# Patient Record
Sex: FEMALE | Race: WHITE | NOT HISPANIC OR LATINO | Employment: UNEMPLOYED | URBAN - METROPOLITAN AREA
[De-identification: names, ages, dates, MRNs, and addresses within clinical notes are randomized per-mention and may not be internally consistent; named-entity substitution may affect disease eponyms.]

---

## 2022-05-12 ENCOUNTER — OFFICE VISIT (OUTPATIENT)
Dept: URGENT CARE | Facility: CLINIC | Age: 47
End: 2022-05-12
Payer: MEDICAID

## 2022-05-12 VITALS
RESPIRATION RATE: 16 BRPM | OXYGEN SATURATION: 98 % | TEMPERATURE: 98 F | HEART RATE: 88 BPM | WEIGHT: 259 LBS | DIASTOLIC BLOOD PRESSURE: 96 MMHG | SYSTOLIC BLOOD PRESSURE: 157 MMHG

## 2022-05-12 DIAGNOSIS — R22.31 LOCALIZED SWELLING ON RIGHT HAND: ICD-10-CM

## 2022-05-12 DIAGNOSIS — T63.461A WASP STING, ACCIDENTAL OR UNINTENTIONAL, INITIAL ENCOUNTER: Primary | ICD-10-CM

## 2022-05-12 PROCEDURE — 4010F ACE/ARB THERAPY RXD/TAKEN: CPT | Mod: CPTII,S$GLB,, | Performed by: STUDENT IN AN ORGANIZED HEALTH CARE EDUCATION/TRAINING PROGRAM

## 2022-05-12 PROCEDURE — 3077F PR MOST RECENT SYSTOLIC BLOOD PRESSURE >= 140 MM HG: ICD-10-PCS | Mod: CPTII,S$GLB,, | Performed by: STUDENT IN AN ORGANIZED HEALTH CARE EDUCATION/TRAINING PROGRAM

## 2022-05-12 PROCEDURE — 3077F SYST BP >= 140 MM HG: CPT | Mod: CPTII,S$GLB,, | Performed by: STUDENT IN AN ORGANIZED HEALTH CARE EDUCATION/TRAINING PROGRAM

## 2022-05-12 PROCEDURE — 3080F DIAST BP >= 90 MM HG: CPT | Mod: CPTII,S$GLB,, | Performed by: STUDENT IN AN ORGANIZED HEALTH CARE EDUCATION/TRAINING PROGRAM

## 2022-05-12 PROCEDURE — 99203 PR OFFICE/OUTPT VISIT, NEW, LEVL III, 30-44 MIN: ICD-10-PCS | Mod: S$GLB,,, | Performed by: STUDENT IN AN ORGANIZED HEALTH CARE EDUCATION/TRAINING PROGRAM

## 2022-05-12 PROCEDURE — 99203 OFFICE O/P NEW LOW 30 MIN: CPT | Mod: S$GLB,,, | Performed by: STUDENT IN AN ORGANIZED HEALTH CARE EDUCATION/TRAINING PROGRAM

## 2022-05-12 PROCEDURE — 1159F PR MEDICATION LIST DOCUMENTED IN MEDICAL RECORD: ICD-10-PCS | Mod: CPTII,S$GLB,, | Performed by: STUDENT IN AN ORGANIZED HEALTH CARE EDUCATION/TRAINING PROGRAM

## 2022-05-12 PROCEDURE — 3080F PR MOST RECENT DIASTOLIC BLOOD PRESSURE >= 90 MM HG: ICD-10-PCS | Mod: CPTII,S$GLB,, | Performed by: STUDENT IN AN ORGANIZED HEALTH CARE EDUCATION/TRAINING PROGRAM

## 2022-05-12 PROCEDURE — 1160F RVW MEDS BY RX/DR IN RCRD: CPT | Mod: CPTII,S$GLB,, | Performed by: STUDENT IN AN ORGANIZED HEALTH CARE EDUCATION/TRAINING PROGRAM

## 2022-05-12 PROCEDURE — 1159F MED LIST DOCD IN RCRD: CPT | Mod: CPTII,S$GLB,, | Performed by: STUDENT IN AN ORGANIZED HEALTH CARE EDUCATION/TRAINING PROGRAM

## 2022-05-12 PROCEDURE — 4010F PR ACE/ARB THEARPY RXD/TAKEN: ICD-10-PCS | Mod: CPTII,S$GLB,, | Performed by: STUDENT IN AN ORGANIZED HEALTH CARE EDUCATION/TRAINING PROGRAM

## 2022-05-12 PROCEDURE — 1160F PR REVIEW ALL MEDS BY PRESCRIBER/CLIN PHARMACIST DOCUMENTED: ICD-10-PCS | Mod: CPTII,S$GLB,, | Performed by: STUDENT IN AN ORGANIZED HEALTH CARE EDUCATION/TRAINING PROGRAM

## 2022-05-12 RX ORDER — FAMOTIDINE 40 MG/1
40 TABLET, FILM COATED ORAL DAILY
Qty: 5 TABLET | Refills: 0 | Status: SHIPPED | OUTPATIENT
Start: 2022-05-12 | End: 2023-08-21

## 2022-05-12 RX ORDER — DEXAMETHASONE SODIUM PHOSPHATE 4 MG/ML
8 INJECTION, SOLUTION INTRA-ARTICULAR; INTRALESIONAL; INTRAMUSCULAR; INTRAVENOUS; SOFT TISSUE
Status: COMPLETED | OUTPATIENT
Start: 2022-05-12 | End: 2022-05-12

## 2022-05-12 RX ADMIN — DEXAMETHASONE SODIUM PHOSPHATE 8 MG: 4 INJECTION, SOLUTION INTRA-ARTICULAR; INTRALESIONAL; INTRAMUSCULAR; INTRAVENOUS; SOFT TISSUE at 09:05

## 2022-05-12 NOTE — PROGRESS NOTES
Subjective:       Patient ID: Brande Reyer is a 46 y.o. female.    Vitals:  weight is 117.5 kg (259 lb). Her temperature is 97.7 °F (36.5 °C). Her blood pressure is 157/96 (abnormal) and her pulse is 88. Her respiration is 16 and oxygen saturation is 98%.     Chief Complaint: Insect Bite    Patient is a 46-year-old female with past medical history of hypertension, hyperlipidemia, type 2 diabetes, and chronic kidney disease who presents to clinic for evaluation of insect sting.  Patient reports insect sting occurred yesterday.  Patient states she was stung by a wasp on the back of her right hand.  Patient states sting occurred while cleaning her pool.  Patient states has been taking Zyrtec and Claritin with some relief to symptoms.  Patient states symptoms are not completely resolved with this.  Patient states she primarily experiences slight pain to hand however more so swelling to the right hand.  Patient states she does not believe it is infected but needs something to help with the swelling.  Patient states does not have any abnormal sensation to include numbness or tingling or any skin discoloration including redness or bruising.  Patient states does have some decreased  strength but states this is due to the swelling.  Patient states has been stung by wasp in the past and has had similar reactions.  Patient denies any feeling of oral swelling, difficulty speaking or swallowing, excessive drooling, sore throat, fever or chills, chest pain or palpitations, shortness of breath or cough, abnormal breath sounds, headaches or dizziness.    Insect Bite  This is a new problem. The current episode started yesterday. Associated symptoms include arthralgias (Right hand) and joint swelling (Right hand). Pertinent negatives include no abdominal pain, chest pain, chills, congestion, coughing, diaphoresis, fatigue, fever, headaches, nausea, neck pain, numbness, rash, sore throat or vomiting. Nothing aggravates the  symptoms. She has tried nothing for the symptoms.       Constitution: Negative. Negative for chills, sweating, fatigue and fever.   HENT: Negative.  Negative for ear pain, drooling, facial swelling, congestion, sore throat, trouble swallowing and voice change.    Neck: neck negative. Negative for neck pain, neck stiffness, painful lymph nodes and neck swelling.   Cardiovascular: Negative.  Negative for chest pain and palpitations.   Eyes: Negative.    Respiratory: Negative.  Negative for chest tightness, cough, shortness of breath, stridor and wheezing.    Gastrointestinal: Negative.  Negative for abdominal pain, nausea, vomiting and diarrhea.   Endocrine: negative.   Genitourinary: Negative.  Negative for dysuria, frequency and urgency.   Musculoskeletal: Positive for joint pain (Right hand) and joint swelling (Right hand).   Skin: Negative.  Negative for color change, pale, rash, erythema, bruising and hives.   Allergic/Immunologic: Negative.  Negative for hives.   Neurological: Negative.  Negative for dizziness, light-headedness, passing out, headaches, disorientation, altered mental status, numbness and tingling.   Hematologic/Lymphatic: Negative.  Negative for swollen lymph nodes.   Psychiatric/Behavioral: Negative.  Negative for altered mental status, disorientation and confusion.       Objective:      Physical Exam   Constitutional: She is oriented to person, place, and time. She appears well-developed. She is cooperative.  Non-toxic appearance. She does not appear ill. No distress.   HENT:   Head: Normocephalic and atraumatic.   Ears:   Right Ear: Hearing, tympanic membrane, external ear and ear canal normal.   Left Ear: Hearing, tympanic membrane, external ear and ear canal normal.   Nose: Nose normal. No mucosal edema, rhinorrhea, nasal deformity or congestion. No epistaxis. Right sinus exhibits no maxillary sinus tenderness and no frontal sinus tenderness. Left sinus exhibits no maxillary sinus tenderness  and no frontal sinus tenderness.   Mouth/Throat: Uvula is midline, oropharynx is clear and moist and mucous membranes are normal. Mucous membranes are moist. No trismus in the jaw. Normal dentition. No uvula swelling. No oropharyngeal exudate or posterior oropharyngeal erythema. Oropharynx is clear.   Eyes: Conjunctivae and lids are normal. Pupils are equal, round, and reactive to light. Right eye exhibits no discharge. Left eye exhibits no discharge. No scleral icterus.   Neck: Trachea normal and phonation normal. Neck supple. No neck rigidity present.   Cardiovascular: Normal rate, regular rhythm, normal heart sounds and normal pulses.   Pulmonary/Chest: Effort normal and breath sounds normal. No stridor. No respiratory distress. She has no wheezes. She has no rhonchi. She has no rales.   Abdominal: Normal appearance and bowel sounds are normal. She exhibits no distension. Soft. There is no abdominal tenderness.   Musculoskeletal: Normal range of motion.         General: No deformity. Normal range of motion.      Cervical back: She exhibits no tenderness.      Right hand: She exhibits tenderness (Mild) and swelling (Moderate). She exhibits normal capillary refill. Normal sensation noted. Decreased strength (Mild) noted.   Lymphadenopathy:     She has no cervical adenopathy.   Neurological: She is alert and oriented to person, place, and time. She exhibits normal muscle tone. Coordination normal.   Skin: Skin is warm, dry, intact, not diaphoretic, not pale and no rash. Capillary refill takes less than 2 seconds. No erythema   Psychiatric: Her speech is normal and behavior is normal. Judgment and thought content normal.   Nursing note and vitals reviewed.        Assessment:       1. Wasp sting, accidental or unintentional, initial encounter    2. Localized swelling on right hand          Plan:         Wasp sting, accidental or unintentional, initial encounter    Localized swelling on right hand    Other orders  -      dexamethasone injection 8 mg  -     famotidine (PEPCID) 40 MG tablet; Take 1 tablet (40 mg total) by mouth once daily. for 5 days  Dispense: 5 tablet; Refill: 0                 Patient with recent labs x3 days ago on May 9, 2022 were A1c returned at 5.5.  Discussed risk versus benefits of steroid with patient.  Patient requesting continued steroid injection for wasp sting.  Patient reports will monitor her blood sugars for the next 3-5 days more frequent than prior.  Decadron 8 mg IM in clinic.  Patient tolerated well.  No complications noted.  Take medications as prescribed.  Recommend continued use of Zyrtec and Claritin as directed by PCP.  Recommend elevation of right hand above level of heart.  Recommend use of ice as directed.  Tylenol per package instructions for any pain or fever.  Follow-up with PCP in 1-2 days.  Return to clinic as needed.  To ED for any new or acutely worsening symptoms.  Patient in agreement with plan of care.    DISCLAIMER: Please note that my documentation in this Electronic Healthcare Record was produced using speech recognition software and therefore may contain errors related to that software system.These could include grammar, punctuation and spelling errors or the inclusion/exclusion of phrases that were not intended. Garbled syntax, mangled pronouns, and other bizarre constructions may be attributed to that software system.

## 2022-06-17 ENCOUNTER — OFFICE VISIT (OUTPATIENT)
Dept: URGENT CARE | Facility: CLINIC | Age: 47
End: 2022-06-17
Payer: MEDICAID

## 2022-06-17 VITALS
DIASTOLIC BLOOD PRESSURE: 89 MMHG | SYSTOLIC BLOOD PRESSURE: 157 MMHG | BODY MASS INDEX: 49.34 KG/M2 | HEART RATE: 103 BPM | RESPIRATION RATE: 18 BRPM | WEIGHT: 251.31 LBS | HEIGHT: 60 IN | OXYGEN SATURATION: 96 % | TEMPERATURE: 100 F

## 2022-06-17 DIAGNOSIS — U07.1 COVID-19: ICD-10-CM

## 2022-06-17 DIAGNOSIS — U07.1 COVID-19 VIRUS DETECTED: ICD-10-CM

## 2022-06-17 DIAGNOSIS — R05.9 COUGH: Primary | ICD-10-CM

## 2022-06-17 LAB
CTP QC/QA: YES
SARS-COV-2 AG RESP QL IA.RAPID: POSITIVE

## 2022-06-17 PROCEDURE — 1159F MED LIST DOCD IN RCRD: CPT | Mod: CPTII,S$GLB,, | Performed by: STUDENT IN AN ORGANIZED HEALTH CARE EDUCATION/TRAINING PROGRAM

## 2022-06-17 PROCEDURE — 99214 PR OFFICE/OUTPT VISIT, EST, LEVL IV, 30-39 MIN: ICD-10-PCS | Mod: S$GLB,,, | Performed by: STUDENT IN AN ORGANIZED HEALTH CARE EDUCATION/TRAINING PROGRAM

## 2022-06-17 PROCEDURE — 3079F DIAST BP 80-89 MM HG: CPT | Mod: CPTII,S$GLB,, | Performed by: STUDENT IN AN ORGANIZED HEALTH CARE EDUCATION/TRAINING PROGRAM

## 2022-06-17 PROCEDURE — 3077F PR MOST RECENT SYSTOLIC BLOOD PRESSURE >= 140 MM HG: ICD-10-PCS | Mod: CPTII,S$GLB,, | Performed by: STUDENT IN AN ORGANIZED HEALTH CARE EDUCATION/TRAINING PROGRAM

## 2022-06-17 PROCEDURE — 87811 SARS-COV-2 COVID19 W/OPTIC: CPT | Mod: QW,S$GLB,, | Performed by: STUDENT IN AN ORGANIZED HEALTH CARE EDUCATION/TRAINING PROGRAM

## 2022-06-17 PROCEDURE — 87811 SARS CORONAVIRUS 2 ANTIGEN POCT, MANUAL READ: ICD-10-PCS | Mod: QW,S$GLB,, | Performed by: STUDENT IN AN ORGANIZED HEALTH CARE EDUCATION/TRAINING PROGRAM

## 2022-06-17 PROCEDURE — 4010F ACE/ARB THERAPY RXD/TAKEN: CPT | Mod: CPTII,S$GLB,, | Performed by: STUDENT IN AN ORGANIZED HEALTH CARE EDUCATION/TRAINING PROGRAM

## 2022-06-17 PROCEDURE — 1159F PR MEDICATION LIST DOCUMENTED IN MEDICAL RECORD: ICD-10-PCS | Mod: CPTII,S$GLB,, | Performed by: STUDENT IN AN ORGANIZED HEALTH CARE EDUCATION/TRAINING PROGRAM

## 2022-06-17 PROCEDURE — 3077F SYST BP >= 140 MM HG: CPT | Mod: CPTII,S$GLB,, | Performed by: STUDENT IN AN ORGANIZED HEALTH CARE EDUCATION/TRAINING PROGRAM

## 2022-06-17 PROCEDURE — 1160F PR REVIEW ALL MEDS BY PRESCRIBER/CLIN PHARMACIST DOCUMENTED: ICD-10-PCS | Mod: CPTII,S$GLB,, | Performed by: STUDENT IN AN ORGANIZED HEALTH CARE EDUCATION/TRAINING PROGRAM

## 2022-06-17 PROCEDURE — 3008F BODY MASS INDEX DOCD: CPT | Mod: CPTII,S$GLB,, | Performed by: STUDENT IN AN ORGANIZED HEALTH CARE EDUCATION/TRAINING PROGRAM

## 2022-06-17 PROCEDURE — 3008F PR BODY MASS INDEX (BMI) DOCUMENTED: ICD-10-PCS | Mod: CPTII,S$GLB,, | Performed by: STUDENT IN AN ORGANIZED HEALTH CARE EDUCATION/TRAINING PROGRAM

## 2022-06-17 PROCEDURE — 99214 OFFICE O/P EST MOD 30 MIN: CPT | Mod: S$GLB,,, | Performed by: STUDENT IN AN ORGANIZED HEALTH CARE EDUCATION/TRAINING PROGRAM

## 2022-06-17 PROCEDURE — 3079F PR MOST RECENT DIASTOLIC BLOOD PRESSURE 80-89 MM HG: ICD-10-PCS | Mod: CPTII,S$GLB,, | Performed by: STUDENT IN AN ORGANIZED HEALTH CARE EDUCATION/TRAINING PROGRAM

## 2022-06-17 PROCEDURE — 4010F PR ACE/ARB THEARPY RXD/TAKEN: ICD-10-PCS | Mod: CPTII,S$GLB,, | Performed by: STUDENT IN AN ORGANIZED HEALTH CARE EDUCATION/TRAINING PROGRAM

## 2022-06-17 PROCEDURE — 1160F RVW MEDS BY RX/DR IN RCRD: CPT | Mod: CPTII,S$GLB,, | Performed by: STUDENT IN AN ORGANIZED HEALTH CARE EDUCATION/TRAINING PROGRAM

## 2022-06-17 RX ORDER — AMOXICILLIN 875 MG/1
875 TABLET, FILM COATED ORAL EVERY 12 HOURS
Qty: 14 TABLET | Refills: 0 | Status: SHIPPED | OUTPATIENT
Start: 2022-06-17 | End: 2022-06-24

## 2022-06-17 RX ORDER — BENZONATATE 200 MG/1
200 CAPSULE ORAL 3 TIMES DAILY PRN
Qty: 30 CAPSULE | Refills: 0 | Status: SHIPPED | OUTPATIENT
Start: 2022-06-17 | End: 2022-06-27

## 2022-06-17 RX ORDER — SEVELAMER CARBONATE 800 MG/1
800 TABLET, FILM COATED ORAL 3 TIMES DAILY
COMMUNITY
Start: 2022-06-06

## 2022-06-17 RX ORDER — VIT B COMP NO.3/FOLIC/C/BIOTIN 1 MG-60 MG
1 TABLET ORAL EVERY MORNING
COMMUNITY
Start: 2022-06-08

## 2022-06-17 RX ORDER — CETIRIZINE HYDROCHLORIDE 10 MG/1
TABLET ORAL
COMMUNITY
Start: 2022-01-18

## 2022-06-17 RX ORDER — MISOPROSTOL 200 UG/1
200 TABLET ORAL 2 TIMES DAILY
COMMUNITY
Start: 2022-01-29 | End: 2022-06-17

## 2022-06-17 RX ORDER — LANCETS 33 GAUGE
EACH MISCELLANEOUS
COMMUNITY
Start: 2022-06-14 | End: 2023-06-20 | Stop reason: ALTCHOICE

## 2022-06-17 RX ORDER — LOSARTAN POTASSIUM 50 MG/1
50 TABLET ORAL
COMMUNITY
Start: 2022-06-09 | End: 2023-08-21

## 2022-06-17 RX ORDER — BUSPIRONE HYDROCHLORIDE 5 MG/1
5 TABLET ORAL 2 TIMES DAILY
COMMUNITY
Start: 2022-03-17

## 2022-06-17 RX ORDER — LANCETS 30 GAUGE
EACH MISCELLANEOUS 2 TIMES DAILY PRN
COMMUNITY
Start: 2022-06-01 | End: 2023-06-20 | Stop reason: ALTCHOICE

## 2022-06-17 RX ORDER — CITALOPRAM 20 MG/1
TABLET, FILM COATED ORAL
COMMUNITY
Start: 2022-03-17 | End: 2023-06-20 | Stop reason: ALTCHOICE

## 2022-06-17 RX ORDER — PRAVASTATIN SODIUM 20 MG/1
TABLET ORAL
COMMUNITY
Start: 2022-03-17

## 2022-06-17 RX ORDER — ALBUTEROL SULFATE 90 UG/1
1-2 AEROSOL, METERED RESPIRATORY (INHALATION) EVERY 6 HOURS PRN
Qty: 18 G | Refills: 0 | Status: SHIPPED | OUTPATIENT
Start: 2022-06-17 | End: 2023-08-21

## 2022-06-17 RX ORDER — MONTELUKAST SODIUM 10 MG/1
10 TABLET ORAL NIGHTLY
COMMUNITY
Start: 2022-04-19

## 2022-08-10 ENCOUNTER — PATIENT MESSAGE (OUTPATIENT)
Dept: TRANSPLANT | Facility: CLINIC | Age: 47
End: 2022-08-10
Payer: MEDICAID

## 2022-11-29 ENCOUNTER — PATIENT MESSAGE (OUTPATIENT)
Dept: TRANSPLANT | Facility: CLINIC | Age: 47
End: 2022-11-29
Payer: MEDICARE

## 2023-03-28 ENCOUNTER — PATIENT MESSAGE (OUTPATIENT)
Dept: RESEARCH | Facility: HOSPITAL | Age: 48
End: 2023-03-28
Payer: MEDICARE

## 2023-04-11 ENCOUNTER — PATIENT MESSAGE (OUTPATIENT)
Dept: RESEARCH | Facility: HOSPITAL | Age: 48
End: 2023-04-11
Payer: MEDICARE

## 2023-04-20 ENCOUNTER — PATIENT MESSAGE (OUTPATIENT)
Dept: TRANSPLANT | Facility: CLINIC | Age: 48
End: 2023-04-20
Payer: MEDICARE

## 2023-04-24 ENCOUNTER — TELEPHONE (OUTPATIENT)
Dept: TRANSPLANT | Facility: CLINIC | Age: 48
End: 2023-04-24
Payer: MEDICARE

## 2023-04-26 ENCOUNTER — TELEPHONE (OUTPATIENT)
Dept: TRANSPLANT | Facility: CLINIC | Age: 48
End: 2023-04-26
Payer: MEDICARE

## 2023-04-27 ENCOUNTER — TELEPHONE (OUTPATIENT)
Dept: TRANSPLANT | Facility: CLINIC | Age: 48
End: 2023-04-27
Payer: MEDICARE

## 2023-05-02 ENCOUNTER — PATIENT MESSAGE (OUTPATIENT)
Dept: TRANSPLANT | Facility: CLINIC | Age: 48
End: 2023-05-02
Payer: MEDICARE

## 2023-05-04 DIAGNOSIS — Z76.82 ORGAN TRANSPLANT CANDIDATE: Primary | ICD-10-CM

## 2023-06-20 ENCOUNTER — HOSPITAL ENCOUNTER (OUTPATIENT)
Dept: RADIOLOGY | Facility: HOSPITAL | Age: 48
Discharge: HOME | End: 2023-06-20
Attending: NURSE PRACTITIONER
Payer: MEDICARE

## 2023-06-20 ENCOUNTER — TELEPHONE (OUTPATIENT)
Dept: TRANSPLANT | Facility: CLINIC | Age: 48
End: 2023-06-20
Payer: MEDICARE

## 2023-06-20 ENCOUNTER — OFFICE VISIT (OUTPATIENT)
Dept: TRANSPLANT | Facility: CLINIC | Age: 48
End: 2023-06-20
Payer: MEDICARE

## 2023-06-20 ENCOUNTER — DOCUMENTATION ONLY (OUTPATIENT)
Dept: TRANSPLANT | Facility: CLINIC | Age: 48
End: 2023-06-20
Payer: MEDICARE

## 2023-06-20 VITALS
HEART RATE: 89 BPM | WEIGHT: 223.56 LBS | DIASTOLIC BLOOD PRESSURE: 70 MMHG | RESPIRATION RATE: 16 BRPM | SYSTOLIC BLOOD PRESSURE: 142 MMHG | HEIGHT: 64 IN | OXYGEN SATURATION: 100 % | TEMPERATURE: 98 F | BODY MASS INDEX: 38.17 KG/M2

## 2023-06-20 DIAGNOSIS — E66.01 OBESITY, MORBID: ICD-10-CM

## 2023-06-20 DIAGNOSIS — E11.21 TYPE 2 DIABETES MELLITUS WITH DIABETIC NEPHROPATHY, UNSPECIFIED WHETHER LONG TERM INSULIN USE: ICD-10-CM

## 2023-06-20 DIAGNOSIS — Z76.82 ORGAN TRANSPLANT CANDIDATE: ICD-10-CM

## 2023-06-20 DIAGNOSIS — D63.1 ANEMIA IN CHRONIC KIDNEY DISEASE, UNSPECIFIED CKD STAGE: ICD-10-CM

## 2023-06-20 DIAGNOSIS — N18.9 ANEMIA IN CHRONIC KIDNEY DISEASE, UNSPECIFIED CKD STAGE: ICD-10-CM

## 2023-06-20 DIAGNOSIS — T78.2XXA ANAPHYLACTIC SHOCK, UNSPECIFIED, INITIAL ENCOUNTER: ICD-10-CM

## 2023-06-20 DIAGNOSIS — F32.1 MAJOR DEPRESSIVE DISORDER, SINGLE EPISODE, MODERATE: ICD-10-CM

## 2023-06-20 DIAGNOSIS — I10 ESSENTIAL (PRIMARY) HYPERTENSION: ICD-10-CM

## 2023-06-20 DIAGNOSIS — Z87.59 HISTORY OF STILLBIRTH: ICD-10-CM

## 2023-06-20 DIAGNOSIS — N18.6 END STAGE RENAL DISEASE: Primary | ICD-10-CM

## 2023-06-20 DIAGNOSIS — F41.9 ANXIETY DISORDER, UNSPECIFIED TYPE: ICD-10-CM

## 2023-06-20 DIAGNOSIS — E78.5 HYPERLIPIDEMIA, UNSPECIFIED HYPERLIPIDEMIA TYPE: ICD-10-CM

## 2023-06-20 DIAGNOSIS — R80.9 PROTEINURIA, UNSPECIFIED TYPE: ICD-10-CM

## 2023-06-20 PROBLEM — Z99.2 HEMODIALYSIS ACCESS, AV GRAFT: Status: ACTIVE | Noted: 2022-11-14

## 2023-06-20 PROBLEM — F17.200 NICOTINE DEPENDENCE, UNSPECIFIED, UNCOMPLICATED: Status: ACTIVE | Noted: 2023-02-17

## 2023-06-20 PROCEDURE — 71046 XR CHEST PA AND LATERAL: ICD-10-PCS | Mod: 26,TXP,, | Performed by: RADIOLOGY

## 2023-06-20 PROCEDURE — 93978 VASCULAR STUDY: CPT | Mod: 26,TXP,, | Performed by: RADIOLOGY

## 2023-06-20 PROCEDURE — 99244 OFF/OP CNSLTJ NEW/EST MOD 40: CPT | Mod: S$PBB,TXP,, | Performed by: SURGERY

## 2023-06-20 PROCEDURE — 99999 PR PBB SHADOW E&M-EST. PATIENT-LVL V: ICD-10-PCS | Mod: PBBFAC,TXP,, | Performed by: STUDENT IN AN ORGANIZED HEALTH CARE EDUCATION/TRAINING PROGRAM

## 2023-06-20 PROCEDURE — 99244 PR OFFICE CONSULTATION,LEVEL IV: ICD-10-PCS | Mod: S$PBB,TXP,, | Performed by: SURGERY

## 2023-06-20 PROCEDURE — 72170 X-RAY EXAM OF PELVIS: CPT | Mod: 26,TXP,, | Performed by: RADIOLOGY

## 2023-06-20 PROCEDURE — 99215 OFFICE O/P EST HI 40 MIN: CPT | Mod: S$PBB,TXP,, | Performed by: STUDENT IN AN ORGANIZED HEALTH CARE EDUCATION/TRAINING PROGRAM

## 2023-06-20 PROCEDURE — 72170 XR PELVIS ROUTINE AP: ICD-10-PCS | Mod: 26,TXP,, | Performed by: RADIOLOGY

## 2023-06-20 PROCEDURE — 71046 X-RAY EXAM CHEST 2 VIEWS: CPT | Mod: TC,TXP

## 2023-06-20 PROCEDURE — 99999 PR PBB SHADOW E&M-EST. PATIENT-LVL V: CPT | Mod: PBBFAC,TXP,, | Performed by: STUDENT IN AN ORGANIZED HEALTH CARE EDUCATION/TRAINING PROGRAM

## 2023-06-20 PROCEDURE — 99215 OFFICE O/P EST HI 40 MIN: CPT | Mod: PBBFAC,25,TXP | Performed by: STUDENT IN AN ORGANIZED HEALTH CARE EDUCATION/TRAINING PROGRAM

## 2023-06-20 PROCEDURE — 99204 PR OFFICE/OUTPT VISIT, NEW, LEVL IV, 45-59 MIN: ICD-10-PCS | Mod: S$PBB,TXP,, | Performed by: REGISTERED NURSE

## 2023-06-20 PROCEDURE — 93978 US DOPP ILIACS BILATERAL: ICD-10-PCS | Mod: 26,TXP,, | Performed by: RADIOLOGY

## 2023-06-20 PROCEDURE — 99204 OFFICE O/P NEW MOD 45 MIN: CPT | Mod: S$PBB,TXP,, | Performed by: REGISTERED NURSE

## 2023-06-20 PROCEDURE — 71046 X-RAY EXAM CHEST 2 VIEWS: CPT | Mod: 26,TXP,, | Performed by: RADIOLOGY

## 2023-06-20 PROCEDURE — 72170 X-RAY EXAM OF PELVIS: CPT | Mod: TC,TXP

## 2023-06-20 PROCEDURE — 99215 PR OFFICE/OUTPT VISIT, EST, LEVL V, 40-54 MIN: ICD-10-PCS | Mod: S$PBB,TXP,, | Performed by: STUDENT IN AN ORGANIZED HEALTH CARE EDUCATION/TRAINING PROGRAM

## 2023-06-20 PROCEDURE — 93978 VASCULAR STUDY: CPT | Mod: TC,TXP

## 2023-06-20 RX ORDER — ERGOCALCIFEROL 1.25 MG/1
50000 CAPSULE ORAL
COMMUNITY
Start: 2023-02-28

## 2023-06-20 RX ORDER — CLONIDINE HYDROCHLORIDE 0.1 MG/1
0.2 TABLET ORAL NIGHTLY
COMMUNITY
Start: 2023-05-26

## 2023-06-20 RX ORDER — L. ACIDOPHILUS/L.BULGARICUS 100MM CELL
1 GRANULES IN PACKET (EA) ORAL DAILY
COMMUNITY

## 2023-06-20 RX ORDER — CALCITRIOL 0.25 UG/1
0.25 CAPSULE ORAL
COMMUNITY
Start: 2023-02-28

## 2023-06-20 RX ORDER — BUPROPION HYDROCHLORIDE 100 MG/1
100 TABLET ORAL 2 TIMES DAILY
COMMUNITY
Start: 2023-05-26 | End: 2023-08-21

## 2023-06-20 RX ORDER — FLUOXETINE HYDROCHLORIDE 20 MG/1
40 CAPSULE ORAL DAILY
COMMUNITY
Start: 2023-05-26

## 2023-06-20 RX ORDER — ACETAMINOPHEN AND PHENYLEPHRINE HCL 325; 5 MG/1; MG/1
1 TABLET ORAL DAILY
COMMUNITY

## 2023-06-20 RX ORDER — ALPRAZOLAM 0.25 MG/1
TABLET ORAL
COMMUNITY
Start: 2023-05-26

## 2023-06-20 RX ORDER — DOCUSATE SODIUM 100 MG/1
CAPSULE ORAL
COMMUNITY
Start: 2023-02-28

## 2023-06-20 NOTE — PROGRESS NOTES
INITIAL PATIENT EDUCATION NOTE    Ms. Brande Reyer was seen in pre-kidney transplant clinic for evaluation for kidney, kidney/pancreas or pancreas only transplant.  The patient attended a an individual video education session that discussed/reviewed the following aspects of transplantation: evaluation including diagnostic and laboratory testing,( Chemistries, Hematology, Serologies including HIV and Hepatitis and HLA) required for transplantation and selection committee process, UNOS waitlist management/multiple listings, types of organs offered (KDPI < 85%, KDPI > 85%, PHS risk, DCD, HCV+, HIV+ for HIV+ recipients and enbloc/dual), financial aspects, surgical procedures, dietary instruction pre- and post-transplant, health maintenance pre- and post-transplant, post-transplant hospitalization and outpatient follow-up, potential to participate in a research protocol, and medication management and side effects.  A question and answer session was provided after the presentation.    The patient was seen by all members of the multi-disciplinary team to include: Nephrologist/LISA, Surgeon, , Transplant Coordinator, , Pharmacist and Dietician (if applicable).    The patient reviewed and signed all consents for evaluation which were witnessed and sent to scanning into the [facility] chart.    The patient was given an education book and plan for further evaluation based on her individual assessment.      Reviewed program requirement for complete COVID vaccination with documentation prior to listing.  COVID education information reviewed with patient. Patient encouraged to be up to date on all vaccinations.       The patient was informed that the transplant team would manage immediate post op pain. If the patient requires long term pain management, they will need to have that pain management addressed by their PCP or previous provider who wrote for long term pain medicines.    The patient was  encouraged to call with any questions or concerns.

## 2023-06-20 NOTE — TELEPHONE ENCOUNTER
Reviewed pt transplant labs.  Notified dialysis unit dietitian of the following abnormal labs via fax and requested their most recent nutrition note on this pt.  Once this note is received it will be scanned into pt's chart.     Glucose 128

## 2023-06-20 NOTE — PROGRESS NOTES
Transplant Surgery  Kidney Transplant Recipient Evaluation    Referring Physician: Samuel Knutson  Current Nephrologist: Tam Mathis    Subjective:     Reason for Visit: evaluate transplant candidacy    History of Present Illness: Brande Reyer is a 48 y.o. year old female undergoing transplant evaluation.    Dialysis History: [facility] is on hemodialysis.      Transplant History: N/A    Etiology of Renal Disease: Diabetes Mellitus - Type II (based on medical records from referral).    External provider notes reviewed: No    Review of Systems   Constitutional:  Positive for fatigue.   HENT:  Negative for drooling, postnasal drip and sore throat.    Eyes:  Negative for discharge and itching.   Respiratory:  Negative for choking and stridor.    Gastrointestinal:  Negative for rectal pain.   Endocrine: Negative for polydipsia.   Genitourinary:  Negative for enuresis and genital sores.   Musculoskeletal:  Negative for back pain, neck pain and neck stiffness.   Allergic/Immunologic: Negative for immunocompromised state.   Neurological:  Negative for facial asymmetry and numbness.   Hematological:  Negative for adenopathy.   Psychiatric/Behavioral:  Negative for behavioral problems, self-injury and suicidal ideas.    Objective:     Physical Exam:  Constitutional:   Vitals reviewed: yes   Well-nourished and well-groomed: yes  Eyes:   Sclerae icteric: no   Extraocular movements intact: yes  GI:    Bowel sounds normal: yes   Tenderness: no    If yes, quadrant/location: not applicable   Palpable masses: no    If yes, quadrant/location: not applicable   Hepatosplenomegaly: no   Ascites: no   Hernia: no    If yes, type/location: not applicable   Surgical scars: yes    If yes, type/location: Pfannenstiel  laparoscopic port sites  Resp:   Effort normal: yes   Breath sounds normal: yes    CV:   Regular rate and rhythm: yes   Heart sounds normal: yes   Femoral pulses normal: yes   Extremities edematous: no  Skin:   Rashes or  lesions present: no    If yes, describe:not applicable   Jaundice:: no    Musculoskeletal:   Gait normal: yes   Strength normal: yes  Psych:   Oriented to person, place, and time: yes   Affect and mood normal: yes    Additional comments: not applicable    Diagnostics:  The following labs have been reviewed: CBC  CMP    Counseling: We provided Brande Reyer with a group education session today.  We discussed kidney transplantation at length with her, including risks, potential complications, and alternatives in the management of her renal failure.  The discussion included complications related to anesthesia, bleeding, infection, primary nonfunction, and ATN.  I discussed the typical postoperative course, length of hospitalization, the need for long-term immunosuppression, and the need for long-term routine follow-up.  I discussed living-donor and -donor transplantation and the relative advantages and disadvantages of each.  I also discussed average waiting times for both living donation and  donation.  I discussed national and center-specific survival rates.  I also mentioned the potential benefit of multicenter listing to candidates listed with centers within more than one organ procurement organization.  All questions were answered.    Patient advised that it is recommended that all transplant candidates, and their close contacts and household members receive Covid vaccination.    Final determination of transplant candidacy will be made once evaluation is complete and reviewed by the Kidney & Kidney/Pancreas Selection Committee.    Coronavirus disease (COVID-19) caused by severe acute respiratory virus coronavirus 2 (SARS-C0V 2) is associated with increased mortality in solid organ transplant recipients (SOT) compared to non-transplant patients. Vaccine responses to vaccination are depressed against SARS-CoV2 compared to normal individuals but improve with third vaccination doses. Vaccination prior  to SOT provides both the best opportunity for transplant candidates to develop protective immunity and to reduce the risk of serious COVID19 infections post transplantation. Organ transplant candidates at [facility] will be required to receive SARS-CoV-2 vaccination prior to being listed with a an active status, whenever possible. Exceptions will be made for disability related reasons or for sincerely held Rastafari beliefs.          Transplant Surgery - Candidacy   Assessment/Plan:   Brande Reyer is pre-dialysis with CKD stage 4 (GFR 15-29 mL/min). I have concerns with faint pulses on physical exam and long history of smoking - will need bilateral iliac dopplers.. Based on available information, Brande Reyer is a suitable kidney transplant candidate.     Additional testing to be completed according to the Written Order Guidelines for Adult Pre-kidney and Pancreas Transplant Evaluation (KI-02).  Interpretation of tests and discussion of patient management involves all members of the multidisciplinary transplant team.    Karthik Stover MD

## 2023-06-20 NOTE — PROGRESS NOTES
PHARM.D. PRE-TRANSPLANT NOTE:    This patient's medication therapy was evaluated as part of her pre-transplant evaluation.      The following general pharmacologic concerns were noted: none    The following concerns for post-operative pain management were noted: none    The following pharmacologic concerns related to HCV therapy were noted: none      This patient's medication profile was reviewed for considerations for DAA Hepatitis C therapy:    [x]  No current inducers of CYP 3A4 or PGP  [x]  No amiodarone on this patient's EMR profile in the last 24 months  [x]  No past or current atrial fibrillation on this patient's EMR profile       Current Outpatient Medications   Medication Sig Dispense Refill    albuterol (VENTOLIN HFA) 90 mcg/actuation inhaler Inhale 1-2 puffs into the lungs every 6 (six) hours as needed for Wheezing or Shortness of Breath. Rescue 18 g 0    ALPRAZolam (XANAX) 0.25 MG tablet TAKE ONE TABLET BY MOUTH TWICE DAILY ON DIALYSIS DAYS. ALL OTHER DAYS TAKE ONE TABLET BY MOUTH AT BEDTIME      biotin 10,000 mcg Cap Take 1 capsule by mouth once daily.      buPROPion (WELLBUTRIN) 100 MG tablet Take 100 mg by mouth 2 (two) times a day.      busPIRone (BUSPAR) 5 MG Tab Take 5 mg by mouth 2 (two) times daily.      calcitRIOL (ROCALTROL) 0.25 MCG Cap Take 0.25 mcg by mouth 3 (three) times a week.      cetirizine (ZYRTEC) 10 MG tablet cetirizine 10 mg tablet   TAKE ONE TABLET BY MOUTH DAILY      cloNIDine (CATAPRES) 0.1 MG tablet Take 0.1 mg by mouth every evening.      COLACE 100 mg capsule SMARTSI Capsule(s) By Mouth Every Other Day PRN      ergocalciferol (ERGOCALCIFEROL) 50,000 unit Cap Take 50,000 Units by mouth every 14 (fourteen) days.      FLUoxetine 20 MG capsule Take 20 mg by mouth once daily.      lactobacillus acidophilus & bulgar (LACTINEX) 100 million cell packet Take 1 tablet by mouth once daily.      losartan (COZAAR) 50 MG tablet Take 50 mg by mouth.      montelukast (SINGULAIR) 10 mg  tablet Take 10 mg by mouth nightly.      pravastatin (PRAVACHOL) 20 MG tablet pravastatin 20 mg tablet   TAKE ONE TABLET BY MOUTH DAILY      VIRGIL-GIO RX 1- mg-mg-mcg Tab Take 1 tablet by mouth every morning.      sevelamer carbonate (RENVELA) 800 mg Tab Take 800 mg by mouth 3 (three) times daily.      famotidine (PEPCID) 40 MG tablet Take 1 tablet (40 mg total) by mouth once daily. for 5 days (Patient not taking: Reported on 6/20/2023) 5 tablet 0     No current facility-administered medications for this visit.           I am available for consultation and can be contacted, as needed by the other members of the Transplant team.

## 2023-06-20 NOTE — PROGRESS NOTES
"   Transplant Nephrology  Kidney Transplant Recipient Evaluation    Referring Physician: Samuel Knutson  Current Nephrologist: Tam Mathis    CC:   Initial evaluation of kidney transplant candidacy.    HPI:  Ms. Reyer is a 48 y.o. year old White female who presented to [facility] for evaluation as a potential kidney transplant recipient.  She has ESRD secondary to unknown etiology and worst a1c in life was 7, had proteinuria, never had kidney biopsy . Patient is currently on hemodialysis started on 2022. Patient is dialyzing on home hemo every other day.  Patient reports that she is tolerating dialysis well.. She has a LUE AV fistula for dialysis access.    She does basic activities on daily basis without any symptoms of chest pain, SOB. Ambulates without an assistive device.    Light-touch clinical frailty:   No - Occasionally or most of the time last week "Everything was an effort"  No - Unintentional weight loss in the prior year  No - do 0-1 of the following: walking for exercise, moderately strenuous chores, any other physical activities  39 - time to cross-arm stand 15 times  Bilateral excellent -  strength  Home O2: No  Hypotension: No  Active wound: No  HIV: No  HEP C: No  Active Cancer: No  Previous neurogenic bladder/Self-cath/recurrent UTI: No  Current urinary output: Yes, describe: 1.5L  Anticoagulation/ antiplatelet therapy and reason: No  History of DM: Yes, describe: worst a1c 7 diagnosed in   Meets eligibility for SKP: She does not require insulin. She does not have history of DKA, hypoglycemia.   Previous Transplant: No   Potential Donor: Yes   Past Medical History:   Diagnosis Date    Allergy     Diabetes mellitus, type 2     Disorder of kidney and ureter     GERD (gastroesophageal reflux disease)     Hyperlipidemia     Hypertension      Past Surgical History:   Procedure Laterality Date     SECTION      HYSTERECTOMY      INSERTION OF DIALYSIS CATHETER      TUBAL LIGATION       No " family history on file.  Social History     Socioeconomic History    Marital status:    Tobacco Use    Smoking status: Every Day     Packs/day: 1.00     Types: Cigarettes    Smokeless tobacco: Never   Substance and Sexual Activity    Alcohol use: Not Currently    Drug use: Never    Sexual activity: Not Currently   Social History Narrative    Caregiver      Current Outpatient Medications   Medication Sig Dispense Refill    albuterol (VENTOLIN HFA) 90 mcg/actuation inhaler Inhale 1-2 puffs into the lungs every 6 (six) hours as needed for Wheezing or Shortness of Breath. Rescue 18 g 0    ALPRAZolam (XANAX) 0.25 MG tablet TAKE ONE TABLET BY MOUTH TWICE DAILY ON DIALYSIS DAYS. ALL OTHER DAYS TAKE ONE TABLET BY MOUTH AT BEDTIME      biotin 10,000 mcg Cap Take 1 capsule by mouth once daily.      buPROPion (WELLBUTRIN) 100 MG tablet Take 100 mg by mouth 2 (two) times a day.      busPIRone (BUSPAR) 5 MG Tab Take 5 mg by mouth 2 (two) times daily.      calcitRIOL (ROCALTROL) 0.25 MCG Cap Take 0.25 mcg by mouth 3 (three) times a week.      cetirizine (ZYRTEC) 10 MG tablet cetirizine 10 mg tablet   TAKE ONE TABLET BY MOUTH DAILY      cloNIDine (CATAPRES) 0.1 MG tablet Take 0.1 mg by mouth every evening.      COLACE 100 mg capsule SMARTSI Capsule(s) By Mouth Every Other Day PRN      ergocalciferol (ERGOCALCIFEROL) 50,000 unit Cap Take 50,000 Units by mouth every 14 (fourteen) days.      FLUoxetine 20 MG capsule Take 20 mg by mouth once daily.      lactobacillus acidophilus & bulgar (LACTINEX) 100 million cell packet Take 1 tablet by mouth once daily.      losartan (COZAAR) 50 MG tablet Take 50 mg by mouth.      montelukast (SINGULAIR) 10 mg tablet Take 10 mg by mouth nightly.      pravastatin (PRAVACHOL) 20 MG tablet pravastatin 20 mg tablet   TAKE ONE TABLET BY MOUTH DAILY      VIRGIL-GIO RX 1- mg-mg-mcg Tab Take 1 tablet by mouth every morning.      sevelamer carbonate (RENVELA) 800 mg Tab Take 800 mg by  "mouth 3 (three) times daily.      famotidine (PEPCID) 40 MG tablet Take 1 tablet (40 mg total) by mouth once daily. for 5 days (Patient not taking: Reported on 6/20/2023) 5 tablet 0     No current facility-administered medications for this visit.     Review of Systems   Constitutional: Negative.    HENT: Negative.     Eyes: Negative.    Respiratory: Negative.     Cardiovascular: Negative.    Gastrointestinal: Negative.    Genitourinary: Negative.    Musculoskeletal: Negative.    Skin: Negative.    Neurological: Negative.    Endo/Heme/Allergies: Negative.    Psychiatric/Behavioral: Negative.     Blood pressure (!) 142/70, pulse 89, temperature 97.7 °F (36.5 °C), temperature source Temporal, resp. rate 16, height 5' 3.9" (1.623 m), weight 101.4 kg (223 lb 8.7 oz), SpO2 100 %.body mass index is 38.49 kg/m².  Physical Exam  Vitals and nursing note reviewed.   Constitutional:       General: She is not in acute distress.     Appearance: She is obese.   Eyes:      General: No scleral icterus.  Cardiovascular:      Rate and Rhythm: Normal rate.   Pulmonary:      Effort: Pulmonary effort is normal. No respiratory distress.   Abdominal:      Palpations: Abdomen is soft.   Musculoskeletal:         General: Deformity (LUE AVF) present. No swelling.      Right lower leg: No edema.      Left lower leg: No edema.   Skin:     Coloration: Skin is not jaundiced.   Neurological:      Mental Status: She is alert.      Lab Results   Component Value Date    WBC 7.77 06/20/2023    HGB 9.5 (L) 06/20/2023    HCT 29.5 (L) 06/20/2023     06/20/2023    K 4.6 06/20/2023     06/20/2023    CO2 25 06/20/2023    BUN 47 (H) 06/20/2023    CREATININE 6.7 (H) 06/20/2023    CALCIUM 9.4 06/20/2023    PHOS 4.1 06/20/2023    ALBUMIN 3.8 06/20/2023    AST 11 06/20/2023    ALT 11 06/20/2023    .3 (H) 06/20/2023     No results found for: PREALBUMIN, BILIRUBINUA, GGT, AMYLASE, LIPASE, PROTEINUA, NITRITE, RBCUA, WBCUA  No results found for: " HLAABCTYPE  Labs were reviewed with the patient.    ASSESSMENT  1. End stage renal disease    2. Essential (primary) hypertension    3. Major depressive disorder, single episode, moderate    4. Anxiety disorder, unspecified type    5. Hyperlipidemia, unspecified hyperlipidemia type    6. Anemia in chronic kidney disease, unspecified CKD stage    7. Obesity, morbid    8. Type 2 diabetes mellitus with diabetic nephropathy, unspecified whether long term insulin use    9. History of stillbirth    10. Anaphylactic shock, unspecified, initial encounter      PLAN    Mrs. Reyer is a 48 year old female with ESKD from unknown cause with good compliance and low frailty. She started home hemodialysis in 5/2022 and quit smoking after 30 yrs at that time. She needs standard workup including cardiac stress test and will also need SPEP, IF for her proteinuria. She is an excellent candidate for kidney transplant though we have recommended continued weight loss and necessary age relate screening.    Transplant Candidacy:   Based on available information, Ms. Reyer is a suitable kidney transplant candidate.   Meets center eligibility for accepting HCV+ donor offer - Yes.  Patient educated on HCV+ donors. [facility] is willing to accept HCV+ donor offer - Yes   Patient is a candidate for KDPI > 85 kidney donor offer - No.  Final determination of transplant candidacy will be made once workup is complete and reviewed by the selection committee.    Patient advised that it is recommended that all transplant candidates, and their close contacts and household members receive Covid vaccination.    Andrea Adhikari Jr., MD       [facility] Patient Status  Functional Status: 40% - Disabled: requires special care and assistance  Physical Capacity: No Limitations

## 2023-06-20 NOTE — PROGRESS NOTES
PRE-TRANSPLANT INFECTIOUS DISEASE CONSULT    Reason for Visit:  Pre-transplant evaluation  Referring Provider: Dr. Tam Mathis     History of Present Illness:    48 y.o. female with a history of ESRD, on HD every other day, T2DM, presents for pre-kidney transplant evaluation.    Infectious History:  Recent hospital admissions: No  Recent infections: No  Recent or current antibiotic use: No  History of recurrent infections *(sinus / pneumonia / UTI / SBP)*: No  History of diabetic foot wound or kellie  ne/joint infection: No  Recent dental infections, issues or procedures: No  History of chicken pox: Yes  History of shingles: No  History of STI: No  History of COVID infection: Yes    History of Immunosuppression:  Prior chemotherapy / immunosuppression: No  Prior transplant: No  History of splenectomy: No    Tuberculosis:  Prior screening for latent TB: Yes  Prior diagnosis of latent TB: No  Risk factors for TB *known exposure, incarceration, homelessness*: No    Geographical exposures:  Currently lives in [place], [place] with , and two children (8 and 22 year old)  Lived in the following states: [place], [place], [place]   Lived or travelled to the [facility] [place]: No  International travel: No  Travel-associated illness: No    Social/Environmental:  Occupation:  unemployed  Pets: Yes, 2 dogs (indoor, UTD on vaccines), 2 cats (1 indoor, 1 in/out). Pt reports she does not handle the litter boxes and/or feces.   Livestock: Yes, chickens, pt does not maintain coup, states her  does.   Fishing / hunting: No  Hobbies: NA  Water: [place] water  Consumption of raw/undercooked meat or seafood?  No  Tobacco: No  Alcohol: No  Recreational drug use:  No  Sexual partners: Yes, monogamous with the        Past Histories:   Past Medical History:   Diagnosis Date    Allergy     Anemia     Anxiety     Depression     Diabetes mellitus, type 2     Disorder of kidney and ureter     GERD (gastroesophageal reflux  disease)     Hyperlipidemia     Hypertension     Obesity      Past Surgical History:   Procedure Laterality Date     SECTION      HYSTERECTOMY      INSERTION OF DIALYSIS CATHETER      TUBAL LIGATION       Family History   Problem Relation Age of Onset    Heart disease Father     No Known Problems Daughter      Social History     Tobacco Use    Smoking status: Former     Packs/day: 1.00     Types: Cigarettes     Quit date:      Years since quittin.4    Smokeless tobacco: Never   Substance Use Topics    Alcohol use: Not Currently    Drug use: Never     Review of patient's allergies indicates:   Allergen Reactions    Amlodipine Itching and Swelling         Immunization History:  Received all childhood vaccines: Yes  All household members receive annual flu vaccine: No  All household members are up to date on COVID vaccine: No      Current antibiotics:  Antibiotics (From admission, onward)      None              Review of Systems  Review of Systems   Constitutional: Negative for chills, fever and weight loss.   Respiratory:  Negative for cough and hemoptysis.    Skin:  Negative for poor wound healing, rash and suspicious lesions.        Objective  Physical Exam  Vitals reviewed.   Constitutional:       General: She is not in acute distress.     Appearance: Normal appearance. She is not ill-appearing.   HENT:      Head: Normocephalic.      Nose: Nose normal.      Mouth/Throat:      Mouth: Mucous membranes are moist.      Pharynx: Oropharynx is clear.   Eyes:      General: No scleral icterus.        Right eye: No discharge.         Left eye: No discharge.      Conjunctiva/sclera: Conjunctivae normal.   Cardiovascular:      Rate and Rhythm: Normal rate and regular rhythm.   Pulmonary:      Effort: Pulmonary effort is normal. No respiratory distress.      Breath sounds: No stridor.   Abdominal:      General: Abdomen is flat. There is no distension.      Palpations: Abdomen is soft.   Musculoskeletal:          General: Normal range of motion.      Cervical back: Normal range of motion.      Right lower leg: No edema.      Left lower leg: No edema.   Skin:     General: Skin is warm.      Findings: No erythema or rash.   Neurological:      General: No focal deficit present.      Mental Status: She is alert and oriented to person, place, and time.      Motor: No weakness.      Gait: Gait normal.   Psychiatric:         Mood and Affect: Mood normal.         Behavior: Behavior normal.         Thought Content: Thought content normal.         Judgment: Judgment normal.         Labs:    CBC:   Lab Results   Component Value Date    WBC 7.77 06/20/2023    HGB 9.5 (L) 06/20/2023    HCT 29.5 (L) 06/20/2023     (H) 06/20/2023     (L) 06/20/2023    GRAN 5.6 06/20/2023    GRAN 71.8 06/20/2023    LYMPH 1.7 06/20/2023    LYMPH 21.5 06/20/2023    MONO 0.3 06/20/2023    MONO 4.0 06/20/2023    EOSINOPHIL 1.9 06/20/2023       Syphilis screening: No results found for: RPR, PRPQ, FTAABS     TB screening: No results found for: TBGOLDPLUS, TSPOTSCREN    HIV screening:   Lab Results   Component Value Date    [id number] Non-reactive 06/20/2023       Strongyloides IgG: No results found for: STRONGANTIGG    Hepatitis Serologies:   Lab Results   Component Value Date    HEPAIGG Non-reactive 06/20/2023    HEPBCAB Non-reactive 06/20/2023    HEPBSAB <3.00 06/20/2023    HEPBSAB Non-reactive 06/20/2023    HEPCAB Non-reactive 06/20/2023        Varicella IgG: No results found for: VARICELLAINT        There is no immunization history on file for this patient.       Assessment and Plan    1. Risks of Infection: Available serologies were reviewed. No unusual risks of infection or significant barriers to transplantation were identified from the infectious disease standpoint given the information available at this time.    - Acute infectious issues: None   - Pending serologies: Quantiferon gold / T-spot, RPR, Strongyloides IgG, and VZV IgG   - Please  call if any pending serologic testing is positive.    2. Immunizations:  Based on the patient's immunization history and serologies, the following immunizations are recommended:  - Hepatitis A    Patient does not have immunity to hepatitis A    Vaccination ordered today: Yes   - Hepatitis B    Patient does not have immunity to hepatitis B    Vaccination ordered today: Yes  Pt reports that she has completed two hepatitis B vaccination series. Discussed with staff MD, recommending to revaccinate and recheck Hep B titers 2 months after completion of series.    - COVID    Current CDC vaccination recommendations were discussed with the patient   - Annual high dose influenza     Vaccination ordered today: Yes   - Prevnar 20    Vaccination ordered today: Yes   - Tdap    Vaccination ordered today: Yes   - Shingrix    Vaccination ordered today: Yes    Recommended Pre-Transplant Immunization Schedule   Vaccine  0m 1m 2m 6m   Pneumococcal conjugate vaccine (Prevnar 20) X      Tetanus-diphtheria-pertussis (Tdap)* X      Hepatitis A Vaccine (Havrix)** X   X   Hepatitis B Vaccine (Heplisav)** X X     Influenza (annual) X      Zoster Recombinant Vaccine (Shingrix) X  X           *Administer booster every 10 years.       **Administer if no immunity demonstrated on serologies               Patient will receive vaccines at local pharmacy. A written prescription was provided for all vaccine doses.     3. Counseling:   I discussed with the patient the risk for increased susceptibility to infections following transplantation including increased risk for infection right after transplant and if rejection should occur.  The patient has been counseled on the importance of vaccinations to decrease risk of infection and severe illness. Specific guidance has been provided to the patient regarding the patient's occupation, hobbies and activities to avoid future infectious complications.     4. Transplant Candidacy: Based on available  information, there are no identified significant barriers to transplantation from an infectious disease standpoint.  Final determination of transplant candidacy will be made once evaluation is complete and reviewed by the Selection Committee.      Follow up with infectious disease as needed.       The total time for evaluation and management services performed on 06/20/2023 was greater than 45 minutes.

## 2023-06-20 NOTE — PROGRESS NOTES
Transplant Recipient Adult Psychosocial Assessment    Quiana Reyer  [address] [place] [address]  Telephone Information:   Mobile [phone]   Home  [phone] (home)  Work  There is no work phone number on file.  E-mail  [email or web address]    Sex: female  YOB: 1975  Age: 48 y.o.    Encounter Date: 2023  [place] Citizen: yes  Primary Language: English   Needed: no    Emergency Contact:  Name: Shandy Reyer  Relationship:  sister in law  Address: [place]  Phone Numbers:  [phone] (mobile)    Family/Social Support:   Number of dependents/: one 9 y/o adopted dtr  Marital history:  since   Other family dynamics: both parents are , two half siblings and pt reports two miscarriages-one was late term, 1 infant death (13 months), 1 adopted dtr and one adult dtr.  Reports close family with good support.   is over-the-road  so is gone for long stretches of time.    Household Composition:  Name: Brande and James Reyer  Age: Both 48  Relationship: patient and  (Tylor [phone])  Does person drive?  Both drive    Name: Macayla Reyer [phone]  Age: 22  Relationship: daughter  Does person drive? yes    Name: Jemma Reyer  Age: 8  Relationship: daughter  Does person drive? no    Do you and your caregivers have access to reliable transportation? yes  PRIMARY CAREGIVER: Shandy Reyer will be primary caregiver, phone number [phone].      provided in-depth information to patient and caregiver regarding pre- and post-transplant caregiver role.   strongly encourages patient and caregiver to have concrete plan regarding post-transplant care giving, including back-up caregiver(s) to ensure care giving needs are met as needed.    Patient and Caregiver states understanding all aspects of caregiver role/commitment and is able/willing/committed to being caregiver to the fullest extent necessary.    Patient  and Caregiver verbalizes understanding of the education provided today and caregiver responsibilities.         remains available. Patient and Caregiver agree to contact  in a timely manner if concerns arise.      Able to take time off work without financial concerns:  caregiver does not work outside of the home .     Additional Significant Others who will Assist with Transplant:  Name: Colton Reyer [phone]  Age: 28  City: [place] State: [place]  Relationship:  brother in law  Does person drive? yes    Name: Doc Street [phone]  Age: 23  City: [place] State: [place]  Relationship:  dtr's fiancee  Does person drive? yes    Living Will: no  Healthcare Power of : no  Advance Directives on file: <<no information> per medical record.  Verbally reviewed LW/HCPA information.   provided patient with copy of LW/HCPA documents and provided education on completion of forms.    Living Donors: Yes.  Name: Pt states she has several interested persons. Education and resource information given to patient.      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    Spouse/Significant Other Employment: over-the-road     Disabled: no  Pt states she does not qualify since she has not paid the required number of quarters.    Monthly Income:  Other: $Pt states 's income varies by load  Able to afford all costs now and if transplanted, including medications: yes  Patient and Caregiver verbalizes understanding of personal responsibilities related to transplant costs and the importance of having a financial plan to ensure that patients transplant costs are fully covered.       provided fundraising  information/education. Patient and Caregiververbalizes understanding.   remains available.    Insurance:   Payer/Plan Subscr  Sex Relation Sub. Ins. ID Effective Group Num   1. [place] - [place]* REYER,BRANDE 1975 Female Self [id number] 22                                    [address]   2. [facility]* REYER,BRANDE 1975 Female Self [id number]                                    [address] [address]     Primary Insurance (for UNOS reporting): Public Insurance - Medicare FFS (Fee For Service)  Secondary Insurance (for UNOS reporting): [place] Insurance - [place]  [place]  Patient and Caregiver verbalizes clear understanding that patient may experience difficulty obtaining and/or be denied insurance coverage post-surgery. This includes and is not limited to disability insurance, life insurance, health insurance, burial insurance, long term care insurance, and other insurances.      Patient and Caregiver also reports understanding that future health concerns related to or unrelated to transplantation may not be covered by patient's insurance.  Resources and information provided and reviewed.     Patient and Caregiver provides verbal permission to release any necessary information to outside resources for patient care and discharge planning.  Resources and information provided are reviewed.      Dialysis Adherence: Patient reports she is on home hemo since 2023.  She has been on dialysis for one year.  Compliance check sent to unit.    Infusion Service: patient utilizing? no  Home Health: patient utilizing? no  DME:  home hemo equipment and supplies  Pulmonary/Cardiac Rehab: denies   ADLS:  Pt states ability to independently accomplish all adls.     Adherence:   Pt states current and expected compliance with all healthcare recommendations.  Adherence education and counseling provided.     Per History Section:  Past Medical History:   Diagnosis Date    Allergy     Diabetes mellitus, type 2      Disorder of kidney and ureter     GERD (gastroesophageal reflux disease)     Hyperlipidemia     Hypertension      Social History     Tobacco Use    Smoking status: Every Day     Packs/day: 1.00     Types: Cigarettes    Smokeless tobacco: Never   Substance Use Topics    Alcohol use: Not Currently     Social History     Substance and Sexual Activity   Drug Use Never     Social History     Substance and Sexual Activity   Sexual Activity Not Currently       Per Today's Psychosocial:  Tobacco:  Pt reports she smoked 1ppd for 30 years, but quit when instructed in August 2022. .  Alcohol: none, patient denies any use.  Illicit Drugs/Non-prescribed Medications: none, patient denies any use.  Has used marijuana socially in the past.    Patient and Caregiver states clear understanding of the potential impact of substance use as it relates to transplant candidacy and is aware of possible random substance screening.  Substance abstinence/cessation counseling, education and resources provided and reviewed.     Arrests/DWI/Treatment/Rehab: patient denies    Psychiatric History:    Mental Health: anxiety  Psychiatrist/Counselor: PCP prescribes medications Nicole Shankar MD and therapist Lisa Churchill.  Medications:  Wellbutrin, prozac and Xanax  Suicide/Homicide Issues: Pt denies current or past suicidal/homicidal ideation.    Safety at home: Pt denies any home safety concerns.     Knowledge: Patient and Caregiver states having clear understanding and realistic expectations regarding the potential risks and potential benefits of organ transplantation and organ donation and agrees to discuss with health care team members and support system members, as well as to utilize available resources and express questions and/or concerns in order to further facilitate the pt informed decision-making.  Resources and information provided and reviewed.    Patient and Caregiver is aware of [facility]'s affiliation and/or partnership with agencies in  home health care, LTAC, SNF, DME, and other hospitals and clinics.    Understanding: Patient and Caregiver reports having a clear understanding of the many lifetime commitments involved with being a transplant recipient, including costs, compliance, medications, lab work, procedures, appointments, concrete and financial planning, preparedness, timely and appropriate communication of concerns, abstinence (ETOH, tobacco, illicit non-prescribed drugs), adherence to all health care team recommendations, support system and caregiver involvement, appropriate and timely resource utilization and follow-through, mental health counseling as needed/recommended, and patient and caregiver responsibilities.  Social Service Handbook, resources and detailed educational information provided and reviewed.  Educational information provided.    Patient and Caregiver also reports current and expected compliance with health care regime and states having a clear understanding of the importance of compliance.      Patient and Caregiver reports a clear understanding that risks and benefits may be involved with organ transplantation and with organ donation.       Patient and Caregiver also reports clear understanding that psychosocial risk factors may affect patient, and include but are not limited to feelings of depression, generalized anxiety, anxiety regarding dependence on others, post traumatic stress disorder, feelings of guilt and other emotional and/or mental concerns, and/or exacerbation of existing mental health concerns.  Detailed resources provided and discussed.      Patient and Caregiver agrees to access appropriate resources in a timely manner as needed and/or as recommended, and to communicate concerns appropriately.  Patient and Caregiver also reports a clear understanding of treatment options available.     Patient and Caregiver received education in a group setting.   reviewed education, provided additional  information, and answered questions.    Feelings or Concerns: pt and caregiver deny current concerns.    Coping: Identify Patient & Caregiver Strategies to [place]:   1. In the past, coping with major surgery and/or related stress - pt states she has experienced surgeries and coped successfully with the related stressors.   2. Currently & Pre-transplant - reports excellent family support, enjoys swimming, exercise, yard wrk, crafts and chickens   3. At the time of surgery - family support   4. During post-Transplant & Recovery Period - family support    Goals: wants to get off dialysis and live longer quality life for her children.  Patient referred to Vocational Rehabilitation.    Interview Behavior: Patient and Caregiver presents as alert and oriented x 4, pleasant, good eye contact, well groomed, recall good, concentration/judgement good, average intelligence, calm, communicative, cooperative, and asking and answering questions appropriately. She was accompanied by her caregiver, Mia who presented as very supportive of pt's pursuit of transplant.         Transplant Social Work - Candidacy  Assessment/Plan:     Psychosocial Suitability: Based on psychosocial risk factors, patient presents as low risk, due to high motivation for transplant, has caregiver, transportation, financial plans and family living nearby in [place] for lodging.   .    Recommendations/Additional Comments: Recommend fundraising to offset costs associated with transplant.  Pt and caregiver informed that they are responsible for transplant related lodging arrangements and expense.   Pt and caregiver informed that lodging assistance may be unavailable beginning 2023. - information reviewed in depth.      Sharla Biggs LCSW

## 2023-06-20 NOTE — LETTER
June 22, 2023        Tam Mathis  [address]  [facility] [address]  Phone: [phone]  Fax: [phone]             [facility]- Transplant 1st Fl  [address]  [facility] [address]  Phone: [phone]   Patient: Brande Reyer   MR Number: [id number]   YOB: 1975   Date of Visit: 6/20/2023       Dear Dr. Tam Mathis    Thank you for referring Brande Reyer to me for evaluation. Attached you will find relevant portions of my assessment and plan of care.    If you have questions, please do not hesitate to call me. I look forward to following Brande Reyer along with you.    Sincerely,    Andrea Adhikari Jr., MD    Enclosure    If you would like to receive this communication electronically, please contact [email or web address] or [phone] to request NavigatorMD Link access.    Navigator[facility] Link is a tool which provides read-only access to select patient information with whom you have a relationship. Its easy to use and provides real time access to review your patients record including encounter summaries, notes, results, and demographic information.    If you feel you have received this communication in error or would no longer like to receive these types of communications, please e-mail [email or web address]

## 2023-06-23 ENCOUNTER — SOCIAL WORK (OUTPATIENT)
Dept: TRANSPLANT | Facility: CLINIC | Age: 48
End: 2023-06-23
Payer: MEDICARE

## 2023-06-27 ENCOUNTER — TELEPHONE (OUTPATIENT)
Dept: TRANSPLANT | Facility: CLINIC | Age: 48
End: 2023-06-27
Payer: MEDICARE

## 2023-06-27 NOTE — TELEPHONE ENCOUNTER
Returned pt's call and informed her that her testing is still being review by her nurse. She will get a call in about 2 weeks regarding update. Pt verbalized understanding.

## 2023-06-27 NOTE — TELEPHONE ENCOUNTER
"----- Message from Federico Mackay sent at 6/27/2023  1:14 PM CDT -----  Consult/Advisory:          Name Of Caller: Self      Contact Preference?: [phone]      What is the nature of the call?: Calling to speak w/ Shelbie about current status          Additional Notes:  "Thank you for all that you do for our patients"      "

## 2023-06-29 ENCOUNTER — DOCUMENTATION ONLY (OUTPATIENT)
Dept: TRANSPLANT | Facility: CLINIC | Age: 48
End: 2023-06-29
Payer: MEDICARE

## 2023-07-13 ENCOUNTER — TELEPHONE (OUTPATIENT)
Dept: TRANSPLANT | Facility: CLINIC | Age: 48
End: 2023-07-13
Payer: MEDICARE

## 2023-07-13 DIAGNOSIS — Z94.0 KIDNEY REPLACED BY TRANSPLANT: Primary | ICD-10-CM

## 2023-07-13 DIAGNOSIS — R80.8 OTHER PROTEINURIA: ICD-10-CM

## 2023-07-13 DIAGNOSIS — Z12.11 COLON CANCER SCREENING: ICD-10-CM

## 2023-07-13 DIAGNOSIS — Z76.82 AWAITING ORGAN TRANSPLANT STATUS: ICD-10-CM

## 2023-07-13 DIAGNOSIS — Z12.31 ENCOUNTER FOR SCREENING MAMMOGRAM FOR MALIGNANT NEOPLASM OF BREAST: ICD-10-CM

## 2023-07-18 ENCOUNTER — TELEPHONE (OUTPATIENT)
Dept: TRANSPLANT | Facility: CLINIC | Age: 48
End: 2023-07-18
Payer: MEDICARE

## 2023-07-18 DIAGNOSIS — Z76.82 ORGAN TRANSPLANT CANDIDATE: ICD-10-CM

## 2023-07-18 DIAGNOSIS — Z76.82 AWAITING ORGAN TRANSPLANT STATUS: Primary | ICD-10-CM

## 2023-07-18 DIAGNOSIS — Z12.11 COLON CANCER SCREENING: ICD-10-CM

## 2023-07-18 NOTE — TELEPHONE ENCOUNTER
----- Message from Raul Monique sent at 7/18/2023 12:38 PM CDT -----  Regarding: call back  Pt call to speak with someone in regards to scheduling some appt for transplant requesting call back    Call

## 2023-08-03 DIAGNOSIS — Z76.82 AWAITING ORGAN TRANSPLANT STATUS: Primary | ICD-10-CM

## 2023-08-21 ENCOUNTER — HOSPITAL ENCOUNTER (OUTPATIENT)
Dept: PREADMISSION TESTING | Facility: HOSPITAL | Age: 48
Discharge: HOME | End: 2023-08-21
Attending: INTERNAL MEDICINE
Payer: MEDICARE

## 2023-08-21 VITALS
OXYGEN SATURATION: 98 % | BODY MASS INDEX: 38.17 KG/M2 | HEART RATE: 88 BPM | HEIGHT: 64 IN | TEMPERATURE: 98 F | WEIGHT: 223.56 LBS | DIASTOLIC BLOOD PRESSURE: 72 MMHG | SYSTOLIC BLOOD PRESSURE: 108 MMHG

## 2023-08-21 DIAGNOSIS — Z01.818 PREOP TESTING: Primary | ICD-10-CM

## 2023-08-21 PROCEDURE — 93005 ELECTROCARDIOGRAM TRACING: CPT | Performed by: SPECIALIST

## 2023-08-21 PROCEDURE — 93010 ELECTROCARDIOGRAM REPORT: CPT | Mod: ,,, | Performed by: SPECIALIST

## 2023-08-21 PROCEDURE — 93010 EKG 12-LEAD: ICD-10-PCS | Mod: ,,, | Performed by: SPECIALIST

## 2023-08-21 RX ORDER — DULAGLUTIDE 3 MG/.5ML
3 INJECTION, SOLUTION SUBCUTANEOUS WEEKLY
COMMUNITY
Start: 2023-08-07

## 2023-08-21 NOTE — DISCHARGE INSTRUCTIONS
INSTRUCTIONS  To confirm your doctor has scheduled your surgery for: 08/29    Morning of surgery please check in with registration near [address] Entrance then proceed to Registration then to endoscopy department    ENDOSCOPY NURSES will call the afternoon prior to procedure with your final arrival time.    TAKE ONLY THESE MEDICATIONS WITH A SMALL SIP OF WATER THE MORNING OF SURGERY: see list    DO NOT TAKE ANY INSULIN OR ORAL DIABETIC MEDICATIONS THE MORNING OF SURGERY UNLESS DIRECTED BY YOUR PHYSICIAN OR PRE ADMIT NURSE.    DO NOT TAKE THESE MEDICATIONS 5-7 DAYS PRIOR to your procedure per your surgeon's request: ASPIRIN, ALEVE, BC powder, STEVIE SELTZER, IBUPROFEN, FISH OIL, VITAMIN E, OR HERBALS   (May take Tylenol)    If you are prescribed any types of blood thinners (Aspirin, Coumadin, Plavix, Pradaxa, Xarelto, Aggrenox, Effient, Eliquis, Savasya, Brilinta or any other), please ask your surgeon how many days before scheduled procedure should you stop taking them. You may also need to verify with prescribing physician if it is OK to stop your blood thinners.      INSTRUCTIONS IMPORTANT!!  Do not eat or drink anything between midnight and the time of your procedure- this includes gum, mints, and candy. You may brush your teeth but do not swallow.  ONLY if you are diabetic, check your sugar in the morning before your procedure.  Do not smoke, vape or drink alcoholic beverages 24 hours prior to your procedure.  If you wear contact lenses, dentures, hearing aids or glasses, bring a container to put them in during surgery and give to a family member for safe keeping.    Please leave all jewelry, piercing's and valuables at home.   Wear comfortable loose clothing and rubber soled shoes.  If your condition changes such as fever, cough, etc, please notify your surgeon.   ONLY for patients requiring bowel prep, written instructions will be given by your doctor's office.  Make arrangements in advance for  transportation home by a responsible adult.  You must make arrangements for transportation, TAXI'S, UBER'S OR LYFTS ARE NOT ALLOWED.        If you have any questions about these instructions, call Endoscopy Nurse at [phone]

## 2023-08-29 ENCOUNTER — ANESTHESIA (OUTPATIENT)
Dept: SURGERY | Facility: HOSPITAL | Age: 48
End: 2023-08-29
Payer: MEDICARE

## 2023-08-29 ENCOUNTER — HOSPITAL ENCOUNTER (OUTPATIENT)
Facility: HOSPITAL | Age: 48
Discharge: HOME | End: 2023-08-29
Attending: INTERNAL MEDICINE | Admitting: INTERNAL MEDICINE
Payer: MEDICARE

## 2023-08-29 ENCOUNTER — ANESTHESIA EVENT (OUTPATIENT)
Dept: SURGERY | Facility: HOSPITAL | Age: 48
End: 2023-08-29
Payer: MEDICARE

## 2023-08-29 VITALS
OXYGEN SATURATION: 100 % | DIASTOLIC BLOOD PRESSURE: 77 MMHG | SYSTOLIC BLOOD PRESSURE: 166 MMHG | RESPIRATION RATE: 18 BRPM | TEMPERATURE: 98 F | HEART RATE: 75 BPM

## 2023-08-29 DIAGNOSIS — Z12.11 SCREEN FOR COLON CANCER: ICD-10-CM

## 2023-08-29 PROCEDURE — 45385 COLONOSCOPY W/LESION REMOVAL: CPT | Performed by: INTERNAL MEDICINE

## 2023-08-29 PROCEDURE — 27201028 HC NEEDLE, SCLERO: Performed by: INTERNAL MEDICINE

## 2023-08-29 PROCEDURE — 27201089 HC SNARE, DISP (ANY): Performed by: INTERNAL MEDICINE

## 2023-08-29 PROCEDURE — 27202049 HC PROBE, APC ERBE: Performed by: INTERNAL MEDICINE

## 2023-08-29 PROCEDURE — D9220A PRA ANESTHESIA: ICD-10-PCS | Mod: PT,ANES,, | Performed by: ANESTHESIOLOGY

## 2023-08-29 PROCEDURE — 63600175 PHARM REV CODE 636 W HCPCS: Performed by: NURSE ANESTHETIST, CERTIFIED REGISTERED

## 2023-08-29 PROCEDURE — 25000003 PHARM REV CODE 250: Performed by: NURSE ANESTHETIST, CERTIFIED REGISTERED

## 2023-08-29 PROCEDURE — D9220A PRA ANESTHESIA: ICD-10-PCS | Mod: PT,CRNA,, | Performed by: NURSE ANESTHETIST, CERTIFIED REGISTERED

## 2023-08-29 PROCEDURE — D9220A PRA ANESTHESIA: Mod: PT,ANES,, | Performed by: ANESTHESIOLOGY

## 2023-08-29 PROCEDURE — 27201114 HC TRAP (ANY): Performed by: INTERNAL MEDICINE

## 2023-08-29 PROCEDURE — D9220A PRA ANESTHESIA: Mod: PT,CRNA,, | Performed by: NURSE ANESTHETIST, CERTIFIED REGISTERED

## 2023-08-29 PROCEDURE — 27200997: Performed by: INTERNAL MEDICINE

## 2023-08-29 PROCEDURE — 37000008 HC ANESTHESIA 1ST 15 MINUTES: Performed by: INTERNAL MEDICINE

## 2023-08-29 PROCEDURE — 88305 TISSUE EXAM BY PATHOLOGIST: CPT | Mod: TC | Performed by: PATHOLOGY

## 2023-08-29 PROCEDURE — 45390 COLONOSCOPY W/RESECTION: CPT | Performed by: INTERNAL MEDICINE

## 2023-08-29 PROCEDURE — 27202438 HC MUCOSAL LIFTING AGENT: Performed by: INTERNAL MEDICINE

## 2023-08-29 PROCEDURE — 27201013 HC FORCEPS, HOT BIOPSY, DISP: Performed by: INTERNAL MEDICINE

## 2023-08-29 PROCEDURE — 37000009 HC ANESTHESIA EA ADD 15 MINS: Performed by: INTERNAL MEDICINE

## 2023-08-29 RX ORDER — PROPOFOL 10 MG/ML
VIAL (ML) INTRAVENOUS
Status: DISCONTINUED | OUTPATIENT
Start: 2023-08-29 | End: 2023-08-29

## 2023-08-29 RX ORDER — LIDOCAINE HYDROCHLORIDE 20 MG/ML
INJECTION INTRAVENOUS
Status: DISCONTINUED | OUTPATIENT
Start: 2023-08-29 | End: 2023-08-29

## 2023-08-29 RX ADMIN — PROPOFOL 100 MG: 10 INJECTION, EMULSION INTRAVENOUS at 11:08

## 2023-08-29 RX ADMIN — PROPOFOL 50 MG: 10 INJECTION, EMULSION INTRAVENOUS at 11:08

## 2023-08-29 RX ADMIN — SODIUM CHLORIDE: 9 INJECTION, SOLUTION INTRAVENOUS at 10:08

## 2023-08-29 RX ADMIN — LIDOCAINE HYDROCHLORIDE 40 MG: 20 INJECTION, SOLUTION INTRAVENOUS at 11:08

## 2023-08-29 NOTE — H&P
GASTROENTEROLOGY PRE-PROCEDURE H&P NOTE  Patient Name: Brande Reyer  Patient MRN: [id number]  Patient : 1975    Service date: 2023    PCP: No, Primary Doctor    No chief complaint on file.      HPI: Patient is a 48 y.o. female with PMHx as below here for evaluation of screening colon.     Past Medical History:  Past Medical History:   Diagnosis Date    Allergy     Anemia     Anxiety     Depression     Diabetes mellitus, type 2     Disorder of kidney and ureter     Hyperlipidemia     Hypertension     Obesity         Past Surgical History:  Past Surgical History:   Procedure Laterality Date    AV FISTULA PLACEMENT  2022     SECTION      HYSTERECTOMY      INSERTION OF DIALYSIS CATHETER      removal of dialysis catheter      TUBAL LIGATION          Home Medications:  Medications Prior to Admission   Medication Sig Dispense Refill Last Dose    ALPRAZolam (XANAX) 0.25 MG tablet TAKE ONE TABLET BY MOUTH TWICE DAILY ON DIALYSIS DAYS. ALL OTHER DAYS TAKE ONE TABLET BY MOUTH AT BEDTIME   2023    biotin 10,000 mcg Cap Take 1 capsule by mouth once daily.   2023    busPIRone (BUSPAR) 5 MG Tab Take 5 mg by mouth 2 (two) times daily.   2023    calcitRIOL (ROCALTROL) 0.25 MCG Cap Take 0.25 mcg by mouth 3 (three) times a week.   2023    cetirizine (ZYRTEC) 10 MG tablet cetirizine 10 mg tablet   TAKE ONE TABLET BY MOUTH DAILY   2023    cloNIDine (CATAPRES) 0.1 MG tablet Take 0.2 mg by mouth every evening.   2023    COLACE 100 mg capsule SMARTSI Capsule(s) By Mouth Every Other Day PRN   2023    ergocalciferol (ERGOCALCIFEROL) 50,000 unit Cap Take 50,000 Units by mouth every 14 (fourteen) days.   2023    FLUoxetine 20 MG capsule Take 20 mg by mouth once daily.   2023    lactobacillus acidophilus & bulgar (LACTINEX) 100 million cell packet Take 1 tablet by mouth once daily.   2023    montelukast (SINGULAIR) 10 mg tablet Take 10 mg by mouth nightly.   2023     "pravastatin (PRAVACHOL) 20 MG tablet pravastatin 20 mg tablet   TAKE ONE TABLET BY MOUTH DAILY   2023    VIRGIL-GIO RX 1- mg-mg-mcg Tab Take 1 tablet by mouth every morning.   2023    sevelamer carbonate (RENVELA) 800 mg Tab Take 800 mg by mouth 3 (three) times daily.   2023    TRULICITY 3 mg/0.5 mL pen injector Inject 3 mg into the skin once a week.   2023               Review of patient's allergies indicates:   Allergen Reactions    Amlodipine Itching and Swelling       Social History:   Social History     Occupational History    Occupation: unemployed   Tobacco Use    Smoking status: Former     Current packs/day: 0.00     Types: Cigarettes     Quit date:      Years since quittin.6    Smokeless tobacco: Never   Substance and Sexual Activity    Alcohol use: Not Currently    Drug use: Never    Sexual activity: Yes     Partners: Male     Birth control/protection: See Surgical Hx       Family History:   Family History   Problem Relation Age of Onset    Heart disease Father     No Known Problems Daughter        Review of Systems:  A 10 point review of systems was performed and was normal, except as mentioned in the HPI, including constitutional, HEENT, heme, lymph, cardiovascular, respiratory, gastrointestinal, genitourinary, neurologic, endocrine, psychiatric and musculoskeletal.      OBJECTIVE:    Physical Exam:  24 Hour Vital Sign Ranges:    Most recent vitals: Breastfeeding No    GEN: well-developed, well-nourished, awake and alert, non-toxic appearing adult  HEENT: PERRL, sclera anicteric, oral mucosa pink and moist without lesion  NECK: trachea midline; Good ROM  CV: regular rate and rhythm, no murmurs or gallops  RESP: clear to auscultation bilaterally, no wheezes, rhonci or rales  ABD: soft, non-tender, non-distended, normal bowel sounds  EXT: no swelling or edema, 2+ pulses distally  SKIN: no rashes or jaundice  PSYCH: normal affect    Labs:   No results for input(s): "WBC", " ""MCV", "PLT" in the last 72 hours.    Invalid input(s): "HGBAU"  No results for input(s): "NA", "K", "CL", "CO2", "BUN", "GLU" in the last 72 hours.    Invalid input(s): "CREA"  No results for input(s): "ALB" in the last 72 hours.    Invalid input(s): "ALKP", "SGOT", "SGPT", "TBIL", "DBIL", "TPRO"  No results for input(s): "PT", "INR", "PTT" in the last 72 hours.      IMPRESSION / RECOMMENDATIONS:  Colon  with interventions as warranted.   RIsks, benefits, alternatives discussed in detail regarding upcoming procedures and sedation. Some of the more common endoscopic complications include but not limited to immediate or delayed perforation, bleeding, infections, pain, inadvertent injury to surrounding tissue / organs and possible need for surgical evaluation. Patient expressed understanding, all questions answered and will proceed with procedure as planned.     Hermilo Orozco III  8/29/2023  11:13 AM      "

## 2023-08-29 NOTE — PROVATION PATIENT INSTRUCTIONS
Discharge Summary/Instructions after an Endoscopic Procedure  Patient Name: Brande Reyer  Patient MRN: [id number]  Patient YOB: 1975 Tuesday, August 29, 2023  Hermilo Orozco III, MD  RESTRICTIONS:  During your procedure today, you received medications for sedation.  These   medications may affect your judgment, balance and coordination.  Therefore,   for 24 hours, you have the following restrictions:   - DO NOT drive a car, operate machinery, make legal/financial decisions,   sign important papers or drink alcohol.    ACTIVITY:  Today: no heavy lifting, straining or running due to procedural   sedation/anesthesia.  The following day: return to full activity including work.  DIET:  Eat and drink normally unless instructed otherwise.     TREATMENT FOR COMMON SIDE EFFECTS:  - Mild abdominal pain, nausea, belching, bloating or excessive gas:  rest,   eat lightly and use a heating pad.  - Sore Throat: treat with throat lozenges and/or gargle with warm salt   water.  - Because air was used during the procedure, expelling large amounts of air   from your rectum or belching is normal.  - If a bowel prep was taken, you may not have a bowel movement for 1-3 days.    This is normal.  SYMPTOMS TO WATCH FOR AND REPORT TO YOUR PHYSICIAN:  1. Abdominal pain or bloating, other than gas cramps.  2. Chest pain.  3. Back pain.  4. Signs of infection such as: chills or fever occurring within 24 hours   after the procedure.  5. Rectal bleeding, which would show as bright red, maroon, or black stools.   (A tablespoon of blood from the rectum is not serious, especially if   hemorrhoids are present.)  6. Vomiting.  7. Weakness or dizziness.  GO DIRECTLY TO THE NEAREST EMERGENCY ROOM IF YOU HAVE ANY OF THE FOLLOWING:      Difficulty breathing              Chills and/or fever over 101 F   Persistent vomiting and/or vomiting blood   Severe abdominal pain   Severe chest pain   Black, tarry stools   Bleeding- more than one  tablespoon   Any other symptom or condition that you feel may need urgent attention  Your doctor recommends these additional instructions:  If any biopsies were taken, your doctors clinic will contact you in 1 to 2   weeks with any results.  - Patient has a contact number available for emergencies.  The signs and   symptoms of potential delayed complications were discussed with the   patient.  Return to normal activities tomorrow.  Written discharge   instructions were provided to the patient.   - Resume previous diet.   - Continue present medications.   - Repeat colonoscopy in 3 months for surveillance.   - No ibuprofen, naproxen, or other non-steroidal anti-inflammatory drugs for   2 weeks after polyp removal.   - Discharge patient to home (with escort).  For questions, problems or results please call your physician - Hermilo Orozco III, MD at Work:  [phone].  [facility], EMERGENCY ROOM PHONE NUMBER: [phone]  IF A COMPLICATION OR EMERGENCY SITUATION ARISES AND YOU ARE UNABLE TO REACH   YOUR PHYSICIAN - GO DIRECTLY TO THE EMERGENCY ROOM.  Hermilo Orozco III, MD  8/29/2023 12:07:16 PM  This report has been verified and signed electronically.  Dear patient,  As a result of recent federal legislation (The Federal Cures Act), you may   receive lab or pathology results from your procedure in your [facility]   account before your physician is able to contact you. Your physician or   their representative will relay the results to you with their   recommendations at their soonest availability.  Thank you,  PROVATION

## 2023-08-29 NOTE — TRANSFER OF CARE
Anesthesia Transfer of Care Note    Patient: Brande Reyer    Procedure(s) Performed: Procedure(s) (LRB):  COLONOSCOPY (N/A)    Patient location: GI    Anesthesia Type: general    Transport from OR: Transported from OR on room air with adequate spontaneous ventilation    Post pain: adequate analgesia    Post assessment: no apparent anesthetic complications    Post vital signs: stable    Level of consciousness: awake and alert    Nausea/Vomiting: no nausea/vomiting    Complications: none    Transfer of care protocol was followed      Last vitals:   Visit Vitals  Breastfeeding No

## 2023-08-29 NOTE — ANESTHESIA PREPROCEDURE EVALUATION
2023  Brande Reyer is a 48 y.o., female.      Pre-op Assessment    I have reviewed the Patient Summary Reports.     I have reviewed the Nursing Notes. I have reviewed the NPO Status.   I have reviewed the Medications.     Review of Systems  Anesthesia Hx:  No problems with previous Anesthesia  Neg history of prior surgery. Denies Family Hx of Anesthesia complications.   Denies Personal Hx of Anesthesia complications.   Social:  Former Smoker, No Alcohol Use    Hematology/Oncology:     Oncology Normal    -- Anemia:   EENT/Dental:EENT/Dental Normal   Cardiovascular:   Hypertension hyperlipidemia    Pulmonary:  Pulmonary Normal    Renal/:   Chronic Renal Disease, ESRD, Dialysis    Hepatic/GI:  Hepatic/GI Normal    Musculoskeletal:  Musculoskeletal Normal    Neurological:  Neurology Normal    Endocrine:   Diabetes, type 2    Dermatological:  Skin Normal    Psych:   anxiety depression          Patient Active Problem List   Diagnosis    Anaphylactic shock, unspecified, initial encounter    Anemia in chronic kidney disease    Anxiety disorder, unspecified    End stage renal disease    Hemodialysis access, AV graft    History of stillbirth    Hyperlipidemia    Essential (primary) hypertension    Major depressive disorder, single episode, moderate    Nicotine dependence, unspecified, uncomplicated    Obesity, morbid    Type 2 diabetes mellitus with diabetic nephropathy       Past Surgical History:   Procedure Laterality Date    AV FISTULA PLACEMENT  2022     SECTION      HYSTERECTOMY      INSERTION OF DIALYSIS CATHETER      removal of dialysis catheter      TUBAL LIGATION          Tobacco Use:  The patient  reports that she quit smoking about 19 months ago. Her smoking use included cigarettes. She has never used smokeless tobacco.     Results for orders placed or performed during  the hospital encounter of 08/21/23   EKG 12-lead    Collection Time: 08/21/23 12:44 PM    Narrative    Test Reason : Z01.818,    Vent. Rate : 089 BPM     Atrial Rate : 089 BPM     P-R Int : 154 ms          QRS Dur : 082 ms      QT Int : 388 ms       P-R-T Axes : 027 038 044 degrees     QTc Int : 472 ms    Normal sinus rhythm  Normal ECG  No previous ECGs available  Confirmed by Oscar MATHUR, Andi FRANKEL ([id number]) on 8/25/2023 6:39:48 PM    Referred By:             Confirmed By:Andi Moore MD             Lab Results   Component Value Date    WBC 8.51 08/21/2023    HGB 10.7 (L) 08/21/2023    HCT 32.2 (L) 08/21/2023     (H) 08/21/2023     (L) 08/21/2023     BMP  Lab Results   Component Value Date     08/21/2023    K 4.8 08/21/2023     08/21/2023    CO2 27 08/21/2023    BUN 54 (H) 08/21/2023    CREATININE 7.6 (H) 08/21/2023    CALCIUM 9.1 08/21/2023    ANIONGAP 9 08/21/2023     (H) 08/21/2023     (H) 06/20/2023       No results found for this or any previous visit.          Physical Exam  General: Well nourished and Alert    Airway:  Mallampati: II   Mouth Opening: Normal  TM Distance: Normal  Tongue: Normal  Neck ROM: Normal ROM    Dental:  Intact    Chest/Lungs:  Clear to auscultation, Normal Respiratory Rate    Heart:  Rate: Normal  Rhythm: Regular Rhythm  Sounds: Normal        Anesthesia Plan  Type of Anesthesia, risks & benefits discussed:    Anesthesia Type: Gen Natural Airway  Intra-op Monitoring Plan: Standard ASA Monitors  Induction:  IV  Informed Consent: Informed consent signed with the Patient and all parties understand the risks and agree with anesthesia plan.  All questions answered. Patient consented to blood products? Yes  ASA Score: 3  Anesthesia Plan Notes: Last dialysis Monday    Ready For Surgery From Anesthesia Perspective.     .

## 2023-08-29 NOTE — ANESTHESIA POSTPROCEDURE EVALUATION
Anesthesia Post Evaluation    Patient: Brande Reyer    Procedure(s) Performed: Procedure(s) (LRB):  COLONOSCOPY (N/A)    Final Anesthesia Type: general      Patient location during evaluation: GI PACU  Patient participation: Yes- Able to Participate  Level of consciousness: awake and alert and oriented  Post-procedure vital signs: reviewed and stable  Pain management: adequate  Airway patency: patent    PONV status at discharge: No PONV  Anesthetic complications: no      Cardiovascular status: blood pressure returned to baseline, hemodynamically stable and stable  Respiratory status: unassisted, spontaneous ventilation and room air  Hydration status: euvolemic  Follow-up not needed.          Vitals Value Taken Time   /74 08/29/23 1230   Temp 36.6 °C (97.8 °F) 08/29/23 1209   Pulse 75 08/29/23 1242   Resp 18 08/29/23 1215   SpO2 100 % 08/29/23 1242   Vitals shown include unvalidated device data.      No case tracking events are documented in the log.      Pain/Jesse Score: No data recorded

## 2023-09-05 ENCOUNTER — TELEPHONE (OUTPATIENT)
Dept: TRANSPLANT | Facility: CLINIC | Age: 48
End: 2023-09-05

## 2023-09-05 NOTE — TELEPHONE ENCOUNTER
----- Message from Sweetie Shaw, RN sent at 9/1/2023  2:52 PM CDT -----  Regarding: FW: Patient advice  Contact: Mia [phone]    ----- Message -----  From: Stacy Velazco  Sent: 9/1/2023  12:04 PM CDT  To: Kidney Waitlisted Coordinator  Subject: Patient advice                                           Name of Caller: Mia    Contact Preference:   [phone]     Nature of Call: Patient care partner called to confirm everything is needed for up coming appt 09/18/23

## 2023-09-14 ENCOUNTER — TELEPHONE (OUTPATIENT)
Dept: CARDIOLOGY | Facility: HOSPITAL | Age: 48
End: 2023-09-14
Payer: MEDICARE

## 2023-09-18 ENCOUNTER — HOSPITAL ENCOUNTER (OUTPATIENT)
Dept: CARDIOLOGY | Facility: HOSPITAL | Age: 48
Discharge: HOME | End: 2023-09-18
Attending: NURSE PRACTITIONER
Payer: MEDICARE

## 2023-09-18 ENCOUNTER — OFFICE VISIT (OUTPATIENT)
Dept: CARDIOLOGY | Facility: CLINIC | Age: 48
End: 2023-09-18
Attending: NURSE PRACTITIONER
Payer: MEDICARE

## 2023-09-18 VITALS
DIASTOLIC BLOOD PRESSURE: 76 MMHG | BODY MASS INDEX: 38.07 KG/M2 | SYSTOLIC BLOOD PRESSURE: 130 MMHG | HEIGHT: 64 IN | HEART RATE: 75 BPM | WEIGHT: 223 LBS

## 2023-09-18 VITALS
HEIGHT: 64 IN | BODY MASS INDEX: 38.07 KG/M2 | DIASTOLIC BLOOD PRESSURE: 91 MMHG | HEART RATE: 91 BPM | SYSTOLIC BLOOD PRESSURE: 150 MMHG | WEIGHT: 223 LBS | RESPIRATION RATE: 16 BRPM

## 2023-09-18 VITALS
BODY MASS INDEX: 37.9 KG/M2 | WEIGHT: 222 LBS | HEIGHT: 64 IN | SYSTOLIC BLOOD PRESSURE: 132 MMHG | DIASTOLIC BLOOD PRESSURE: 70 MMHG | HEART RATE: 104 BPM

## 2023-09-18 DIAGNOSIS — Z76.82 AWAITING ORGAN TRANSPLANT STATUS: ICD-10-CM

## 2023-09-18 DIAGNOSIS — N18.6 END STAGE RENAL DISEASE: ICD-10-CM

## 2023-09-18 DIAGNOSIS — I10 ESSENTIAL (PRIMARY) HYPERTENSION: ICD-10-CM

## 2023-09-18 DIAGNOSIS — Z99.2 HEMODIALYSIS ACCESS, AV GRAFT: Primary | ICD-10-CM

## 2023-09-18 DIAGNOSIS — E78.5 HYPERLIPIDEMIA, UNSPECIFIED HYPERLIPIDEMIA TYPE: ICD-10-CM

## 2023-09-18 DIAGNOSIS — R94.39 ABNORMAL CARDIOVASCULAR STRESS TEST: ICD-10-CM

## 2023-09-18 DIAGNOSIS — E66.01 OBESITY, MORBID: ICD-10-CM

## 2023-09-18 LAB
ASCENDING AORTA: 2.89 CM
AV INDEX (PROSTH): 0.88
AV MEAN GRADIENT: 5 MMHG
AV PEAK GRADIENT: 9 MMHG
AV VALVE AREA BY VELOCITY RATIO: 3.88 CM²
AV VALVE AREA: 3.66 CM²
AV VELOCITY RATIO: 0.94
BSA FOR ECHO PROCEDURE: 2.14 M2
CV ECHO LV RWT: 0.45 CM
CV STRESS BASE HR: 81 BPM
DIASTOLIC BLOOD PRESSURE: 97 MMHG
DOP CALC AO PEAK VEL: 1.54 M/S
DOP CALC AO VTI: 34.68 CM
DOP CALC LVOT AREA: 4.2 CM2
DOP CALC LVOT DIAMETER: 2.3 CM
DOP CALC LVOT PEAK VEL: 1.44 M/S
DOP CALC LVOT STROKE VOLUME: 126.9 CM3
DOP CALCLVOT PEAK VEL VTI: 30.56 CM
E WAVE DECELERATION TIME: 181.11 MSEC
E/A RATIO: 0.9
E/E' RATIO: 18.17 M/S
ECHO LV POSTERIOR WALL: 1.07 CM (ref 0.6–1.1)
EJECTION FRACTION- HIGH: 59 %
END DIASTOLIC INDEX-HIGH: 155 ML/M2
END DIASTOLIC INDEX-LOW: 91 ML/M2
END SYSTOLIC INDEX-HIGH: 78 ML/M2
END SYSTOLIC INDEX-LOW: 40 ML/M2
FRACTIONAL SHORTENING: 36 % (ref 28–44)
INTERVENTRICULAR SEPTUM: 0.93 CM (ref 0.6–1.1)
LA MAJOR: 5.16 CM
LA MINOR: 5.33 CM
LA WIDTH: 3.54 CM
LEFT ATRIUM SIZE: 4.25 CM
LEFT ATRIUM VOLUME INDEX MOD: 19 ML/M2
LEFT ATRIUM VOLUME INDEX: 32.7 ML/M2
LEFT ATRIUM VOLUME MOD: 38.93 CM3
LEFT ATRIUM VOLUME: 67.06 CM3
LEFT INTERNAL DIMENSION IN SYSTOLE: 3.02 CM (ref 2.1–4)
LEFT VENTRICLE DIASTOLIC VOLUME INDEX: 50.83 ML/M2
LEFT VENTRICLE DIASTOLIC VOLUME: 104.2 ML
LEFT VENTRICLE MASS INDEX: 81 G/M2
LEFT VENTRICLE SYSTOLIC VOLUME INDEX: 17.3 ML/M2
LEFT VENTRICLE SYSTOLIC VOLUME: 35.51 ML
LEFT VENTRICULAR INTERNAL DIMENSION IN DIASTOLE: 4.74 CM (ref 3.5–6)
LEFT VENTRICULAR MASS: 166.74 G
LV LATERAL E/E' RATIO: 18.17 M/S
LV SEPTAL E/E' RATIO: 18.17 M/S
MV A" WAVE DURATION": 10.28 MSEC
MV PEAK A VEL: 1.21 M/S
MV PEAK E VEL: 1.09 M/S
MV STENOSIS PRESSURE HALF TIME: 52.52 MS
MV VALVE AREA P 1/2 METHOD: 4.19 CM2
NUC REST DIASTOLIC VOLUME INDEX: 85
NUC REST EJECTION FRACTION: 66
NUC REST SYSTOLIC VOLUME INDEX: 29
NUC STRESS DIASTOLIC VOLUME INDEX: 82
NUC STRESS EJECTION FRACTION: 65 %
NUC STRESS SYSTOLIC VOLUME INDEX: 29
OHS CV CPX 1 MINUTE RECOVERY HEART RATE: 107 BPM
OHS CV CPX 85 PERCENT MAX PREDICTED HEART RATE MALE: 138
OHS CV CPX MAX PREDICTED HEART RATE: 163
OHS CV CPX PATIENT IS FEMALE: 1
OHS CV CPX PATIENT IS MALE: 0
OHS CV CPX PEAK DIASTOLIC BLOOD PRESSURE: 96 MMHG
OHS CV CPX PEAK HEAR RATE: 96 BPM
OHS CV CPX PEAK RATE PRESSURE PRODUCT: NORMAL
OHS CV CPX PEAK SYSTOLIC BLOOD PRESSURE: 147 MMHG
OHS CV CPX PERCENT MAX PREDICTED HEART RATE ACHIEVED: 59
OHS CV CPX RATE PRESSURE PRODUCT PRESENTING: NORMAL
PISA TR MAX VEL: 2.73 M/S
PULM VEIN S/D RATIO: 1.32
PV PEAK D VEL: 0.28 M/S
PV PEAK S VEL: 0.37 M/S
RA MAJOR: 4.88 CM
RA PRESSURE ESTIMATED: 3 MMHG
RA WIDTH: 2.53 CM
RETIRED EF AND QEF - SEE NOTES: 47 %
RIGHT VENTRICULAR END-DIASTOLIC DIMENSION: 3.43 CM
RV TB RVSP: 6 MMHG
SINUS: 3.3 CM
STJ: 2.32 CM
SYSTOLIC BLOOD PRESSURE: 142 MMHG
TDI LATERAL: 0.06 M/S
TDI SEPTAL: 0.06 M/S
TDI: 0.06 M/S
TR MAX PG: 30 MMHG
TRICUSPID ANNULAR PLANE SYSTOLIC EXCURSION: 2.17 CM
TV REST PULMONARY ARTERY PRESSURE: 33 MMHG
Z-SCORE OF LEFT VENTRICULAR DIMENSION IN END DIASTOLE: -2.6
Z-SCORE OF LEFT VENTRICULAR DIMENSION IN END SYSTOLE: -1.75

## 2023-09-18 PROCEDURE — 3075F PR MOST RECENT SYSTOLIC BLOOD PRESS GE 130-139MM HG: ICD-10-PCS | Mod: CPTII,S$GLB,TXP, | Performed by: INTERNAL MEDICINE

## 2023-09-18 PROCEDURE — 3075F SYST BP GE 130 - 139MM HG: CPT | Mod: CPTII,S$GLB,TXP, | Performed by: INTERNAL MEDICINE

## 2023-09-18 PROCEDURE — 3008F BODY MASS INDEX DOCD: CPT | Mod: CPTII,S$GLB,TXP, | Performed by: INTERNAL MEDICINE

## 2023-09-18 PROCEDURE — 93016 CV STRESS TEST SUPVJ ONLY: CPT | Mod: TXP,,, | Performed by: INTERNAL MEDICINE

## 2023-09-18 PROCEDURE — 3066F NEPHROPATHY DOC TX: CPT | Mod: CPTII,S$GLB,TXP, | Performed by: INTERNAL MEDICINE

## 2023-09-18 PROCEDURE — 99205 OFFICE O/P NEW HI 60 MIN: CPT | Mod: S$GLB,TXP,, | Performed by: INTERNAL MEDICINE

## 2023-09-18 PROCEDURE — 1160F RVW MEDS BY RX/DR IN RCRD: CPT | Mod: CPTII,S$GLB,TXP, | Performed by: INTERNAL MEDICINE

## 2023-09-18 PROCEDURE — 99205 PR OFFICE/OUTPT VISIT, NEW, LEVL V, 60-74 MIN: ICD-10-PCS | Mod: S$GLB,TXP,, | Performed by: INTERNAL MEDICINE

## 2023-09-18 PROCEDURE — 3078F PR MOST RECENT DIASTOLIC BLOOD PRESSURE < 80 MM HG: ICD-10-PCS | Mod: CPTII,S$GLB,TXP, | Performed by: INTERNAL MEDICINE

## 2023-09-18 PROCEDURE — 93306 ECHO (CUPID ONLY): ICD-10-PCS | Mod: 26,TXP,, | Performed by: INTERNAL MEDICINE

## 2023-09-18 PROCEDURE — 3066F PR DOCUMENTATION OF TREATMENT FOR NEPHROPATHY: ICD-10-PCS | Mod: CPTII,S$GLB,TXP, | Performed by: INTERNAL MEDICINE

## 2023-09-18 PROCEDURE — 93018 CV STRESS TEST I&R ONLY: CPT | Mod: TXP,,, | Performed by: INTERNAL MEDICINE

## 2023-09-18 PROCEDURE — 78452 NUCLEAR STRESS - CARDIOLOGY INTERPRETED (CUPID ONLY): ICD-10-PCS | Mod: 26,TXP,, | Performed by: INTERNAL MEDICINE

## 2023-09-18 PROCEDURE — 3008F PR BODY MASS INDEX (BMI) DOCUMENTED: ICD-10-PCS | Mod: CPTII,S$GLB,TXP, | Performed by: INTERNAL MEDICINE

## 2023-09-18 PROCEDURE — 93018 NUCLEAR STRESS - CARDIOLOGY INTERPRETED (CUPID ONLY): ICD-10-PCS | Mod: TXP,,, | Performed by: INTERNAL MEDICINE

## 2023-09-18 PROCEDURE — 4010F ACE/ARB THERAPY RXD/TAKEN: CPT | Mod: CPTII,S$GLB,TXP, | Performed by: INTERNAL MEDICINE

## 2023-09-18 PROCEDURE — 63600175 PHARM REV CODE 636 W HCPCS: Mod: TXP | Performed by: NURSE PRACTITIONER

## 2023-09-18 PROCEDURE — 1159F PR MEDICATION LIST DOCUMENTED IN MEDICAL RECORD: ICD-10-PCS | Mod: CPTII,S$GLB,TXP, | Performed by: INTERNAL MEDICINE

## 2023-09-18 PROCEDURE — 3078F DIAST BP <80 MM HG: CPT | Mod: CPTII,S$GLB,TXP, | Performed by: INTERNAL MEDICINE

## 2023-09-18 PROCEDURE — 1160F PR REVIEW ALL MEDS BY PRESCRIBER/CLIN PHARMACIST DOCUMENTED: ICD-10-PCS | Mod: CPTII,S$GLB,TXP, | Performed by: INTERNAL MEDICINE

## 2023-09-18 PROCEDURE — 1159F MED LIST DOCD IN RCRD: CPT | Mod: CPTII,S$GLB,TXP, | Performed by: INTERNAL MEDICINE

## 2023-09-18 PROCEDURE — A9502 TC99M TETROFOSMIN: HCPCS | Mod: TXP

## 2023-09-18 PROCEDURE — 99999 PR PBB SHADOW E&M-EST. PATIENT-LVL V: CPT | Mod: PBBFAC,TXP,, | Performed by: INTERNAL MEDICINE

## 2023-09-18 PROCEDURE — 93306 TTE W/DOPPLER COMPLETE: CPT | Mod: 26,TXP,, | Performed by: INTERNAL MEDICINE

## 2023-09-18 PROCEDURE — 93016 NUCLEAR STRESS - CARDIOLOGY INTERPRETED (CUPID ONLY): ICD-10-PCS | Mod: TXP,,, | Performed by: INTERNAL MEDICINE

## 2023-09-18 PROCEDURE — 4010F PR ACE/ARB THEARPY RXD/TAKEN: ICD-10-PCS | Mod: CPTII,S$GLB,TXP, | Performed by: INTERNAL MEDICINE

## 2023-09-18 PROCEDURE — 78452 HT MUSCLE IMAGE SPECT MULT: CPT | Mod: 26,TXP,, | Performed by: INTERNAL MEDICINE

## 2023-09-18 PROCEDURE — 99999 PR PBB SHADOW E&M-EST. PATIENT-LVL V: ICD-10-PCS | Mod: PBBFAC,TXP,, | Performed by: INTERNAL MEDICINE

## 2023-09-18 PROCEDURE — 93017 CV STRESS TEST TRACING ONLY: CPT | Mod: TXP

## 2023-09-18 PROCEDURE — 93306 TTE W/DOPPLER COMPLETE: CPT | Mod: TXP

## 2023-09-18 RX ORDER — REGADENOSON 0.08 MG/ML
0.4 INJECTION, SOLUTION INTRAVENOUS ONCE
Status: COMPLETED | OUTPATIENT
Start: 2023-09-18 | End: 2023-09-18

## 2023-09-18 RX ORDER — ZOSTER VACCINE RECOMBINANT, ADJUVANTED 50 MCG/0.5
0.5 KIT INTRAMUSCULAR ONCE
Qty: 1 EACH | Refills: 0 | Status: SHIPPED | OUTPATIENT
Start: 2023-09-18 | End: 2023-10-06

## 2023-09-18 RX ORDER — AMINOPHYLLINE 25 MG/ML
75 INJECTION, SOLUTION INTRAVENOUS ONCE
Status: COMPLETED | OUTPATIENT
Start: 2023-09-18 | End: 2023-09-18

## 2023-09-18 RX ADMIN — AMINOPHYLLINE 75 MG: 25 INJECTION, SOLUTION INTRAVENOUS at 09:09

## 2023-09-18 RX ADMIN — REGADENOSON 0.4 MG: 0.08 INJECTION, SOLUTION INTRAVENOUS at 09:09

## 2023-09-18 NOTE — PROGRESS NOTES
PCP - No, Primary Doctor    Subjective:   Brande Reyer is a 48 y.o. y.o. female with PMH significant for HTN, HLD, Obesity with BMI 38, ESRD on HD via AVF in [facility] who presents for cardiac evaluation prior to kidney transplant.   She never had cardiac issues in the past. No current cardiac symptoms. No CP, SOB, lightheadedness, palpitations. Never had a syncope or a fall. She reports to be physically active and takes care of her kids and chickens and dogs w/o any problems. Her  ASCVD risk is 1.3% as calculated today.     SH: lives in [place], [place] with her  and 2 kids, worked in a cafeteria ten years ago, smoked 1 ppd x 30 years and quit in 2022, no h/o illicit drug use, no current alcohol use  FH: father  of heart disease and had CAD, CABG  PSH: 3 c sections, hysterectomy, AVF     History:     Social History     Tobacco Use    Smoking status: Former     Current packs/day: 0.00     Types: Cigarettes     Quit date:      Years since quittin.7    Smokeless tobacco: Never   Substance Use Topics    Alcohol use: Not Currently     Family History   Problem Relation Age of Onset    Heart disease Father     No Known Problems Daughter        Meds:     Review of patient's allergies indicates:   Allergen Reactions    Amlodipine Itching and Swelling       Current Outpatient Medications:     ALPRAZolam (XANAX) 0.25 MG tablet, TAKE ONE TABLET BY MOUTH TWICE DAILY ON DIALYSIS DAYS. ALL OTHER DAYS TAKE ONE TABLET BY MOUTH AT BEDTIME, Disp: , Rfl:     biotin 10,000 mcg Cap, Take 1 capsule by mouth once daily., Disp: , Rfl:     busPIRone (BUSPAR) 5 MG Tab, Take 5 mg by mouth 2 (two) times daily., Disp: , Rfl:     calcitRIOL (ROCALTROL) 0.25 MCG Cap, Take 0.25 mcg by mouth 3 (three) times a week. MWF, Disp: , Rfl:     cetirizine (ZYRTEC) 10 MG tablet, cetirizine 10 mg tablet  TAKE ONE TABLET BY MOUTH DAILY, Disp: , Rfl:     cloNIDine (CATAPRES) 0.1 MG tablet, Take 0.2 mg by mouth every evening., Disp:  ", Rfl:     COLACE 100 mg capsule, SMARTSI Capsule(s) By Mouth Every Other Day PRN, Disp: , Rfl:     ergocalciferol (ERGOCALCIFEROL) 50,000 unit Cap, Take 50,000 Units by mouth every 14 (fourteen) days., Disp: , Rfl:     FLUoxetine 20 MG capsule, Take 20 mg by mouth once daily., Disp: , Rfl:     lactobacillus acidophilus & bulgar (LACTINEX) 100 million cell packet, Take 1 tablet by mouth once daily., Disp: , Rfl:     montelukast (SINGULAIR) 10 mg tablet, Take 10 mg by mouth nightly., Disp: , Rfl:     pravastatin (PRAVACHOL) 20 MG tablet, pravastatin 20 mg tablet  TAKE ONE TABLET BY MOUTH DAILY, Disp: , Rfl:     VIRGIL-GIO RX 1- mg-mg-mcg Tab, Take 1 tablet by mouth every morning., Disp: , Rfl:     sevelamer carbonate (RENVELA) 800 mg Tab, Take 800 mg by mouth 3 (three) times daily., Disp: , Rfl:     TRULICITY 3 mg/0.5 mL pen injector, Inject 3 mg into the skin once a week., Disp: , Rfl:     varicella-zoster gE-AS01B, PF, (SHINGRIX, PF,) 50 mcg/0.5 mL injection, Inject 0.5 mLs into the muscle once. for 1 dose, Disp: 1 each, Rfl: 0  No current facility-administered medications for this visit.    Review of Systems   Constitutional:  Positive for malaise/fatigue. Negative for fever and weight loss.   HENT:  Negative for hearing loss and nosebleeds.    Eyes:  Negative for photophobia and pain.   Respiratory:  Negative for cough and shortness of breath.    Cardiovascular:  Negative for chest pain, palpitations, orthopnea, claudication and leg swelling.   Gastrointestinal:  Negative for abdominal pain and heartburn.   Genitourinary:  Negative for dysuria.   Musculoskeletal:  Negative for myalgias.   Skin:  Negative for rash.   Neurological:  Negative for dizziness and headaches.   Endo/Heme/Allergies:  Negative for polydipsia.   Psychiatric/Behavioral:  Negative for depression. The patient is not nervous/anxious.        Objective:   /70   Pulse 104   Ht 5' 4" (1.626 m)   Wt 100.7 kg (222 lb 0.1 oz)   BMI " "38.11 kg/m²   Physical Exam  Constitutional:       Appearance: Normal appearance. She is obese.   HENT:      Right Ear: Tympanic membrane normal.      Nose: Nose normal.      Mouth/Throat:      Mouth: Mucous membranes are moist.   Eyes:      Pupils: Pupils are equal, round, and reactive to light.   Cardiovascular:      Rate and Rhythm: Tachycardia present.   Pulmonary:      Effort: Pulmonary effort is normal.      Breath sounds: Normal breath sounds.   Abdominal:      General: Abdomen is flat. There is distension.   Skin:     General: Skin is warm.   Neurological:      General: No focal deficit present.      Mental Status: She is alert and oriented to person, place, and time.   Psychiatric:         Mood and Affect: Mood normal.         Behavior: Behavior normal.         Labs:     Lab Results   Component Value Date     08/21/2023    K 4.8 08/21/2023     08/21/2023    CO2 27 08/21/2023    BUN 54 (H) 08/21/2023    CREATININE 7.6 (H) 08/21/2023    ANIONGAP 9 08/21/2023     Lab Results   Component Value Date    HGBA1C 6.1 08/04/2010     No results found for: "BNP", "BNPTRIAGEBLO"    Lab Results   Component Value Date    WBC 8.51 08/21/2023    HGB 10.7 (L) 08/21/2023    HCT 32.2 (L) 08/21/2023     (L) 08/21/2023    GRAN 6.0 08/21/2023    GRAN 69.9 08/21/2023     Lab Results   Component Value Date    CHOL 151 06/20/2023    HDL 48 06/20/2023    LDLCALC 81.0 06/20/2023    TRIG 110 06/20/2023       Lab Results   Component Value Date     08/21/2023    K 4.8 08/21/2023     08/21/2023    CO2 27 08/21/2023    BUN 54 (H) 08/21/2023    CREATININE 7.6 (H) 08/21/2023    ANIONGAP 9 08/21/2023     Lab Results   Component Value Date    HGBA1C 6.1 08/04/2010     No results found for: "BNP", "BNPTRIAGEBLO"    Vital Signs:   /70   Pulse 104   Ht 5' 4" (1.626 m)   Wt 100.7 kg (222 lb 0.1 oz)   BMI 38.11 kg/m²   Lab Results   Component Value Date    WBC 8.51 08/21/2023    HGB 10.7 (L) 08/21/2023    " HCT 32.2 (L) 08/21/2023     (L) 08/21/2023    GRAN 6.0 08/21/2023    GRAN 69.9 08/21/2023     Lab Results   Component Value Date    CHOL 151 06/20/2023    HDL 48 06/20/2023    LDLCALC 81.0 06/20/2023    TRIG 110 06/20/2023          TTE 9/18/23    Left Ventricle: The left ventricle is normal in size. Increased wall thickness. There is concentric remodeling. Normal wall motion. There is normal systolic function with a visually estimated ejection fraction of 55 - 60%. There is indeterminate diastolic function.    Right Ventricle: Normal right ventricular cavity size. Right ventricle wall motion  is normal. Systolic function is normal.    Pulmonary Artery: The estimated pulmonary artery systolic pressure is 33 mmHg.    IVC/SVC: Normal venous pressure at 3 mmHg.    SPECT 9/18/23    Abnormal myocardial perfusion scan.    There is a moderate intensity, small to moderate sized, reversible perfusion abnormality that is consistent with ischemia in the mid to distal anteroseptal wall(s). Specificity of these findings is reduced secondary to the patient's body habitus.    There are no other significant perfusion abnormalities.    The gated perfusion images showed an ejection fraction of 66% at rest. The gated perfusion images showed an ejection fraction of 65% post stress. Normal ejection fraction is greater than 47%.    There is normal wall motion at rest and post stress.    LV cavity size is normal at rest and normal at stress.    The ECG portion of the study is negative for ischemia.    The patient reported no chest pain during the stress test.    There were no arrhythmias during stress.    There are no prior studies for comparison.    Assessment & Plan:     1. Hemodialysis access, AV graft    2. Awaiting organ transplant status    3. Hyperlipidemia, unspecified hyperlipidemia type    4. Essential (primary) hypertension    5. End stage renal disease    6. Obesity, morbid    7. Abnormal cardiovascular stress test       #Pre-kidney tranplant evaluation  #Abnormal stress test (abnormal SPECT)  -EKG and echo unremarkable, she doesn't have any cardiac symptoms either  -SPECT is positive today, however, her BMI is 38 and sensitivity of SPECT decreased once BMI is above 35  -There is a moderate intensity, small to moderate sized, reversible perfusion abnormality that is consistent with ischemia in the mid to distal anteroseptal wall on SPECT  -her ASCVD risk score is 1.3%, her SPECT is most likely not accurate, d/w Dr Hartman who rev'd the studies in detail  -will order CT coronary calcium score today, will follow but that test is to see if she qualifies for ASA 81 qd for primary MI prevention     Revised Cardiac Risk Index   High risk surgery: Yes  History of ischemic heart disease: No  History of congestive heart failure: No  History of stroke: No  Insulin dependent diabetes: No  Cr > 2: Yes  2 points, class III risk, 10.1% risk of cardiac event      #HTN  -132/70, follows with nephrology for dialysis and BP management    #Hyperlpidemia  -on pravastatin, continue    #Obesity  -on trulicity now, BMI 38  -actively working on it, showed me her prior picture, remarkably has lost weight to be able to get renal tranpslant    #ESRD on HD  -via AVF in [place] every other day at home with care partner     #Tachycardia  -, was told to drink coffee after the lexiscan test and also has anxiety    #Health maintenance  -age 48 but needs shingrix vaccine due to ESRD and to be listed on trnasplant wait list  -d/w Dr Hartman, order inserted and sent it to our pharmacy    Signed:  Carmine Vázquez M.D.  Cardiovascular Fellow PGY-V  [facility]

## 2023-09-25 ENCOUNTER — HOSPITAL ENCOUNTER (OUTPATIENT)
Dept: RADIOLOGY | Facility: HOSPITAL | Age: 48
Discharge: HOME | End: 2023-09-25
Attending: INTERNAL MEDICINE
Payer: MEDICARE

## 2023-09-25 DIAGNOSIS — Z76.82 AWAITING ORGAN TRANSPLANT STATUS: ICD-10-CM

## 2023-09-25 DIAGNOSIS — R94.39 ABNORMAL CARDIOVASCULAR STRESS TEST: ICD-10-CM

## 2023-09-28 ENCOUNTER — TELEPHONE (OUTPATIENT)
Dept: TRANSPLANT | Facility: CLINIC | Age: 48
End: 2023-09-28

## 2023-09-28 NOTE — TELEPHONE ENCOUNTER
----- Message from Hermes Melgar sent at 9/28/2023 12:02 PM CDT -----  Regarding: Consult/Advisory  Contact: Reyer,Shandy , patient's sister  Consult/Advisory    Name Of Caller: HariniMia tilley , patient's sister      Contact Preference:[phone] (Mobile    Nature of call: Cardiac testing came back abnormal so cardiac Ct scoring down and heart rate can't get low enough to do the test. Can cardiac doctors send a note to let team know that they are working on the test to be on the list? Requesting a call back.

## 2023-11-02 ENCOUNTER — TELEPHONE (OUTPATIENT)
Dept: TRANSPLANT | Facility: HOSPITAL | Age: 48
End: 2023-11-02
Payer: MEDICARE

## 2023-11-02 NOTE — TELEPHONE ENCOUNTER
Transplant Nephrology Review of Pre-transplant Work up    48 yr old WF with ESRD 2 to unknown etiology on home HD since 5/2022.    - hx of DM but appears to have been well controlled   - heavy smoking hx but quit on 5/2022.     BMI of 38.49    Has potential donors.    Things pending:   - Regadenoson stress test on 9/18/23 abnormal due to possible reversible perfusion abnormality. Decreased specificity of the finding given patient's BMI. Seen by Dr. Hartman with CT coronary calcium score recommended  Plan:    - can present at selection once CT coronary calcium score result is back   - checklist updated    Pepper Jenkins DO  Transplant Nephrology

## 2023-11-03 ENCOUNTER — TELEPHONE (OUTPATIENT)
Dept: TRANSPLANT | Facility: CLINIC | Age: 48
End: 2023-11-03
Payer: MEDICARE

## 2023-11-03 NOTE — TELEPHONE ENCOUNTER
----- Message from Lisa Davies sent at 11/3/2023  8:07 AM CDT -----  Regarding: Questions      Name Of Caller:    Quiana      Contact Preference:    [phone]      Nature of call:   Pt was unable to complete the cardiac scoring test due to her heart rate being too high. Even with medication it was still too high. She wants to know what steps to take next.

## 2023-11-05 NOTE — PROGRESS NOTES
Late entry:   11/03/23: spoke to pt, informed her that her case will be presented to the transplant selection committee next Friday.  ----- Message from Lisa Davies sent at 11/3/2023  8:07 AM CDT -----  Regarding: Questions        Name Of Caller:    Quiana        Contact Preference:    [phone]        Nature of call:   Pt was unable to complete the cardiac scoring test due to her heart rate being too high. Even with medication it was still too high. She wants to know what steps to take next.                  Note

## 2023-11-08 ENCOUNTER — TELEPHONE (OUTPATIENT)
Dept: TRANSPLANT | Facility: CLINIC | Age: 48
End: 2023-11-08
Payer: MEDICARE

## 2023-11-08 NOTE — TELEPHONE ENCOUNTER
MA notes per Adherence form     FOR THE PAST THREE MONTHS:    0-AMA's  0-No-shows    No concerns with care giving, transportation, or mental health    Scanned in pt's media    Shaina Pat  Abdominal Transplant MA

## 2023-11-10 ENCOUNTER — COMMITTEE REVIEW (OUTPATIENT)
Dept: TRANSPLANT | Facility: CLINIC | Age: 48
End: 2023-11-10
Payer: MEDICARE

## 2023-11-10 NOTE — LETTER
November 10, 2023    Tam Mathis MD  [address]  [place] [address]  Phone: [phone]  Fax: [phone]     Dear Dr. Mathis:    Patient: Brande Reyer   MR Number: [id number]   YOB: 1975     Your patient, Brande Reyer, was recently discussed at the [facility] Selection Committee meeting on 11/10/2023. I am happy to inform you that Quiana has been approved for transplantation.  She has met selection criteria for a kidney transplant related to ESRD secondary to primary diagnosis of Diabetes Mellitus - Type II. Your patient will be placed on the cadaveric wait list pending final financial approval from insurance company.     We appreciate your confidence in allowing us to participate in your patients care.  If you have any questions or concerns, please do not hesitate to contact me.    Sincerely,      Amina Venegas MD  Medical Director, Kidney & Kidney/Pancreas Transplantation    CC:  Brande Reyer (patient)          [facility]          Samuel Knutson MD

## 2023-11-10 NOTE — COMMITTEE REVIEW
Native Organ Dx: Diabetes Mellitus - Type II      SELECTION COMMITTEE NOTE    Brande Reyer was presented at selection committee on 11/10/23.  Patient met selection criteria for kidney transplant related to ESRD due to  Diabetes Mellitus - Type II.  No absolute contraindications to transplant at this time.  Patient will be placed on the cadaveric wait list pending  cardiololgy discussion regarding abnormal spect  and final financial approval from insurance company.  Patient will return to clinic for routine appointment in 1 year(s). Patient does not meet criteria for High KDPI kidney offer. Patient does meet HCV+ donor offer. Patient does not meet criteria for dual/enbloc, due to weight. Planned immunosuppression thymoglobulin.      Note written by Shelbie Gotti RN    ===============================================    I was present at the meeting and attest to the general consensus of the committee.   Andrea Adhikari Jr.

## 2023-11-13 ENCOUNTER — TELEPHONE (OUTPATIENT)
Dept: TRANSPLANT | Facility: CLINIC | Age: 48
End: 2023-11-13
Payer: MEDICARE

## 2023-11-13 NOTE — TELEPHONE ENCOUNTER
----- Message from Hermes Melgar sent at 11/13/2023 10:56 AM CST -----  Regarding: Consult/Advisory  Contact: Brande Reyer  Consult/Advisory    Name Of Caller: Brande Reyer       Contact Preference: [phone] (home)      Nature of call: Patient calling to speak to coordinator regarding committee review decisions. Requesting a call back.

## 2023-11-16 ENCOUNTER — TELEPHONE (OUTPATIENT)
Dept: TRANSPLANT | Facility: CLINIC | Age: 48
End: 2023-11-16
Payer: MEDICARE

## 2023-11-16 NOTE — TELEPHONE ENCOUNTER
----- Message from Sweetie Shaw RN sent at 11/16/2023  4:57 PM CST -----  Regarding: FW: Committe review  Contact: Quiana    ----- Message -----  From: Geno Schroeder MA  Sent: 11/16/2023  11:37 AM CST  To: [facility] Pre-Kidney Transplant Clinical  Subject: FW: Committe review                                ----- Message -----  From: Melania Barnes  Sent: 11/16/2023  11:27 AM CST  To: [facility] Pre-Kidney Transplant Non-Clinical  Subject: Committe review                                  Regarding:  Pt Status        Name Of Caller: Quiana        Contact Preference: [phone] (home)          Nature of call:  is calling to check on  Pt Status/ Committee Review . Please advise. Requesting a call back.

## 2023-11-16 NOTE — PROGRESS NOTES
"11/17/23-  notified pt of MD review and team decision: ok to list.  Will activate when financialyl cleared.    Marii Galeano MD Frady, Bridget L., RN  After talking to Dr Hartman, we can list pt . However pt has pending CT coronary calcium score test. We need to follow on this .            Previous Messages     ----- Message -----  From: Paul Hartman MD  Sent: 11/16/2023  10:10 AM CST  To: Marii Galeano MD  Does not appear to have been ordered.  We will make sure it gets done.  In view of all the data, you can probably list the patient.  ----- Message -----  From: Marii Galeano MD  Sent: 11/14/2023  10:10 AM CST  To: Paul Hartman MD  Thank you so much for clarification!  So was CT coronary calcium score test  ordered by cardiology? We plan to list the pt for kidney tx but awaiting final word from cardiology after CT coronary calcium score test.  Thank you  Marii  ----- Message -----  From: Paul Hartman MD  Sent: 11/13/2023   5:07 PM CST  To: MD Marii James,  She was not "cleared" by Cardiology.  We only establish risk of a future event, which is very low in her case.  I believe the SPECT is probably a false positive, which is why we asked for a CT calcium score.  If the CT calcium score shows evidence of calcium in her coronary arteries, then we will do a PET stress to evaluate coronary blood flow.    However, she should have better lipid control so I suggest increasing her pravastatin to 40 qd and repeat a fasting lipid profile in one month.  The goal is to get her LDL cholesterol to <70 mg/dL.    Thanks,  Paul  ----- Message -----  From: Marii Galeano MD  Sent: 11/13/2023  11:32 AM CST  To: Marii Galeano MD; Paul Hartman MD  Hello Dr Hartman,  Hope you are doing well.  This pt had abnormal Spect. However he was cleared by cardiology team. CT coronary calcium score was recommenced/ordered by cardiology but it was never done.  Transplant team wanted to run " this by you. I appreciate your input. Pt has history of DM  Thank you  Marii       11/16/23 Spoke to pt, reviewed committee decision, approved pending cardiology discussion regarding abnormal SPECT and financial clearance.  Will update patient after cardiology discussion.   She is currently doing home dialysis and will be drawing her own HLA blood samples with her monthly dialysis labs and [facility] will send to HLA lab.  She confirms that she already has the [employer] HLA tubes and will drawn labs this Monday, 11/21/23, will bring to Dialysis clinic and they will send in to HLA lab

## 2023-11-16 NOTE — TELEPHONE ENCOUNTER
----- Message from Geno Schroeder MA sent at 11/16/2023 11:37 AM CST -----  Regarding: FW: Committe review  Contact: Quiana    ----- Message -----  From: Melania Barnes  Sent: 11/16/2023  11:27 AM CST  To: [facility] Pre-Kidney Transplant Non-Clinical  Subject: Committe review                                  Regarding:  Pt Status        Name Of Caller: Quiana        Contact Preference: [phone] (home)          Nature of call:  is calling to check on  Pt Status/ Committee Review . Please advise. Requesting a call back.

## 2023-12-19 ENCOUNTER — TELEPHONE (OUTPATIENT)
Dept: TRANSPLANT | Facility: CLINIC | Age: 48
End: 2023-12-19
Payer: MEDICARE

## 2023-12-19 NOTE — TELEPHONE ENCOUNTER
----- Message from Marcie Moyer RN sent at 12/18/2023  3:40 PM CST -----  Regarding: FW: discss status  Contact:  [phone]    ----- Message -----  From: Nazanin Desai  Sent: 12/18/2023  10:57 AM CST  To: [facility] Pre-Kidney Transplant Clinical  Subject: discss status                                    The patient called requesting to speak to Nurse Sauceda to discuss txp status. Please reach out to patient to advise    No further information provided      Patient can be contacted @# [phone]

## 2023-12-19 NOTE — TELEPHONE ENCOUNTER
----- Message from Melania Barnes sent at 12/19/2023  8:10 AM CST -----  Regarding: Pt Status  Contact: Quiana  Regarding:  Pt Status        Name Of Caller: Quiana        Contact Preference: [phone]          Nature of call:  pt  is calling to check  Status, pt is really anxious to know what her next step is, Please advise. Requesting  a call  back.

## 2023-12-19 NOTE — PROGRESS NOTES
Notified pt that we are waiting for final insurance approval to activate on the kidney transplant waitlist.

## 2024-01-05 ENCOUNTER — TELEPHONE (OUTPATIENT)
Dept: TRANSPLANT | Facility: CLINIC | Age: 49
End: 2024-01-05
Payer: MEDICARE

## 2024-01-05 NOTE — PROGRESS NOTES
"    KIDNEY WAIT LISTING NOTE    Date of Financial clearance to list: 23    SSN/[facility]:     Organ: Kidney    Last Name: Reyer  First Name: Quiana    : 1975       Gender: female        MRN#: [id number]                                   State of Permanent Residence: [address] [place] [address] [place] [address]    Ethnicity: Not  or /a   Race:      White    CLINICAL INFORMATION   Candidate Medical Urgency Status: Active (1)  Number of Previous Kidney Transplants: 0  Number of Previous Solid Organ Transplants: 0  Did you enter number of previous kidney or other solid organ transplants? Yes  Is this Candidate a Prior Living Donor: No  (If yes, please generate letter to [employer] with patient's date of donation, recipient SSN, signed by Surgical Director after patient is listed in order to receive priority points).      ABO  ABO Blood Group:   A POS     ABO Confirmation: (THESE DATES MUST BE PRIOR TO THE LIST DATE AND SUPPORTED BY SEPARATE LAB REPORTS)    Internal Results    Lab Results   Component Value Date    GROUPTRH A POS 2023    GROUPTRH A POS 2023     No results found for: "ABO"    External Results    ABO Date 1:  11  ABO Date 2  Are either of these ABO results based on External Labs? Yes  (If Yes, STOP and go to source document in Media Tab for verification).    VITALS  Height:  5' 3.7"  Weight:  100.7 kg  (Use height from Transplant clinic visits only).  Did you enter height/weight? Yes    HLA    Class I:  Lab Results   Component Value Date    UUIT0IL 11 2023    ETDR0SU 26 2023    FCUP9QQ 64 2023    TJZG5QW 58 2023    YBEXX5HO 6 2023    VDLAF9GQ 4 2023    SPSVK1PF 10 2023    UPRLW4MM 8 2023       Class II:  Lab Results   Component Value Date    SEXUCG43HF 17 2023    YXYBMI48TW 7 2023    QMZMUC093HS 52 2023    ESBFCK0525 53 2023    XHOUD6ZU 2 2023    VCFEZ8EQ XX 2023       Tested for HLA " "Antibodies: Yes, antibodies detected     If result is "Positive" antibodies are detected     If result is "Negative or questionable" no antibodies detected    Lab Results   Component Value Date    CIPRAS Positive 06/20/2023    CIIPRAS Positive 06/20/2023       DIALYSIS INFORMATION  Is patient Pre-Dialysis: No            GFR Information  Report GFR being used as the criteria for placement on the kidney list. If not, leave blank  GFR < or = 20 ml/min?  Yes/no  If Yes, Specify value  ___   ml/min     Initial date GFR xfwiq4l 20 or less:   Is GFR obtained from an Outside lab Result? Yes/no  (If YES verify with source document scanned into media)    If patient on Dialysis:    Is candidate currently on dialysis for ESRD? Yes  If Yes,  Date Chronic Dialysis Started:   5/27/2022  (verify with source document in Media Tab)   Dialysis Unit Name: [facility]  [address] [place] [address]  [place] [address]                        Physician Name:  Dr. Amina Will  NPI#: [id number]    DIABETES INFORMATION  Primary Native Kidney Diagnosis: Diabetes Mellitus - Type II  C-Peptide Value - No results found for: "CPEPTIDE"  Current Diabetes Status: Type II    FOR NON-KIDNEY DEPARTMENT USE ONLY:  Additional Organs Registered? none    Maximum Acceptable Number of HLA Mismatches  ABDR:     6      (0-6)               AB:               (0-4)  ADR:   _____  (0-4)              BDR: _____ (0-4)  A:        _____  (0-2)              B:      _____ (0-2)          DR: ______ (0-2)    Will Recipient Accept?   Accept HBcAB Positive Organ:            Yes  Accept HBV MARKO Positive Organ:        No  Accept HCV Antibody Positive Organ: Yes   Accept HCV MARKO Positive Organ: Yes    Dual Kidney and En Bloc Opt In : No  Dual  Local:   No  Dual Import:   No  En Bloc Local:   No  En Bloc Import: No     Accept KDPI > 85: Single: No     Local: No     Import: No  Accept KDPI > 85: Dual: No     Local: No     Import: No    ### NURSE TO VERIFY CONSENT " AND MAKE ANY NECESSARY CHANGES NEEDED IN UNET AT THE TIME OF VERIFICATION ###    Unacceptible Antigens  If yes, list     Lab Results   Component Value Date    AS7NIOF  06/20/2023     Cw18,Cw15,Cw5,Cw2,B7,B81,Cw17,Cw6,B60,B73,B61,B27,B13,B48,B42,A66,B55,B67,B56,B37,B82,B47,B45,Cw4,B35,B53    CIABCLM B51, WEAK---B76, B75 06/20/2023    CIIAB DQ5,DQ6 06/20/2023       ### DO NOT LIST IF ANTIGEN VALUE WEAK ###    Hep B Vaccine series completed: yes    Blood Type x2 was verified by myself and (type in Name of second coordinator).  Blood type determination and reporting was completed according to the programs protocols and OPTN requirements.

## 2024-01-10 ENCOUNTER — TELEPHONE (OUTPATIENT)
Dept: TRANSPLANT | Facility: CLINIC | Age: 49
End: 2024-01-10
Payer: MEDICARE

## 2024-01-10 DIAGNOSIS — Z94.0 KIDNEY REPLACED BY TRANSPLANT: Primary | ICD-10-CM

## 2024-01-10 NOTE — LETTER
This communication is flagged as high priority.    January 10, 2024  Brande Reyer  [address] [place] [address]        Dear Brande Reyer:  MRN: [id number]    Congratulations! On 1/10/2024, you were placed on  the waiting list for a  donor transplant.    Your candidacy for kidney transplant is based on the following criteria: Diabetes Mellitus type II.    Your transplant coordinator while on the waiting list is Komal Linton RN. They can be reached at [phone] or [phone] with any questions.      What to do now?    Ask your living donors to call and begin testing  Give your donors our phone number, [phone]  Make sure your donors have your name and date of birth when they call  You will get transplanted much faster if you have a living donor    Have your blood sent to our Transplant Lab every month  If you are on dialysis - our Transplant Lab will work with your dialysis unit to send your blood every month  If you are not on dialysis   If you live near an [facility] lab, we will schedule you to have blood drawn every month  If you do not live near an [facility] lab, you will be sent blood kits in the mail. You will need to take a kit to your local lab or doctor to have your blood drawn every month and mail to the Transplant Lab.     Call us with ANY CHANGES  Phone numbers - we must be able to reach you anytime of the day or night when a kidney is available  Address  Insurance coverage  Dialysis unit or kidney doctor  [place]: if you have surgery, stay in the hospital, have to get blood, or have an infection    Review your Kidney/Kidney-Pancreas Transplant Guide   This will give you detailed information about what happens when  you are on the waiting list   you are called when a kidney is available    The [facility] has a transplant surgeon and physician available 365 days a year, 24 hours a day to coordinate organ acceptance, procurement, surgical placement and to  address urgent patient care issues.  You will be notified in writing of any changes to our Transplant Centers staffing plan that would impact your ability to receive a transplant.    Attached is a letter from the [employer] ([employer]). It describes the services and information offered to patients by [employer] and the Organ Procurement and Transplant Network. We look forward to working with you while on the waiting list.     We would like to inform you of an important OPTN (Organ Procurement and Transplant Network) Policy change that may affect the waiting time for some candidates on our waiting list.     Waiting time is important in identifying who receives offers for kidneys. A long wait time may increase your chance of getting an offer. Wait time is based on a test called eGFR that tells how well your kidneys are working. Wait time could also be based on how long you have been on dialysis. Government and health officials have changed the way this test is used. Before this year, hospitals used an eGFR that would include your race. For Black or  Americans, this eGFR could have shown that their kidneys were working better than they were.    Because of this change, we are looking at everyones record and assessing waiting time for people who are eligible. We will be reviewing everyones medical records and will contact you if you are eligible.     Who can I talk to if I have a question?  You can contact us if you have questions or send a message through [facility].     Please give us time to answer your questions. We are working on this for many patients.    How can I learn more about eGFR and this policy change?  Go to OPTN website > Patients > Kidney > FAQ: Understanding race-neutral eGFR calculations  Full URL: https://optn.transplant.hrsa.gov/patients/by-organ/kidney/understanding-the-proposal-to-require-race-neutral-egfr-calculations/  Call the Organ Procurement and Transplantation Network  (OPTN) toll-free patient services line at 1-959.485.1286    Congratulations,    Your Transplant Partner  [facility]   [facility] [place] [address]  [phone]                                                                The Organ Procurement and Transplantation Network   Toll-free patient services line: [phone]  Your resource for organ transplant information      Staffed 8:30 am - 5:00 pm ET Monday - Friday   Leave a message 24/7 to receive a call back    The Organ Procurement and Transplantation Network (OPTN) is the national transplant system. It makes the policies that decide how donated organs are matched to patients waiting for a transplant. The OPTN:    Makes sure donated organs get matched to people on the transplant waiting list  Tells people about the donation and transplant processes  Makes sure that the public knows about the need for more organ and tissue donations    The OPTN has a free patient services line that you can call to:  Get more information about:   o Organ donation and organ transplants   o Donation and transplant policies  Get an information kit with:   o A list of transplant hospitals   o Waiting list information  Talk about any questions you may have about your transplant hospital or organ procurement organization. The staff will do their best to help you or point you to others who may help.  Find out how you can volunteer with the OPTN and help shape transplant policy    The patient services line number is: 2-924-351-1687    Patient services line staff CANNOT answer questions about your own medical care, including:  Waiting list status  Test results  Medical records  You will need to call your transplant hospital for this information.    The following websites have more information about transplantation and donation:  OPTN: https://optn.transplant.[facility].gov/  For potential living donors and transplant recipients:   o Living with  transplant: https://www.transplantliving.org/   o Living donation process: https://optn.transplant.hrsa.gov/living-donation/     o Financial assistance: https://www.livingdonorassistance.org/  Transplantation data: https://www.srtr.org/  Organ donation: https://www.organdonor.gov/    Volunteer with the OPTN: https://optn.transplant.hrsa.gov/get-involved

## 2024-01-10 NOTE — PROGRESS NOTES
"  KIDNEY WAIT LISTING NOTE    Date of Financial clearance to list: 23    SSN/[facility]:     Organ: Kidney    Last Name: Reyer  First Name: Quiana    : 1975       Gender: female        MRN#: [id number]                                   State of Permanent Residence: [address] [place] [address]  [place] [address]    Ethnicity: Not  or /a   Race:      White    CLINICAL INFORMATION   Candidate Medical Urgency Status: Active (1)  Number of Previous Kidney Transplants: 0   Number of Previous Solid Organ Transplants: 0  Did you enter number of previous kidney or other solid organ transplants? Yes  Is this Candidate a Prior Living Donor: No  (If yes, please generate letter to [employer] with patient's date of donation, recipient SSN, signed by Surgical Director after patient is listed in order to receive priority points).      ABO  ABO Blood Group:   A POS     ABO Confirmation: (THESE DATES MUST BE PRIOR TO THE LIST DATE AND SUPPORTED BY SEPARATE LAB REPORTS)    Internal Results    Lab Results   Component Value Date    GROUPTRH A POS 2023    GROUPTRH A POS 2023     No results found for: "ABO"    External Results    ABO Date 1:    ABO Date 2  Are either of these ABO results based on External Labs? No  (If Yes, STOP and go to source document in Media Tab for verification).    VITALS  Height:  5' 3.7"  Weight:  100.7 kg  (Use height from Transplant clinic visits only).  Did you enter height/weight? Yes    HLA    Class I:  Lab Results   Component Value Date    VHWQ0WB 11 2023    GYMI4ZP 26 2023    GJTN1BC 64 2023    YESG5YB 58 2023    LJATF9NJ 6 2023    ISHSF7BZ 4 2023    SMBML5FY 10 2023    YTZUM2QA 8 2023       Class II:  Lab Results   Component Value Date    YQNXAR76PZ 17 2023    AOLCBA91LD 7 2023    VGSWAE566AQ 52 2023    QMGNXI7365 53 2023    LGQPS1XE 2 2023    SEPCW5WW XX 2023       Tested for HLA Antibodies: " "Yes, antibodies detected     If result is "Positive" antibodies are detected     If result is "Negative or questionable" no antibodies detected    Lab Results   Component Value Date    CIPRAS Positive 06/20/2023    CIIPRAS Positive 06/20/2023       DIALYSIS INFORMATION  Is patient Pre-Dialysis: No     GFR Information  Report GFR being used as the criteria for placement on the kidney list. If not, leave blank  GFR < or = 20 ml/min? n/a  If Yes, Specify value  ___   ml/min     Initial date GFR became 20 or less:   Is GFR obtained from an Outside lab Result? n/a  (If YES verify with source document scanned into media)    If patient on Dialysis:    Is candidate currently on dialysis for ESRD? Yes  If Yes,  Date Chronic Dialysis Started:   5/27/2022  (verify with source document in Media Tab)   Dialysis Unit Name: [facility]  [address] [place] [address]  [place] [address]                        Physician Name:  Dr. Amina Will  NPI#: [id number]    DIABETES INFORMATION  Primary Native Kidney Diagnosis: Diabetes Mellitus - Type II  C-Peptide Value - No results found for: "CPEPTIDE"  Current Diabetes Status: Type II    FOR NON-KIDNEY DEPARTMENT USE ONLY:  Additional Organs Registered? none    Maximum Acceptable Number of HLA Mismatches  ABDR:     6      (0-6)               AB:               (0-4)  ADR:   _____  (0-4)              BDR: _____ (0-4)  A:        _____  (0-2)              B:      _____ (0-2)          DR: ______ (0-2)    Will Recipient Accept?   Accept HBcAB Positive Organ:            Yes  Accept HBV MARKO Positive Organ:        No  Accept HCV Antibody Positive Organ: Yes   Accept HCV MARKO Positive Organ: Yes    Dual Kidney and En Bloc Opt In : No  Dual  Local:   No  Dual Import:   No  En Bloc Local:   No  En Bloc Import: No     Accept KDPI > 85: Single: No     Local: No     Import: No  Accept KDPI > 85: Dual: No     Local: No     Import: No    ### NURSE TO VERIFY CONSENT AND MAKE ANY NECESSARY " CHANGES NEEDED IN UNET AT THE TIME OF VERIFICATION ###    Unacceptible Antigens  If yes, list     Lab Results   Component Value Date    XV6VHQM  06/20/2023     Cw18,Cw15,Cw5,Cw2,B7,B81,Cw17,Cw6,B60,B73,B61,B27,B13,B48,B42,A66,B55,B67,B56,B37,B82,B47,B45,Cw4,B35,B53    CIABCLM B51, WEAK---B76, B75 06/20/2023    CIIAB DQ5,DQ6 06/20/2023       ### DO NOT LIST IF ANTIGEN VALUE WEAK ###    Hep B Vaccine series completed: yes    Blood Type x2 was verified by myself and Mavis Allen RN.  Blood type determination and reporting was completed according to the programs protocols and OPTN requirements.

## 2024-01-12 ENCOUNTER — TELEPHONE (OUTPATIENT)
Dept: TRANSPLANT | Facility: CLINIC | Age: 49
End: 2024-01-12
Payer: MEDICARE

## 2024-01-12 NOTE — PROGRESS NOTES
Spoke to pt, she confirms that her transplant tube was drawn with her monthly labs yesterday, she dropped off to her dialysis clinic today.

## 2024-01-16 ENCOUNTER — TELEPHONE (OUTPATIENT)
Dept: TRANSPLANT | Facility: CLINIC | Age: 49
End: 2024-01-16
Payer: MEDICARE

## 2024-01-17 DIAGNOSIS — Z76.82 PRE-KIDNEY TRANSPLANT, LISTED: Primary | ICD-10-CM

## 2024-01-17 DIAGNOSIS — Z76.82 ORGAN TRANSPLANT CANDIDATE: Primary | ICD-10-CM

## 2024-01-26 ENCOUNTER — HOSPITAL ENCOUNTER (OUTPATIENT)
Dept: PREADMISSION TESTING | Facility: HOSPITAL | Age: 49
Discharge: HOME | End: 2024-01-26
Attending: INTERNAL MEDICINE
Payer: MEDICARE

## 2024-01-26 VITALS
DIASTOLIC BLOOD PRESSURE: 81 MMHG | OXYGEN SATURATION: 100 % | HEIGHT: 64 IN | SYSTOLIC BLOOD PRESSURE: 144 MMHG | RESPIRATION RATE: 16 BRPM | WEIGHT: 222 LBS | BODY MASS INDEX: 37.9 KG/M2 | TEMPERATURE: 98 F

## 2024-01-26 DIAGNOSIS — Z01.818 PRE-OP TESTING: Primary | ICD-10-CM

## 2024-01-26 RX ORDER — LANTHANUM CARBONATE 750 MG/1
1500 TABLET, CHEWABLE ORAL
COMMUNITY

## 2024-01-30 ENCOUNTER — ANESTHESIA (OUTPATIENT)
Dept: SURGERY | Facility: HOSPITAL | Age: 49
End: 2024-01-30
Payer: MEDICARE

## 2024-01-30 ENCOUNTER — HOSPITAL ENCOUNTER (OUTPATIENT)
Facility: HOSPITAL | Age: 49
Discharge: HOME | End: 2024-01-30
Attending: INTERNAL MEDICINE | Admitting: INTERNAL MEDICINE
Payer: MEDICARE

## 2024-01-30 ENCOUNTER — ANESTHESIA EVENT (OUTPATIENT)
Dept: SURGERY | Facility: HOSPITAL | Age: 49
End: 2024-01-30
Payer: MEDICARE

## 2024-01-30 VITALS
SYSTOLIC BLOOD PRESSURE: 137 MMHG | OXYGEN SATURATION: 99 % | HEART RATE: 77 BPM | RESPIRATION RATE: 14 BRPM | DIASTOLIC BLOOD PRESSURE: 61 MMHG

## 2024-01-30 VITALS
OXYGEN SATURATION: 99 % | RESPIRATION RATE: 14 BRPM | HEART RATE: 74 BPM | SYSTOLIC BLOOD PRESSURE: 144 MMHG | TEMPERATURE: 98 F | DIASTOLIC BLOOD PRESSURE: 66 MMHG

## 2024-01-30 DIAGNOSIS — Z86.010 PERSONAL HISTORY OF COLONIC POLYPS: ICD-10-CM

## 2024-01-30 LAB — GLUCOSE SERPL-MCNC: 112 MG/DL (ref 70–110)

## 2024-01-30 PROCEDURE — 45385 COLONOSCOPY W/LESION REMOVAL: CPT | Performed by: INTERNAL MEDICINE

## 2024-01-30 PROCEDURE — 82962 GLUCOSE BLOOD TEST: CPT | Performed by: INTERNAL MEDICINE

## 2024-01-30 PROCEDURE — 37000009 HC ANESTHESIA EA ADD 15 MINS: Performed by: INTERNAL MEDICINE

## 2024-01-30 PROCEDURE — 27201089 HC SNARE, DISP (ANY): Performed by: INTERNAL MEDICINE

## 2024-01-30 PROCEDURE — 37000008 HC ANESTHESIA 1ST 15 MINUTES: Performed by: INTERNAL MEDICINE

## 2024-01-30 PROCEDURE — 88305 TISSUE EXAM BY PATHOLOGIST: CPT | Mod: TC,59 | Performed by: PATHOLOGY

## 2024-01-30 PROCEDURE — D9220A PRA ANESTHESIA: Mod: PT,CRNA,, | Performed by: NURSE ANESTHETIST, CERTIFIED REGISTERED

## 2024-01-30 PROCEDURE — 63600175 PHARM REV CODE 636 W HCPCS: Performed by: NURSE ANESTHETIST, CERTIFIED REGISTERED

## 2024-01-30 PROCEDURE — 25000003 PHARM REV CODE 250: Performed by: NURSE ANESTHETIST, CERTIFIED REGISTERED

## 2024-01-30 PROCEDURE — 27201114 HC TRAP (ANY): Performed by: INTERNAL MEDICINE

## 2024-01-30 PROCEDURE — D9220A PRA ANESTHESIA: Mod: PT,ANES,, | Performed by: ANESTHESIOLOGY

## 2024-01-30 RX ORDER — TIRZEPATIDE 2.5 MG/.5ML
2.5 INJECTION, SOLUTION SUBCUTANEOUS
COMMUNITY

## 2024-01-30 RX ORDER — PROPOFOL 10 MG/ML
VIAL (ML) INTRAVENOUS
Status: DISCONTINUED | OUTPATIENT
Start: 2024-01-30 | End: 2024-01-30

## 2024-01-30 RX ADMIN — PROPOFOL 30 MG: 10 INJECTION, EMULSION INTRAVENOUS at 09:01

## 2024-01-30 RX ADMIN — PROPOFOL 20 MG: 10 INJECTION, EMULSION INTRAVENOUS at 09:01

## 2024-01-30 RX ADMIN — PROPOFOL 100 MG: 10 INJECTION, EMULSION INTRAVENOUS at 09:01

## 2024-01-30 RX ADMIN — SODIUM CHLORIDE: 0.9 INJECTION, SOLUTION INTRAVENOUS at 09:01

## 2024-01-30 NOTE — PROVATION PATIENT INSTRUCTIONS
Discharge Summary/Instructions after an Endoscopic Procedure  Patient Name: Brande Reyer  Patient MRN: [id number]  Patient YOB: 1975 Tuesday, January 30, 2024  Hermilo Orozco III, MD  RESTRICTIONS:  During your procedure today, you received medications for sedation.  These   medications may affect your judgment, balance and coordination.  Therefore,   for 24 hours, you have the following restrictions:   - DO NOT drive a car, operate machinery, make legal/financial decisions,   sign important papers or drink alcohol.    ACTIVITY:  Today: no heavy lifting, straining or running due to procedural   sedation/anesthesia.  The following day: return to full activity including work.  DIET:  Eat and drink normally unless instructed otherwise.     TREATMENT FOR COMMON SIDE EFFECTS:  - Mild abdominal pain, nausea, belching, bloating or excessive gas:  rest,   eat lightly and use a heating pad.  - Sore Throat: treat with throat lozenges and/or gargle with warm salt   water.  - Because air was used during the procedure, expelling large amounts of air   from your rectum or belching is normal.  - If a bowel prep was taken, you may not have a bowel movement for 1-3 days.    This is normal.  SYMPTOMS TO WATCH FOR AND REPORT TO YOUR PHYSICIAN:  1. Abdominal pain or bloating, other than gas cramps.  2. Chest pain.  3. Back pain.  4. Signs of infection such as: chills or fever occurring within 24 hours   after the procedure.  5. Rectal bleeding, which would show as bright red, maroon, or black stools.   (A tablespoon of blood from the rectum is not serious, especially if   hemorrhoids are present.)  6. Vomiting.  7. Weakness or dizziness.  GO DIRECTLY TO THE NEAREST EMERGENCY ROOM IF YOU HAVE ANY OF THE FOLLOWING:      Difficulty breathing              Chills and/or fever over 101 F   Persistent vomiting and/or vomiting blood   Severe abdominal pain   Severe chest pain   Black, tarry stools   Bleeding- more than one  tablespoon   Any other symptom or condition that you feel may need urgent attention  Your doctor recommends these additional instructions:  If any biopsies were taken, your doctors clinic will contact you in 1 to 2   weeks with any results.  - Discharge patient to home (ambulatory).   - Patient has a contact number available for emergencies.  The signs and   symptoms of potential delayed complications were discussed with the   patient.  Return to normal activities tomorrow.  Written discharge   instructions were provided to the patient.   - Resume previous diet.   - Continue present medications.   - Repeat colonoscopy in 3 years for surveillance.   - No ibuprofen, naproxen, or other non-steroidal anti-inflammatory drugs for   2 weeks after polyp removal.  For questions, problems or results please call your physician - Hermilo Orozco III, MD at Work:  [phone].  [facility], EMERGENCY ROOM PHONE NUMBER: [phone]  IF A COMPLICATION OR EMERGENCY SITUATION ARISES AND YOU ARE UNABLE TO REACH   YOUR PHYSICIAN - GO DIRECTLY TO THE EMERGENCY ROOM.  Hermilo Orozco III, MD  1/30/2024 9:45:45 AM  This report has been verified and signed electronically.  Dear patient,  As a result of recent federal legislation (The Federal Cures Act), you may   receive lab or pathology results from your procedure in your [facility]   account before your physician is able to contact you. Your physician or   their representative will relay the results to you with their   recommendations at their soonest availability.  Thank you,  PROVATION

## 2024-01-30 NOTE — ANESTHESIA PREPROCEDURE EVALUATION
2024  Brande Reyer is a 48 y.o., female.      Pre-op Assessment    I have reviewed the Patient Summary Reports.     I have reviewed the Nursing Notes. I have reviewed the NPO Status.   I have reviewed the Medications.     Review of Systems  Anesthesia Hx:  No problems with previous Anesthesia   Neg history of prior surgery.          Denies Family Hx of Anesthesia complications.    Denies Personal Hx of Anesthesia complications.                    Social:  Former Smoker, No Alcohol Use       Hematology/Oncology:    Oncology Normal    -- Anemia:                                  EENT/Dental:  EENT/Dental Normal           Cardiovascular:     Hypertension           hyperlipidemia                             Pulmonary:  Pulmonary Normal                       Renal/:  Chronic Renal Disease, ESRD, Dialysis                Hepatic/GI:  Hepatic/GI Normal       Colonic polyps in the past          Musculoskeletal:  Musculoskeletal Normal                Neurological:  Neurology Normal                                      Endocrine:  Diabetes, type 2           Dermatological:  Skin Normal    Psych:   anxiety depression                Patient Active Problem List   Diagnosis    Anaphylactic shock, unspecified, initial encounter    Anemia in chronic kidney disease    Anxiety disorder, unspecified    End stage renal disease    Hemodialysis access, AV graft    History of stillbirth    Hyperlipidemia    Essential (primary) hypertension    Major depressive disorder, single episode, moderate    Nicotine dependence, unspecified, uncomplicated    Obesity, morbid    Type 2 diabetes mellitus with diabetic nephropathy       Past Surgical History:   Procedure Laterality Date    AV FISTULA PLACEMENT  2022     SECTION      COLONOSCOPY N/A 2023    Procedure: COLONOSCOPY;  Surgeon: Hermilo Orozco III, MD;   Location: [facility];  Service: Endoscopy;  Laterality: N/A;    HYSTERECTOMY      INSERTION OF DIALYSIS CATHETER      removal of dialysis catheter      TUBAL LIGATION          Tobacco Use:  The patient  reports that she quit smoking about 2 years ago. Her smoking use included cigarettes. She has never used smokeless tobacco.     Results for orders placed or performed during the hospital encounter of 08/21/23   EKG 12-lead    Collection Time: 08/21/23 12:44 PM    Narrative    Test Reason : Z01.818,    Vent. Rate : 089 BPM     Atrial Rate : 089 BPM     P-R Int : 154 ms          QRS Dur : 082 ms      QT Int : 388 ms       P-R-T Axes : 027 038 044 degrees     QTc Int : 472 ms    Normal sinus rhythm  Normal ECG  No previous ECGs available  Confirmed by Oscar MATHUR, Andi FRANKEL ([id number]) on 8/25/2023 6:39:48 PM    Referred By:             Confirmed By:Andi Moore MD             Lab Results   Component Value Date    WBC 6.82 01/26/2024    HGB 10.1 (L) 01/26/2024    HCT 31.4 (L) 01/26/2024     (H) 01/26/2024     (L) 01/26/2024     BMP  Lab Results   Component Value Date     01/26/2024    K 4.5 01/26/2024     01/26/2024    CO2 28 01/26/2024    BUN 60 (H) 01/26/2024    CREATININE 7.4 (H) 01/26/2024    CALCIUM 9.3 01/26/2024    ANIONGAP 9 01/26/2024     (H) 01/26/2024     (H) 08/21/2023     (H) 06/20/2023       No results found for this or any previous visit.          Physical Exam  General: Well nourished and Alert    Airway:  Mallampati: II   Mouth Opening: Normal  TM Distance: Normal  Tongue: Normal  Neck ROM: Normal ROM    Dental:  Intact    Chest/Lungs:  Clear to auscultation, Normal Respiratory Rate    Heart:  Rate: Normal  Rhythm: Regular Rhythm  Sounds: Normal        Anesthesia Plan  Type of Anesthesia, risks & benefits discussed:    Anesthesia Type: Gen Natural Airway  Intra-op Monitoring Plan: Standard ASA Monitors  Induction:  IV  Informed Consent: Informed consent signed  with the Patient and all parties understand the risks and agree with anesthesia plan.  All questions answered. Patient consented to blood products? Yes  ASA Score: 3    Ready For Surgery From Anesthesia Perspective.     .

## 2024-01-30 NOTE — H&P
GASTROENTEROLOGY PRE-PROCEDURE H&P NOTE  Patient Name: Brande Reyer  Patient MRN: [id number]  Patient : 1975    Service date: 2024    PCP: Janie Shankar FNP-C    No chief complaint on file.      HPI: Patient is a 48 y.o. female with PMHx as below here for evaluation of f/u colon polyps and EMR.     Past Medical History:  Past Medical History:   Diagnosis Date    Allergy     Anemia     Anxiety     Depression     Diabetes mellitus, type 2     Dialysis patient     Disorder of kidney and ureter     Hyperlipidemia     Hypertension     Obesity         Past Surgical History:  Past Surgical History:   Procedure Laterality Date    AV FISTULA PLACEMENT  2022     SECTION      COLONOSCOPY N/A 2023    Procedure: COLONOSCOPY;  Surgeon: Hermilo Orozco III, MD;  Location: [facility];  Service: Endoscopy;  Laterality: N/A;    HYSTERECTOMY      INSERTION OF DIALYSIS CATHETER      TUBAL LIGATION          Home Medications:  Medications Prior to Admission   Medication Sig Dispense Refill Last Dose    ALPRAZolam (XANAX) 0.25 MG tablet TAKE ONE TABLET BY MOUTH TWICE DAILY ON DIALYSIS DAYS. ALL OTHER DAYS TAKE ONE TABLET BY MOUTH AT BEDTIME   2024    biotin 10,000 mcg Cap Take 1 capsule by mouth once daily.   2024    busPIRone (BUSPAR) 5 MG Tab Take 5 mg by mouth 2 (two) times daily.   2024    calcitRIOL (ROCALTROL) 0.25 MCG Cap Take 0.25 mcg by mouth 3 (three) times a week. MWF   2024    cloNIDine (CATAPRES) 0.1 MG tablet Take 0.2 mg by mouth every evening.   2024    COLACE 100 mg capsule SMARTSI Capsule(s) By Mouth Every Other Day PRN   2024    ergocalciferol (ERGOCALCIFEROL) 50,000 unit Cap Take 50,000 Units by mouth every 30 days.   2024    FLUoxetine 20 MG capsule Take 40 mg by mouth once daily.   2024    lactobacillus acidophilus & bulgar (LACTINEX) 100 million cell packet Take 1 tablet by mouth once daily.   2024    lanthanum (FOSRENOL) 750 mg  chewable tablet Take 1,500 mg by mouth 3 (three) times daily with meals.   2024    montelukast (SINGULAIR) 10 mg tablet Take 10 mg by mouth nightly.   2024    pravastatin (PRAVACHOL) 20 MG tablet pravastatin 20 mg tablet   TAKE ONE TABLET BY MOUTH DAILY   2024    VIRGIL-GIO RX 1- mg-mg-mcg Tab Take 1 tablet by mouth every morning.   2024    cetirizine (ZYRTEC) 10 MG tablet cetirizine 10 mg tablet   TAKE ONE TABLET BY MOUTH DAILY       sevelamer carbonate (RENVELA) 800 mg Tab Take 800 mg by mouth 3 (three) times daily.       sodium zirconium cyclosilicate (LOKELMA) 10 gram packet Take 10 g by mouth. Mix entire contents of packet(s) into drinking glass containing 3 tablespoons of water; stir well and drink immediately. Add water and repeat until no powder remains to receive entire dose.   2024    tirzepatide (MOUNJARO) 2.5 mg/0.5 mL PnIj Inject 2.5 mg into the skin every 7 days.   2024    TRULICITY 3 mg/0.5 mL pen injector Inject 3 mg into the skin once a week.       varicella-zoster gE-AS01B, PF, (SHINGRIX, PF,) 50 mcg/0.5 mL injection Inject into the muscle. 1 each 1                Review of patient's allergies indicates:   Allergen Reactions    Amlodipine Itching and Swelling       Social History:   Social History     Occupational History    Occupation: unemployed   Tobacco Use    Smoking status: Former     Current packs/day: 0.00     Types: Cigarettes     Quit date:      Years since quittin.0    Smokeless tobacco: Never   Substance and Sexual Activity    Alcohol use: Not Currently    Drug use: Never    Sexual activity: Yes     Partners: Male     Birth control/protection: See Surgical Hx       Family History:   Family History   Problem Relation Age of Onset    Heart disease Father     No Known Problems Daughter        Review of Systems:  A 10 point review of systems was performed and was normal, except as mentioned in the HPI, including constitutional, HEENT, heme, lymph,  "cardiovascular, respiratory, gastrointestinal, genitourinary, neurologic, endocrine, psychiatric and musculoskeletal.      OBJECTIVE:    Physical Exam:  24 Hour Vital Sign Ranges: Temp:  [98.2 °F (36.8 °C)] 98.2 °F (36.8 °C)  Pulse:  [73] 73  Resp:  [16] 16  SpO2:  [100 %] 100 %  BP: (120)/(61) 120/61  Most recent vitals: /61 (BP Location: Left arm, Patient Position: Lying)   Pulse 73   Temp 98.2 °F (36.8 °C) (Oral)   Resp 16   SpO2 100% Comment: ROOM AIR  Breastfeeding No    GEN: well-developed, well-nourished, awake and alert, non-toxic appearing adult  HEENT: PERRL, sclera anicteric, oral mucosa pink and moist without lesion  NECK: trachea midline; Good ROM  CV: regular rate and rhythm, no murmurs or gallops  RESP: clear to auscultation bilaterally, no wheezes, rhonci or rales  ABD: soft, non-tender, non-distended, normal bowel sounds  EXT: no swelling or edema, 2+ pulses distally  SKIN: no rashes or jaundice  PSYCH: normal affect    Labs:   No results for input(s): "WBC", "MCV", "PLT" in the last 72 hours.    Invalid input(s): "HGBAU"  No results for input(s): "NA", "K", "CL", "CO2", "BUN", "GLU" in the last 72 hours.    Invalid input(s): "CREA"  No results for input(s): "ALB" in the last 72 hours.    Invalid input(s): "ALKP", "SGOT", "SGPT", "TBIL", "DBIL", "TPRO"  No results for input(s): "PT", "INR", "PTT" in the last 72 hours.      IMPRESSION / RECOMMENDATIONS:  Colon  with interventions as warranted.   RIsks, benefits, alternatives discussed in detail regarding upcoming procedures and sedation. Some of the more common endoscopic complications include but not limited to immediate or delayed perforation, bleeding, infections, pain, inadvertent injury to surrounding tissue / organs and possible need for surgical evaluation. Patient expressed understanding, all questions answered and will proceed with procedure as planned.     Hermilo Orozco III  1/30/2024  9:22 AM      "

## 2024-01-30 NOTE — TRANSFER OF CARE
Anesthesia Transfer of Care Note    Patient: Brande Reyer    Procedure(s) Performed: Procedure(s) (LRB):  COLONOSCOPY (N/A)    Patient location: GI    Anesthesia Type: general    Transport from OR: Transported from OR on room air with adequate spontaneous ventilation    Post pain: adequate analgesia    Post assessment: no apparent anesthetic complications and tolerated procedure well    Post vital signs: stable    Level of consciousness: awake, alert and oriented    Nausea/Vomiting: no nausea/vomiting    Complications: none    Transfer of care protocol was followed      Last vitals: Visit Vitals  /61 (BP Location: Left arm, Patient Position: Lying)   Pulse 73   Temp 36.8 °C (98.2 °F) (Oral)   Resp 16   SpO2 100%   Breastfeeding No

## 2024-01-30 NOTE — ANESTHESIA POSTPROCEDURE EVALUATION
Anesthesia Post Evaluation    Patient: Brande Reyer    Procedure(s) Performed: Procedure(s) (LRB):  COLONOSCOPY (N/A)    Final Anesthesia Type: general      Patient location during evaluation: GI PACU  Patient participation: Yes- Able to Participate  Level of consciousness: awake and alert  Post-procedure vital signs: reviewed and stable  Pain management: adequate  Airway patency: patent    PONV status at discharge: No PONV  Anesthetic complications: no      Cardiovascular status: blood pressure returned to baseline and stable  Respiratory status: unassisted and room air  Hydration status: euvolemic  Follow-up not needed.              Vitals Value Taken Time   /66 01/30/24 1005   Temp 36.6 °C (97.8 °F) 01/30/24 0950   Pulse 74 01/30/24 1005   Resp 17 01/30/24 1005   SpO2 99 % 01/30/24 1005         Event Time   Out of Recovery 10:12:56         Pain/Jesse Score: Jesse Score: 10 (1/30/2024  9:53 AM)

## 2024-01-31 ENCOUNTER — TELEPHONE (OUTPATIENT)
Dept: TRANSPLANT | Facility: CLINIC | Age: 49
End: 2024-01-31
Payer: MEDICARE

## 2024-01-31 DIAGNOSIS — Z76.82 ORGAN TRANSPLANT CANDIDATE: Primary | ICD-10-CM

## 2024-01-31 NOTE — TELEPHONE ENCOUNTER
Spoke with patient about getting blood work drawn so we can test donors for cross matches.  All questions answered.  Orders placed, appointment set.

## 2024-02-01 ENCOUNTER — PATIENT MESSAGE (OUTPATIENT)
Dept: TRANSPLANT | Facility: CLINIC | Age: 49
End: 2024-02-01
Payer: MEDICARE

## 2024-02-01 ENCOUNTER — LAB VISIT (OUTPATIENT)
Dept: LAB | Facility: HOSPITAL | Age: 49
End: 2024-02-01
Attending: NURSE PRACTITIONER
Payer: MEDICARE

## 2024-02-01 DIAGNOSIS — Z76.82 ORGAN TRANSPLANT CANDIDATE: Primary | ICD-10-CM

## 2024-02-01 PROCEDURE — 36415 COLL VENOUS BLD VENIPUNCTURE: CPT | Performed by: NURSE PRACTITIONER

## 2024-02-02 DIAGNOSIS — Z76.82 ORGAN TRANSPLANT CANDIDATE: Primary | ICD-10-CM

## 2024-02-15 DIAGNOSIS — Z76.82 AWAITING ORGAN TRANSPLANT STATUS: Primary | ICD-10-CM

## 2024-02-20 ENCOUNTER — TELEPHONE (OUTPATIENT)
Dept: TRANSPLANT | Facility: CLINIC | Age: 49
End: 2024-02-20
Payer: MEDICARE

## 2024-02-20 NOTE — TELEPHONE ENCOUNTER
Spoke with patient, questions answered.    ----- Message from Komal Linton RN sent at 2/20/2024  8:22 AM CST -----  Regarding: FW: Questions    ----- Message -----  From: Lisa Davies  Sent: 2/20/2024   8:20 AM CST  To: Kidney Waitlisted Coordinator  Subject: Questions                                              Name Of Caller:     Quiana      Contact Preference:    [phone]       Nature of call:    Pt has some questions regarding the donor crossmatch tests.

## 2024-04-10 ENCOUNTER — TELEPHONE (OUTPATIENT)
Dept: TRANSPLANT | Facility: CLINIC | Age: 49
End: 2024-04-10
Payer: MEDICARE

## 2024-04-10 DIAGNOSIS — Z76.82 ORGAN TRANSPLANT CANDIDATE: Primary | ICD-10-CM

## 2024-04-10 DIAGNOSIS — Z12.31 ENCOUNTER FOR SCREENING MAMMOGRAM FOR MALIGNANT NEOPLASM OF BREAST: ICD-10-CM

## 2024-04-23 ENCOUNTER — TELEPHONE (OUTPATIENT)
Dept: TRANSPLANT | Facility: CLINIC | Age: 49
End: 2024-04-23
Payer: MEDICARE

## 2024-04-25 ENCOUNTER — TELEPHONE (OUTPATIENT)
Dept: TRANSPLANT | Facility: CLINIC | Age: 49
End: 2024-04-25
Payer: MEDICARE

## 2024-04-25 NOTE — TELEPHONE ENCOUNTER
"----- Message from Mavis Allen RN sent at 4/25/2024 12:31 PM CDT -----  Regarding: FW: call back    ----- Message -----  From: Raul Monique  Sent: 4/25/2024  12:10 PM CDT  To: Kidney Waitlisted Coordinator  Subject: call back                                        Consult/Advisory:        Name Of Caller: Self     Contact Preference?:[phone]     What is the nature of the call?: Calling to speak w/Komal in regards to some question she has          Additional Notes:  "Thank you for all that you do for our patients"  "

## 2024-04-25 NOTE — TELEPHONE ENCOUNTER
"Returned call, patient had questions about her potential donor "registering with the national donor registry" and wanted to know how that works for her to get her donor's kidney. I informed patient that I do not know how that works but her donor needs to complete [facility]'s questionnaire to start the donor evaluation for any potential donor to donate to her. She wanted to speak to donor coordinators to see if they can answer her question about national registry. I also educated patient on paired donation program.   "

## 2024-04-26 ENCOUNTER — TELEPHONE (OUTPATIENT)
Dept: TRANSPLANT | Facility: CLINIC | Age: 49
End: 2024-04-26
Payer: MEDICARE

## 2024-04-26 NOTE — TELEPHONE ENCOUNTER
"Spoke with patient regarding questions about donation.  Gave her my name and number to give to potential donors if they have any questions.  All questions answered.    ----- Message from Kathleen Mendoza RN sent at 4/25/2024  2:57 PM CDT -----    ----- Message -----  From: Federico Mackay  Sent: 4/25/2024   1:17 PM CDT  To: [facility] Living Donor Coordinator    Consult/Advisory:      Name Of Caller: Self    Contact Preference?: [phone] (home)     What is the nature of the call?: Calling to speak vaughn/ Stefani about donor inquiries    Additional Notes: Stating she already spoke vaughn/ Komal Linton and was recommended to reach out    "Thank you for all that you do for our patients"  "

## 2024-06-20 ENCOUNTER — HOSPITAL ENCOUNTER (OUTPATIENT)
Dept: RADIOLOGY | Facility: HOSPITAL | Age: 49
Discharge: HOME | End: 2024-06-20
Attending: NURSE PRACTITIONER
Payer: MEDICARE

## 2024-06-20 ENCOUNTER — OFFICE VISIT (OUTPATIENT)
Dept: TRANSPLANT | Facility: CLINIC | Age: 49
End: 2024-06-20
Payer: MEDICARE

## 2024-06-20 ENCOUNTER — TELEPHONE (OUTPATIENT)
Dept: TRANSPLANT | Facility: CLINIC | Age: 49
End: 2024-06-20
Payer: MEDICARE

## 2024-06-20 VITALS
OXYGEN SATURATION: 100 % | HEIGHT: 64 IN | BODY MASS INDEX: 39.03 KG/M2 | SYSTOLIC BLOOD PRESSURE: 137 MMHG | RESPIRATION RATE: 16 BRPM | HEART RATE: 85 BPM | WEIGHT: 228.63 LBS | TEMPERATURE: 97 F | DIASTOLIC BLOOD PRESSURE: 68 MMHG

## 2024-06-20 DIAGNOSIS — N18.6 ESRD ON HEMODIALYSIS: ICD-10-CM

## 2024-06-20 DIAGNOSIS — Z76.82 PATIENT ON WAITING LIST FOR KIDNEY TRANSPLANT: Primary | ICD-10-CM

## 2024-06-20 DIAGNOSIS — Z99.2 ESRD ON HEMODIALYSIS: ICD-10-CM

## 2024-06-20 DIAGNOSIS — Z76.82 ORGAN TRANSPLANT CANDIDATE: ICD-10-CM

## 2024-06-20 DIAGNOSIS — N25.81 SECONDARY HYPERPARATHYROIDISM: ICD-10-CM

## 2024-06-20 DIAGNOSIS — I10 ESSENTIAL (PRIMARY) HYPERTENSION: ICD-10-CM

## 2024-06-20 DIAGNOSIS — E11.21 TYPE 2 DIABETES MELLITUS WITH DIABETIC NEPHROPATHY, UNSPECIFIED WHETHER LONG TERM INSULIN USE: ICD-10-CM

## 2024-06-20 PROCEDURE — 76770 US EXAM ABDO BACK WALL COMP: CPT | Mod: 26,TXP,, | Performed by: RADIOLOGY

## 2024-06-20 PROCEDURE — 3078F DIAST BP <80 MM HG: CPT | Mod: CPTII,S$GLB,TXP, | Performed by: NURSE PRACTITIONER

## 2024-06-20 PROCEDURE — 71046 X-RAY EXAM CHEST 2 VIEWS: CPT | Mod: 26,TXP,, | Performed by: RADIOLOGY

## 2024-06-20 PROCEDURE — 99215 OFFICE O/P EST HI 40 MIN: CPT | Mod: S$GLB,TXP,, | Performed by: NURSE PRACTITIONER

## 2024-06-20 PROCEDURE — 1160F RVW MEDS BY RX/DR IN RCRD: CPT | Mod: CPTII,S$GLB,TXP, | Performed by: NURSE PRACTITIONER

## 2024-06-20 PROCEDURE — 1159F MED LIST DOCD IN RCRD: CPT | Mod: CPTII,S$GLB,TXP, | Performed by: NURSE PRACTITIONER

## 2024-06-20 PROCEDURE — 3008F BODY MASS INDEX DOCD: CPT | Mod: CPTII,S$GLB,TXP, | Performed by: NURSE PRACTITIONER

## 2024-06-20 PROCEDURE — 71046 X-RAY EXAM CHEST 2 VIEWS: CPT | Mod: TC,TXP

## 2024-06-20 PROCEDURE — 76770 US EXAM ABDO BACK WALL COMP: CPT | Mod: TC,TXP

## 2024-06-20 PROCEDURE — 3044F HG A1C LEVEL LT 7.0%: CPT | Mod: CPTII,S$GLB,TXP, | Performed by: NURSE PRACTITIONER

## 2024-06-20 PROCEDURE — 99999 PR PBB SHADOW E&M-EST. PATIENT-LVL V: CPT | Mod: PBBFAC,TXP,, | Performed by: NURSE PRACTITIONER

## 2024-06-20 PROCEDURE — 3075F SYST BP GE 130 - 139MM HG: CPT | Mod: CPTII,S$GLB,TXP, | Performed by: NURSE PRACTITIONER

## 2024-06-20 RX ORDER — FLUOXETINE HYDROCHLORIDE 40 MG/1
1 CAPSULE ORAL DAILY
COMMUNITY
Start: 2024-01-14

## 2024-06-20 RX ORDER — GABAPENTIN 100 MG/1
100 CAPSULE ORAL 3 TIMES DAILY
COMMUNITY

## 2024-06-20 RX ORDER — CLONIDINE HYDROCHLORIDE 0.2 MG/1
0.2 TABLET ORAL 2 TIMES DAILY
COMMUNITY

## 2024-06-20 RX ORDER — ALPRAZOLAM 0.5 MG/1
0.5 TABLET ORAL 3 TIMES DAILY
COMMUNITY

## 2024-06-20 NOTE — PROGRESS NOTES
Kidney Transplant Recipient Reevalulation    Referring Physician: Samuel Knutson  Current Nephrologist: Tam Mathis  Waitlist Status: active  Dialysis Start Date: 5/27/2022    Subjective:     CC:  Annual reassessment of kidney transplant candidacy.    HPI:  Ms. Reyer is a 49 y.o. year old White female with ESRD secondary to diabetic nephropathy.  She has been on the wait list for a kidney transplant at [facility] since 5/27/2022. Patient is currently on hemodialysis started on 5/27/2022. Patient is dialyzing on home HD 4 days/week.  Patient reports that she is tolerating dialysis well.. She has a LUE AV fistula. Patient denies any recent hospitalizations or ED visits.    Here today for first wait list visit. Weight up 5 lbs since last transplant visit, BMI today 39.24. She is on mounjaro and has been increasing dosage. Remains active without functional impairments. Looks good, not frail. Requesting rx for RSV vaccine.    CXR 6/20/2024: No acute process seen.   PXR 6/20/2023: favorable for transplant   Renal US 6/20/2024: Chronic medical renal disease. No hydronephrosis or solid renal mass. Bilateral simple and minimally complex renal cysts.  Iliacs 6/20/2023: favorable for transplant   Echo 9/18/2023: EF 55-60%, PA 33  Stress 9/18/2023: Abnormal myocardial perfusion scan. There is a moderate intensity, small to moderate sized, reversible perfusion abnormality that is consistent with ischemia in the mid to distal anteroseptal wall(s). PER CARDIOLOGY:her ASCVD risk score is 1.3%, her SPECT is most likely not accurate, d/w Dr Hartman who rev'd the studies in detail   Colonoscopy 1/30/2024: tubular adenoma, repeat in 3 years  PAP 12/7/2021: Negative for intraepithelial lesion or malignancy   MMG 4/17/2024: negative    Current Outpatient Medications   Medication Sig Dispense Refill    ALPRAZolam (XANAX) 0.5 MG tablet Take 0.5 mg by mouth 3 (three) times daily.      biotin 10,000 mcg Cap Take 1 capsule by mouth once  daily.      busPIRone (BUSPAR) 5 MG Tab Take 5 mg by mouth 2 (two) times daily.      calcitRIOL (ROCALTROL) 0.25 MCG Cap Take 0.25 mcg by mouth 3 (three) times a week. MWF      cloNIDine (CATAPRES) 0.2 MG tablet Take 0.2 mg by mouth 2 (two) times daily.      COLACE 100 mg capsule SMARTSI Capsule(s) By Mouth Every Other Day PRN      FLUoxetine 40 MG capsule Take 1 tablet by mouth once daily.      gabapentin (NEURONTIN) 100 MG capsule Take 100 mg by mouth 3 (three) times daily.      lactobacillus acidophilus & bulgar (LACTINEX) 100 million cell packet Take 1 tablet by mouth once daily.      Lactobacillus acidophilus (ACIDOPHILUS ORAL) Take by mouth.      lanthanum (FOSRENOL) 750 mg chewable tablet Take 1,500 mg by mouth 3 (three) times daily with meals.      montelukast (SINGULAIR) 10 mg tablet Take 10 mg by mouth nightly.      pravastatin (PRAVACHOL) 20 MG tablet pravastatin 20 mg tablet   TAKE ONE TABLET BY MOUTH DAILY      VIRGIL-GIO RX 1- mg-mg-mcg Tab Take 1 tablet by mouth every morning.      sodium zirconium cyclosilicate (LOKELMA) 10 gram packet Take 10 g by mouth. Mix entire contents of packet(s) into drinking glass containing 3 tablespoons of water; stir well and drink immediately. Add water and repeat until no powder remains to receive entire dose.      tirzepatide (MOUNJARO) 2.5 mg/0.5 mL PnIj Inject 2.5 mg into the skin every 7 days.      varicella-zoster gE-AS01B, PF, (SHINGRIX, PF,) 50 mcg/0.5 mL injection Inject into the muscle. 1 each 1    ALPRAZolam (XANAX) 0.25 MG tablet TAKE ONE TABLET BY MOUTH TWICE DAILY ON DIALYSIS DAYS. ALL OTHER DAYS TAKE ONE TABLET BY MOUTH AT BEDTIME      cetirizine (ZYRTEC) 10 MG tablet cetirizine 10 mg tablet   TAKE ONE TABLET BY MOUTH DAILY      cloNIDine (CATAPRES) 0.1 MG tablet Take 0.2 mg by mouth every evening.      ergocalciferol (ERGOCALCIFEROL) 50,000 unit Cap Take 50,000 Units by mouth every 30 days.      FLUoxetine 20 MG capsule Take 40 mg by mouth once  "daily.      sevelamer carbonate (RENVELA) 800 mg Tab Take 800 mg by mouth 3 (three) times daily.      TRULICITY 3 mg/0.5 mL pen injector Inject 3 mg into the skin once a week.       No current facility-administered medications for this visit.       Past Medical History:   Diagnosis Date    Allergy     Anemia     Anxiety     Depression     Diabetes mellitus, type 2     Dialysis patient     Disorder of kidney and ureter     Hyperlipidemia     Hypertension     Obesity        Review of Systems   Constitutional:  Positive for fatigue. Negative for activity change and fever.   Eyes:  Negative for visual disturbance.   Respiratory:  Negative for shortness of breath.    Cardiovascular:  Negative for chest pain and leg swelling.   Gastrointestinal:  Negative for constipation, diarrhea and nausea.   Genitourinary:  Positive for decreased urine volume. Negative for difficulty urinating, frequency and hematuria.        Still urinating    Musculoskeletal:  Negative for arthralgias and myalgias.   Skin:  Negative for wound.   Neurological:  Negative for weakness and numbness.   Psychiatric/Behavioral:  Negative for sleep disturbance. The patient is not nervous/anxious.        Objective:   body mass index is 39.24 kg/m².  /68 (BP Location: Right arm, Patient Position: Sitting, BP Method: Large (Automatic))   Pulse 85   Temp 97.2 °F (36.2 °C) (Temporal)   Resp 16   Ht 5' 4" (1.626 m)   Wt 103.7 kg (228 lb 9.9 oz)   SpO2 100%   BMI 39.24 kg/m²     Physical Exam  Vitals and nursing note reviewed.   Constitutional:       Appearance: Normal appearance.   Cardiovascular:      Rate and Rhythm: Normal rate and regular rhythm.      Heart sounds: Normal heart sounds.   Pulmonary:      Effort: Pulmonary effort is normal.      Breath sounds: Normal breath sounds.   Abdominal:      General: There is no distension.   Musculoskeletal:         General: Normal range of motion.      Comments: LUE AV fistula + thrill    Skin:     " General: Skin is warm and dry.   Neurological:      Mental Status: She is alert.         Labs:  Lab Results   Component Value Date    WBC 6.82 01/26/2024    HGB 10.1 (L) 01/26/2024    HCT 31.4 (L) 01/26/2024     01/26/2024    K 4.5 01/26/2024     01/26/2024    CO2 28 01/26/2024    BUN 57 (H) 04/24/2024    CREATININE 7.4 (H) 01/26/2024    EGFRNORACEVR 6.3 (A) 01/26/2024    CALCIUM 9.3 01/26/2024    PHOS 4.1 06/20/2023    ALBUMIN 3.8 06/20/2023    AST 11 06/20/2023    ALT 11 06/20/2023     (H) 06/12/2024     Lab Results   Component Value Date    CPRA 94 02/01/2024    [id number]  02/01/2024     Cw18,Cw15,Cw5,B7,B81,Cw2,B60,Cw17,Cw6,B27,B61,B73,B13,B42,B48,B67,B55,A66,B56,B82,B37,B47,B45,B35,Cw4,B53,B75,B76,B51    CIABCLM WEAK B78, B77, B71, B49, B54, B41, B44, B62, B50 02/01/2024    CIIAB DQ5,DQ6,DPB1*14:01 02/01/2024    ABCMT  02/01/2024     DR51, DP11, DQ8, DQ9, -- WEAK DQ7, DP6, DP13, DR4, DP10, DP3, DP5, DQ4, --       Labs were reviewed with the patient.    Pre-transplant Workup:   Reviewed with the patient.    Assessment:     1. Patient on waiting list for kidney transplant    2. ESRD on hemodialysis    3. Type 2 diabetes mellitus with diabetic nephropathy, unspecified whether long term insulin use    4. Essential (primary) hypertension    5. Secondary hyperparathyroidism    6. BMI 39.0-39.9,adult      Plan:   Encourage lifestyle modifications: diet, exercise, weight loss   Needs to maintain acceptable BMI for transplant per guidelines       Transplant Candidacy:   Ms. Reyer is a suitable kidney transplant candidate.  Meets center eligibility for accepting HCV+ donor offer - Yes.  Patient educated on HCV+ donors. Quiana is willing  to accept HCV+ donor offer -  Yes   Patient is a candidate for KDPI > 85 kidney donor offer - No (weight)  She remains in overall stable health, and will remain active on the transplant list.    Patient advised that it is recommended that all transplant candidates, and  their close contacts and household members receive Covid vaccination.    Birgit Romano NP       Follow-up:   In addition to the tests noted in the plan, Ms. Reyer will continue to have reevaluation as per the standing pre-kidney transplant protocol:  Monthly blood for PRA  Annual return to clinic, except HIV positive, > 65 years of age, or pancreas transplant candidates who will be scheduled to see transplant every 6 months while in pre-transplant phase  Annual re-testing: CXR, EKG, yearly mammograms for women over 40 and PSA for males over 40, cardiology follow-up as recommended by initial cardiology pre-transplant evaluation  Renal ultrasound every 2 years  Baseline colonoscopy after age 50 and repeated as recommended    UNOS Patient Status  Functional Status: 60% - Requires occasional assistance but is able to care for needs  Physical Capacity: No Limitations

## 2024-06-20 NOTE — LETTER
June 24, 2024        Tam Mathis  [address]  [facility] [address]  Phone: [phone]  Fax: [phone]             [facility] 1st Fl  [address]  [facility] [address]  Phone: [phone]   Patient: Brande Reyer   MR Number: [id number]   YOB: 1975   Date of Visit: 6/20/2024       Dear Dr. Tam Mathis    Thank you for referring Brande Reyer to me for evaluation. Attached you will find relevant portions of my assessment and plan of care.    If you have questions, please do not hesitate to call me. I look forward to following Brande Reyer along with you.    Sincerely,    Birgit Romano, LAZARO    Enclosure    If you would like to receive this communication electronically, please contact [email or web address] or [phone] to request [employer] Link access.    [employer] Link is a tool which provides read-only access to select patient information with whom you have a relationship. Its easy to use and provides real time access to review your patients record including encounter summaries, notes, results, and demographic information.    If you feel you have received this communication in error or would no longer like to receive these types of communications, please e-mail [email or web address]

## 2024-06-21 NOTE — PROGRESS NOTES
WAITLIST  PATIENT EDUCATION NOTE    Ms. Brande Reyer was seen in pre-kidney transplant clinic for evaluation for kidney, kidney/pancreas or pancreas only transplant.  The patient attended a group education session that discussed/reviewed the following aspects of transplantation: evaluation and selection committee process, UNOS waitlist management/multiple listings, types of organs offered (KDPI < 85%, KDPI > 85%, PHS risk, DCD, HCV+, HIV+ for HIV+ recipients and enbloc/dual), financial aspects, surgical procedures, dietary instruction pre- and post-transplant, health maintenance pre- and post-transplant, post-transplant hospitalization and outpatient follow-up, potential to participate in a research protocol, and medication management and side effects.  A question and answer session was provided after the presentation.    The patient was seen by all members of the multi-disciplinary team to include: Nephrologist/PA, Surgeon, , Transplant Coordinator, , Pharmacist and Dietician (if applicable).    The patient reviewed and signed all consents for evaluation which were witnessed and sent to scanning into the EPIC chart.    The patient was given an education book and plan for further evaluation based on her individual assessment.      Reviewed program requirement for complete COVID vaccination with documentation prior to listing. COVID education information reviewed with patient. Patient encouraged to be up to date on all vaccinations.     The patient was encouraged to call with any questions or concerns.

## 2024-06-25 ENCOUNTER — TELEPHONE (OUTPATIENT)
Dept: TRANSPLANT | Facility: CLINIC | Age: 49
End: 2024-06-25
Payer: MEDICARE

## 2024-06-25 DIAGNOSIS — Z76.82 PRE-KIDNEY TRANSPLANT, LISTED: Primary | ICD-10-CM

## 2024-06-25 NOTE — TELEPHONE ENCOUNTER
MA notes per Adherence form     HHPT    FOR THE PAST THREE MONTHS:    0-AMA's  0-No-shows    No concerns with care giving, transportation, or mental health    Scanned in pt's media    Shaina Pat  Abdominal Transplant MA

## 2024-06-27 ENCOUNTER — TELEPHONE (OUTPATIENT)
Dept: TRANSPLANT | Facility: CLINIC | Age: 49
End: 2024-06-27
Payer: MEDICARE

## 2024-06-27 DIAGNOSIS — Z76.82 ORGAN TRANSPLANT CANDIDATE: Primary | ICD-10-CM

## 2024-06-27 NOTE — TELEPHONE ENCOUNTER
----- Message from Mary Field MD sent at 6/26/2024  3:18 PM CDT -----  Regarding: FW: kidney waitlist  Surgical Review:    She has a CT from 2019 that is not in our system to review.  Can we get a CT pelvis at some point when convenient, or on admission if she comes up for an offer?  ----- Message -----  From: Meg Chiang DNP  Sent: 6/26/2024   1:54 PM CDT  To: Mary Field MD  Subject: kidney waitlist                                  CT abd/pelvis 3/5/19 needs surgical review.

## 2024-07-11 ENCOUNTER — HOSPITAL ENCOUNTER (OUTPATIENT)
Dept: RADIOLOGY | Facility: HOSPITAL | Age: 49
Discharge: HOME | End: 2024-07-11
Attending: NURSE PRACTITIONER
Payer: MEDICARE

## 2024-07-11 DIAGNOSIS — Z76.82 ORGAN TRANSPLANT CANDIDATE: ICD-10-CM

## 2024-07-11 PROCEDURE — 72192 CT PELVIS W/O DYE: CPT | Mod: TC,PO

## 2024-09-27 ENCOUNTER — OFFICE VISIT (OUTPATIENT)
Dept: URGENT CARE | Facility: CLINIC | Age: 49
End: 2024-09-27
Payer: MEDICARE

## 2024-09-27 VITALS
RESPIRATION RATE: 16 BRPM | TEMPERATURE: 97 F | DIASTOLIC BLOOD PRESSURE: 82 MMHG | SYSTOLIC BLOOD PRESSURE: 149 MMHG | BODY MASS INDEX: 38.93 KG/M2 | WEIGHT: 228 LBS | HEART RATE: 81 BPM | HEIGHT: 64 IN | OXYGEN SATURATION: 100 %

## 2024-09-27 DIAGNOSIS — T14.8XXA MUSCLE STRAIN: Primary | ICD-10-CM

## 2024-09-27 DIAGNOSIS — S83.91XA SPRAIN OF RIGHT KNEE, UNSPECIFIED LIGAMENT, INITIAL ENCOUNTER: ICD-10-CM

## 2024-09-27 RX ORDER — LISINOPRIL 20 MG/1
1 TABLET ORAL NIGHTLY
COMMUNITY

## 2024-09-27 RX ORDER — CYCLOBENZAPRINE HCL 10 MG
10 TABLET ORAL 3 TIMES DAILY PRN
Qty: 21 TABLET | Refills: 0 | Status: SHIPPED | OUTPATIENT
Start: 2024-09-27 | End: 2024-10-07

## 2024-09-27 NOTE — LETTER
September 27, 2024      [facility]  [address]  [address]  [place] [address]  Phone: [phone]  Fax: [phone]       Patient: Brande Brande Reyer   YOB: 1975  Date of Visit: 09/27/2024    To Whom It May Concern:    Brande Reyer  was at [facility] on 09/27/2024. The patient may return to work/school on 9/28/24 with  restrictions of frequent resting until cleared by primary . If you have any questions or concerns, or if I can be of further assistance, please do not hesitate to contact me.    Sincerely,    Yevtte Palacio MA

## 2024-09-27 NOTE — PATIENT INSTRUCTIONS
Thankyou for the opportunity to care for you today.  Please take all medications as directed, continue any previous prescribed medications unless we specifically discussed holding them.  If your symptoms do not resolve or worsen please return to the clinic for re-evaluation, if your situation becomes emergent please present to to the nearest emergency department.  Follow-up with your PCP for continued evaluation and management.  R.I.C.E. therapy.  Rest, ice, compress, elevate.  For the next 24-48 hours please continue rest, intermittently use ice packs and continue to wear your Ace wrap, maintain elevation to the limb.

## 2024-09-27 NOTE — PROGRESS NOTES
"Subjective:      Patient ID: Brande Reyer is a 49 y.o. female.    Vitals:  height is 5' 4" (1.626 m) and weight is 103.4 kg (228 lb). Her temperature is 97.3 °F (36.3 °C). Her blood pressure is 149/82 (abnormal) and her pulse is 81. Her respiration is 16 and oxygen saturation is 100%.     Chief Complaint: Knee Injury    Bibiana to room with complaint of right knee pain right calf pain times a couple of days, states was ambulating walking down stairs where she twisted her right knee    Knee Injury  This is a new problem. The current episode started in the past 7 days (x 1 week). The problem has been unchanged. The symptoms are aggravated by walking. She has tried ice for the symptoms.       Constitution: Negative.   HENT: Negative.     Neck: neck negative.   Cardiovascular: Negative.    Eyes: Negative.    Respiratory: Negative.     Gastrointestinal: Negative.    Genitourinary: Negative.    Musculoskeletal: Negative.  Positive for pain.   Skin: Negative.    Allergic/Immunologic: Negative.    Neurological: Negative.    Psychiatric/Behavioral: Negative.        Objective:     Physical Exam   Constitutional: She is oriented to person, place, and time. She appears well-developed. She is cooperative. No distress.   HENT:   Head: Normocephalic and atraumatic.   Nose: Nose normal.   Mouth/Throat: Oropharynx is clear and moist and mucous membranes are normal.   Eyes: Conjunctivae and lids are normal.   Neck: Trachea normal and phonation normal. Neck supple.   Cardiovascular: Normal rate, regular rhythm, normal heart sounds and normal pulses.   Pulmonary/Chest: Effort normal and breath sounds normal.   Abdominal: Normal appearance and bowel sounds are normal. She exhibits no mass. Soft.   Musculoskeletal:         General: Tenderness present. No deformity.      Comments: Right knee pain and right calf pain.  Tender to palpation.   Neurological: She is alert and oriented to person, place, and time. She has normal strength and normal " reflexes. No sensory deficit.   Skin: Skin is warm, dry, intact and not diaphoretic.   Psychiatric: Her speech is normal and behavior is normal. Judgment and thought content normal.   Nursing note and vitals reviewed.      Assessment:     1. Muscle strain    2. Sprain of right knee, unspecified ligament, initial encounter        Plan:       Muscle strain    Sprain of right knee, unspecified ligament, initial encounter  -     Physician communication order    Other orders  -     cyclobenzaprine (FLEXERIL) 10 MG tablet; Take 1 tablet (10 mg total) by mouth 3 (three) times daily as needed for Muscle spasms.  Dispense: 21 tablet; Refill: 0           Patient is on dialysis, we will avoid NSAIDs as well as Robaxin, Flexeril for muscle relaxer and pain relief.  R.I.C.E. therapy.  Rest, ice, compress, elevate.  For the next 24-48 hours please continue rest, intermittently use ice packs and continue to wear your Ace wrap, maintain elevation to the limb.      - To ED for any new or acutely worsening symptoms including but not limited to chest pain, palpitations, shortness of breath, or fever greater than 103° F.  Patient in agreement with plan of care.    - The diagnosis, treatment plan, instructions for follow-up and reevaluation as well as ED precautions were discussed and understanding was verbalized. All questions or concerns have been addressed.  -Follow up with your primary care provider for continued evaluation and management.

## 2024-10-23 ENCOUNTER — TELEPHONE (OUTPATIENT)
Dept: TRANSPLANT | Facility: CLINIC | Age: 49
End: 2024-10-23

## 2024-10-23 NOTE — TELEPHONE ENCOUNTER
Spoke to patient, discussed wait time on the list and elevated PRA level. She asked if a cousin in custodial could be evaluated as a donor. I informed her that he cannot be evaluated while incarcerated.

## 2024-10-23 NOTE — TELEPHONE ENCOUNTER
"----- Message from Federico sent at 10/23/2024  9:25 AM CDT -----  Consult/Advisory    Name Of Caller: Self    Contact Preference?: [phone]     Coordinator: Royer    What is the nature of the call?: Inquiring about current status      Additional Notes: Stating it's been a while since she's heard from anyone    "Thank you for all that you do for our patients"  "

## 2024-11-18 DIAGNOSIS — M25.561 KNEE PAIN, RIGHT: Primary | ICD-10-CM

## 2024-11-21 ENCOUNTER — CLINICAL SUPPORT (OUTPATIENT)
Dept: REHABILITATION | Facility: HOSPITAL | Age: 49
End: 2024-11-21
Payer: MEDICARE

## 2024-11-21 DIAGNOSIS — M25.561 KNEE PAIN, RIGHT: ICD-10-CM

## 2024-11-21 DIAGNOSIS — M25.561 ACUTE PAIN OF RIGHT KNEE: ICD-10-CM

## 2024-11-21 DIAGNOSIS — Z74.09 IMPAIRED FUNCTIONAL MOBILITY AND ACTIVITY TOLERANCE: Primary | ICD-10-CM

## 2024-11-21 PROCEDURE — 97110 THERAPEUTIC EXERCISES: CPT | Mod: PN

## 2024-11-21 PROCEDURE — 97162 PT EVAL MOD COMPLEX 30 MIN: CPT | Mod: PN

## 2024-11-21 NOTE — PLAN OF CARE
"[facility]   Physical Therapy Initial Evaluation      Name: Brande Reyer  [facility] Number: [id number]    Therapy Diagnosis:   Encounter Diagnoses   Name Primary?    Impaired functional mobility and activity tolerance Yes    Knee pain, right         Physician: Janie Shankar NP    Physician Orders: PT Eval and Treat Knees  Medical Diagnosis from Referral: Knee pain, right  Evaluation Date: 11/21/2024  Authorization Period Expiration: 12/31/2024  Plan of Care Expiration: 1/3/2025  Progress Note Due: 12/21/2024  Date of Surgery: N/A  Visit # / Visits authorized: 1/ 1   FOTO: 1/ 3   Next survey due week of 12/2/2024    Precautions: Diabetes and dialysis patient (organ transplant candidate)      Time In: 1011  Time Out: 1102  Total Billable Time: 47 minutes    Subjective     Date of onset: about a month ago    History of current condition - Quiana reports: she was in her usual state of health until about an month ago when she developed insidious onset R knee pain while descending steps.  She states that "it felt like it was coming apart". The next day she couldn't walk on it. Went to urgent care. Was prescribed muscle relaxers which have helped.  She has self treated with kinesiology taping and wrapping which also have helped. She is now being referred to PT.  Patient is a kidney transplant candidate.    Falls: No    Imaging: X-Ray Knee 3 View Right 11/13/24: FINDINGS per report:    "No fracture, dislocation or soft tissue injury is identified. The joint spaces are well-maintained."    Prior Therapy: No  Social History: lives with their family (, 10 y/o. Sister-in-law lives next door) in a mobile home with 4 steps to enter. B rails present.  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 " "Increased pain with walking "a long time". Twisting it    Pain:  Current 3/10, worst 8/10, best 0/10   Location: right medial knee and posterolateral knee    Description: Intermittent burning and throbbing  Aggravating Factors: prolonged standing, step negotiation, prolonged walking, twisting  Easing Factors: Taping, activity modification.     Patients goals: To make my knee not hurt.      Medical History:   Past Medical History:   Diagnosis Date    Allergy     Anemia     Anxiety     Depression     Diabetes mellitus, type 2     Dialysis patient     Disorder of kidney and ureter     Hyperlipidemia     Hypertension     Obesity        Surgical History:   Brande Reyer  has a past surgical history that includes Hysterectomy;  section; Tubal ligation; Insertion of dialysis catheter; AV fistula placement (2022); Colonoscopy (N/A, 2023); and Colonoscopy (N/A, 2024).    Medications:   Quiana has a current medication list which includes the following prescription(s): alprazolam, alprazolam, biotin, buspirone, calcitriol, cetirizine, clonidine, clonidine, colace, ergocalciferol, fluoxetine, fluoxetine, gabapentin, lactobacillus acidophilus & bulgar, lactobacillus acidophilus, lanthanum, lisinopril, montelukast, pravastatin, wanda-niko rx, sevelamer carbonate, sodium zirconium cyclosilicate, mounjaro, trulicity, and shingrix (pf).    Allergies:   Review of patient's allergies indicates:   Allergen Reactions    Amlodipine Itching and Swelling        Objective      Posture: Standing: nearly equal weight bearing.  Sitting: unremarkable   Palpation: tenderness present medial joint line, medial tibial plateau.   Sensation: Reports no new paresthesias this date.   DTRs: N/A  Range of Motion/Strength:   Knee  Right   Left  Pain/Dysfunction with Movement    AROM PROM MMT AROM PROM MMT    Flexion  105*  117*  4+/5  110*  120*  4+/5    Extension  0*  0*   4-/5   0*  0*  4+/5    Hip ROM Grossly WNL   Strength: hip " flexion R 4-/5; L 4-/5-4/5; extension 4-/5 B; abduction R 4/5, L 4/5-4+/5; adduction 5/5;   Ankle ROM: Grossly WNL   Strength: R 4+/5, L 4/5   Flexibility: Hamstring length R 150*, L 153*; Piriformis R moderate tightness, L minimal to moderate tightness;   Gait: Without AD  Analysis: increased stance width, decreased step length/foot clearance B  Bed Mobility:Independent  Transfers: Modified independent with increased UE support and increased time required  Special Tests: Anterior/posterior drawer (-); Vandana (-); varus/valgus stress (-)  Other: Patellar mobility Decreased R lateral glide.    Girth:  Knee     Right  Left  @ joint line  38.0 cm 35.8 cm  @ 5 cm proximal 45.2 cm 40.9 cm  @ 5 cm distal   37.5 cm 35.3 cm    Intake Outcome Measure for FOTO Knee Survey    Therapist reviewed FOTO scores for Brande Reyer on 11/21/2024.   FOTO report - see Media section or FOTO account episode details.    Intake Score: 48%    Primary Measure     Score Range     Intake Score     Score Interpretation      KOOS, JR.            0 - 100                57.1                   Lower Score = Greater Disability         Treatment     Total Treatment time (time-based codes) separate from Evaluation: 8 minutes     Quiana received the treatments listed below:      therapeutic exercises to develop flexibility for 8 minutes including:  Seated hamstring stretch 3x30s ea   Piriformis stretch 3x30s ea  Standing calf stretch using wedge 3x30s    Patient Education and Home Exercises     Education provided:   - Educated patient in role of PT and proposed POC. Initiated instruction in exercise program    Written Home Exercises Provided: yes. Exercises were reviewed and Quiana was able to demonstrate them prior to the end of the session.  Quiana demonstrated good  understanding of the education provided. See EMR under Patient Instructions for exercises provided during therapy sessions.    Assessment     Quiana is a 49 y.o. female referred to  outpatient Physical Therapy with a medical diagnosis of Knee pain, right. Patient presents with therapeutic diagnoses of impaired functional mobility/activity tolerance.  Additional problems include: increased tenderness, LE weakness, impaired LE flexibility, and swelling.  These problems contribute to the following limitations:  difficulty with step negotiation, with prolonged standing and walking, impaired sleep.  Quiana will benefit from skilled PT services to address these problems in order to reduce knee pain and swelling, to resolve tenderness, to minimize functional deficit and to maximize activity tolerance.     Patient prognosis is fair - good.   Patient will benefit from skilled outpatient Physical Therapy to address the deficits stated above and in the chart below, provide patient /family education, and to maximize patientt's level of independence.     Plan of care discussed with patient: Yes  Patient's spiritual, cultural and educational needs considered and patient is agreeable to the plan of care and goals as stated below:     Anticipated Barriers for therapy: Patient is a dialysis patient and in on the transplant list.    Medical Necessity is demonstrated by the following  History  Co-morbidities and personal factors that may impact the plan of care [] LOW: no personal factors / co-morbidities  [] MODERATE: 1-2 personal factors / co-morbidities  [x] HIGH: 3+ personal factors / co-morbidities    Moderate / High Support Documentation:   Co-morbidities affecting plan of care: diabetes, dialysis, HTN, BMI    Personal Factors:   no deficits     Examination  Body Structures and Functions, activity limitations and participation restrictions that may impact the plan of care [] LOW: addressing 1-2 elements  [x] MODERATE: 3+ elements  [] HIGH: 4+ elements (please support below)    Moderate / High Support Documentation: impaired gait, limited walking/standing tolerance, intermittent sleep impairment     Clinical  "Presentation [] LOW: stable  [x] MODERATE: Evolving  [] HIGH: Unstable     Decision Making/ Complexity Score: moderate       Goals:  Short Term Goals: 3 weeks   1) Decrease maximal pain to < 6/10  2) Decrease swelling by 1 cm  3) Normalize step length  4) Patient will navigate 4-6 steps up to 6" height utilizing reciprocating gait pattern with pain no greater than 4/10    Long Term Goals: 6 weeks   1) Facilitate improved LE flexibility  2) Patient will stand/walk > 30 minutes without increased pain  3) Patient will consistently navigate 8" steps utilizing reciprocating gait pattern without increased pain  4) Resolve sleep disturbance caused by knee pain  5) Improve functional score to > 65% as indicated by FOTO knee scale  6) Patient will be independent in HEP    Plan     Plan of care Certification: 11/21/2024 to 1/3/2025.    Outpatient Physical Therapy 2 times weekly for 6 weeks to include the following interventions: Electrical Stimulation IFC, Gait Training, Manual Therapy, Moist Heat/ Ice, Neuromuscular Re-ed, Patient Education, Therapeutic Activities, Therapeutic Exercise, Ultrasound, and Therapeutic Taping . Quiana may be treated by PTA as part of their rehab team.     Samira Katz, PT        Physician's Signature: _________________________________________ Date: ________________   "

## 2024-11-22 PROBLEM — M25.561 KNEE PAIN, RIGHT: Status: ACTIVE | Noted: 2024-11-22

## 2024-11-22 PROBLEM — Z74.09 IMPAIRED FUNCTIONAL MOBILITY AND ACTIVITY TOLERANCE: Status: ACTIVE | Noted: 2024-11-22

## 2024-11-27 ENCOUNTER — CLINICAL SUPPORT (OUTPATIENT)
Dept: REHABILITATION | Facility: HOSPITAL | Age: 49
End: 2024-11-27
Payer: MEDICARE

## 2024-11-27 DIAGNOSIS — M25.561 ACUTE PAIN OF RIGHT KNEE: ICD-10-CM

## 2024-11-27 DIAGNOSIS — Z74.09 IMPAIRED FUNCTIONAL MOBILITY AND ACTIVITY TOLERANCE: Primary | ICD-10-CM

## 2024-11-27 PROCEDURE — 97110 THERAPEUTIC EXERCISES: CPT | Mod: PN,CQ

## 2024-11-27 NOTE — PROGRESS NOTES
"[facility]   Physical Therapy Treatment Note     Name: Brande Reyer  Clinic Number: [id number]    Therapy Diagnosis:   Encounter Diagnoses   Name Primary?    Impaired functional mobility and activity tolerance Yes    Acute pain of right knee      Physician: Janie Shankar NP    Visit Date: 11/27/2024    Physician Orders: PT Eval and Treat Knees  Medical Diagnosis from Referral: Knee pain, right  Evaluation Date: 11/21/2024  Authorization Period Expiration: 12/31/2024  Plan of Care Expiration: 1/3/2025  Progress Note Due: 12/21/2024  Date of Surgery: N/A  Visit # / Visits authorized: 1/ 20 (2 total)  FOTO: 1/ 3              Next survey due week of 12/2/2024     Precautions: Diabetes and dialysis patient (organ transplant candidate)      PTA Visit #: 1/5     Time In: 1108  Time Out: 1204  Total Billable Time: 56 minutes    SUBJECTIVE     Pt reports: "It is kind of sore, but not real bad right now." "Once it starts hurting I start limping on it."  She was compliant with home exercise program.  Response to previous treatment: "Sore, but I was okay. I went to work afterwards."  Functional change: N/A at this time    Pain: 1/10  Location: right knee      OBJECTIVE     Objective Measures updated at progress report unless specified.     Treatment   Quiana received the treatments listed below: (activities not performed on this date in grey print, additions in bold print, and to be added/changed in blue print)    therapeutic exercises to develop strength, endurance, flexibility, and knee stability for 53 minutes including:  Recumbent bike lvl1 x6'  Standing gastroc using wedge 3x30s   Marching x15 ea  R Hip abduction x15  Extension x15  Flexion SLR x15  R closed chain TKE on 4" step x15  Shuttle leg press x15 ea  B using 50#  R using 37#  Seated hamstring stretches 3x30s ea  Piriformis stretches 3x30s ea  R LAQ using 2# x15  Alternate hip abduction against green theraband x15 ea  R marching using 2# " against green theraband x15  Supine R hip flexor stretches x1.5'  R hip flexion SLR using 2# x15  R SAQ using 2# x15  Bridging x15    To be added:   VMO strengthening (transitioning standing hip AROM to increased gravity resisted, clamshell, fire-hydrants, lateral lunges, monster walks), Step navigation exercises, Squats as tolerated, Treadmill (forward & retro, laterals too if not performing lateral lunges)    manual therapy techniques: Kinesiology taping were applied to the: right knee for 3 minutes, including:  Kinesiology tape applied using 2 I strips: 1st anchored distal, medial thigh with no tension. Dispersing approximately 20% tension distal over medial joint, laterally below joint line, and down down lateral calf (S shape); 2nd I strip anchored near 1st (distal medial thigh) with no tension. Dispersing approximately 25% tension distal over medial joint and down medial calf. (Can add 3rd I strip longitudinal with slight tension as needed for additional stability)    Patient Education and Home Exercises     Home Exercises Provided and Patient Education Provided     Education provided:   - Education was provided on rehabilitation process, and the patient was instructed in initial treatment program. Reviewed utilizing kinesiology tape, specific technique, as well as can do trial of others, and care/removal of tape.    Written Home Exercises Provided: Patient instructed to cont prior HEP, and will be provided updated as further progressed. Exercises were reviewed and Quiana was able to demonstrate them prior to the end of the session. Quiana demonstrated good  understanding of the education provided. See EMR under Patient Instructions for exercises provided during therapy sessions.    ASSESSMENT     The patient reported to physical therapy stating present aching/soreness of knee, but denying increased pain. Patient reports increased pain usually while working, and she also states it is constant when begins,  "affecting gait quality. Brande Reyer was instructed in initial treatment program targeting increased flexibility for improved functional mobility and reduced stresses on knee joint, as well as improving strength for improved functional performance and knee stability. The patient required slight verbal cues for valgus correction and to not hyperextend knee/control terminal knee extension during exercises. To aide in reducing valgus form, joint space correction, and weight distrubution, in turn reducing excess stresses on knee, trial of offloading kinesiology tape was provided. Excess mass of eyes of knee/distal to knee joint demonstrate increased compared to contralateral side. Patient did not report or demonstrate any adverse reactions during treatment.     Quiana Is slowly progressing towards her goals.   Pt prognosis is  fair - good.     Pt will continue to benefit from skilled outpatient physical therapy to address the deficits listed in the problem list box on initial evaluation, provide pt/family education and to maximize pt's level of independence in the home and community environment.     Pt's spiritual, cultural and educational needs considered and pt agreeable to plan of care and goals.     Anticipated barriers to physical therapy: Patient is a dialysis patient and in on the transplant list.     Goals:  Short Term Goals: 3 weeks   1) Decrease maximal pain to < 6/10 Initiated/Ongoing  2) Decrease swelling by 1 cm Initiated/Ongoing  3) Normalize step length Initiated  4) Patient will navigate 4-6 steps up to 6" height utilizing reciprocating gait pattern with pain no greater than 4/10 Partially initiated     Long Term Goals: 6 weeks   1) Facilitate improved LE flexibility Initiated  2) Patient will stand/walk > 30 minutes without increased pain Partially initiated  3) Patient will consistently navigate 8" steps utilizing reciprocating gait pattern without increased pain Ongoing  4) Resolve sleep disturbance " caused by knee pain Initiated/Ongoing  5) Improve functional score to > 65% as indicated by FOTO knee scale Initiated/Ongoing  6) Patient will be independent in HEP Initiated    PLAN   POC: 2 times weekly for 6 weeks to include the following interventions: Electrical Stimulation IFC, Gait Training, Manual Therapy, Moist Heat/ Ice, Neuromuscular Re-ed, Patient Education, Therapeutic Activities, Therapeutic Exercise, Ultrasound, and Therapeutic Taping. Quiana may be treated by PTA as part of their rehab team.     The patient is to be progressed within the established plan of care as tolerated in order to accomplish goals as stated above. Plan to incorporate additional VMO training, Mini squats, and step navigation exercises/activities subsequently to aide in knee stability and functional performance subsequently. Assess and change kinesiology taping technique as needed. Update home exercise program as further established.    Birgit Cervantes, PTA

## 2024-12-22 ENCOUNTER — TELEPHONE (OUTPATIENT)
Dept: TRANSPLANT | Facility: CLINIC | Age: 49
End: 2024-12-22
Payer: MEDICARE

## 2024-12-22 NOTE — TELEPHONE ENCOUNTER
ON-CALL NOTE    UNOS# [id number]    Reviewed patient status with Dr. Jenkins . Upon review of medical record, the following was identified as having potential safety issues for the transplant at this time and will require further assessment and resolution:  abnormal stress test. Will need CT coronary calcium score per cardiologist's recommendation.

## 2024-12-27 ENCOUNTER — TELEPHONE (OUTPATIENT)
Dept: TRANSPLANT | Facility: CLINIC | Age: 49
End: 2024-12-27
Payer: MEDICARE

## 2024-12-27 NOTE — TELEPHONE ENCOUNTER
"Returned patient's call this afternoon:  Advised that we could send additional tubes and kits to patient's home address as per her request.  Further advised patient that she is inactive on the transplant waiting list as of 12/22/24 due to an abnormal stress test.  Patient instantly became very upset.  "No one bothered to call me".  Advised that her assigned coordinator has been off due to the holiday and that she would have received a call on Monday morning.  " [facility] does not care about me.  [facility] is playing with my life".  Advised patient that after physician review of her chart that the abnormal stress test needed further investigation in order for patient to safely proceed to transplant.  Patient remained upset.  With further review of the chart, while patient was on the phone, I could see she saw cardiology on the same day as the stress test.  A calcium scoring test was ordered, but patient stated she did not complete the test due to her heart rate being elevated.  Patient remained upset.  Advised I would see if I could get a physician to call her, but not sure if I could get that done today.  Patient stated she is being seen in the near future at [facility].  Advised patient letter was sent to her home address and to her dialysis unit as well as per UNOS requirements. Advised pt she has not lost any time.  Advised patient she can be reactivated as soon as this is further investigated.  Patient remained upset at the conclusion of the phone conversation.    "

## 2024-12-27 NOTE — TELEPHONE ENCOUNTER
"----- Message from Federico sent at 12/27/2024  9:31 AM CST -----  Consult/Advisory    Name Of Caller: Self    Contact Preference?: [phone]     Coordinator: Royer    What is the nature of the call?: Inquiring about current status; Also requesting more boxes to send samples in    Additional Notes:  "Thank you for all that you do for our patients"  "

## 2024-12-30 ENCOUNTER — TELEPHONE (OUTPATIENT)
Dept: TRANSPLANT | Facility: CLINIC | Age: 49
End: 2024-12-30
Payer: MEDICARE

## 2024-12-30 DIAGNOSIS — I25.110 ATHEROSCLEROSIS OF NATIVE CORONARY ARTERY OF NATIVE HEART WITH UNSTABLE ANGINA PECTORIS: Primary | ICD-10-CM

## 2024-12-30 DIAGNOSIS — Z76.82 ORGAN TRANSPLANT CANDIDATE: ICD-10-CM

## 2024-12-30 DIAGNOSIS — N18.6 END STAGE RENAL DISEASE: ICD-10-CM

## 2024-12-30 DIAGNOSIS — R94.39 ABNORMAL STRESS TEST: ICD-10-CM

## 2024-12-30 NOTE — TELEPHONE ENCOUNTER
"Lengthy conversation with patient explaining why she was placed temporarily inactive due to abnormal stress test and incomplete calcium score. Dr. Jenkins reviewed her chart on 12/22 and did not feel comfortable moving forward with transplant until testing is complete and Cardiology clearance received. Patient states her Cardiologist could not complete the calcium scoring due to her "bp not getting low enough". We discussed that we could try to order a PET stress test and Cardiology visit for clearance. Patient agreed with plan.   "

## 2024-12-30 NOTE — TELEPHONE ENCOUNTER
"----- Message from Federico sent at 12/27/2024  9:31 AM CST -----  Consult/Advisory    Name Of Caller: Self    Contact Preference?: [phone]     Coordinator: Royer    What is the nature of the call?: Inquiring about current status; Also requesting more boxes to send samples in    Additional Notes:  "Thank you for all that you do for our patients"  "

## 2024-12-30 NOTE — TELEPHONE ENCOUNTER
Spoke to pt confirming scheduled stress test and cardiology consult visit on 01/09/2025. Appt reminders were mailed.

## 2025-01-09 ENCOUNTER — OFFICE VISIT (OUTPATIENT)
Dept: CARDIOLOGY | Facility: CLINIC | Age: 50
End: 2025-01-09
Payer: MEDICARE

## 2025-01-09 ENCOUNTER — HOSPITAL ENCOUNTER (OUTPATIENT)
Dept: CARDIOLOGY | Facility: HOSPITAL | Age: 50
Discharge: HOME | End: 2025-01-09
Attending: NURSE PRACTITIONER
Payer: MEDICARE

## 2025-01-09 VITALS
HEART RATE: 87 BPM | WEIGHT: 229.25 LBS | HEIGHT: 64 IN | SYSTOLIC BLOOD PRESSURE: 164 MMHG | BODY MASS INDEX: 39.14 KG/M2 | DIASTOLIC BLOOD PRESSURE: 81 MMHG

## 2025-01-09 VITALS
HEART RATE: 92 BPM | WEIGHT: 228 LBS | RESPIRATION RATE: 16 BRPM | DIASTOLIC BLOOD PRESSURE: 55 MMHG | HEIGHT: 64 IN | SYSTOLIC BLOOD PRESSURE: 136 MMHG | BODY MASS INDEX: 38.93 KG/M2

## 2025-01-09 DIAGNOSIS — Z76.82 ORGAN TRANSPLANT CANDIDATE: ICD-10-CM

## 2025-01-09 DIAGNOSIS — I10 ESSENTIAL (PRIMARY) HYPERTENSION: ICD-10-CM

## 2025-01-09 DIAGNOSIS — R94.39 ABNORMAL STRESS TEST: ICD-10-CM

## 2025-01-09 DIAGNOSIS — F32.1 MAJOR DEPRESSIVE DISORDER, SINGLE EPISODE, MODERATE: ICD-10-CM

## 2025-01-09 DIAGNOSIS — N18.6 ESRD ON HEMODIALYSIS: Primary | ICD-10-CM

## 2025-01-09 DIAGNOSIS — I25.110 ATHEROSCLEROSIS OF NATIVE CORONARY ARTERY OF NATIVE HEART WITH UNSTABLE ANGINA PECTORIS: ICD-10-CM

## 2025-01-09 DIAGNOSIS — E66.01 OBESITY, MORBID: ICD-10-CM

## 2025-01-09 DIAGNOSIS — Z99.2 ESRD ON HEMODIALYSIS: Primary | ICD-10-CM

## 2025-01-09 DIAGNOSIS — E11.21 TYPE 2 DIABETES MELLITUS WITH DIABETIC NEPHROPATHY, WITHOUT LONG-TERM CURRENT USE OF INSULIN: ICD-10-CM

## 2025-01-09 LAB
CFR FLOW - ANTERIOR: 1.54
CFR FLOW - INFERIOR: 1.64
CFR FLOW - LATERAL: 1.41
CFR FLOW - MAX: 1.83
CFR FLOW - MIN: 1.25
CFR FLOW - SEPTAL: 1.52
CFR FLOW - WHOLE HEART: 1.53
CV STRESS BASE HR: 88 BPM
DIASTOLIC BLOOD PRESSURE: 88 MMHG
EJECTION FRACTION- HIGH: 59 %
END DIASTOLIC INDEX-HIGH: 155 ML/M2
END DIASTOLIC INDEX-LOW: 91 ML/M2
END SYSTOLIC INDEX-HIGH: 78 ML/M2
END SYSTOLIC INDEX-LOW: 40 ML/M2
NUC REST DIASTOLIC VOLUME INDEX: 138
NUC REST EJECTION FRACTION: 62
NUC REST SYSTOLIC VOLUME INDEX: 52
NUC STRESS DIASTOLIC VOLUME INDEX: 147
NUC STRESS EJECTION FRACTION: 63 %
NUC STRESS SYSTOLIC VOLUME INDEX: 55
OHS CV CPX 1 MINUTE RECOVERY HEART RATE: 100 BPM
OHS CV CPX 85 PERCENT MAX PREDICTED HEART RATE MALE: 145
OHS CV CPX MAX PREDICTED HEART RATE: 170
OHS CV CPX PATIENT IS FEMALE: 1
OHS CV CPX PATIENT IS MALE: 0
OHS CV CPX PEAK DIASTOLIC BLOOD PRESSURE: 62 MMHG
OHS CV CPX PEAK HEAR RATE: 89 BPM
OHS CV CPX PEAK RATE PRESSURE PRODUCT: NORMAL
OHS CV CPX PEAK SYSTOLIC BLOOD PRESSURE: 137 MMHG
OHS CV CPX PERCENT MAX PREDICTED HEART RATE ACHIEVED: 55
OHS CV CPX RATE PRESSURE PRODUCT PRESENTING: NORMAL
OHS CV INITIAL DOSE: 32 MCG/KG/MIN
OHS CV MODERATELY REDUCED FLOW CAPACITY: 28 %
OHS CV MYOCARDIAL STEAL: 0 %
OHS CV NO ISCHEMIA MILDLY REDUCED FLOW CAPACTY: 69 %
OHS CV NO ISCHEMIA MINIMALLY REDUCED FLOW CAPACITY: 2 %
OHS CV NON-TRANSMURAL MYOCARDIAL INFARCTION SINGLE CONTINUOUS REGION: 0 %
OHS CV NORMAL FLOW CAPACITY COMPARABLE TO HEALTHY YOUNG VOLUNTEERS: 0 %
OHS CV PEAK DOSE: 32 MCG/KG/MIN
OHS CV PET ID: 7969
OHS CV PRE-DOMINANTLY MYOCARDIAL SCAR: 0 %
OHS CV SEVERELY REDUCED FLOW CAPACITY LARGEST SINGLE CONTINUOUS REGION: 0 %
OHS CV SEVERELY REDUCED FLOW CAPACITY: 0 %
OHS CV TOTAL EXAM DLP: 530.45 MGY-CM
REST FLOW - ANTERIOR: 0.86 CC/MIN/G
REST FLOW - INFERIOR: 0.93 CC/MIN/G
REST FLOW - LATERAL: 0.88 CC/MIN/G
REST FLOW - MAX: 1.25 CC/MIN/G
REST FLOW - MIN: 0.5 CC/MIN/G
REST FLOW - SEPTAL: 0.81 CC/MIN/G
REST FLOW - WHOLE HEART: 0.87 CC/MIN/G
RETIRED EF AND QEF - SEE NOTES: 47 %
STRESS FLOW - ANTERIOR: 1.29 CC/MIN/G
STRESS FLOW - INFERIOR: 1.48 CC/MIN/G
STRESS FLOW - LATERAL: 1.21 CC/MIN/G
STRESS FLOW - MAX: 2.01 CC/MIN/G
STRESS FLOW - MIN: 0.77 CC/MIN/G
STRESS FLOW - SEPTAL: 1.21 CC/MIN/G
STRESS FLOW - WHOLE HEART: 1.3 CC/MIN/G
SYSTOLIC BLOOD PRESSURE: 157 MMHG

## 2025-01-09 PROCEDURE — 93017 CV STRESS TEST TRACING ONLY: CPT | Mod: TXP

## 2025-01-09 PROCEDURE — 63600175 PHARM REV CODE 636 W HCPCS: Mod: TXP | Performed by: NURSE PRACTITIONER

## 2025-01-09 PROCEDURE — 99999 PR PBB SHADOW E&M-EST. PATIENT-LVL IV: CPT | Mod: PBBFAC,TXP,, | Performed by: INTERNAL MEDICINE

## 2025-01-09 PROCEDURE — A9555 RB82 RUBIDIUM: HCPCS | Mod: TXP | Performed by: NURSE PRACTITIONER

## 2025-01-09 RX ORDER — REGADENOSON 0.08 MG/ML
0.4 INJECTION, SOLUTION INTRAVENOUS
Status: COMPLETED | OUTPATIENT
Start: 2025-01-09 | End: 2025-01-09

## 2025-01-09 RX ORDER — AMINOPHYLLINE 25 MG/ML
75 INJECTION, SOLUTION INTRAVENOUS
Status: COMPLETED | OUTPATIENT
Start: 2025-01-09 | End: 2025-01-09

## 2025-01-09 RX ORDER — PRAVASTATIN SODIUM 40 MG/1
40 TABLET ORAL DAILY
Qty: 90 TABLET | Refills: 3 | Status: SHIPPED | OUTPATIENT
Start: 2025-01-09 | End: 2026-01-09

## 2025-01-09 RX ADMIN — RUBIDIUM CHLORIDE RB-82 32 MILLICURIE: 150 INJECTION, SOLUTION INTRAVENOUS at 07:01

## 2025-01-09 RX ADMIN — REGADENOSON 0.4 MG: 0.08 INJECTION, SOLUTION INTRAVENOUS at 08:01

## 2025-01-09 RX ADMIN — AMINOPHYLLINE 75 MG: 25 INJECTION, SOLUTION INTRAVENOUS at 08:01

## 2025-01-09 RX ADMIN — RUBIDIUM CHLORIDE RB-82 32 MILLICURIE: 150 INJECTION, SOLUTION INTRAVENOUS at 08:01

## 2025-01-09 NOTE — PROGRESS NOTES
Subjective:   Patient ID:  Brande Reyer is a 49 y.o. female who presents for follow-up of Kidney Transplant Pre-evaluation and Hypertension      Brande Reyer is a 48 y.o. y.o. female with PMH significant for HTN, HLD, Obesity with BMI 38, ESRD on HD via AVF in [facility] [place] who presents for cardiac evaluation prior to kidney transplant.   She never had cardiac issues in the past. No current cardiac symptoms. No CP, SOB, lightheadedness, palpitations. Never had a syncope or a fall. She reports to be physically active and takes care of her kids and chickens and dogs w/o any problems. Her     ASCVD risk is 1.3% as calculated today.      SH: lives in [place], [place] with her  and 2 kids, worked in a cafeteria ten years ago, smoked 1 ppd x 30 years and quit in 2022, no h/o illicit drug use, no current alcohol use  FH: father  of heart disease and had CAD, CABG  PSH: 3 c sections, hysterectomy, AVF       Echo     Left Ventricle: The left ventricle is normal in size. Increased wall thickness. There is concentric remodeling. Normal wall motion. There is normal systolic function with a visually estimated ejection fraction of 55 - 60%. There is indeterminate diastolic function.    Right Ventricle: Normal right ventricular cavity size. Right ventricle wall motion  is normal. Systolic function is normal.    Pulmonary Artery: The estimated pulmonary artery systolic pressure is 33 mmHg.    IVC/SVC: Normal venous pressure at 3 mmHg.     HPI:   Smoked 1 ppd x 30 years and quit in 2022,  Father had CABG at age 46  She does HD and does it at home  No chest pain, Orthopnea, PND of heart failure symptoms.   Denies palpitations or fluttering in the chest  She lost 80 pounds to get on the transplant list  Can easily go up a flight of stairs  She walks      Nuclear stress     Abnormal myocardial perfusion scan.    There is a moderate intensity, small to moderate sized, reversible perfusion abnormality that is  consistent with ischemia in the mid to distal anteroseptal wall(s). Specificity of these findings is reduced secondary to the patient's body habitus.    There are no other significant perfusion abnormalities.    The gated perfusion images showed an ejection fraction of 66% at rest. The gated perfusion images showed an ejection fraction of 65% post stress. Normal ejection fraction is greater than 47%.    There is normal wall motion at rest and post stress.    LV cavity size is normal at rest and normal at stress.    The ECG portion of the study is negative for ischemia.    The patient reported no chest pain during the stress test.    There were no arrhythmias during stress.    There are no prior studies for comparison.    PET 1/25    There are no clinically significant perfusion abnormalities. There is base to apical gradient in the anteroseptal wall(s) consistent with diffuse atherosclerosis.    The whole heart absolute myocardial perfusion values were normal at rest, mildly reduced during stress and CFR is moderately reduced.    CT attenuation images demonstrate moderate diffuse coronary calcifications in the LAD territory and no aortic calcifications.    The gated perfusion images showed an ejection fraction of 62% at rest and 63% during stress. A normal ejection fraction is greater than 47%.    There is normal wall motion at rest and normal wall motion during stress.    The study's ECG is negative for ischemia.       Patient Active Problem List   Diagnosis    Anaphylactic shock, unspecified, initial encounter    Anemia in chronic kidney disease    Anxiety disorder, unspecified    ESRD on hemodialysis    Hemodialysis access, AV graft    History of stillbirth    Hyperlipidemia    Essential (primary) hypertension    Major depressive disorder, single episode, moderate    Nicotine dependence, unspecified, uncomplicated    Obesity, morbid    Type 2 diabetes mellitus with diabetic nephropathy    Patient on waiting list  "for kidney transplant    Impaired functional mobility and activity tolerance    Knee pain, right     BP (!) 164/81 (BP Location: Right arm, Patient Position: Sitting)   Pulse 87   Ht 5' 4" (1.626 m)   Wt 104 kg (229 lb 4.5 oz)   BMI 39.36 kg/m²   Body mass index is 39.36 kg/m².  CrCl cannot be calculated (Patient's most recent lab result is older than the maximum 7 days allowed.).    Lab Results   Component Value Date     2024    K 4.5 2024     2024    CO2 28 2024    BUN 68 (H) 2024    BUN 60 (H) 2024    CREATININE 7.4 (H) 2024     (H) 2024    HGBA1C 4.9 2024    HGBA1C 6.1 2010    AST 11 2023    ALT 11 2023    ALBUMIN 3.8 2023    PROT 7.3 2023    BILITOT 0.8 2023    WBC 6.82 2024    HGB 10.1 (L) 2024    HCT 31.4 (L) 2024     (H) 2024     (L) 2024    INR 1.0 2023    CHOL 151 2023    HDL 48 2023    LDLCALC 81.0 2023    TRIG 110 2023       No results found for: "APOLIPOPNB", "LIPOA", "HSCRP", "CRP"      No results found for this or any previous visit.        Current Outpatient Medications   Medication Sig    ALPRAZolam (XANAX) 0.5 MG tablet Take 0.5 mg by mouth 3 (three) times daily.    biotin 10,000 mcg Cap Take 1 capsule by mouth once daily.    busPIRone (BUSPAR) 5 MG Tab Take 5 mg by mouth 2 (two) times daily.    calcitRIOL (ROCALTROL) 0.25 MCG Cap Take 0.25 mcg by mouth 3 (three) times a week. MWF    cloNIDine (CATAPRES) 0.2 MG tablet Take 0.2 mg by mouth Daily.    COLACE 100 mg capsule SMARTSI Capsule(s) By Mouth Every Other Day PRN    gabapentin (NEURONTIN) 100 MG capsule Take 100 mg by mouth 3 (three) times daily.    lactobacillus acidophilus & bulgar (LACTINEX) 100 million cell packet Take 1 tablet by mouth once daily.    Lactobacillus acidophilus (ACIDOPHILUS ORAL) Take by mouth.    lanthanum (FOSRENOL) 750 mg chewable " tablet Take 1,500 mg by mouth 3 (three) times daily with meals.    montelukast (SINGULAIR) 10 mg tablet Take 10 mg by mouth nightly.    pravastatin (PRAVACHOL) 20 MG tablet pravastatin 20 mg tablet   TAKE ONE TABLET BY MOUTH DAILY    VIRGIL-GIO RX 1- mg-mg-mcg Tab Take 1 tablet by mouth every morning.    sodium zirconium cyclosilicate (LOKELMA) 10 gram packet Take 10 g by mouth. Mix entire contents of packet(s) into drinking glass containing 3 tablespoons of water; stir well and drink immediately. Add water and repeat until no powder remains to receive entire dose.    tirzepatide (MOUNJARO) 2.5 mg/0.5 mL PnIj Inject 2.5 mg into the skin every 7 days.    ALPRAZolam (XANAX) 0.25 MG tablet TAKE ONE TABLET BY MOUTH TWICE DAILY ON DIALYSIS DAYS. ALL OTHER DAYS TAKE ONE TABLET BY MOUTH AT BEDTIME    cetirizine (ZYRTEC) 10 MG tablet cetirizine 10 mg tablet   TAKE ONE TABLET BY MOUTH DAILY    cloNIDine (CATAPRES) 0.1 MG tablet Take 0.2 mg by mouth every evening.    ergocalciferol (ERGOCALCIFEROL) 50,000 unit Cap Take 50,000 Units by mouth every 30 days.    FLUoxetine 20 MG capsule Take 40 mg by mouth once daily.    FLUoxetine 40 MG capsule Take 1 tablet by mouth once daily.    lisinopriL (PRINIVIL,ZESTRIL) 20 MG tablet Take 1 tablet by mouth every evening. (Patient not taking: Reported on 9/27/2024)    sevelamer carbonate (RENVELA) 800 mg Tab Take 800 mg by mouth 3 (three) times daily.    TRULICITY 3 mg/0.5 mL pen injector Inject 3 mg into the skin once a week.    varicella-zoster [id number], PF, (SHINGRIX, PF,) 50 mcg/0.5 mL injection Inject into the muscle.     No current facility-administered medications for this visit.       Review of Systems   Constitutional: Negative for chills, decreased appetite, malaise/fatigue, night sweats, weight gain and weight loss.   Eyes:  Negative for blurred vision, double vision, visual disturbance and visual halos.   Cardiovascular:  Negative for chest pain, claudication, cyanosis,  dyspnea on exertion, irregular heartbeat, leg swelling, near-syncope, orthopnea, palpitations, paroxysmal nocturnal dyspnea and syncope.   Respiratory:  Negative for cough, hemoptysis, snoring, sputum production and wheezing.    Endocrine: Negative for cold intolerance, heat intolerance, polydipsia and polyphagia.   Hematologic/Lymphatic: Negative for adenopathy and bleeding problem. Does not bruise/bleed easily.   Skin:  Negative for flushing, itching, poor wound healing and rash.   Musculoskeletal:  Negative for arthritis, back pain, falls, gout, joint pain, joint swelling, muscle cramps, muscle weakness, myalgias, neck pain and stiffness.   Gastrointestinal:  Negative for bloating, abdominal pain, anorexia, diarrhea, dysphagia, excessive appetite, flatus, hematemesis, jaundice, melena and nausea.   Genitourinary:  Negative for hesitancy and incomplete emptying.   Neurological:  Negative for aphonia, brief paralysis, difficulty with concentration, disturbances in coordination, excessive daytime sleepiness, dizziness, focal weakness, light-headedness, loss of balance and weakness.   Psychiatric/Behavioral:  Negative for altered mental status, depression, hallucinations, hypervigilance, memory loss, substance abuse and suicidal ideas. The patient does not have insomnia and is not nervous/anxious.        Objective:   Physical Exam  Constitutional:       General: She is not in acute distress.     Appearance: She is well-developed. She is not diaphoretic.   HENT:      Head: Normocephalic and atraumatic.      Nose: Nose normal.      Mouth/Throat:      Pharynx: No oropharyngeal exudate.   Eyes:      General: No scleral icterus.        Right eye: No discharge.         Left eye: No discharge.      Conjunctiva/sclera: Conjunctivae normal.      Pupils: Pupils are equal, round, and reactive to light.   Neck:      Thyroid: No thyromegaly.      Vascular: No JVD.      Trachea: No tracheal deviation.   Cardiovascular:      Rate  and Rhythm: Normal rate and regular rhythm.      Pulses: Intact distal pulses.      Heart sounds: Normal heart sounds. No murmur heard.     No friction rub. No gallop.   Pulmonary:      Effort: Pulmonary effort is normal. No respiratory distress.      Breath sounds: Normal breath sounds. No stridor. No wheezing or rales.   Chest:      Chest wall: No tenderness.   Abdominal:      General: Bowel sounds are normal. There is no distension.      Palpations: Abdomen is soft. There is no mass.      Tenderness: There is no abdominal tenderness. There is no guarding or rebound.   Musculoskeletal:         General: No tenderness. Normal range of motion.      Cervical back: Normal range of motion and neck supple.   Lymphadenopathy:      Cervical: No cervical adenopathy.   Skin:     General: Skin is warm.      Coloration: Skin is not pale.      Findings: No erythema or rash.   Neurological:      Mental Status: She is alert and oriented to person, place, and time.      Cranial Nerves: No cranial nerve deficit.      Motor: No abnormal muscle tone.      Coordination: Coordination normal.      Deep Tendon Reflexes: Reflexes are normal and symmetric. Reflexes normal.   Psychiatric:         Behavior: Behavior normal.         Thought Content: Thought content normal.         Judgment: Judgment normal.         Assessment:     1. ESRD on hemodialysis    2. Abnormal stress test    3. Organ transplant candidate    4. Type 2 diabetes mellitus with diabetic nephropathy, without long-term current use of insulin    5. Essential (primary) hypertension    6. Major depressive disorder, single episode, moderate        Plan:   Patient is moderate risk due to underlying risk factors. RCRI score is 2 with rate of perioeprative morbidity and mortality of 3.6% and can proceed with renal transplant. PET results reviewed and recommended uptitration of statin and starting antihypertensive.   Quiana was seen today for kidney transplant pre-evaluation and  hypertension.    Diagnoses and all orders for this visit:    ESRD on hemodialysis    Abnormal stress test  -     Ambulatory referral/consult to Cardiology    Organ transplant candidate  -     Ambulatory referral/consult to Cardiology    Type 2 diabetes mellitus with diabetic nephropathy, without long-term current use of insulin    Essential (primary) hypertension    Major depressive disorder, single episode, moderate    Obesity, morbid    Other orders  -     pravastatin (PRAVACHOL) 40 MG tablet; Take 1 tablet (40 mg total) by mouth once daily.  -     atenoloL (TENORMIN) 25 MG Tab; Take 1 tablet (25 mg total) by mouth once daily.

## 2025-01-14 ENCOUNTER — TELEPHONE (OUTPATIENT)
Dept: TRANSPLANT | Facility: CLINIC | Age: 50
End: 2025-01-14
Payer: MEDICARE

## 2025-01-14 NOTE — TELEPHONE ENCOUNTER
"----- Message from Joel Marc MD sent at 1/14/2025  9:50 AM CST -----  Regarding: RE: ok to reactivate?  PET looks fine. Is there is no other issue for inactive status, you can activate.  ----- Message -----  From: Komal Linton, RN  Sent: 1/14/2025   9:37 AM CST  To: Joel Marc MD; #  Subject: ok to reactivate?                                Cardiac PET done 1/9/25, please review results. She also saw Dr. eLe on 1/9. Please let me know if ok and if she can be reactivated. She was extremely upset about being inactivated from abnormal stress in 2023 that we just acted upon. Thanks.     Komal  ----- Message -----  From: Joel Marc MD  Sent: 1/7/2025  10:43 AM CST  To: Amina Foote MD; #    Noris is OFF this week, let's proceed with Cardiac PET and we go from there after the test is resulted  ----- Message -----  From: Komal Linton RN  Sent: 1/7/2025   8:03 AM CST  To: Amina Foote MD; #    She is actually scheduled for a PET stress this Thursday 1/9.  ----- Message -----  From: Amina Foote MD  Sent: 12/30/2024   6:57 PM CST  To: Kidney Waitlisted Coordinator    Let's see if she is eligible to PET  Or what is current pulse? Would she qualify for CT now? May be worth calling that department, as I'm really surprised as the tight parameter.  ----- Message -----  From: Paul Hartman MD  Sent: 12/27/2024   4:33 PM CST  To: MD Noris Hernandez,    The patient had a "positive" regadenoson SPECT stress test that I think is due to her weight.  The purpose of the CT calcium score was to assess whther she had any evidence of atherosclerosis.  If the score was low, then we would be able to say that the SPECT results are not indicative of the presence of coronary artery disease. If she was listed with the positive stress, I do not know why she would then be unlisted because she did not get the CT scan.  As she is aymptomatic, even if she had " coronary disease, all we would is adjust her medications.      The options are: 1) reorder the CT calcium score and make a decision for further therapy and/or testing, 2) send her for a PET stress instead, or 3) assume that if she has coronary diseaase it is stable and just focus on risk factor control.  If the result of the prior SPECT stress test is affecting her eligibility for a new kidney, I would recommend doing #1 or #2,    If you have any further questions, please let me know.    Regards,  Paul  ----- Message -----  From: Amina Foote MD  Sent: 12/27/2024   2:11 PM CST  To: MD Beltran Hightower,  You staffed this aptient with a fellow way back is 9/18/23. At that time CT coronary ca score was requested. Unfortunately it appears it was cancelled twice d/t HR being too high.   I cannot tell what happened after that.... but here we are over a year later and patient was made active on the list at some point. I cannot see that the cardiology issue was resolved, so unclear why/how she was activated.  She is now very upset because last week my partner inactivated her pending calcium score.    Since it's been a year, would it make sense to try again or do something else to clarify her cardiac status?!    Thanks,  Noris

## 2025-01-14 NOTE — TELEPHONE ENCOUNTER
----- Message from Med Assistant Becerra sent at 1/14/2025 10:00 AM CST -----  Regarding: FW: Consult/Advisory  Contact: [phone]    ----- Message -----  From: Tejal Rob  Sent: 1/14/2025   9:58 AM CST  To: [facility] Pre-Kidney Transplant Non-Clinical  Subject: Consult/Advisory                                 Consult/Advisory     Name Of Caller: Pt         Contact Preference:   [phone]     Nature of call: Pt would like to know if she is back on transplant following cardiac procedures. Please call to advise Thank you

## 2025-03-25 DIAGNOSIS — N18.6 END STAGE RENAL DISEASE: ICD-10-CM

## 2025-03-25 DIAGNOSIS — Z76.82 AWAITING ORGAN TRANSPLANT STATUS: Primary | ICD-10-CM

## 2025-03-25 DIAGNOSIS — Z76.82 ORGAN TRANSPLANT CANDIDATE: ICD-10-CM

## 2025-04-01 ENCOUNTER — TELEPHONE (OUTPATIENT)
Dept: TRANSPLANT | Facility: CLINIC | Age: 50
End: 2025-04-01
Payer: MEDICARE

## 2025-04-01 NOTE — TELEPHONE ENCOUNTER
Spoke to pt confirming scheduled annual test and clinic visit on 06/17/2025. Appt reminders were mailed and pt is aware to bring care giver.

## 2025-04-17 ENCOUNTER — TELEPHONE (OUTPATIENT)
Dept: TRANSPLANT | Facility: CLINIC | Age: 50
End: 2025-04-17
Payer: MEDICARE

## 2025-04-17 DIAGNOSIS — Z76.82 AWAITING ORGAN TRANSPLANT STATUS: Primary | ICD-10-CM

## 2025-04-17 NOTE — TELEPHONE ENCOUNTER
ON-CALL NOTE    UNOS# [id number]    Notified by Lico Quezada, , that Brande Reyer is eligible for kidney offer.  Spoke with patient and identified no acute medical issues with telephone assessment. Protocol script read to patient regarding N/A, standard donor offer. Patient verbalized understanding, all questions answered, patient accepts organ offer. Notified by Lico Quezada that virtual crossmatch is positive .  Current sample of blood is available from date 3/12/2025 for crossmatch.  Patient reports no sensitizing event since last blood sample for PRA received. Notified Chadwick in [facility] to perform a prospective  crossmatch per guideline.    Patient was asked if they have had a positive COVID-19 test or if they have any signs or symptoms. Informed patient that they will be tested for COVID-19 upon arrival to the hospital, unless have a previous positive result. If tested and result is positive, the transplant will not be able to occur, they will be inactivated on the wait list for 21 days per protocol and required to quarantine.   4/18/2025 @ 07:15am Per BERNA Quezada  crossmatch results Negative.  07:30am Report to KIRK Allen RN

## 2025-04-18 ENCOUNTER — TELEPHONE (OUTPATIENT)
Dept: TRANSPLANT | Facility: CLINIC | Age: 50
End: 2025-04-18
Payer: MEDICARE

## 2025-04-18 NOTE — TELEPHONE ENCOUNTER
JASWINDER [id number]  On call note:  I CALLED AND UPDATED PATIENT ON CASE.Patient stated that this was her first offer and she had questions. I educated patient on back-up offer and the process of on call offers and time frames. Shew as also educated on allocation and multi listing which she is in the process of doing [facility] and [place].    We talked about HIGH PRA and offers, crossmatches, etc.  Patient was very appreciative and verbalized understanding regarding all information discussed.  No donor OR set at this time. Patient will be called once updated information is available.  She was encouraged to call with any additional questions.

## 2025-04-19 ENCOUNTER — ANESTHESIA (OUTPATIENT)
Dept: SURGERY | Facility: HOSPITAL | Age: 50
DRG: 651 | End: 2025-04-19
Payer: MEDICARE

## 2025-04-19 ENCOUNTER — HOSPITAL ENCOUNTER (INPATIENT)
Facility: HOSPITAL | Age: 50
LOS: 4 days | Discharge: HOME | DRG: 651 | End: 2025-04-23
Attending: TRANSPLANT SURGERY | Admitting: TRANSPLANT SURGERY
Payer: MEDICARE

## 2025-04-19 ENCOUNTER — ANESTHESIA EVENT (OUTPATIENT)
Dept: SURGERY | Facility: HOSPITAL | Age: 50
DRG: 651 | End: 2025-04-19
Payer: MEDICARE

## 2025-04-19 ENCOUNTER — TELEPHONE (OUTPATIENT)
Dept: TRANSPLANT | Facility: CLINIC | Age: 50
End: 2025-04-19
Payer: MEDICARE

## 2025-04-19 DIAGNOSIS — Z79.60 LONG-TERM USE OF IMMUNOSUPPRESSANT MEDICATION: ICD-10-CM

## 2025-04-19 DIAGNOSIS — Z29.89 PROPHYLACTIC IMMUNOTHERAPY: ICD-10-CM

## 2025-04-19 DIAGNOSIS — E87.5 HYPERKALEMIA: ICD-10-CM

## 2025-04-19 DIAGNOSIS — E78.5 HYPERLIPIDEMIA, UNSPECIFIED HYPERLIPIDEMIA TYPE: ICD-10-CM

## 2025-04-19 DIAGNOSIS — D62 ACUTE BLOOD LOSS ANEMIA: ICD-10-CM

## 2025-04-19 DIAGNOSIS — Z94.0 STATUS POST DECEASED-DONOR KIDNEY TRANSPLANTATION: ICD-10-CM

## 2025-04-19 DIAGNOSIS — Z76.82 KIDNEY TRANSPLANT CANDIDATE: Primary | ICD-10-CM

## 2025-04-19 DIAGNOSIS — F32.1 MAJOR DEPRESSIVE DISORDER, SINGLE EPISODE, MODERATE: ICD-10-CM

## 2025-04-19 DIAGNOSIS — N18.6 ESRD ON HEMODIALYSIS: ICD-10-CM

## 2025-04-19 DIAGNOSIS — I10 ESSENTIAL (PRIMARY) HYPERTENSION: ICD-10-CM

## 2025-04-19 DIAGNOSIS — N18.9 ANEMIA OF RENAL DISEASE: ICD-10-CM

## 2025-04-19 DIAGNOSIS — Z99.2 ESRD ON HEMODIALYSIS: ICD-10-CM

## 2025-04-19 DIAGNOSIS — D63.1 ANEMIA OF RENAL DISEASE: ICD-10-CM

## 2025-04-19 DIAGNOSIS — Z01.818 PRE-OP EVALUATION: ICD-10-CM

## 2025-04-19 DIAGNOSIS — E11.21 TYPE 2 DIABETES MELLITUS WITH DIABETIC NEPHROPATHY, UNSPECIFIED WHETHER LONG TERM INSULIN USE: ICD-10-CM

## 2025-04-19 DIAGNOSIS — Z91.89 AT RISK FOR OPPORTUNISTIC INFECTIONS: ICD-10-CM

## 2025-04-19 LAB
ABSOLUTE EOSINOPHIL (OHS): 0.33 K/UL
ABSOLUTE MONOCYTE (OHS): 0.53 K/UL (ref 0.3–1)
ABSOLUTE NEUTROPHIL COUNT (OHS): 4.97 K/UL (ref 1.8–7.7)
ALBUMIN SERPL BCP-MCNC: 3.4 G/DL (ref 3.5–5.2)
ALLENS TEST: ABNORMAL
ALP SERPL-CCNC: 47 UNIT/L (ref 40–150)
ALT SERPL W/O P-5'-P-CCNC: 21 UNIT/L (ref 10–44)
ANION GAP (OHS): 12 MMOL/L (ref 8–16)
AST SERPL-CCNC: 14 UNIT/L (ref 11–45)
BASOPHILS # BLD AUTO: 0.07 K/UL
BASOPHILS NFR BLD AUTO: 0.9 %
BILIRUB SERPL-MCNC: 0.6 MG/DL (ref 0.1–1)
BUN SERPL-MCNC: 44 MG/DL (ref 6–20)
CALCIUM SERPL-MCNC: 8.9 MG/DL (ref 8.7–10.5)
CHLORIDE SERPL-SCNC: 99 MMOL/L (ref 95–110)
CO2 SERPL-SCNC: 26 MMOL/L (ref 23–29)
CREAT SERPL-MCNC: 8.7 MG/DL (ref 0.5–1.4)
DELSYS: ABNORMAL
ERYTHROCYTE [DISTWIDTH] IN BLOOD BY AUTOMATED COUNT: 12.8 % (ref 11.5–14.5)
GFR SERPLBLD CREATININE-BSD FMLA CKD-EPI: 5 ML/MIN/1.73/M2
GLUCOSE SERPL-MCNC: 76 MG/DL (ref 70–110)
HBV CORE AB SERPL QL IA: NORMAL
HBV CORE IGM SERPL QL IA: NORMAL
HBV SURFACE AG SERPL QL IA: NORMAL
HCO3 UR-SCNC: 31.7 MMOL/L (ref 24–28)
HCT VFR BLD AUTO: 25.7 % (ref 37–48.5)
HCT VFR BLD CALC: 27 %PCV (ref 36–54)
HCV AB SERPL QL IA: NORMAL
HGB BLD-MCNC: 8.3 GM/DL (ref 12–16)
HIV 1+2 AB+HIV1 P24 AG SERPL QL IA: NORMAL
IMM GRANULOCYTES # BLD AUTO: 0.02 K/UL (ref 0–0.04)
IMM GRANULOCYTES NFR BLD AUTO: 0.3 % (ref 0–0.5)
INDIRECT COOMBS: NORMAL
INR PPP: 1 (ref 0.8–1.2)
LYMPHOCYTES # BLD AUTO: 1.63 K/UL (ref 1–4.8)
MCH RBC QN AUTO: 33.5 PG (ref 27–31)
MCHC RBC AUTO-ENTMCNC: 32.3 G/DL (ref 32–36)
MCV RBC AUTO: 104 FL (ref 82–98)
MODE: ABNORMAL
NUCLEATED RBC (/100WBC) (OHS): 0 /100 WBC
PCO2 BLDA: 51.5 MMHG (ref 35–45)
PH SMN: 7.4 [PH] (ref 7.35–7.45)
PHOSPHATE SERPL-MCNC: 5.9 MG/DL (ref 2.7–4.5)
PLATELET # BLD AUTO: 159 K/UL (ref 150–450)
PMV BLD AUTO: 8.8 FL (ref 9.2–12.9)
PO2 BLDA: 24 MMHG (ref 40–60)
POC BE: 7 MMOL/L
POC IONIZED CALCIUM: 1.16 MMOL/L (ref 1.06–1.42)
POC SATURATED O2: 42 % (ref 95–100)
POC TCO2: 33 MMOL/L (ref 24–29)
POCT GLUCOSE: 81 MG/DL (ref 70–110)
POTASSIUM BLD-SCNC: 5.5 MMOL/L (ref 3.5–5.1)
POTASSIUM SERPL-SCNC: 5.4 MMOL/L (ref 3.5–5.1)
PROT SERPL-MCNC: 6.7 GM/DL (ref 6–8.4)
PROTHROMBIN TIME: 10.6 SECONDS (ref 9–12.5)
RBC # BLD AUTO: 2.48 M/UL (ref 4–5.4)
RELATIVE EOSINOPHIL (OHS): 4.4 %
RELATIVE LYMPHOCYTE (OHS): 21.6 % (ref 18–48)
RELATIVE MONOCYTE (OHS): 7 % (ref 4–15)
RELATIVE NEUTROPHIL (OHS): 65.8 % (ref 38–73)
RH BLD: NORMAL
SAMPLE: ABNORMAL
SITE: ABNORMAL
SODIUM BLD-SCNC: 135 MMOL/L (ref 136–145)
SODIUM SERPL-SCNC: 137 MMOL/L (ref 136–145)
SP02: 95
SPECIMEN OUTDATE: NORMAL
WBC # BLD AUTO: 7.55 K/UL (ref 3.9–12.7)

## 2025-04-19 PROCEDURE — 86850 RBC ANTIBODY SCREEN: CPT | Performed by: NURSE PRACTITIONER

## 2025-04-19 PROCEDURE — 27000207 HC ISOLATION: Mod: NTX

## 2025-04-19 PROCEDURE — 71000015 HC POSTOP RECOV 1ST HR: Performed by: TRANSPLANT SURGERY

## 2025-04-19 PROCEDURE — 86704 HEP B CORE ANTIBODY TOTAL: CPT | Mod: NTX | Performed by: NURSE PRACTITIONER

## 2025-04-19 PROCEDURE — 37000009 HC ANESTHESIA EA ADD 15 MINS: Performed by: TRANSPLANT SURGERY

## 2025-04-19 PROCEDURE — 86706 HEP B SURFACE ANTIBODY: CPT | Mod: NTX | Performed by: NURSE PRACTITIONER

## 2025-04-19 PROCEDURE — 63600175 PHARM REV CODE 636 W HCPCS: Performed by: NURSE ANESTHETIST, CERTIFIED REGISTERED

## 2025-04-19 PROCEDURE — 25000003 PHARM REV CODE 250: Mod: NTX | Performed by: NURSE PRACTITIONER

## 2025-04-19 PROCEDURE — 71000033 HC RECOVERY, INTIAL HOUR: Performed by: TRANSPLANT SURGERY

## 2025-04-19 PROCEDURE — 86803 HEPATITIS C AB TEST: CPT | Mod: NTX | Performed by: NURSE PRACTITIONER

## 2025-04-19 PROCEDURE — 87389 HIV-1 AG W/HIV-1&-2 AB AG IA: CPT | Mod: NTX | Performed by: NURSE PRACTITIONER

## 2025-04-19 PROCEDURE — 82803 BLOOD GASES ANY COMBINATION: CPT | Mod: NTX

## 2025-04-19 PROCEDURE — 36000930 HC OR TIME LEV VII 1ST 15 MIN: Performed by: TRANSPLANT SURGERY

## 2025-04-19 PROCEDURE — 0TY00Z0 TRANSPLANTATION OF RIGHT KIDNEY, ALLOGENEIC, OPEN APPROACH: ICD-10-PCS | Performed by: TRANSPLANT SURGERY

## 2025-04-19 PROCEDURE — 84295 ASSAY OF SERUM SODIUM: CPT | Mod: NTX

## 2025-04-19 PROCEDURE — 84132 ASSAY OF SERUM POTASSIUM: CPT | Mod: NTX

## 2025-04-19 PROCEDURE — 80053 COMPREHEN METABOLIC PANEL: CPT | Mod: NTX | Performed by: NURSE PRACTITIONER

## 2025-04-19 PROCEDURE — 36000931 HC OR TIME LEV VII EA ADD 15 MIN: Performed by: TRANSPLANT SURGERY

## 2025-04-19 PROCEDURE — 93010 ELECTROCARDIOGRAM REPORT: CPT | Mod: ,,, | Performed by: INTERNAL MEDICINE

## 2025-04-19 PROCEDURE — 36415 COLL VENOUS BLD VENIPUNCTURE: CPT | Mod: NTX | Performed by: NURSE PRACTITIONER

## 2025-04-19 PROCEDURE — 85014 HEMATOCRIT: CPT | Mod: NTX

## 2025-04-19 PROCEDURE — D9220A PRA ANESTHESIA: Mod: CRNA,,, | Performed by: NURSE ANESTHETIST, CERTIFIED REGISTERED

## 2025-04-19 PROCEDURE — 25000003 PHARM REV CODE 250: Performed by: NURSE ANESTHETIST, CERTIFIED REGISTERED

## 2025-04-19 PROCEDURE — 50605 INSERT URETERAL SUPPORT: CPT | Mod: 51,RT,, | Performed by: TRANSPLANT SURGERY

## 2025-04-19 PROCEDURE — 71000039 HC RECOVERY, EACH ADD'L HOUR: Performed by: TRANSPLANT SURGERY

## 2025-04-19 PROCEDURE — C2617 STENT, NON-COR, TEM W/O DEL: HCPCS | Performed by: TRANSPLANT SURGERY

## 2025-04-19 PROCEDURE — 84100 ASSAY OF PHOSPHORUS: CPT | Mod: NTX | Performed by: NURSE PRACTITIONER

## 2025-04-19 PROCEDURE — 85610 PROTHROMBIN TIME: CPT | Mod: NTX | Performed by: NURSE PRACTITIONER

## 2025-04-19 PROCEDURE — D9220A PRA ANESTHESIA: Mod: ANES,,, | Performed by: ANESTHESIOLOGY

## 2025-04-19 PROCEDURE — 87522 HEPATITIS C REVRS TRNSCRPJ: CPT | Performed by: TRANSPLANT SURGERY

## 2025-04-19 PROCEDURE — 82330 ASSAY OF CALCIUM: CPT | Mod: NTX

## 2025-04-19 PROCEDURE — 93005 ELECTROCARDIOGRAM TRACING: CPT

## 2025-04-19 PROCEDURE — 20600001 HC STEP DOWN PRIVATE ROOM: Mod: NTX

## 2025-04-19 PROCEDURE — 50360 RNL ALTRNSPLJ W/O RCP NFRCT: CPT | Mod: RT,,, | Performed by: TRANSPLANT SURGERY

## 2025-04-19 PROCEDURE — 37000008 HC ANESTHESIA 1ST 15 MINUTES: Performed by: TRANSPLANT SURGERY

## 2025-04-19 PROCEDURE — 99900035 HC TECH TIME PER 15 MIN (STAT): Mod: NTX

## 2025-04-19 PROCEDURE — 86705 HEP B CORE ANTIBODY IGM: CPT | Mod: NTX | Performed by: NURSE PRACTITIONER

## 2025-04-19 PROCEDURE — 87522 HEPATITIS C REVRS TRNSCRPJ: CPT | Mod: NTX | Performed by: NURSE PRACTITIONER

## 2025-04-19 PROCEDURE — 63600175 PHARM REV CODE 636 W HCPCS: Mod: NTX | Performed by: NURSE PRACTITIONER

## 2025-04-19 PROCEDURE — 85025 COMPLETE CBC W/AUTO DIFF WBC: CPT | Mod: NTX | Performed by: NURSE PRACTITIONER

## 2025-04-19 PROCEDURE — 87340 HEPATITIS B SURFACE AG IA: CPT | Mod: NTX | Performed by: NURSE PRACTITIONER

## 2025-04-19 PROCEDURE — 27201423 OPTIME MED/SURG SUP & DEVICES STERILE SUPPLY: Performed by: TRANSPLANT SURGERY

## 2025-04-19 DEVICE — SET SOF-FLEX DBL PGTL 7FR 12CM: Type: IMPLANTABLE DEVICE | Site: URETER | Status: FUNCTIONAL

## 2025-04-19 RX ORDER — PROPOFOL 10 MG/ML
VIAL (ML) INTRAVENOUS
Status: DISCONTINUED | OUTPATIENT
Start: 2025-04-19 | End: 2025-04-20

## 2025-04-19 RX ORDER — LIDOCAINE HYDROCHLORIDE 20 MG/ML
INJECTION, SOLUTION EPIDURAL; INFILTRATION; INTRACAUDAL; PERINEURAL
Status: DISCONTINUED | OUTPATIENT
Start: 2025-04-19 | End: 2025-04-20

## 2025-04-19 RX ORDER — CEFAZOLIN 2 G/1
2 INJECTION, POWDER, FOR SOLUTION INTRAMUSCULAR; INTRAVENOUS
Status: DISCONTINUED | OUTPATIENT
Start: 2025-04-19 | End: 2025-04-20

## 2025-04-19 RX ORDER — FERRIC CITRATE 210 MG/1
630 TABLET, FILM COATED ORAL 3 TIMES DAILY
Status: ON HOLD | COMMUNITY
End: 2025-04-23 | Stop reason: HOSPADM

## 2025-04-19 RX ORDER — SODIUM CHLORIDE 0.9 % (FLUSH) 0.9 %
10 SYRINGE (ML) INJECTION
Status: DISCONTINUED | OUTPATIENT
Start: 2025-04-19 | End: 2025-04-21

## 2025-04-19 RX ORDER — MIDAZOLAM HYDROCHLORIDE 1 MG/ML
INJECTION INTRAMUSCULAR; INTRAVENOUS
Status: DISCONTINUED | OUTPATIENT
Start: 2025-04-19 | End: 2025-04-20

## 2025-04-19 RX ORDER — ROCURONIUM BROMIDE 10 MG/ML
INJECTION, SOLUTION INTRAVENOUS
Status: DISCONTINUED | OUTPATIENT
Start: 2025-04-19 | End: 2025-04-20

## 2025-04-19 RX ORDER — HEPARIN SODIUM 5000 [USP'U]/ML
5000 INJECTION, SOLUTION INTRAVENOUS; SUBCUTANEOUS ONCE
Status: COMPLETED | OUTPATIENT
Start: 2025-04-19 | End: 2025-04-19

## 2025-04-19 RX ORDER — METHYLPREDNISOLONE SODIUM SUCCINATE 1 G/16ML
INJECTION INTRAMUSCULAR; INTRAVENOUS
Status: DISCONTINUED | OUTPATIENT
Start: 2025-04-19 | End: 2025-04-20

## 2025-04-19 RX ORDER — HYDRALAZINE HYDROCHLORIDE 20 MG/ML
10 INJECTION INTRAMUSCULAR; INTRAVENOUS EVERY 8 HOURS PRN
Status: DISCONTINUED | OUTPATIENT
Start: 2025-04-19 | End: 2025-04-21

## 2025-04-19 RX ORDER — PREGABALIN 75 MG/1
75 CAPSULE ORAL
Status: COMPLETED | OUTPATIENT
Start: 2025-04-19 | End: 2025-04-20

## 2025-04-19 RX ORDER — CEFAZOLIN SODIUM 1 G/3ML
INJECTION, POWDER, FOR SOLUTION INTRAMUSCULAR; INTRAVENOUS
Status: DISCONTINUED | OUTPATIENT
Start: 2025-04-19 | End: 2025-04-20

## 2025-04-19 RX ORDER — ACETAMINOPHEN 650 MG/20.3ML
650 LIQUID ORAL ONCE
Status: COMPLETED | OUTPATIENT
Start: 2025-04-19 | End: 2025-04-19

## 2025-04-19 RX ORDER — MUPIROCIN 20 MG/G
OINTMENT TOPICAL
Status: DISCONTINUED | OUTPATIENT
Start: 2025-04-19 | End: 2025-04-21

## 2025-04-19 RX ORDER — DIPHENHYDRAMINE HYDROCHLORIDE 50 MG/ML
50 INJECTION, SOLUTION INTRAMUSCULAR; INTRAVENOUS ONCE
Status: COMPLETED | OUTPATIENT
Start: 2025-04-19 | End: 2025-04-19

## 2025-04-19 RX ADMIN — METHYLPREDNISOLONE SODIUM SUCCINATE 500 MG: 1 INJECTION, POWDER, FOR SOLUTION INTRAMUSCULAR; INTRAVENOUS at 10:04

## 2025-04-19 RX ADMIN — DIPHENHYDRAMINE HYDROCHLORIDE 50 MG: 50 INJECTION, SOLUTION INTRAMUSCULAR; INTRAVENOUS at 10:04

## 2025-04-19 RX ADMIN — CEFAZOLIN 3 G: 330 INJECTION, POWDER, FOR SOLUTION INTRAMUSCULAR; INTRAVENOUS at 10:04

## 2025-04-19 RX ADMIN — HEPARIN SODIUM 5000 UNITS: 5000 INJECTION INTRAVENOUS; SUBCUTANEOUS at 08:04

## 2025-04-19 RX ADMIN — LIDOCAINE HYDROCHLORIDE 50 MG: 20 INJECTION, SOLUTION EPIDURAL; INFILTRATION; INTRACAUDAL at 10:04

## 2025-04-19 RX ADMIN — ACETAMINOPHEN 650 MG: 160 SOLUTION ORAL at 10:04

## 2025-04-19 RX ADMIN — SODIUM CHLORIDE, SODIUM GLUCONATE, SODIUM ACETATE, POTASSIUM CHLORIDE, MAGNESIUM CHLORIDE, SODIUM PHOSPHATE, DIBASIC, AND POTASSIUM PHOSPHATE: .53; .5; .37; .037; .03; .012; .00082 INJECTION, SOLUTION INTRAVENOUS at 10:04

## 2025-04-19 RX ADMIN — PROPOFOL 200 MG: 10 INJECTION, EMULSION INTRAVENOUS at 10:04

## 2025-04-19 RX ADMIN — MIDAZOLAM 2 MG: 1 INJECTION INTRAMUSCULAR; INTRAVENOUS at 10:04

## 2025-04-19 RX ADMIN — ROCURONIUM BROMIDE 200 MG: 10 INJECTION INTRAVENOUS at 10:04

## 2025-04-19 NOTE — TELEPHONE ENCOUNTER
"OS # [id number]    On call update: notified by Andrea that patient is now primary for this case. She is to be admitted to [facility] and brought to OR with Nisha for transplant upon arrival to unit. Patient states she ate "a couple pieces of pineapple" about an hour ago but has not eaten a meal since this morning. Advised patient to not eat or drink anything. Admit reservation made [facility] [phone]. [facility] LISA Catherine MILLER, [facility] GUME AMOR and house supervisor Doc made aware of plans for admission. Patient states her ETA is 8:30-9pm tonight. Advised patient to bring insurance cards and current medications. Provided call back number if patient needs further assistance finding [facility].         "

## 2025-04-20 PROBLEM — D62 ACUTE BLOOD LOSS ANEMIA: Status: ACTIVE | Noted: 2025-04-20

## 2025-04-20 PROBLEM — Z94.0 STATUS POST DECEASED-DONOR KIDNEY TRANSPLANTATION: Status: ACTIVE | Noted: 2025-04-20

## 2025-04-20 PROBLEM — Z29.89 PROPHYLACTIC IMMUNOTHERAPY: Status: ACTIVE | Noted: 2025-04-20

## 2025-04-20 PROBLEM — Z76.82 KIDNEY TRANSPLANT CANDIDATE: Status: RESOLVED | Noted: 2024-06-20 | Resolved: 2025-04-20

## 2025-04-20 PROBLEM — Z79.60 LONG-TERM USE OF IMMUNOSUPPRESSANT MEDICATION: Status: ACTIVE | Noted: 2025-04-20

## 2025-04-20 PROBLEM — Z91.89 AT RISK FOR OPPORTUNISTIC INFECTIONS: Status: ACTIVE | Noted: 2025-04-20

## 2025-04-20 LAB
ABSOLUTE EOSINOPHIL (OHS): 0 K/UL
ABSOLUTE MONOCYTE (OHS): 0.03 K/UL (ref 0.3–1)
ABSOLUTE NEUTROPHIL COUNT (OHS): 3.91 K/UL (ref 1.8–7.7)
ALBUMIN SERPL BCP-MCNC: 3 G/DL (ref 3.5–5.2)
ALBUMIN SERPL BCP-MCNC: 3.2 G/DL (ref 3.5–5.2)
ANION GAP (OHS): 12 MMOL/L (ref 8–16)
ANION GAP (OHS): 15 MMOL/L (ref 8–16)
ANION GAP (OHS): 15 MMOL/L (ref 8–16)
BASOPHILS # BLD AUTO: 0 K/UL
BASOPHILS NFR BLD AUTO: 0 %
BUN SERPL-MCNC: 30 MG/DL (ref 6–20)
BUN SERPL-MCNC: 46 MG/DL (ref 6–20)
BUN SERPL-MCNC: 48 MG/DL (ref 6–20)
CALCIUM SERPL-MCNC: 8.1 MG/DL (ref 8.7–10.5)
CALCIUM SERPL-MCNC: 8.2 MG/DL (ref 8.7–10.5)
CALCIUM SERPL-MCNC: 8.5 MG/DL (ref 8.7–10.5)
CHLORIDE SERPL-SCNC: 97 MMOL/L (ref 95–110)
CHLORIDE SERPL-SCNC: 99 MMOL/L (ref 95–110)
CHLORIDE SERPL-SCNC: 99 MMOL/L (ref 95–110)
CO2 SERPL-SCNC: 21 MMOL/L (ref 23–29)
CO2 SERPL-SCNC: 22 MMOL/L (ref 23–29)
CO2 SERPL-SCNC: 27 MMOL/L (ref 23–29)
CREAT SERPL-MCNC: 5.8 MG/DL (ref 0.5–1.4)
CREAT SERPL-MCNC: 8.7 MG/DL (ref 0.5–1.4)
CREAT SERPL-MCNC: 9 MG/DL (ref 0.5–1.4)
ERYTHROCYTE [DISTWIDTH] IN BLOOD BY AUTOMATED COUNT: 13 % (ref 11.5–14.5)
GFR SERPLBLD CREATININE-BSD FMLA CKD-EPI: 5 ML/MIN/1.73/M2
GFR SERPLBLD CREATININE-BSD FMLA CKD-EPI: 5 ML/MIN/1.73/M2
GFR SERPLBLD CREATININE-BSD FMLA CKD-EPI: 8 ML/MIN/1.73/M2
GLUCOSE SERPL-MCNC: 171 MG/DL (ref 70–110)
GLUCOSE SERPL-MCNC: 175 MG/DL (ref 70–110)
GLUCOSE SERPL-MCNC: 179 MG/DL (ref 70–110)
GLUCOSE SERPL-MCNC: 186 MG/DL (ref 70–110)
HCO3 UR-SCNC: 26.4 MMOL/L (ref 24–28)
HCT VFR BLD AUTO: 27 % (ref 37–48.5)
HCT VFR BLD CALC: 26 %PCV (ref 36–54)
HGB BLD-MCNC: 8.8 GM/DL (ref 12–16)
IMM GRANULOCYTES # BLD AUTO: 0.03 K/UL (ref 0–0.04)
IMM GRANULOCYTES NFR BLD AUTO: 0.8 % (ref 0–0.5)
LYMPHOCYTES # BLD AUTO: 0.03 K/UL (ref 1–4.8)
MAGNESIUM SERPL-MCNC: 2.5 MG/DL (ref 1.6–2.6)
MCH RBC QN AUTO: 33.8 PG (ref 27–31)
MCHC RBC AUTO-ENTMCNC: 32.6 G/DL (ref 32–36)
MCV RBC AUTO: 104 FL (ref 82–98)
NUCLEATED RBC (/100WBC) (OHS): 0 /100 WBC
OHS QRS DURATION: 84 MS
OHS QTC CALCULATION: 454 MS
PCO2 BLDA: 63.9 MMHG (ref 35–45)
PH SMN: 7.22 [PH] (ref 7.35–7.45)
PHOSPHATE SERPL-MCNC: 6.3 MG/DL (ref 2.7–4.5)
PHOSPHATE SERPL-MCNC: 6.7 MG/DL (ref 2.7–4.5)
PLATELET # BLD AUTO: 95 K/UL (ref 150–450)
PLATELET BLD QL SMEAR: ABNORMAL
PMV BLD AUTO: 8.8 FL (ref 9.2–12.9)
PO2 BLDA: 28 MMHG (ref 40–60)
POC BE: -1 MMOL/L
POC IONIZED CALCIUM: 1.1 MMOL/L (ref 1.06–1.42)
POC SATURATED O2: 40 % (ref 95–100)
POC TCO2: 28 MMOL/L (ref 24–29)
POCT GLUCOSE: 128 MG/DL (ref 70–110)
POCT GLUCOSE: 128 MG/DL (ref 70–110)
POCT GLUCOSE: 136 MG/DL (ref 70–110)
POCT GLUCOSE: 180 MG/DL (ref 70–110)
POCT GLUCOSE: 249 MG/DL (ref 70–110)
POCT GLUCOSE: 333 MG/DL (ref 70–110)
POTASSIUM BLD-SCNC: 5.3 MMOL/L (ref 3.5–5.1)
POTASSIUM SERPL-SCNC: 4.9 MMOL/L (ref 3.5–5.1)
POTASSIUM SERPL-SCNC: 5.3 MMOL/L (ref 3.5–5.1)
POTASSIUM SERPL-SCNC: 5.6 MMOL/L (ref 3.5–5.1)
POTASSIUM SERPL-SCNC: 5.7 MMOL/L (ref 3.5–5.1)
POTASSIUM SERPL-SCNC: 6.8 MMOL/L (ref 3.5–5.1)
RBC # BLD AUTO: 2.6 M/UL (ref 4–5.4)
RELATIVE EOSINOPHIL (OHS): 0 %
RELATIVE LYMPHOCYTE (OHS): 0.8 % (ref 18–48)
RELATIVE MONOCYTE (OHS): 0.8 % (ref 4–15)
RELATIVE NEUTROPHIL (OHS): 97.6 % (ref 38–73)
SAMPLE: ABNORMAL
SODIUM BLD-SCNC: 134 MMOL/L (ref 136–145)
SODIUM SERPL-SCNC: 135 MMOL/L (ref 136–145)
SODIUM SERPL-SCNC: 136 MMOL/L (ref 136–145)
SODIUM SERPL-SCNC: 136 MMOL/L (ref 136–145)
TACROLIMUS BLD-MCNC: <2 NG/ML (ref 5–15)
WBC # BLD AUTO: 4 K/UL (ref 3.9–12.7)

## 2025-04-20 PROCEDURE — 25000003 PHARM REV CODE 250: Performed by: PHYSICIAN ASSISTANT

## 2025-04-20 PROCEDURE — 36415 COLL VENOUS BLD VENIPUNCTURE: CPT | Performed by: TRANSPLANT SURGERY

## 2025-04-20 PROCEDURE — 5A1D70Z PERFORMANCE OF URINARY FILTRATION, INTERMITTENT, LESS THAN 6 HOURS PER DAY: ICD-10-PCS | Performed by: TRANSPLANT SURGERY

## 2025-04-20 PROCEDURE — 63600175 PHARM REV CODE 636 W HCPCS: Performed by: TRANSPLANT SURGERY

## 2025-04-20 PROCEDURE — 83735 ASSAY OF MAGNESIUM: CPT | Performed by: TRANSPLANT SURGERY

## 2025-04-20 PROCEDURE — 80069 RENAL FUNCTION PANEL: CPT | Performed by: NURSE PRACTITIONER

## 2025-04-20 PROCEDURE — 25000003 PHARM REV CODE 250: Performed by: NURSE PRACTITIONER

## 2025-04-20 PROCEDURE — 81300002 HC KIDNEY TRANSPORT, GROUND 4-5 HOURS

## 2025-04-20 PROCEDURE — 81200001 HC KIDNEY ACQUISITION - CADAVER

## 2025-04-20 PROCEDURE — 25000003 PHARM REV CODE 250: Performed by: NURSE ANESTHETIST, CERTIFIED REGISTERED

## 2025-04-20 PROCEDURE — 80197 ASSAY OF TACROLIMUS: CPT | Performed by: TRANSPLANT SURGERY

## 2025-04-20 PROCEDURE — 25000003 PHARM REV CODE 250: Performed by: TRANSPLANT SURGERY

## 2025-04-20 PROCEDURE — 85025 COMPLETE CBC W/AUTO DIFF WBC: CPT | Performed by: TRANSPLANT SURGERY

## 2025-04-20 PROCEDURE — 80100014 HC HEMODIALYSIS 1:1

## 2025-04-20 PROCEDURE — 20600001 HC STEP DOWN PRIVATE ROOM

## 2025-04-20 PROCEDURE — 84132 ASSAY OF SERUM POTASSIUM: CPT | Performed by: NURSE PRACTITIONER

## 2025-04-20 PROCEDURE — 63600175 PHARM REV CODE 636 W HCPCS

## 2025-04-20 PROCEDURE — 63600175 PHARM REV CODE 636 W HCPCS: Performed by: PHYSICIAN ASSISTANT

## 2025-04-20 PROCEDURE — 27000207 HC ISOLATION

## 2025-04-20 PROCEDURE — 80069 RENAL FUNCTION PANEL: CPT | Performed by: TRANSPLANT SURGERY

## 2025-04-20 PROCEDURE — 63600175 PHARM REV CODE 636 W HCPCS: Performed by: NURSE PRACTITIONER

## 2025-04-20 PROCEDURE — 84132 ASSAY OF SERUM POTASSIUM: CPT | Performed by: TRANSPLANT SURGERY

## 2025-04-20 PROCEDURE — 25000003 PHARM REV CODE 250: Mod: NTX | Performed by: NURSE PRACTITIONER

## 2025-04-20 PROCEDURE — 36415 COLL VENOUS BLD VENIPUNCTURE: CPT | Performed by: NURSE PRACTITIONER

## 2025-04-20 PROCEDURE — 63600175 PHARM REV CODE 636 W HCPCS: Mod: JZ,TB,NTX | Performed by: NURSE PRACTITIONER

## 2025-04-20 PROCEDURE — S5010 5% DEXTROSE AND 0.45% SALINE: HCPCS | Performed by: TRANSPLANT SURGERY

## 2025-04-20 PROCEDURE — 63600175 PHARM REV CODE 636 W HCPCS: Performed by: NURSE ANESTHETIST, CERTIFIED REGISTERED

## 2025-04-20 PROCEDURE — 25000003 PHARM REV CODE 250

## 2025-04-20 PROCEDURE — 84132 ASSAY OF SERUM POTASSIUM: CPT | Performed by: PHYSICIAN ASSISTANT

## 2025-04-20 RX ORDER — HYDROMORPHONE HYDROCHLORIDE 1 MG/ML
0.5 INJECTION, SOLUTION INTRAMUSCULAR; INTRAVENOUS; SUBCUTANEOUS ONCE
Status: DISCONTINUED | OUTPATIENT
Start: 2025-04-20 | End: 2025-04-20

## 2025-04-20 RX ORDER — LIDOCAINE AND PRILOCAINE 25; 25 MG/G; MG/G
CREAM TOPICAL
Status: DISCONTINUED | OUTPATIENT
Start: 2025-04-20 | End: 2025-04-23 | Stop reason: HOSPADM

## 2025-04-20 RX ORDER — METHYLPREDNISOLONE SOD SUCC 125 MG
250 VIAL (EA) INJECTION ONCE
Status: COMPLETED | OUTPATIENT
Start: 2025-04-21 | End: 2025-04-21

## 2025-04-20 RX ORDER — TRAMADOL HYDROCHLORIDE 50 MG/1
50 TABLET ORAL EVERY 8 HOURS PRN
Status: DISCONTINUED | OUTPATIENT
Start: 2025-04-20 | End: 2025-04-21

## 2025-04-20 RX ORDER — EPINEPHRINE 1 MG/ML
1 INJECTION, SOLUTION, CONCENTRATE INTRAVENOUS ONCE AS NEEDED
Status: DISCONTINUED | OUTPATIENT
Start: 2025-04-20 | End: 2025-04-22

## 2025-04-20 RX ORDER — HEPARIN SOD,PORCINE/0.9 % NACL 1000/500ML
INTRAVENOUS SOLUTION INTRAVENOUS
Status: DISCONTINUED | OUTPATIENT
Start: 2025-04-20 | End: 2025-04-20 | Stop reason: HOSPADM

## 2025-04-20 RX ORDER — GLUCAGON 1 MG
1 KIT INJECTION CONTINUOUS PRN
Status: DISCONTINUED | OUTPATIENT
Start: 2025-04-20 | End: 2025-04-23 | Stop reason: HOSPADM

## 2025-04-20 RX ORDER — ACETAMINOPHEN 325 MG/1
650 TABLET ORAL
Status: COMPLETED | OUTPATIENT
Start: 2025-04-21 | End: 2025-04-21

## 2025-04-20 RX ORDER — FUROSEMIDE 10 MG/ML
20 INJECTION INTRAMUSCULAR; INTRAVENOUS ONCE
Status: DISCONTINUED | OUTPATIENT
Start: 2025-04-20 | End: 2025-04-20

## 2025-04-20 RX ORDER — TACROLIMUS 1 MG/1
3 CAPSULE ORAL 2 TIMES DAILY
Status: DISCONTINUED | OUTPATIENT
Start: 2025-04-20 | End: 2025-04-21

## 2025-04-20 RX ORDER — ACETAMINOPHEN 325 MG/1
650 TABLET ORAL EVERY 8 HOURS
Status: DISPENSED | OUTPATIENT
Start: 2025-04-20 | End: 2025-04-22

## 2025-04-20 RX ORDER — BUSPIRONE HYDROCHLORIDE 5 MG/1
5 TABLET ORAL 2 TIMES DAILY
Status: DISCONTINUED | OUTPATIENT
Start: 2025-04-20 | End: 2025-04-23 | Stop reason: HOSPADM

## 2025-04-20 RX ORDER — SODIUM CHLORIDE 0.9 % (FLUSH) 0.9 %
10 SYRINGE (ML) INJECTION
Status: DISCONTINUED | OUTPATIENT
Start: 2025-04-20 | End: 2025-04-21

## 2025-04-20 RX ORDER — HEPARIN SODIUM 5000 [USP'U]/ML
5000 INJECTION, SOLUTION INTRAVENOUS; SUBCUTANEOUS EVERY 8 HOURS
Status: DISCONTINUED | OUTPATIENT
Start: 2025-04-20 | End: 2025-04-23 | Stop reason: HOSPADM

## 2025-04-20 RX ORDER — HEPARIN SODIUM 1000 [USP'U]/ML
INJECTION, SOLUTION INTRAVENOUS; SUBCUTANEOUS
Status: DISCONTINUED | OUTPATIENT
Start: 2025-04-20 | End: 2025-04-20

## 2025-04-20 RX ORDER — SODIUM CHLORIDE 9 MG/ML
INJECTION, SOLUTION INTRAVENOUS CONTINUOUS
Status: DISCONTINUED | OUTPATIENT
Start: 2025-04-20 | End: 2025-04-20

## 2025-04-20 RX ORDER — CALCIUM GLUCONATE 20 MG/ML
1 INJECTION, SOLUTION INTRAVENOUS ONCE
Status: COMPLETED | OUTPATIENT
Start: 2025-04-20 | End: 2025-04-20

## 2025-04-20 RX ORDER — VILAZODONE HYDROCHLORIDE 10 MG/1
40 TABLET ORAL DAILY
Status: DISCONTINUED | OUTPATIENT
Start: 2025-04-21 | End: 2025-04-23 | Stop reason: HOSPADM

## 2025-04-20 RX ORDER — SULFAMETHOXAZOLE AND TRIMETHOPRIM 400; 80 MG/1; MG/1
1 TABLET ORAL EVERY MORNING
Status: DISCONTINUED | OUTPATIENT
Start: 2025-04-30 | End: 2025-04-23 | Stop reason: HOSPADM

## 2025-04-20 RX ORDER — CEFAZOLIN SODIUM 1 G/3ML
INJECTION, POWDER, FOR SOLUTION INTRAMUSCULAR; INTRAVENOUS
Status: DISCONTINUED | OUTPATIENT
Start: 2025-04-20 | End: 2025-04-20 | Stop reason: HOSPADM

## 2025-04-20 RX ORDER — LIDOCAINE 50 MG/G
2 PATCH TOPICAL
Status: DISCONTINUED | OUTPATIENT
Start: 2025-04-20 | End: 2025-04-22

## 2025-04-20 RX ORDER — CARVEDILOL 3.12 MG/1
3.12 TABLET ORAL 2 TIMES DAILY
Status: DISCONTINUED | OUTPATIENT
Start: 2025-04-20 | End: 2025-04-23

## 2025-04-20 RX ORDER — SODIUM CHLORIDE 0.9 % (FLUSH) 0.9 %
10 SYRINGE (ML) INJECTION
Status: DISCONTINUED | OUTPATIENT
Start: 2025-04-20 | End: 2025-04-23 | Stop reason: HOSPADM

## 2025-04-20 RX ORDER — FUROSEMIDE 10 MG/ML
INJECTION INTRAMUSCULAR; INTRAVENOUS
Status: DISCONTINUED | OUTPATIENT
Start: 2025-04-20 | End: 2025-04-20

## 2025-04-20 RX ORDER — MANNITOL 250 MG/ML
INJECTION, SOLUTION INTRAVENOUS
Status: DISCONTINUED | OUTPATIENT
Start: 2025-04-20 | End: 2025-04-20

## 2025-04-20 RX ORDER — FLUOXETINE HYDROCHLORIDE 20 MG/1
40 CAPSULE ORAL DAILY
Status: DISCONTINUED | OUTPATIENT
Start: 2025-04-20 | End: 2025-04-23 | Stop reason: HOSPADM

## 2025-04-20 RX ORDER — DOCUSATE SODIUM 100 MG/1
100 CAPSULE, LIQUID FILLED ORAL 3 TIMES DAILY
Status: DISCONTINUED | OUTPATIENT
Start: 2025-04-20 | End: 2025-04-23 | Stop reason: HOSPADM

## 2025-04-20 RX ORDER — IBUPROFEN 200 MG
16 TABLET ORAL
Status: DISCONTINUED | OUTPATIENT
Start: 2025-04-20 | End: 2025-04-23 | Stop reason: HOSPADM

## 2025-04-20 RX ORDER — VALGANCICLOVIR 450 MG/1
450 TABLET, FILM COATED ORAL EVERY MORNING
Status: DISCONTINUED | OUTPATIENT
Start: 2025-04-30 | End: 2025-04-23 | Stop reason: HOSPADM

## 2025-04-20 RX ORDER — OXYCODONE HYDROCHLORIDE 5 MG/1
5 TABLET ORAL EVERY 6 HOURS PRN
Status: DISCONTINUED | OUTPATIENT
Start: 2025-04-20 | End: 2025-04-20

## 2025-04-20 RX ORDER — HALOPERIDOL LACTATE 5 MG/ML
0.5 INJECTION, SOLUTION INTRAMUSCULAR EVERY 10 MIN PRN
Status: DISCONTINUED | OUTPATIENT
Start: 2025-04-20 | End: 2025-04-22

## 2025-04-20 RX ORDER — DIPHENHYDRAMINE HCL 25 MG
25 CAPSULE ORAL
Status: COMPLETED | OUTPATIENT
Start: 2025-04-21 | End: 2025-04-21

## 2025-04-20 RX ORDER — MYCOPHENOLATE MOFETIL 250 MG/1
1000 CAPSULE ORAL 2 TIMES DAILY
Status: DISCONTINUED | OUTPATIENT
Start: 2025-04-20 | End: 2025-04-23 | Stop reason: HOSPADM

## 2025-04-20 RX ORDER — OXYCODONE HYDROCHLORIDE 5 MG/1
5 TABLET ORAL
Status: DISCONTINUED | OUTPATIENT
Start: 2025-04-20 | End: 2025-04-21

## 2025-04-20 RX ORDER — FAMOTIDINE 20 MG/1
20 TABLET, FILM COATED ORAL NIGHTLY
Status: DISCONTINUED | OUTPATIENT
Start: 2025-04-20 | End: 2025-04-23 | Stop reason: HOSPADM

## 2025-04-20 RX ORDER — GLUCAGON 1 MG
1 KIT INJECTION
Status: DISCONTINUED | OUTPATIENT
Start: 2025-04-20 | End: 2025-04-20

## 2025-04-20 RX ORDER — HYDROMORPHONE HYDROCHLORIDE 1 MG/ML
INJECTION, SOLUTION INTRAMUSCULAR; INTRAVENOUS; SUBCUTANEOUS
Status: DISCONTINUED | OUTPATIENT
Start: 2025-04-20 | End: 2025-04-20

## 2025-04-20 RX ORDER — OXYCODONE HYDROCHLORIDE 10 MG/1
10 TABLET ORAL EVERY 6 HOURS PRN
Refills: 0 | Status: DISCONTINUED | OUTPATIENT
Start: 2025-04-20 | End: 2025-04-23 | Stop reason: HOSPADM

## 2025-04-20 RX ORDER — INSULIN ASPART 100 [IU]/ML
0-10 INJECTION, SOLUTION INTRAVENOUS; SUBCUTANEOUS EVERY 4 HOURS PRN
Status: DISCONTINUED | OUTPATIENT
Start: 2025-04-20 | End: 2025-04-23 | Stop reason: HOSPADM

## 2025-04-20 RX ORDER — ONDANSETRON HYDROCHLORIDE 2 MG/ML
INJECTION, SOLUTION INTRAVENOUS
Status: DISCONTINUED | OUTPATIENT
Start: 2025-04-20 | End: 2025-04-20

## 2025-04-20 RX ORDER — DIPHENHYDRAMINE HCL 25 MG
50 CAPSULE ORAL ONCE AS NEEDED
Status: DISCONTINUED | OUTPATIENT
Start: 2025-04-20 | End: 2025-04-23 | Stop reason: HOSPADM

## 2025-04-20 RX ORDER — CEFAZOLIN 2 G/1
2 INJECTION, POWDER, FOR SOLUTION INTRAMUSCULAR; INTRAVENOUS
Status: COMPLETED | OUTPATIENT
Start: 2025-04-20 | End: 2025-04-20

## 2025-04-20 RX ORDER — CALCIUM GLUCONATE 20 MG/ML
1 INJECTION, SOLUTION INTRAVENOUS EVERY 10 MIN PRN
Status: DISCONTINUED | OUTPATIENT
Start: 2025-04-20 | End: 2025-04-23 | Stop reason: HOSPADM

## 2025-04-20 RX ORDER — MUPIROCIN 20 MG/G
1 OINTMENT TOPICAL 2 TIMES DAILY
Status: DISCONTINUED | OUTPATIENT
Start: 2025-04-20 | End: 2025-04-23 | Stop reason: HOSPADM

## 2025-04-20 RX ORDER — ONDANSETRON HYDROCHLORIDE 2 MG/ML
4 INJECTION, SOLUTION INTRAVENOUS EVERY 6 HOURS PRN
Status: DISCONTINUED | OUTPATIENT
Start: 2025-04-20 | End: 2025-04-23 | Stop reason: HOSPADM

## 2025-04-20 RX ORDER — IBUPROFEN 200 MG
24 TABLET ORAL
Status: DISCONTINUED | OUTPATIENT
Start: 2025-04-20 | End: 2025-04-23 | Stop reason: HOSPADM

## 2025-04-20 RX ORDER — HYDROMORPHONE HYDROCHLORIDE 1 MG/ML
0.2 INJECTION, SOLUTION INTRAMUSCULAR; INTRAVENOUS; SUBCUTANEOUS EVERY 5 MIN PRN
Status: DISCONTINUED | OUTPATIENT
Start: 2025-04-20 | End: 2025-04-20

## 2025-04-20 RX ORDER — PREDNISONE 20 MG/1
20 TABLET ORAL DAILY
Status: DISCONTINUED | OUTPATIENT
Start: 2025-04-22 | End: 2025-04-23 | Stop reason: HOSPADM

## 2025-04-20 RX ORDER — INSULIN ASPART 100 [IU]/ML
0-5 INJECTION, SOLUTION INTRAVENOUS; SUBCUTANEOUS
Status: DISCONTINUED | OUTPATIENT
Start: 2025-04-20 | End: 2025-04-20

## 2025-04-20 RX ORDER — DEXTROSE MONOHYDRATE AND SODIUM CHLORIDE 5; .45 G/100ML; G/100ML
INJECTION, SOLUTION INTRAVENOUS CONTINUOUS
Status: DISCONTINUED | OUTPATIENT
Start: 2025-04-20 | End: 2025-04-20

## 2025-04-20 RX ORDER — METHOCARBAMOL 750 MG/1
750 TABLET, FILM COATED ORAL EVERY 8 HOURS PRN
Status: DISCONTINUED | OUTPATIENT
Start: 2025-04-20 | End: 2025-04-21

## 2025-04-20 RX ORDER — BISACODYL 5 MG
10 TABLET, DELAYED RELEASE (ENTERIC COATED) ORAL NIGHTLY
Status: DISCONTINUED | OUTPATIENT
Start: 2025-04-20 | End: 2025-04-23 | Stop reason: HOSPADM

## 2025-04-20 RX ORDER — FUROSEMIDE 10 MG/ML
120 INJECTION INTRAMUSCULAR; INTRAVENOUS ONCE
Status: COMPLETED | OUTPATIENT
Start: 2025-04-20 | End: 2025-04-20

## 2025-04-20 RX ORDER — TRAMADOL HYDROCHLORIDE 50 MG/1
50 TABLET ORAL EVERY 6 HOURS PRN
Status: DISCONTINUED | OUTPATIENT
Start: 2025-04-20 | End: 2025-04-20

## 2025-04-20 RX ADMIN — ACETAMINOPHEN 650 MG: 325 TABLET ORAL at 02:04

## 2025-04-20 RX ADMIN — INSULIN HUMAN 10 UNITS: 100 INJECTION, SOLUTION PARENTERAL at 10:04

## 2025-04-20 RX ADMIN — HYDROMORPHONE HYDROCHLORIDE 1 MG: 1 INJECTION, SOLUTION INTRAMUSCULAR; INTRAVENOUS; SUBCUTANEOUS at 03:04

## 2025-04-20 RX ADMIN — TACROLIMUS 3 MG: 1 CAPSULE ORAL at 05:04

## 2025-04-20 RX ADMIN — FUROSEMIDE 100 MG: 10 INJECTION, SOLUTION INTRAMUSCULAR; INTRAVENOUS at 12:04

## 2025-04-20 RX ADMIN — CARVEDILOL 3.12 MG: 3.12 TABLET, FILM COATED ORAL at 08:04

## 2025-04-20 RX ADMIN — SODIUM CHLORIDE 50 ML/HR: 9 INJECTION, SOLUTION INTRAVENOUS at 04:04

## 2025-04-20 RX ADMIN — BISACODYL 10 MG: 5 TABLET, COATED ORAL at 08:04

## 2025-04-20 RX ADMIN — CEFAZOLIN 2 G: 2 INJECTION, POWDER, FOR SOLUTION INTRAMUSCULAR; INTRAVENOUS at 05:04

## 2025-04-20 RX ADMIN — SUGAMMADEX 400 MG: 100 INJECTION, SOLUTION INTRAVENOUS at 02:04

## 2025-04-20 RX ADMIN — MYCOPHENOLATE MOFETIL 1000 MG: 250 CAPSULE ORAL at 10:04

## 2025-04-20 RX ADMIN — MYCOPHENOLATE MOFETIL 1000 MG: 250 CAPSULE ORAL at 08:04

## 2025-04-20 RX ADMIN — HEPARIN SODIUM 5000 UNITS: 5000 INJECTION INTRAVENOUS; SUBCUTANEOUS at 02:04

## 2025-04-20 RX ADMIN — HEPARIN SODIUM 2000 UNITS: 1000 INJECTION, SOLUTION INTRAVENOUS; SUBCUTANEOUS at 12:04

## 2025-04-20 RX ADMIN — HYDRALAZINE HYDROCHLORIDE 10 MG: 20 INJECTION, SOLUTION INTRAMUSCULAR; INTRAVENOUS at 03:04

## 2025-04-20 RX ADMIN — MUPIROCIN 1 G: 20 OINTMENT TOPICAL at 08:04

## 2025-04-20 RX ADMIN — HEPARIN SODIUM 5000 UNITS: 5000 INJECTION INTRAVENOUS; SUBCUTANEOUS at 09:04

## 2025-04-20 RX ADMIN — TRAMADOL HYDROCHLORIDE 50 MG: 50 TABLET, COATED ORAL at 03:04

## 2025-04-20 RX ADMIN — MANNITOL 25 G: 12.5 INJECTION, SOLUTION INTRAVENOUS at 12:04

## 2025-04-20 RX ADMIN — BUSPIRONE HYDROCHLORIDE 5 MG: 5 TABLET ORAL at 08:04

## 2025-04-20 RX ADMIN — TRAMADOL HYDROCHLORIDE 50 MG: 50 TABLET, COATED ORAL at 05:04

## 2025-04-20 RX ADMIN — OXYCODONE HYDROCHLORIDE 10 MG: 10 TABLET ORAL at 11:04

## 2025-04-20 RX ADMIN — PREGABALIN 75 MG: 75 CAPSULE ORAL at 08:04

## 2025-04-20 RX ADMIN — THERA TABS 1 TABLET: TAB at 08:04

## 2025-04-20 RX ADMIN — HYDROMORPHONE HYDROCHLORIDE 0.2 MG: 1 INJECTION, SOLUTION INTRAMUSCULAR; INTRAVENOUS; SUBCUTANEOUS at 04:04

## 2025-04-20 RX ADMIN — CEFAZOLIN 3 G: 330 INJECTION, POWDER, FOR SOLUTION INTRAMUSCULAR; INTRAVENOUS at 02:04

## 2025-04-20 RX ADMIN — SODIUM CHLORIDE 125 MG: 9 INJECTION, SOLUTION INTRAVENOUS at 12:04

## 2025-04-20 RX ADMIN — TACROLIMUS 3 MG: 1 CAPSULE ORAL at 08:04

## 2025-04-20 RX ADMIN — FUROSEMIDE 120 MG: 10 INJECTION, SOLUTION INTRAVENOUS at 08:04

## 2025-04-20 RX ADMIN — ONDANSETRON 4 MG: 2 INJECTION INTRAMUSCULAR; INTRAVENOUS at 11:04

## 2025-04-20 RX ADMIN — FLUOXETINE HYDROCHLORIDE 40 MG: 20 CAPSULE ORAL at 03:04

## 2025-04-20 RX ADMIN — DOCUSATE SODIUM 100 MG: 100 CAPSULE, LIQUID FILLED ORAL at 08:04

## 2025-04-20 RX ADMIN — CALCIUM GLUCONATE 1 G: 20 INJECTION, SOLUTION INTRAVENOUS at 08:04

## 2025-04-20 RX ADMIN — LIDOCAINE 1 PATCH: 50 PATCH CUTANEOUS at 11:04

## 2025-04-20 RX ADMIN — OXYCODONE 5 MG: 5 TABLET ORAL at 04:04

## 2025-04-20 RX ADMIN — HEPARIN SODIUM 5000 UNITS: 5000 INJECTION INTRAVENOUS; SUBCUTANEOUS at 05:04

## 2025-04-20 RX ADMIN — OXYCODONE 5 MG: 5 TABLET ORAL at 08:04

## 2025-04-20 RX ADMIN — FAMOTIDINE 20 MG: 20 TABLET, FILM COATED ORAL at 08:04

## 2025-04-20 RX ADMIN — ACETAMINOPHEN 650 MG: 325 TABLET ORAL at 09:04

## 2025-04-20 RX ADMIN — CALCIUM GLUCONATE 1 G: 20 INJECTION, SOLUTION INTRAVENOUS at 09:04

## 2025-04-20 RX ADMIN — DEXTROSE AND SODIUM CHLORIDE: 5; 450 INJECTION, SOLUTION INTRAVENOUS at 03:04

## 2025-04-20 RX ADMIN — METHOCARBAMOL 750 MG: 750 TABLET ORAL at 06:04

## 2025-04-20 RX ADMIN — OXYCODONE HYDROCHLORIDE 10 MG: 10 TABLET ORAL at 06:04

## 2025-04-20 RX ADMIN — DOCUSATE SODIUM 100 MG: 100 CAPSULE, LIQUID FILLED ORAL at 03:04

## 2025-04-20 RX ADMIN — CEFAZOLIN 2 G: 2 INJECTION, POWDER, FOR SOLUTION INTRAMUSCULAR; INTRAVENOUS at 08:04

## 2025-04-20 RX ADMIN — TRAMADOL HYDROCHLORIDE 50 MG: 50 TABLET, COATED ORAL at 11:04

## 2025-04-20 RX ADMIN — ONDANSETRON 4 MG: 2 INJECTION INTRAMUSCULAR; INTRAVENOUS at 02:04

## 2025-04-20 RX ADMIN — ACETAMINOPHEN 650 MG: 325 TABLET ORAL at 05:04

## 2025-04-20 RX ADMIN — METHOCARBAMOL 750 MG: 750 TABLET ORAL at 09:04

## 2025-04-20 RX ADMIN — DEXTROSE MONOHYDRATE 50 G: 25 INJECTION, SOLUTION INTRAVENOUS at 09:04

## 2025-04-20 NOTE — SUBJECTIVE & OBJECTIVE
"  Subjective:     Chief Complaint/Reason for Admission: Kidney Transplant Candidate    History of Present Illness:  Ms. Reyer is a 49 y.o. female with ESRD secondary to diabetic nephropathy. She has been on the wait list for a kidney transplant at [facility] since 2022. Patient is currently on hemodialysis started on 2022. Patient is dialyzing on home HD 4 days/week, last HD on . Patient reports that she is tolerating dialysis well. She has a LUE AV fistula. Patient denies any recent hospitalizations or ED visits. Dr. Cameron will be the primary surgeon. Pre op labs and imaging will be reviewed prior to surgery. Patient is NPO    Dialysis History: Ms. Araya with ESRD, requiring chronic dialysis who is on hemodialysis started on 2022. Patient is dialyzing on 4 days weekly  Patient reports that she is tolerating dialysis well.  Date of Last Dialysis: 25    Native urine output per day: "normal amounts " mL    Previous Transplant: no    PTA Medications   Medication Sig    ALPRAZolam (XANAX) 0.5 MG tablet Take 0.5 mg by mouth 3 (three) times daily.    atenoloL (TENORMIN) 25 MG Tab Take 1 tablet (25 mg total) by mouth once daily.    biotin 10,000 mcg Cap Take 1 capsule by mouth once daily.    busPIRone (BUSPAR) 5 MG Tab Take 5 mg by mouth 2 (two) times daily.    calcitRIOL (ROCALTROL) 0.25 MCG Cap Take 0.25 mcg by mouth 3 (three) times a week. MWF    cloNIDine (CATAPRES) 0.2 MG tablet Take 0.2 mg by mouth Daily.    COLACE 100 mg capsule SMARTSI Capsule(s) By Mouth Every Other Day PRN    ferric citrate (AURYXIA) 210 mg iron Tab Take 630 mg by mouth 3 (three) times daily.    gabapentin (NEURONTIN) 100 MG capsule Take 100 mg by mouth 3 (three) times daily.    lactobacillus acidophilus & bulgar (LACTINEX) 100 million cell packet Take 1 tablet by mouth once daily.    Lactobacillus acidophilus (ACIDOPHILUS ORAL) Take by mouth.    lanthanum (FOSRENOL) 750 mg chewable tablet Take 1,500 mg by mouth 3 (three) " times daily with meals.    montelukast (SINGULAIR) 10 mg tablet Take 10 mg by mouth nightly.    pravastatin (PRAVACHOL) 40 MG tablet Take 1 tablet (40 mg total) by mouth once daily.    VIRGIL-GIO RX 1- mg-mg-mcg Tab Take 1 tablet by mouth every morning.    sodium zirconium cyclosilicate (LOKELMA) 10 gram packet Take 10 g by mouth. Mix entire contents of packet(s) into drinking glass containing 3 tablespoons of water; stir well and drink immediately. Add water and repeat until no powder remains to receive entire dose.    tirzepatide (MOUNJARO) 2.5 mg/0.5 mL PnIj Inject 2.5 mg into the skin every 7 days.    varicella-zoster [id number], PF, (SHINGRIX, PF,) 50 mcg/0.5 mL injection Inject into the muscle.       Review of patient's allergies indicates:   Allergen Reactions    Amlodipine Itching and Swelling       Past Medical History:   Diagnosis Date    Allergy     Anemia     Anxiety     Depression     Diabetes mellitus, type 2     Dialysis patient     Disorder of kidney and ureter     Hyperlipidemia     Hypertension     Obesity      Past Surgical History:   Procedure Laterality Date    AV FISTULA PLACEMENT  2022     SECTION      COLONOSCOPY N/A 2023    Procedure: COLONOSCOPY;  Surgeon: Hermilo Orozco III, MD;  Location: [facility];  Service: Endoscopy;  Laterality: N/A;    COLONOSCOPY N/A 2024    Procedure: COLONOSCOPY;  Surgeon: Hermilo Orozco III, MD;  Location: [facility] ENDO;  Service: Endoscopy;  Laterality: N/A;    HYSTERECTOMY      INSERTION OF DIALYSIS CATHETER      TUBAL LIGATION       Family History       Problem Relation (Age of Onset)    Heart disease Father    No Known Problems Daughter          Tobacco Use    Smoking status: Former     Current packs/day: 0.00     Types: Cigarettes     Quit date:      Years since quitting: 3.3    Smokeless tobacco: Never   Substance and Sexual Activity    Alcohol use: Not Currently    Drug use: Never    Sexual activity: Yes     Partners:  "Male     Birth control/protection: See Surgical Hx        Review of Systems   Constitutional:  Negative for activity change and appetite change.   HENT: Negative.     Eyes:  Negative for visual disturbance.   Respiratory:  Negative for shortness of breath.    Cardiovascular:  Negative for chest pain, palpitations and leg swelling.   Gastrointestinal:  Positive for abdominal distention and abdominal pain. Negative for diarrhea, nausea and vomiting.   Genitourinary:  Negative for difficulty urinating.        [place] with pink tinged urine    Musculoskeletal:  Negative for arthralgias, back pain and joint swelling.   Skin:  Positive for wound.        Right lower quad inc with dressing ion place    Allergic/Immunologic: Positive for immunocompromised state.   Neurological:  Negative for dizziness and facial asymmetry.   Hematological:  Negative for adenopathy. Does not bruise/bleed easily.   Psychiatric/Behavioral:  Negative for agitation and behavioral problems.    All other systems reviewed and are negative.    Objective:     Vital Signs (Most Recent):  Temp: 97.8 °F (36.6 °C) (04/20/25 0745)  Pulse: 98 (04/20/25 1400)  Resp: 18 (04/20/25 1143)  BP: (!) 140/63 (04/20/25 1400)  SpO2: 97 % (04/20/25 0847)  Height: 5' 4" (162.6 cm)  Weight: 101.8 kg (224 lb 6.9 oz)  Body mass index is 38.52 kg/m².      Physical Exam  Vitals and nursing note reviewed.   Constitutional:       Appearance: She is well-developed.   HENT:      Head: Normocephalic.   Eyes:      Conjunctiva/sclera: Conjunctivae normal.   Cardiovascular:      Rate and Rhythm: Normal rate and regular rhythm.      Heart sounds: No murmur heard.  Pulmonary:      Effort: Pulmonary effort is normal.      Breath sounds: Normal breath sounds.   Abdominal:      General: Bowel sounds are normal. There is no distension.      Palpations: Abdomen is soft.      Tenderness: There is no abdominal tenderness.   Musculoskeletal:         General: Normal range of motion.      " Cervical back: Normal range of motion.   Skin:     General: Skin is warm and dry.      Capillary Refill: Capillary refill takes less than 2 seconds.   Neurological:      Mental Status: She is alert and oriented to person, place, and time.   Psychiatric:         Behavior: Behavior normal.         Thought Content: Thought content normal.         Judgment: Judgment normal.          Laboratory  CBC:   Recent Labs   Lab 04/14/25  0000 04/19/25  1950 04/19/25  2044 04/20/25  0345 04/20/25  0410   WBC  --  7.55  --   --  4.00   RBC  --  2.48*  --   --  2.60*   HGB 8.4* 8.3*  --   --  8.8*   HCT  --  25.7* 27* 26* 27.0*   PLT  --  159  --   --  95*   MCV  --  104*  --   --  104*   MCH  --  33.5*  --   --  33.8*   MCHC  --  32.3  --   --  32.6     BMP:   Recent Labs   Lab 04/19/25  1950 04/20/25  0410 04/20/25  0837    136 135*   K 5.4* 5.3* 6.8*   CL 99 99 99   CO2 26 22* 21*   BUN 44* 48* 46*   CREATININE 8.7* 8.7* 9.0*   CALCIUM 8.9 8.2* 8.1*     CMP:   Recent Labs   Lab 04/19/25  1950 04/20/25  0410 04/20/25  0837   CALCIUM 8.9 8.2* 8.1*   ALBUMIN 3.4* 3.2* 3.0*    136 135*   K 5.4* 5.3* 6.8*   CO2 26 22* 21*   CL 99 99 99   BUN 44* 48* 46*   CREATININE 8.7* 8.7* 9.0*   ALKPHOS 47  --   --    ALT 21  --   --    AST 14  --   --      Labs within the past 24 hours have been reviewed.    Diagnostic Results:  Chest X-Ray: No results found for this or any previous visit.

## 2025-04-20 NOTE — PROGRESS NOTES
Quiana was seen while receiving her dialysis today for severe hyperkalemia and delayed graft function post kidney transplant. Reviewed case with dialysis nurse at bedside.  HD being tolerated so far- see orders and flowsheet for precise details.

## 2025-04-20 NOTE — HPI
Ms. Reyer is a 49 y.o. female with ESRD secondary to diabetic nephropathy. She has been on the wait list for a kidney transplant at [facility] since 5/27/2022. Patient is currently on hemodialysis started on 5/27/2022. Patient is dialyzing on home HD 4 days/week, last HD on 4/17. Patient reports that she is tolerating dialysis well. She has a LUE AV fistula. Patient denies any recent hospitalizations or ED visits. Dr. Cameron will be the primary surgeon. Pre op labs and imaging will be reviewed prior to surgery. Patient is NPO

## 2025-04-20 NOTE — OP NOTE
Operative Report    Date of Procedure: 4/19/2025  Date of Transplant (UNOS): 4/20/25    Surgeons:  Surgeons and Role:     * Josh Cameron Jr., MD - Primary     * Darron Antunez MD - Resident - Assisting    First Assistant Attestation:  The indicated resident served as first assistant for this procedure.    Pre-operative Diagnosis: ESRD, requiring chronic dialysis secondary to Diabetes Mellitus - Type II  Post-operative Diagnosis: Same    Procedure(s) Performed:   Back Table Preparation of Right Kidney     Donation after Brain Death Kidney transplant    Anesthesia: General endotracheal    Preamble  Indications and Patient Counseling: The patient is a 49 y.o. year-old female with end-stage kidney disease secondary to Diabetes Mellitus - Type II who has been evaluated for a kidney transplant.  The procedure was thoroughly discussed with the patient, including potential risks, complications, and alternatives.  Specific complications mentioned included death, graft non-function, bleeding, infection, and rejection, as well as the possibility of other complications not specifically mentioned.    Donor Risk Factors: Prior to the operation, the patient was advised of any donor-specific risk factors requiring specific disclosure.  Factors in this case included nothing that required specific disclosure.      Specific PHS donor risk criteria for the organ donor include:  None    All questions were answered, the patient voiced appropriate understanding, and she agreed to proceed with the planned procedure.    ABO Confirmation: Immediately following arrival of the donor organ and prior to implantation, a formal ABO confirmation was done according to hospital and UNOS policies.  I confirmed the UNOS ID number ([id number]) of the donor organ and the donor and recipient ABO types, directly verifying these data by comparison with the UNOS Match Run report ( ).  This confirmation was personally done by an attending surgeon and  circulating nurse, and is officially documented elsewhere.    Time-Out: A complete time out was carried out prior to incision, with confirmation of patient identity, correct procedure, correct operative site, appropriate antibiotic prophylaxis, review of any known allergies, and presence of all needed equipment.    Procedure in Detail  Prior to starting the operation, the right kidney  was prepared on the back table. Arterial anatomy was single. Venous anatomy was single. Ureteral anatomy was single. Back table vascular reconstruction was not required  .  Unneeded fat was removed from the kidney, the vessels were cleaned of adherent tissue and tested for leaks, and the kidney was maintained at ice temperature in organ preservation solution until it was brought to the operative field.     The patient was brought into the operating room and placed in a supine position on the OR table.  After the induction of general endotracheal anesthesia, lines were placed by the anesthesiologist.  The urinary bladder was catheterized and irrigated with antibiotic solution.  There was no tension on the axillae and all pressure points were padded.  Sequential compression boots were used as were Sydney Huggers.  The abdomen was prepped and draped in the usual sterile fashion.  Skin was incised over the right with a knife and deepened with electrocautery.  The fascia was entered on the lateral aspect of the rectus sheath, which was severely widened due to the patient's body habitus. The peritoneum and its contents were swept medially, exposing the right external iliac artery and the right external iliac vein.  The Bookwalter retractor was used to provide exposure.  Overlying lymphatics were ligated or cauterized and the vessels were dissected free for a length compatible with anastomosis. Of note, the artery was severely calcified. The common iliac artery was very densely calcified.  A spot on the very proximal external artery was found  for the proximal clamp and there was a soft spot on the distal artery that required extensive distal dissection to have room to clamp and make an arteriotomy. Even so, the arteriotomy was about 1cm below a calcified plaque.    Before clamping, 2000 U iv heparin were given.  The kidney was brought to the OR table at 4/20/2025 12:30 AM.  Venous control was obtained with a vascular clamp.  A venotomy was made, the vein irrigated, and an lengthened renal to right external iliac vein anastomosis was created with 5-0 polypropylene.  Arterial control was obtained with a vascular clamp.  Arteriotomy was made, the artery irrigated, and an end renal to right external iliac artery anastomosis was created with 6-0 polypropylene.  The kidney was unclamped and reperfused at 4/20/2025  1:02 AM.  Reperfusion quality was good. Intraoperative urine production was not observed.  After hemostasis was obtained, a Lich uretero-neocystostomy was created.  The bladder was filled and identified, opened, and the anastomosis created using 6-0 PDS.  The bladder muscle was closed over the distal ureter to create an antireflux tunnel.  A ureteral stent was used.  With the kidney well perfused and sitting appropriately without tension on the anastomoses, viscera were replaced in their usual position.  The wound was closed after a final check for hemostasis.  Overall, the graft quality was assessed to be good. At the end of the case the needle, sponge and instrument counts were all correct.  Sterile dressings were applied and the patient was brought to the recovery room/ICU in good condition.    Intra-op US was done due to high uop at baseline and the flow was satisfactory.     Estimated Blood Loss: 150 mL  Fluids Administered:     Drains: 19f Kyle drains x 2 lateral one in retroperitoneum, medial one in subcutaneous space.   Specimens: none    Findings:    Organ Transplanted: Right Kidney    Arterial Anatomy: single  Number of Arteries:  1  Configuration of Multiple Arteries: not applicable  Venous Anatomy: single  Number of Veins: 1  Ureteral Anatomy: single  Number of Ureters: 1  Reperfusion Quality: good  Overall Graft Quality: good  Intraoperative Urine Production: no  [place]: not to be removed before 2 days.  Ureteral Stent: Yes    Ischemic Times:   Anastomosis (warm ischemia) time: 32 minutes   Cold ischemia time: 625 minutes  Total ischemia time: 657 minutes    Donor Data:  UNOS ID:   [id number]  UNOS Match Run:      Donor Type:   Donation after Brain Death  Donor CMV Status:   Negative  Donor HBcAB:   Negative  Donor HCV Status:   Negative

## 2025-04-20 NOTE — SUBJECTIVE & OBJECTIVE
"  Subjective:     Chief Complaint/Reason for Admission: Kidney Transplant Candidate    History of Present Illness:  Ms. Reyer is a 49 y.o. female with ESRD secondary to diabetic nephropathy. She has been on the wait list for a kidney transplant at [facility] since 2022. Patient is currently on hemodialysis started on 2022. Patient is dialyzing on home HD 4 days/week, last HD on . Patient reports that she is tolerating dialysis well. She has a LUE AV fistula. Patient denies any recent hospitalizations or ED visits. Dr. Cameron will be the primary surgeon. Pre op labs and imaging will be reviewed prior to surgery. Patient is NPO    Dialysis History: Ms. Araya with ESRD, requiring chronic dialysis who is on hemodialysis started on 2022. Patient is dialyzing on 4 days weekly  Patient reports that she is tolerating dialysis well.  Date of Last Dialysis: 25    Native urine output per day: "normal amounts " mL    Previous Transplant: no    PTA Medications   Medication Sig    ALPRAZolam (XANAX) 0.5 MG tablet Take 0.5 mg by mouth 3 (three) times daily.    atenoloL (TENORMIN) 25 MG Tab Take 1 tablet (25 mg total) by mouth once daily.    biotin 10,000 mcg Cap Take 1 capsule by mouth once daily.    busPIRone (BUSPAR) 5 MG Tab Take 5 mg by mouth 2 (two) times daily.    calcitRIOL (ROCALTROL) 0.25 MCG Cap Take 0.25 mcg by mouth 3 (three) times a week. MWF    cloNIDine (CATAPRES) 0.2 MG tablet Take 0.2 mg by mouth Daily.    COLACE 100 mg capsule SMARTSI Capsule(s) By Mouth Every Other Day PRN    gabapentin (NEURONTIN) 100 MG capsule Take 100 mg by mouth 3 (three) times daily.    lactobacillus acidophilus & bulgar (LACTINEX) 100 million cell packet Take 1 tablet by mouth once daily.    Lactobacillus acidophilus (ACIDOPHILUS ORAL) Take by mouth.    lanthanum (FOSRENOL) 750 mg chewable tablet Take 1,500 mg by mouth 3 (three) times daily with meals.    montelukast (SINGULAIR) 10 mg tablet Take 10 mg by mouth " nightly.    pravastatin (PRAVACHOL) 40 MG tablet Take 1 tablet (40 mg total) by mouth once daily.    VIRGIL-GIO RX 1- mg-mg-mcg Tab Take 1 tablet by mouth every morning.    sodium zirconium cyclosilicate (LOKELMA) 10 gram packet Take 10 g by mouth. Mix entire contents of packet(s) into drinking glass containing 3 tablespoons of water; stir well and drink immediately. Add water and repeat until no powder remains to receive entire dose.    tirzepatide (MOUNJARO) 2.5 mg/0.5 mL PnIj Inject 2.5 mg into the skin every 7 days.    varicella-zoster gE-AS01B, PF, (SHINGRIX, PF,) 50 mcg/0.5 mL injection Inject into the muscle.       Review of patient's allergies indicates:   Allergen Reactions    Amlodipine Itching and Swelling       Past Medical History:   Diagnosis Date    Allergy     Anemia     Anxiety     Depression     Diabetes mellitus, type 2     Dialysis patient     Disorder of kidney and ureter     Hyperlipidemia     Hypertension     Obesity      Past Surgical History:   Procedure Laterality Date    AV FISTULA PLACEMENT  2022     SECTION      COLONOSCOPY N/A 2023    Procedure: COLONOSCOPY;  Surgeon: Hermilo Orozco III, MD;  Location: [facility];  Service: Endoscopy;  Laterality: N/A;    COLONOSCOPY N/A 2024    Procedure: COLONOSCOPY;  Surgeon: Hermilo Orozco III, MD;  Location: [facility];  Service: Endoscopy;  Laterality: N/A;    HYSTERECTOMY      INSERTION OF DIALYSIS CATHETER      TUBAL LIGATION       Family History       Problem Relation (Age of Onset)    Heart disease Father    No Known Problems Daughter          Tobacco Use    Smoking status: Former     Current packs/day: 0.00     Types: Cigarettes     Quit date:      Years since quitting: 3.2    Smokeless tobacco: Never   Substance and Sexual Activity    Alcohol use: Not Currently    Drug use: Never    Sexual activity: Yes     Partners: Male     Birth control/protection: See Surgical Hx        Review of Systems  "  Constitutional:  Negative for activity change and appetite change.   HENT: Negative.     Eyes:  Negative for visual disturbance.   Respiratory:  Negative for shortness of breath.    Cardiovascular:  Negative for chest pain, palpitations and leg swelling.   Gastrointestinal:  Negative for abdominal distention, diarrhea, nausea and vomiting.   Genitourinary:  Negative for difficulty urinating.   Musculoskeletal:  Negative for arthralgias, back pain and joint swelling.   Neurological:  Negative for dizziness and facial asymmetry.   Hematological:  Negative for adenopathy. Does not bruise/bleed easily.   Psychiatric/Behavioral:  Negative for agitation and behavioral problems.    All other systems reviewed and are negative.    Objective:     Vital Signs (Most Recent):  Temp: 98.2 °F (36.8 °C) (04/19/25 1930)  Pulse: 84 (04/19/25 1930)  Resp: 20 (04/19/25 1930)  BP: (!) 161/75 (04/19/25 1930)  SpO2: 100 % (04/19/25 1930)  Height: 5' 4" (162.6 cm)  Weight: 101.8 kg (224 lb 6.9 oz)  Body mass index is 38.52 kg/m².      Physical Exam  Vitals and nursing note reviewed.   Constitutional:       Appearance: She is well-developed.   HENT:      Head: Normocephalic.   Eyes:      Conjunctiva/sclera: Conjunctivae normal.   Cardiovascular:      Rate and Rhythm: Normal rate and regular rhythm.      Heart sounds: No murmur heard.  Pulmonary:      Effort: Pulmonary effort is normal.      Breath sounds: Normal breath sounds.   Abdominal:      General: Bowel sounds are normal. There is no distension.      Palpations: Abdomen is soft.      Tenderness: There is no abdominal tenderness.   Musculoskeletal:         General: Normal range of motion.      Cervical back: Normal range of motion.   Skin:     General: Skin is warm and dry.      Capillary Refill: Capillary refill takes less than 2 seconds.   Neurological:      Mental Status: She is alert and oriented to person, place, and time.   Psychiatric:         Behavior: Behavior normal.        " " Thought Content: Thought content normal.         Judgment: Judgment normal.          Laboratory  CBC:   Recent Labs   Lab 04/14/25  0000   HGB 8.4*     BMP: No results for input(s): "GLU", "NA", "K", "CL", "CO2", "BUN", "CREATININE", "CALCIUM" in the last 168 hours.  CMP: No results for input(s): "GLU", "CALCIUM", "ALBUMIN", "PROT", "NA", "K", "CO2", "CL", "BUN", "CREATININE", "ALKPHOS", "ALT", "AST" in the last 168 hours.    Invalid input(s): "BILITO"  Labs within the past 24 hours have been reviewed.    Diagnostic Results:  Chest X-Ray: No results found for this or any previous visit.    "

## 2025-04-20 NOTE — ANESTHESIA PROCEDURE NOTES
Intubation    Date/Time: 4/19/2025 10:23 PM    Performed by: Kendrick Smith CRNA  Authorized by: Valorie Morrison MD    Intubation:     Induction:  Intravenous    Intubated:  Postinduction    Mask Ventilation:  Not attempted    Method of Intubation:  Direct and video laryngoscopy    Blade:  Otero 3    Laryngeal View Grade: Grade I - full view of cords      Difficult Airway Encountered?: No      Complications:  None    Airway Device:  Oral endotracheal tube    Airway Device Size:  7.5    Style/Cuff Inflation:  Cuffed    Inflation Amount (mL):  6    Tube secured:  22    Secured at:  The lips    Placement Verified By:  Capnometry    Complicating Factors:  None    Findings Post-Intubation:  BS equal bilateral and atraumatic/condition of teeth unchanged

## 2025-04-20 NOTE — HOSPITAL COURSE
Patient is now S/P KTX DBD on 4/20/25 (CIT 10hrs 25 mins)  2 day holly; STAN drains x 2 (lateral one in retroperitoneum, medial one in subcutaneous space). Post-op with hyperkalemia requiring HD on POD0. Also had HD on POD 1 due to hyperkalemia. Not clearing, making minimal urine (~200cc).      Interval History: NAEON. Pateint OOB to chair this morning. H&H down but stable this morning, no s/s of overt bleeding present on exam. Plan to check hapto this afternoon. She reports having some nausea this morning. She reports passing flatus. Bowel sounds hypoactive. Tolerated breakfast this morning. Cr not clearing, remains with < 200ml of urine output. Holly catheter removed this morning. K 5.1 today. Plan for 200mg IV lasix x1 today. Will recheck K this afternoon (1500).  Kidney US 4/22 satisfactory, no fluid collection. Both drains with minial serous sang output. Plan to remove medial drain today. (+) flatus, (-) BM. Tentative plan for discharge tomorrow.  VSS. Cont to monitor

## 2025-04-20 NOTE — PROGRESS NOTES
"[facility] - Transplant [facility]  Kidney Transplant  Progress Note      Reason for Follow-up: Reassessment of Kidney Transplant - 4/20/2025  (#1) recipient and management of immunosuppression.    ORGAN:   RIGHT KIDNEY    Donor Type:   Donation after Brain Death    Donor CMV Status: Negative  Donor HBcAB:Negative  Donor HCV Status:Negative  Donor HBV MARKO:   Donor HCV MARKO: Negative      Subjective:     Chief Complaint/Reason for Admission: Kidney Transplant Candidate    History of Present Illness:  Ms. Reyer is a 49 y.o. female with ESRD secondary to diabetic nephropathy. She has been on the wait list for a kidney transplant at [facility] since 5/27/2022. Patient is currently on hemodialysis started on 5/27/2022. Patient is dialyzing on home HD 4 days/week, last HD on 4/17. Patient reports that she is tolerating dialysis well. She has a LUE AV fistula. Patient denies any recent hospitalizations or ED visits. Dr. Cameron will be the primary surgeon. Pre op labs and imaging will be reviewed prior to surgery. Patient is NPO    Dialysis History: Ms. Araya with ESRD, requiring chronic dialysis who is on hemodialysis started on 5/27/2022. Patient is dialyzing on 4 days weekly  Patient reports that she is tolerating dialysis well.  Date of Last Dialysis: 4/18/25    Native urine output per day: "normal amounts " mL    Previous Transplant: no    Hospital interval: Now S/P KTX DBD,  on 4/20/25 CIT 10hrs 25 mins 19f Kyle drains x 2 lateral one in retroperitoneum, medial one in subcutaneous space. Making urine post-op, but made urine prior to transplant repeat K+ 6.8, getting HD now , adjusted pain medications added back home meds - d/c'd  I=O, repeat labs this afternoon   PTA Medications   Medication Sig    ALPRAZolam (XANAX) 0.5 MG tablet Take 0.5 mg by mouth 3 (three) times daily.    atenoloL (TENORMIN) 25 MG Tab Take 1 tablet (25 mg total) by mouth once daily.    biotin 10,000 mcg Cap Take 1 capsule by mouth once daily.    " busPIRone (BUSPAR) 5 MG Tab Take 5 mg by mouth 2 (two) times daily.    calcitRIOL (ROCALTROL) 0.25 MCG Cap Take 0.25 mcg by mouth 3 (three) times a week. MWF    cloNIDine (CATAPRES) 0.2 MG tablet Take 0.2 mg by mouth Daily.    COLACE 100 mg capsule SMARTSI Capsule(s) By Mouth Every Other Day PRN    ferric citrate (AURYXIA) 210 mg iron Tab Take 630 mg by mouth 3 (three) times daily.    gabapentin (NEURONTIN) 100 MG capsule Take 100 mg by mouth 3 (three) times daily.    lactobacillus acidophilus & bulgar (LACTINEX) 100 million cell packet Take 1 tablet by mouth once daily.    Lactobacillus acidophilus (ACIDOPHILUS ORAL) Take by mouth.    lanthanum (FOSRENOL) 750 mg chewable tablet Take 1,500 mg by mouth 3 (three) times daily with meals.    montelukast (SINGULAIR) 10 mg tablet Take 10 mg by mouth nightly.    pravastatin (PRAVACHOL) 40 MG tablet Take 1 tablet (40 mg total) by mouth once daily.    VIRGIL-GIO RX 1- mg-mg-mcg Tab Take 1 tablet by mouth every morning.    sodium zirconium cyclosilicate (LOKELMA) 10 gram packet Take 10 g by mouth. Mix entire contents of packet(s) into drinking glass containing 3 tablespoons of water; stir well and drink immediately. Add water and repeat until no powder remains to receive entire dose.    tirzepatide (MOUNJARO) 2.5 mg/0.5 mL PnIj Inject 2.5 mg into the skin every 7 days.    varicella-zoster gE-AS01B, PF, (SHINGRIX, PF,) 50 mcg/0.5 mL injection Inject into the muscle.       Review of patient's allergies indicates:   Allergen Reactions    Amlodipine Itching and Swelling       Past Medical History:   Diagnosis Date    Allergy     Anemia     Anxiety     Depression     Diabetes mellitus, type 2     Dialysis patient     Disorder of kidney and ureter     Hyperlipidemia     Hypertension     Obesity      Past Surgical History:   Procedure Laterality Date    AV FISTULA PLACEMENT  2022     SECTION      COLONOSCOPY N/A 2023    Procedure: COLONOSCOPY;  Surgeon:  Hermilo Orozco III, MD;  Location: [facility];  Service: Endoscopy;  Laterality: N/A;    COLONOSCOPY N/A 1/30/2024    Procedure: COLONOSCOPY;  Surgeon: Hermilo Orozco III, MD;  Location: [facility];  Service: Endoscopy;  Laterality: N/A;    HYSTERECTOMY      INSERTION OF DIALYSIS CATHETER      TUBAL LIGATION       Family History       Problem Relation (Age of Onset)    Heart disease Father    No Known Problems Daughter          Tobacco Use    Smoking status: Former     Current packs/day: 0.00     Types: Cigarettes     Quit date: 2022     Years since quitting: 3.3    Smokeless tobacco: Never   Substance and Sexual Activity    Alcohol use: Not Currently    Drug use: Never    Sexual activity: Yes     Partners: Male     Birth control/protection: See Surgical Hx        Review of Systems   Constitutional:  Negative for activity change and appetite change.   HENT: Negative.     Eyes:  Negative for visual disturbance.   Respiratory:  Negative for shortness of breath.    Cardiovascular:  Negative for chest pain, palpitations and leg swelling.   Gastrointestinal:  Positive for abdominal distention and abdominal pain. Negative for diarrhea, nausea and vomiting.   Genitourinary:  Negative for difficulty urinating.        [place] with pink tinged urine    Musculoskeletal:  Negative for arthralgias, back pain and joint swelling.   Skin:  Positive for wound.        Right lower quad inc with dressing ion place    Allergic/Immunologic: Positive for immunocompromised state.   Neurological:  Negative for dizziness and facial asymmetry.   Hematological:  Negative for adenopathy. Does not bruise/bleed easily.   Psychiatric/Behavioral:  Negative for agitation and behavioral problems.    All other systems reviewed and are negative.    Objective:     Vital Signs (Most Recent):  Temp: 97.8 °F (36.6 °C) (04/20/25 0745)  Pulse: 98 (04/20/25 1400)  Resp: 18 (04/20/25 1143)  BP: (!) 140/63 (04/20/25 1400)  SpO2: 97 % (04/20/25  "0847)  Height: 5' 4" (162.6 cm)  Weight: 101.8 kg (224 lb 6.9 oz)  Body mass index is 38.52 kg/m².      Physical Exam  Vitals and nursing note reviewed.   Constitutional:       Appearance: She is well-developed.   HENT:      Head: Normocephalic.   Eyes:      Conjunctiva/sclera: Conjunctivae normal.   Cardiovascular:      Rate and Rhythm: Normal rate and regular rhythm.      Heart sounds: No murmur heard.  Pulmonary:      Effort: Pulmonary effort is normal.      Breath sounds: Normal breath sounds.   Abdominal:      General: Bowel sounds are normal. There is no distension.      Palpations: Abdomen is soft.      Tenderness: There is no abdominal tenderness.   Musculoskeletal:         General: Normal range of motion.      Cervical back: Normal range of motion.   Skin:     General: Skin is warm and dry.      Capillary Refill: Capillary refill takes less than 2 seconds.   Neurological:      Mental Status: She is alert and oriented to person, place, and time.   Psychiatric:         Behavior: Behavior normal.         Thought Content: Thought content normal.         Judgment: Judgment normal.          Laboratory  CBC:   Recent Labs   Lab 04/14/25  0000 04/19/25  1950 04/19/25 2044 04/20/25  0345 04/20/25  0410   WBC  --  7.55  --   --  4.00   RBC  --  2.48*  --   --  2.60*   HGB 8.4* 8.3*  --   --  8.8*   HCT  --  25.7* 27* 26* 27.0*   PLT  --  159  --   --  95*   MCV  --  104*  --   --  104*   MCH  --  33.5*  --   --  33.8*   MCHC  --  32.3  --   --  32.6     BMP:   Recent Labs   Lab 04/19/25  1950 04/20/25 0410 04/20/25  0837    136 135*   K 5.4* 5.3* 6.8*   CL 99 99 99   CO2 26 22* 21*   BUN 44* 48* 46*   CREATININE 8.7* 8.7* 9.0*   CALCIUM 8.9 8.2* 8.1*     CMP:   Recent Labs   Lab 04/19/25  1950 04/20/25  0410 04/20/25  0837   CALCIUM 8.9 8.2* 8.1*   ALBUMIN 3.4* 3.2* 3.0*    136 135*   K 5.4* 5.3* 6.8*   CO2 26 22* 21*   CL 99 99 99   BUN 44* 48* 46*   CREATININE 8.7* 8.7* 9.0*   ALKPHOS 47  --   --  "   ALT 21  --   --    AST 14  --   --      Labs within the past 24 hours have been reviewed.    Diagnostic Results:  Chest X-Ray: No results found for this or any previous visit.    Assessment/Plan:     Type 2 diabetes mellitus with diabetic nephropathy  -endocrine consulted       Obesity, morbid        Essential (primary) hypertension  Resume home meds          Discharge Planning:  Not Candidate for discharge at this time   Medical decision making for this encounter includes review of pertinent labs and diagnostic studies, assessment and planning, discussions with consulting providers, discussion with patient/family, and participation in multidisciplinary rounds. Time spent caring for patient: 30 minutes    NERY Molina  Kidney Transplant  [facility] - [facility]

## 2025-04-20 NOTE — H&P
"[facility]  [facility]  H&P      Subjective:     Chief Complaint/Reason for Admission: Kidney Transplant Candidate    History of Present Illness:  Ms. Reyer is a 49 y.o. female with ESRD secondary to diabetic nephropathy. She has been on the wait list for a kidney transplant at [facility] since 2022. Patient is currently on hemodialysis started on 2022. Patient is dialyzing on home HD 4 days/week, last HD on . Patient reports that she is tolerating dialysis well. She has a LUE AV fistula. Patient denies any recent hospitalizations or ED visits. Dr. Cameron will be the primary surgeon. Pre op labs and imaging will be reviewed prior to surgery. Patient is NPO    Dialysis History: Ms. Araya with ESRD, requiring chronic dialysis who is on hemodialysis started on 2022. Patient is dialyzing on 4 days weekly  Patient reports that she is tolerating dialysis well.  Date of Last Dialysis: 25    Native urine output per day: "normal amounts " mL    Previous Transplant: no    PTA Medications   Medication Sig    ALPRAZolam (XANAX) 0.5 MG tablet Take 0.5 mg by mouth 3 (three) times daily.    atenoloL (TENORMIN) 25 MG Tab Take 1 tablet (25 mg total) by mouth once daily.    biotin 10,000 mcg Cap Take 1 capsule by mouth once daily.    busPIRone (BUSPAR) 5 MG Tab Take 5 mg by mouth 2 (two) times daily.    calcitRIOL (ROCALTROL) 0.25 MCG Cap Take 0.25 mcg by mouth 3 (three) times a week. MWF    cloNIDine (CATAPRES) 0.2 MG tablet Take 0.2 mg by mouth Daily.    COLACE 100 mg capsule SMARTSI Capsule(s) By Mouth Every Other Day PRN    gabapentin (NEURONTIN) 100 MG capsule Take 100 mg by mouth 3 (three) times daily.    lactobacillus acidophilus & bulgar (LACTINEX) 100 million cell packet Take 1 tablet by mouth once daily.    Lactobacillus acidophilus (ACIDOPHILUS ORAL) Take by mouth.    lanthanum (FOSRENOL) 750 mg chewable tablet Take 1,500 mg by mouth 3 (three) times daily with meals.    " montelukast (SINGULAIR) 10 mg tablet Take 10 mg by mouth nightly.    pravastatin (PRAVACHOL) 40 MG tablet Take 1 tablet (40 mg total) by mouth once daily.    VIRGIL-GIO RX 1- mg-mg-mcg Tab Take 1 tablet by mouth every morning.    sodium zirconium cyclosilicate (LOKELMA) 10 gram packet Take 10 g by mouth. Mix entire contents of packet(s) into drinking glass containing 3 tablespoons of water; stir well and drink immediately. Add water and repeat until no powder remains to receive entire dose.    tirzepatide (MOUNJARO) 2.5 mg/0.5 mL PnIj Inject 2.5 mg into the skin every 7 days.    varicella-zoster gE-AS01B, PF, (SHINGRIX, PF,) 50 mcg/0.5 mL injection Inject into the muscle.       Review of patient's allergies indicates:   Allergen Reactions    Amlodipine Itching and Swelling       Past Medical History:   Diagnosis Date    Allergy     Anemia     Anxiety     Depression     Diabetes mellitus, type 2     Dialysis patient     Disorder of kidney and ureter     Hyperlipidemia     Hypertension     Obesity      Past Surgical History:   Procedure Laterality Date    AV FISTULA PLACEMENT  2022     SECTION      COLONOSCOPY N/A 2023    Procedure: COLONOSCOPY;  Surgeon: Hermilo Orozco III, MD;  Location: [facility] ENDO;  Service: Endoscopy;  Laterality: N/A;    COLONOSCOPY N/A 2024    Procedure: COLONOSCOPY;  Surgeon: Hermilo Orozco III, MD;  Location: [facility] ENDO;  Service: Endoscopy;  Laterality: N/A;    HYSTERECTOMY      INSERTION OF DIALYSIS CATHETER      TUBAL LIGATION       Family History       Problem Relation (Age of Onset)    Heart disease Father    No Known Problems Daughter          Tobacco Use    Smoking status: Former     Current packs/day: 0.00     Types: Cigarettes     Quit date:      Years since quitting: 3.2    Smokeless tobacco: Never   Substance and Sexual Activity    Alcohol use: Not Currently    Drug use: Never    Sexual activity: Yes     Partners: Male     Birth  "control/protection: See Surgical Hx        Review of Systems   Constitutional:  Negative for activity change and appetite change.   HENT: Negative.     Eyes:  Negative for visual disturbance.   Respiratory:  Negative for shortness of breath.    Cardiovascular:  Negative for chest pain, palpitations and leg swelling.   Gastrointestinal:  Negative for abdominal distention, diarrhea, nausea and vomiting.   Genitourinary:  Negative for difficulty urinating.   Musculoskeletal:  Negative for arthralgias, back pain and joint swelling.   Neurological:  Negative for dizziness and facial asymmetry.   Hematological:  Negative for adenopathy. Does not bruise/bleed easily.   Psychiatric/Behavioral:  Negative for agitation and behavioral problems.    All other systems reviewed and are negative.    Objective:     Vital Signs (Most Recent):  Temp: 98.2 °F (36.8 °C) (04/19/25 1930)  Pulse: 84 (04/19/25 1930)  Resp: 20 (04/19/25 1930)  BP: (!) 161/75 (04/19/25 1930)  SpO2: 100 % (04/19/25 1930)  Height: 5' 4" (162.6 cm)  Weight: 101.8 kg (224 lb 6.9 oz)  Body mass index is 38.52 kg/m².      Physical Exam  Vitals and nursing note reviewed.   Constitutional:       Appearance: She is well-developed.   HENT:      Head: Normocephalic.   Eyes:      Conjunctiva/sclera: Conjunctivae normal.   Cardiovascular:      Rate and Rhythm: Normal rate and regular rhythm.      Heart sounds: No murmur heard.  Pulmonary:      Effort: Pulmonary effort is normal.      Breath sounds: Normal breath sounds.   Abdominal:      General: Bowel sounds are normal. There is no distension.      Palpations: Abdomen is soft.      Tenderness: There is no abdominal tenderness.   Musculoskeletal:         General: Normal range of motion.      Cervical back: Normal range of motion.   Skin:     General: Skin is warm and dry.      Capillary Refill: Capillary refill takes less than 2 seconds.   Neurological:      Mental Status: She is alert and oriented to person, place, and " "time.   Psychiatric:         Behavior: Behavior normal.         Thought Content: Thought content normal.         Judgment: Judgment normal.          Laboratory  CBC:   Recent Labs   Lab 04/14/25  0000   HGB 8.4*     BMP: No results for input(s): "GLU", "NA", "K", "CL", "CO2", "BUN", "CREATININE", "CALCIUM" in the last 168 hours.  CMP: No results for input(s): "GLU", "CALCIUM", "ALBUMIN", "PROT", "NA", "K", "CO2", "CL", "BUN", "CREATININE", "ALKPHOS", "ALT", "AST" in the last 168 hours.    Invalid input(s): "BILITO"  Labs within the past 24 hours have been reviewed.    Diagnostic Results:  Chest X-Ray: No results found for this or any previous visit.    Assessment/Plan:     No new Assessment & Plan notes have been filed under this hospital service since the last note was generated.  Service: Transplant Kidney      The patient presents for kidney transplant.  There are no apparent contraindications to proceeding with the planned transplant.  The patient understands that the transplant could potentially be cancelled pending detailed assessment of the donor organ.  She will receive Thymoglobulin induction.  A complete discussion of the transplant procedure, including risks, complications, and alternatives, as well as any donor-specific risk factors requiring specific disclosure, will be carried out by the responsible staff surgeon prior to the procedure.     Discharge Planning:  Not candidate for discharge at this time     Medical decision making for this encounter includes review of pertinent labs and diagnostic studies, assessment and planning, discussions with consulting providers, discussion with patient/family, and participation in multidisciplinary rounds. Time spent caring for patient: 60 minutes    NERY Molina  Kidney Transplant  [facility] - Transplant Stepdown    "

## 2025-04-20 NOTE — ANESTHESIA PREPROCEDURE EVALUATION
"[facility]  Anesthesia Pre-Operative Evaluation         Patient Name: Brande Reyer  YOB: 1975  MRN: [id number]    SUBJECTIVE:     Pre-operative evaluation for Procedure(s) (LRB):  TRANSPLANT, KIDNEY (N/A)     *PAPER CONSENT SIGNED AND PLACED IN CHART*    2025    Brande Reyer is a 49 y.o. female w/ a significant PMHx of HTN, DM II, obesity (BMI 38.5) on mounjaro (last dose ), and ESRD on HD.    Last oral intake ~1800, "a couple pieces of pineapple".     Patient now presents for the above procedure(s).      LDA:        Hemodialysis AV Fistula Left upper arm (Active)   Number of days:        Prev airway: None documented.    Drips: None documented.    TTE (25):    The left ventricular cavity is normal size. Mildly increased wall   thickness. The ventricular mass is mildly increased. Findings consistent   with concentric hypertrophy. The left ventricular wall motion is normal   with no segmental abnormalities. The left ventricular systolic function is   normal . EF by 2D Chavira biplane is 59%. Normal diastolic function. E/A   ratio is 1.10. Average E/e' ratio is 16.0.     The right ventricular cavity is normal size. The right ventricular   systolic function is normal.RV estimated systolic pressure is 32 mmHg.     There is moderate MR and moderate TR.     There is no evidence of pericardial effusion on limited views of the   pericardial space. There is no pleural effusion noted on limited views of   the pleural space.     Problem List[1]    Review of patient's allergies indicates:   Allergen Reactions    Amlodipine Itching and Swelling       Current Outpatient Medications:  Current Medications[2]    Past Surgical History:   Procedure Laterality Date    AV FISTULA PLACEMENT  2022     SECTION      COLONOSCOPY N/A 2023    Procedure: COLONOSCOPY;  Surgeon: Hermilo Orozco III, MD;  Location: [facility];  Service: Endoscopy;  Laterality: N/A;    COLONOSCOPY " N/A 1/30/2024    Procedure: COLONOSCOPY;  Surgeon: Hermilo Orozco III, MD;  Location: [facility];  Service: Endoscopy;  Laterality: N/A;    HYSTERECTOMY      INSERTION OF DIALYSIS CATHETER      TUBAL LIGATION         Social History[3]    OBJECTIVE:     Vital Signs Range (Last 24H):  Temp:  [36.8 °C (98.2 °F)]   Pulse:  [84]   Resp:  [20]   BP: (161)/(75)   SpO2:  [100 %]       Significant Labs:  Lab Results   Component Value Date    WBC 6.82 01/26/2024    HGB 8.4 (L) 04/14/2025    HCT 31.4 (L) 01/26/2024     (L) 01/26/2024    CHOL 151 06/20/2023    TRIG 110 06/20/2023    HDL 48 06/20/2023    ALT 11 06/20/2023    AST 11 06/20/2023     01/26/2024    K 4.5 01/26/2024     01/26/2024    CREATININE 7.4 (H) 01/26/2024     (H) 01/29/2025    CO2 28 01/26/2024    INR 1.0 06/20/2023    HGBA1C 5.2 03/13/2025       Diagnostic Studies: No relevant studies.    EKG:   Results for orders placed or performed during the hospital encounter of 08/21/23   EKG 12-lead    Collection Time: 08/21/23 12:44 PM    Narrative    Test Reason : Z01.818,    Vent. Rate : 089 BPM     Atrial Rate : 089 BPM     P-R Int : 154 ms          QRS Dur : 082 ms      QT Int : 388 ms       P-R-T Axes : 027 038 044 degrees     QTc Int : 472 ms    Normal sinus rhythm  Normal ECG  No previous ECGs available  Confirmed by Anid Moore MD ([id number]) on 8/25/2023 6:39:48 PM    Referred By:             Confirmed By:Andi Moore MD       2D ECHO:  TTE:  Results for orders placed or performed during the hospital encounter of 09/18/23   Echo   Result Value Ref Range    Ascending aorta 2.89 cm    STJ 2.32 cm    AV mean gradient 5 mmHg    Ao peak ananth 1.54 m/s    Ao VTI 34.68 cm    IVS 0.93 0.6 - 1.1 cm    LA size 4.25 cm    Left Atrium Major Axis 5.16 cm    Left Atrium Minor Axis 5.33 cm    LVIDd 4.74 3.5 - 6.0 cm    LVIDs 3.02 2.1 - 4.0 cm    LVOT diameter 2.3 cm    LVOT peak VTI 30.56 cm    PW 1.07 0.6 - 1.1 cm    MV Peak A Ananth 1.21 m/s     "E wave deceleration time 181.11 msec    MV Peak E Ananth 1.09 m/s    PV Peak D Ananth 0.28 m/s    PV Peak S Ananth 0.37 m/s    RA Major Axis 4.88 cm    RA Width 2.53 cm    RVDD 3.43 cm    Sinus 3.30 cm    TAPSE 2.17 cm    TR Max Ananth 2.73 m/s    LA WIDTH 3.54 cm    MV stenosis pressure 1/2 time 52.52 ms    LV Diastolic Volume 104.20 mL    LV Systolic Volume 35.51 mL    LVOT peak ananth 1.44 m/s    TDI LATERAL 0.06 m/s    TDI SEPTAL 0.06 m/s    LA Vol (MOD) 38.93 cm3    MV "A" wave duration 10.28 msec    LV LATERAL E/E' RATIO 18.17 m/s    LV SEPTAL E/E' RATIO 18.17 m/s    FS 36 %    LA Vol 67.06 cm3    LV mass 166.74 g    ZLVIDD -2.60     ZLVIDS -1.75     Left Ventricle Relative Wall Thickness 0.45 cm    AV valve area 3.66 cm²    AV Velocity Ratio 0.94     AV index (prosthetic) 0.88     MV valve area p 1/2 method 4.19 cm2    E/A ratio 0.90     Mean e' 0.06 m/s    Pulm vein S/D ratio 1.32     LVOT area 4.2 cm2    LVOT stroke volume 126.90 cm3    AV peak gradient 9 mmHg    E/E' ratio 18.17 m/s    LV Systolic Volume Index 17.3 mL/m2    LV Diastolic Volume Index 50.83 mL/m2    MARTINA 32.7 mL/m2    LV Mass Index 81 g/m2    Triscuspid Valve Regurgitation Peak Gradient 30 mmHg    MARTINA (MOD) 19.0 mL/m2    JANETH by Velocity Ratio 3.88 cm²    BSA 2.14 m2    TV resting pulmonary artery pressure 33 mmHg    RV TB RVSP 6 mmHg    Est. RA pres 3 mmHg    Narrative      Left Ventricle: The left ventricle is normal in size. Increased wall   thickness. There is concentric remodeling. Normal wall motion. There is   normal systolic function with a visually estimated ejection fraction of 55   - 60%. There is indeterminate diastolic function.    Right Ventricle: Normal right ventricular cavity size. Right ventricle   wall motion  is normal. Systolic function is normal.    Pulmonary Artery: The estimated pulmonary artery systolic pressure is   33 mmHg.    IVC/SVC: Normal venous pressure at 3 mmHg.         SAKSHI:  No results found for this or any previous " visit.    ASSESSMENT/PLAN:           Pre-op Assessment    I have reviewed the Patient Summary Reports.     I have reviewed the Nursing Notes. I have reviewed the NPO Status.   I have reviewed the Medications.     Review of Systems  Anesthesia Hx:  No problems with previous Anesthesia   Neg history of prior surgery.          Denies Family Hx of Anesthesia complications.    Denies Personal Hx of Anesthesia complications.                    Social:  Former Smoker, No Alcohol Use       Hematology/Oncology:    Oncology Normal    -- Anemia:                                  EENT/Dental:  EENT/Dental Normal           Cardiovascular:     Hypertension           hyperlipidemia                         Hypertension         Pulmonary:  Pulmonary Normal                       Renal/:  Chronic Renal Disease, ESRD, Dialysis        Kidney Function/Disease             Hepatic/GI:  Hepatic/GI Normal                    Musculoskeletal:  Musculoskeletal Normal                Neurological:  Neurology Normal                                      Endocrine:  Diabetes, type 2    Diabetes                      Dermatological:  Skin Normal    Psych:  Psychiatric History anxiety depression                Physical Exam  General: Alert, Cooperative, Oriented and Well nourished    Airway:  Mallampati: III   Mouth Opening: Normal  TM Distance: Normal  Tongue: Normal  Neck ROM: Normal ROM    Dental:    Chest/Lungs:  Normal Respiratory Rate    Heart:  Rate: Normal        Anesthesia Plan  Type of Anesthesia, risks & benefits discussed:    Anesthesia Type: Gen ETT  Intra-op Monitoring Plan: Standard ASA Monitors and Art Line  Post Op Pain Control Plan: multimodal analgesia and IV/PO Opioids PRN  Induction:  IV and rapid sequence  Airway Plan: Direct and Video, Post-Induction  Informed Consent: Informed consent signed with the Patient and all parties understand the risks and agree with anesthesia plan.  All questions answered.   ASA Score: 3  Day of  Surgery Review of History & Physical: H&P Update referred to the surgeon/provider.    Ready For Surgery From Anesthesia Perspective.     .           [1]   Patient Active Problem List  Diagnosis    Anaphylactic shock, unspecified, initial encounter    Anemia in chronic kidney disease    Anxiety disorder, unspecified    ESRD on hemodialysis    Hemodialysis access, AV graft    History of stillbirth    Hyperlipidemia    Essential (primary) hypertension    Major depressive disorder, single episode, moderate    Nicotine dependence, unspecified, uncomplicated    Obesity, morbid    Type 2 diabetes mellitus with diabetic nephropathy    Kidney transplant candidate    Impaired functional mobility and activity tolerance    Knee pain, right   [2]   Current Facility-Administered Medications:     acetaminophen oral solution 650 mg, 650 mg, Per NG tube, Once, Ludin-Gennaro, Catherine, FNP    antithymocyte globulin (rabbit) 125 mg, hydrocortisone sodium succinate (SOLU-CORTEF) 20 mg in 0.9% NaCl 500 mL (FOR PERIPHERAL LINE ADMINISTRATION ONLY), 2.25 mg/kg (Ideal), Intravenous, Once, Richmond-Gennaro, Catherine, FNP    ceFAZolin 2 g, 2 g, Intravenous, On Call Procedure, Richmond-Gennaro, Catherine, FNP    diphenhydrAMINE injection 50 mg, 50 mg, Intravenous, Once, Ludin-Gennaro, Catherine, FNP    heparin (porcine) injection 5,000 Units, 5,000 Units, Subcutaneous, Once, Richmond-Gennaro, Catherine, FNP    methylPREDNISolone sodium succinate (SOLU-MEDROL) 500 mg in 0.9% NaCl 100 mL IVPB, 500 mg, Intravenous, Once, Ludin-Gennaro, Catherine, FNP    mupirocin 2 % ointment, , Nasal, On Call Procedure, Richmond-Gennaro, Actherine, FNP    pregabalin capsule 75 mg, 75 mg, Oral, On Call Procedure, Richmond-Gennaro, Catherine, FNP    sodium chloride 0.9% flush 10 mL, 10 mL, Intravenous, PRN, Ludin-Gennaro, Catherine, FNP  [3]   Social History  Socioeconomic History    Marital status:    Occupational History    Occupation: unemployed   Tobacco Use    Smoking status: Former     Current packs/day:  0.00     Types: Cigarettes     Quit date: 2022     Years since quitting: 3.2    Smokeless tobacco: Never   Substance and Sexual Activity    Alcohol use: Not Currently    Drug use: Never    Sexual activity: Yes     Partners: Male     Birth control/protection: See Surgical Hx   Social History Narrative    Sister in law, Mia is caregiver. [place] perform all ADLs. 3x per week swims and used to go 3x per week to gym.     Social Drivers of Health     Financial Resource Strain: Medium Risk (1/8/2025)    Overall Financial Resource Strain (CARDIA)     Difficulty of Paying Living Expenses: Somewhat hard   Food Insecurity: No Food Insecurity (1/8/2025)    Hunger Vital Sign     Worried About Running Out of Food in the Last Year: Never true     Ran Out of Food in the Last Year: Never true   Transportation Needs: No Transportation Needs (10/17/2024)    [facility]     Lack of Transportation (Medical): No     Lack of Transportation (Non-Medical): No   Physical Activity: Insufficiently Active (1/8/2025)    Exercise Vital Sign     Days of Exercise per Week: 3 days     Minutes of Exercise per Session: 20 min   Stress: Stress Concern Present (1/8/2025)    [employer] - Occupational Stress Questionnaire     Feeling of Stress : To some extent   Housing Stability: High Risk (1/8/2025)    Housing Stability Vital Sign     Unable to Pay for Housing in the Last Year: Yes

## 2025-04-20 NOTE — CARE UPDATE
Care Update:     Consult acknowledged, full consult note to come in the morning.  Patient in room 75262/05426 A. Blood glucose above goal on current inpatient regimen.No hypoglycemia noted over the past 24-hours.   1 Day Post-Op    Steroid use- Methylprednisolone  500 mg (induction dosage).   Renal function-   Lab Results   Component Value Date    CREATININE 9.0 (H) 04/20/2025        Diet Renal Non-Dialysis     POCT Glucose   Date Value Ref Range Status   04/20/2025 136 (H) 70 - 110 mg/dL Final   04/20/2025 180 (H) 70 - 110 mg/dL Final   04/19/2025 81 70 - 110 mg/dL Final     Lab Results   Component Value Date    HGBA1C 5.2 03/13/2025         Home Medications   Diabetes Medications              tirzepatide (MOUNJARO) 2.5 mg/0.5 mL PnIj Inject 2.5 mg into the skin every 7 days.          Endocrinology consulted for BG management.   BG goal 140-180    - Novolog (Insulin Aspart) prn for BG excursions MDC SSI (150/25)  - BG checks q4hr  - Hypoglycemia protocol in place    ** Please notify Endocrine for any change and/or advance in diet**  ** Please call Endocrine for any BG related issues **    Discharge Planning:   TBD. Please notify endocrinology prior to discharge.      Isaac Aviles DNP, FNP-C  Department of Endocrinology  Inpatient Glycemic Management

## 2025-04-20 NOTE — NURSING
Pt sent to OR with transport for kidney transplant. Teds and SCDs in place. ID band on. CHG bath completed.

## 2025-04-20 NOTE — NURSING
3.5 hour dialysis complete.  Blood returned.  Needles removed from BRENDA fistula.  Pressure held x 5 minutes.  Hemostasis achieved.  +thrill +bruit    No UF per MD order.    Tolerated well.

## 2025-04-20 NOTE — PROGRESS NOTES
TRANSPLANT NOTE:      ORGAN:   RIGHT KIDNEY    Disease Etiology: Diabetes Mellitus - Type II  Donor Type:   Donation after Brain Death    CDC High Risk:   No    Donor CMV Status:     Donor HBcAB:   Negative    Donor HCV Status:   Negative    Peak cPRA % (within 1 year of transplant): 95      Brande Reyer is a 49 y.o. female s/p    Donation after Brain Death   kidney transplant on 4/20/2025 (Kidney) for Diabetes Mellitus - Type II.   This patient will receive anti-thymocyte globulin 4.5 mg/kg for induction.  This patients maintenance immunosuppression will include a steroid taper per protocol to 5mg daily, tacrolimus, and mycophenolate maintenance.  Opportunistic infection prophylaxis will include valganciclovir for 3 months (CMV D - , R + ) and sulfamethoxazole-trimethoprim for 6 months.  Patient is to begin self medications upon transfer to the [facility], and I plan to meet with this patient and his/her support person prior to discharge to review the medication section of the Kidney Transplant Education Manual.  I have reviewed the pre-op medications and have restarted those, as appropriate.

## 2025-04-20 NOTE — PLAN OF CARE
Patient AAOx4. OOB standby assist, up in chair this shift. VSS. Tele NSR. Endocrine consulted. Self meds needs improvement. CHG wipes utilized.  Q4 accu checks. [place] in place, sanguineous o/p, irrigated catheter, no clots noted. Two STAN drains in place, bulb suction, sanguineous drainage, dressing in place, site CDI. RLQ incision, staples intact, dressing in place, site CDI. L UA fistula thrill/bruit +/+. K+ 6.8, Calcium gluconate 1 g IVPB administered, emergency HD performed at bedside, 3.5 hours, 0 off, pt tolerated well. C/o nausea, Zofran 4 mg IV push administered. C/o pain, Oxycodone 10 mg PO given, Tramadol 50 mg PO given, Lidocaine patch placed on lower back, Robaxin 750 mg PO administered. Plan is for KT US in am. Bed lowest setting, locked, 2x rails up, call light within reach, pt verbalized use.

## 2025-04-20 NOTE — CONSULTS
RD consulted for s/p kidney transplant. Unable to see pt at bedside. Full assessment to be provided tomorrow with food safety education.    Thanks!

## 2025-04-20 NOTE — TRANSFER OF CARE
"Anesthesia Transfer of Care Note    Patient: Brande Reyer    Procedure(s) Performed: Procedure(s) (LRB):  TRANSPLANT, KIDNEY (N/A)    Patient location: PACU    Anesthesia Type: general    Transport from OR: Transported from OR on 6-10 L/min O2 by face mask with adequate spontaneous ventilation    Post pain: adequate analgesia    Post assessment: no apparent anesthetic complications and tolerated procedure well    Post vital signs: stable    Level of consciousness: lethargic    Nausea/Vomiting: no nausea/vomiting    Complications: none    Transfer of care protocol was followed    Last vitals: Visit Vitals  BP (!) 161/75 (BP Location: Right arm, Patient Position: Sitting)   Pulse 84   Temp 36.8 °C (98.2 °F) (Oral)   Resp 20   Ht 5' 4" (1.626 m)   Wt 101.8 kg (224 lb 6.9 oz)   SpO2 100%   Breastfeeding No   BMI 38.52 kg/m²     "

## 2025-04-20 NOTE — PROGRESS NOTES
..Nursing Transfer Note      Reason patient is being transferred: procedure care complete     Transfer To: [id number]    Transfer via bed    Transfer with 4L NC  to O2    Transported by Transport tech x2    Medicines sent: antithymocyte globulin (rabbit) 125 mg, hydrocortisone sodium succinate (SOLU-CORTEF) 20 mg in 0.9% NaCl 500 mL (FOR PERIPHERAL LINE ADMINISTRATION ONLY)     dextrose 5 % and 0.45 % NaCl [id number] mL/hr  :  Intravenous  :  Continuous     0.9% NaCl infusion0-550 mL/hr  :  Intravenous  :  Continuous     Any special needs or follow-up needed: routine    Chart send with patient: Yes    Notified: spouse    Patient reassessed at: 3493 4/20/25 (date, time)

## 2025-04-20 NOTE — PLAN OF CARE
Pre-operative Discussion Note  Kidney Transplant Surgery    Brande Reyer is a 49 y.o. female with ESRD, requiring chronic dialysis admitted for kidney transplant.  I discussed the planned procedure in detail, including expected hospital course and outcomes, benefits, risks, and potential complications.  Complications discussed included death, graft failure, bleeding, infection, vascular thrombosis, and rejection.  I discussed the risks of anesthesia, as well as the potential need for re-operation.  The possibility of other complications not specifically mentioned was also discussed.  Also, I discussed the need for lifelong immunosuppression and the possibility of serious complications from immunosuppressive drugs.    The discussion included the risks that the patient will incur if she elects to not have the proposed procedure.    Relevant donor-specific risk factors were disclosed and discussed with the patient, including:   None    Specific PHS donor risk criteria for the organ donor include:  None    HCV: NONE    Blood Type: This is a blood type compatible. transplant.    The patient was SARS-CoV-2 /COVID-19 tested with negative results.    COVID-19: I discussed the possibility of COVID-19 transmission with the patient. Although MARKO testing is available for the virus using a technique which in theory should be very accurate, there is no data yet regarding the likelihood of a  false-negative test leading to virus transmission. Based on accuracy of testing for other viruses, it is expected that this risk is extremely small.     I also discussed that transplant immunosuppression will increase susceptibility to COVID-19 and other viruses, and that although we use stringent precautions to protect patients from infection, it is possible for a transplant recipient to contract this infection. If COVID-19 infection should occur, it would be a serious matter with a significant risk of death.    All questions were answered.   The patient and available family members voice understanding and agree to proceed with the transplant.    UNOS Patient Status  Note on scores:  ICU = 10 = total assistance  TSU = 20-30 = partial assistance  Outpatient admitted for transplant requiring medical care in last year = 40-50 = partial assistance  Scores 60 or higher indicate no assistance, meaning no need for medical care in last year. This would be very unusual for a transplant candidate.    UNOS Patient Status  Functional Status: 50% - Requires considerable assistance and frequent medical care  Physical Capacity: No Limitations    Discussed w patient today at  845pm in her hospital room with her family present.     Josh Cameron Jr

## 2025-04-21 PROBLEM — N18.6 ESRD ON HEMODIALYSIS: Status: RESOLVED | Noted: 2022-05-27 | Resolved: 2025-04-21

## 2025-04-21 PROBLEM — E87.5 HYPERKALEMIA: Status: ACTIVE | Noted: 2025-04-21

## 2025-04-21 PROBLEM — Z99.2 ESRD ON HEMODIALYSIS: Status: RESOLVED | Noted: 2022-05-27 | Resolved: 2025-04-21

## 2025-04-21 LAB
ABSOLUTE EOSINOPHIL (OHS): 0.05 K/UL
ABSOLUTE MONOCYTE (OHS): 0.37 K/UL (ref 0.3–1)
ABSOLUTE NEUTROPHIL COUNT (OHS): 9.24 K/UL (ref 1.8–7.7)
ALBUMIN SERPL BCP-MCNC: 2.9 G/DL (ref 3.5–5.2)
ALBUMIN SERPL BCP-MCNC: 2.9 G/DL (ref 3.5–5.2)
ANION GAP (OHS): 9 MMOL/L (ref 8–16)
ANION GAP (OHS): 9 MMOL/L (ref 8–16)
BASOPHILS # BLD AUTO: 0.01 K/UL
BASOPHILS NFR BLD AUTO: 0.1 %
BUN SERPL-MCNC: 20 MG/DL (ref 6–20)
BUN SERPL-MCNC: 38 MG/DL (ref 6–20)
CALCIUM SERPL-MCNC: 8.5 MG/DL (ref 8.7–10.5)
CALCIUM SERPL-MCNC: 8.5 MG/DL (ref 8.7–10.5)
CHLORIDE SERPL-SCNC: 95 MMOL/L (ref 95–110)
CHLORIDE SERPL-SCNC: 99 MMOL/L (ref 95–110)
CO2 SERPL-SCNC: 25 MMOL/L (ref 23–29)
CO2 SERPL-SCNC: 27 MMOL/L (ref 23–29)
CREAT SERPL-MCNC: 3.5 MG/DL (ref 0.5–1.4)
CREAT SERPL-MCNC: 6.2 MG/DL (ref 0.5–1.4)
ERYTHROCYTE [DISTWIDTH] IN BLOOD BY AUTOMATED COUNT: 13.1 % (ref 11.5–14.5)
GFR SERPLBLD CREATININE-BSD FMLA CKD-EPI: 15 ML/MIN/1.73/M2
GFR SERPLBLD CREATININE-BSD FMLA CKD-EPI: 8 ML/MIN/1.73/M2
GLUCOSE SERPL-MCNC: 130 MG/DL (ref 70–110)
GLUCOSE SERPL-MCNC: 163 MG/DL (ref 70–110)
HCT VFR BLD AUTO: 26.8 % (ref 37–48.5)
HGB BLD-MCNC: 8.5 GM/DL (ref 12–16)
IMM GRANULOCYTES # BLD AUTO: 0.06 K/UL (ref 0–0.04)
IMM GRANULOCYTES NFR BLD AUTO: 0.6 % (ref 0–0.5)
LYMPHOCYTES # BLD AUTO: 0.08 K/UL (ref 1–4.8)
MAGNESIUM SERPL-MCNC: 2.2 MG/DL (ref 1.6–2.6)
MCH RBC QN AUTO: 33.6 PG (ref 27–31)
MCHC RBC AUTO-ENTMCNC: 31.7 G/DL (ref 32–36)
MCV RBC AUTO: 106 FL (ref 82–98)
NUCLEATED RBC (/100WBC) (OHS): 0 /100 WBC
PHOSPHATE SERPL-MCNC: 4.7 MG/DL (ref 2.7–4.5)
PHOSPHATE SERPL-MCNC: 7.3 MG/DL (ref 2.7–4.5)
PLATELET # BLD AUTO: 75 K/UL (ref 150–450)
PMV BLD AUTO: 9.6 FL (ref 9.2–12.9)
POCT GLUCOSE: 122 MG/DL (ref 70–110)
POCT GLUCOSE: 123 MG/DL (ref 70–110)
POCT GLUCOSE: 142 MG/DL (ref 70–110)
POCT GLUCOSE: 175 MG/DL (ref 70–110)
POCT GLUCOSE: 190 MG/DL (ref 70–110)
POTASSIUM SERPL-SCNC: 4.7 MMOL/L (ref 3.5–5.1)
POTASSIUM SERPL-SCNC: 6.9 MMOL/L (ref 3.5–5.1)
RBC # BLD AUTO: 2.53 M/UL (ref 4–5.4)
RELATIVE EOSINOPHIL (OHS): 0.5 %
RELATIVE LYMPHOCYTE (OHS): 0.8 % (ref 18–48)
RELATIVE MONOCYTE (OHS): 3.8 % (ref 4–15)
RELATIVE NEUTROPHIL (OHS): 94.2 % (ref 38–73)
SODIUM SERPL-SCNC: 131 MMOL/L (ref 136–145)
SODIUM SERPL-SCNC: 133 MMOL/L (ref 136–145)
TACROLIMUS BLD-MCNC: <2 NG/ML (ref 5–15)
WBC # BLD AUTO: 9.81 K/UL (ref 3.9–12.7)

## 2025-04-21 PROCEDURE — 25000003 PHARM REV CODE 250: Performed by: TRANSPLANT SURGERY

## 2025-04-21 PROCEDURE — 27000207 HC ISOLATION

## 2025-04-21 PROCEDURE — 63600175 PHARM REV CODE 636 W HCPCS: Mod: JZ,TB | Performed by: TRANSPLANT SURGERY

## 2025-04-21 PROCEDURE — 63600175 PHARM REV CODE 636 W HCPCS: Performed by: NURSE PRACTITIONER

## 2025-04-21 PROCEDURE — 25000003 PHARM REV CODE 250: Performed by: INTERNAL MEDICINE

## 2025-04-21 PROCEDURE — 80197 ASSAY OF TACROLIMUS: CPT | Performed by: TRANSPLANT SURGERY

## 2025-04-21 PROCEDURE — 80069 RENAL FUNCTION PANEL: CPT | Performed by: PHYSICIAN ASSISTANT

## 2025-04-21 PROCEDURE — 83735 ASSAY OF MAGNESIUM: CPT | Performed by: TRANSPLANT SURGERY

## 2025-04-21 PROCEDURE — 99222 1ST HOSP IP/OBS MODERATE 55: CPT | Mod: ,,, | Performed by: NURSE PRACTITIONER

## 2025-04-21 PROCEDURE — 20600001 HC STEP DOWN PRIVATE ROOM

## 2025-04-21 PROCEDURE — 90935 HEMODIALYSIS ONE EVALUATION: CPT

## 2025-04-21 PROCEDURE — 25000003 PHARM REV CODE 250

## 2025-04-21 PROCEDURE — 25000003 PHARM REV CODE 250: Performed by: PHYSICIAN ASSISTANT

## 2025-04-21 PROCEDURE — 80069 RENAL FUNCTION PANEL: CPT | Performed by: TRANSPLANT SURGERY

## 2025-04-21 PROCEDURE — 25000003 PHARM REV CODE 250: Performed by: NURSE PRACTITIONER

## 2025-04-21 PROCEDURE — 36415 COLL VENOUS BLD VENIPUNCTURE: CPT | Performed by: TRANSPLANT SURGERY

## 2025-04-21 PROCEDURE — 36415 COLL VENOUS BLD VENIPUNCTURE: CPT | Performed by: PHYSICIAN ASSISTANT

## 2025-04-21 PROCEDURE — 85025 COMPLETE CBC W/AUTO DIFF WBC: CPT | Performed by: TRANSPLANT SURGERY

## 2025-04-21 PROCEDURE — 99233 SBSQ HOSP IP/OBS HIGH 50: CPT | Mod: 24,,, | Performed by: PHYSICIAN ASSISTANT

## 2025-04-21 PROCEDURE — 63600175 PHARM REV CODE 636 W HCPCS: Performed by: PHYSICIAN ASSISTANT

## 2025-04-21 RX ORDER — SULFAMETHOXAZOLE AND TRIMETHOPRIM 400; 80 MG/1; MG/1
1 TABLET ORAL DAILY
Qty: 30 TABLET | Refills: 5 | Status: SHIPPED | OUTPATIENT
Start: 2025-04-21 | End: 2025-04-23

## 2025-04-21 RX ORDER — OXYCODONE HYDROCHLORIDE 5 MG/1
5 TABLET ORAL EVERY 6 HOURS PRN
Refills: 0 | Status: DISCONTINUED | OUTPATIENT
Start: 2025-04-21 | End: 2025-04-23 | Stop reason: HOSPADM

## 2025-04-21 RX ORDER — TACROLIMUS 1 MG/1
6 CAPSULE ORAL EVERY 12 HOURS
Qty: 360 CAPSULE | Refills: 11 | Status: SHIPPED | OUTPATIENT
Start: 2025-04-21 | End: 2025-04-23

## 2025-04-21 RX ORDER — SODIUM CHLORIDE 9 MG/ML
INJECTION, SOLUTION INTRAVENOUS
Status: DISCONTINUED | OUTPATIENT
Start: 2025-04-21 | End: 2025-04-22

## 2025-04-21 RX ORDER — FAMOTIDINE 20 MG/1
20 TABLET, FILM COATED ORAL DAILY
Qty: 30 TABLET | Refills: 0 | Status: SHIPPED | OUTPATIENT
Start: 2025-04-21

## 2025-04-21 RX ORDER — CALCIUM GLUCONATE 20 MG/ML
1 INJECTION, SOLUTION INTRAVENOUS
Status: COMPLETED | OUTPATIENT
Start: 2025-04-21 | End: 2025-04-21

## 2025-04-21 RX ORDER — MYCOPHENOLATE MOFETIL 250 MG/1
1000 CAPSULE ORAL 2 TIMES DAILY
Qty: 240 CAPSULE | Refills: 11 | Status: SHIPPED | OUTPATIENT
Start: 2025-04-21

## 2025-04-21 RX ORDER — TACROLIMUS 1 MG/1
2 CAPSULE ORAL ONCE
Status: COMPLETED | OUTPATIENT
Start: 2025-04-21 | End: 2025-04-21

## 2025-04-21 RX ORDER — TACROLIMUS 1 MG/1
5 CAPSULE ORAL 2 TIMES DAILY
Status: DISCONTINUED | OUTPATIENT
Start: 2025-04-21 | End: 2025-04-22

## 2025-04-21 RX ORDER — PREDNISONE 5 MG/1
TABLET ORAL
Qty: 70 TABLET | Refills: 0 | Status: SHIPPED | OUTPATIENT
Start: 2025-04-21

## 2025-04-21 RX ORDER — VALGANCICLOVIR 450 MG/1
900 TABLET, FILM COATED ORAL DAILY
Qty: 60 TABLET | Refills: 2 | Status: SHIPPED | OUTPATIENT
Start: 2025-04-21 | End: 2025-04-23

## 2025-04-21 RX ORDER — METHOCARBAMOL 500 MG/1
500 TABLET, FILM COATED ORAL 4 TIMES DAILY
Status: DISCONTINUED | OUTPATIENT
Start: 2025-04-21 | End: 2025-04-23 | Stop reason: HOSPADM

## 2025-04-21 RX ADMIN — TACROLIMUS 3 MG: 1 CAPSULE ORAL at 07:04

## 2025-04-21 RX ADMIN — CARVEDILOL 3.12 MG: 3.12 TABLET, FILM COATED ORAL at 08:04

## 2025-04-21 RX ADMIN — TRAMADOL HYDROCHLORIDE 50 MG: 50 TABLET, COATED ORAL at 07:04

## 2025-04-21 RX ADMIN — TACROLIMUS 2 MG: 1 CAPSULE ORAL at 02:04

## 2025-04-21 RX ADMIN — DIPHENHYDRAMINE HYDROCHLORIDE 25 MG: 25 CAPSULE ORAL at 03:04

## 2025-04-21 RX ADMIN — METHOCARBAMOL 750 MG: 750 TABLET ORAL at 02:04

## 2025-04-21 RX ADMIN — LIDOCAINE 2 PATCH: 50 PATCH CUTANEOUS at 04:04

## 2025-04-21 RX ADMIN — HEPARIN SODIUM 5000 UNITS: 5000 INJECTION INTRAVENOUS; SUBCUTANEOUS at 06:04

## 2025-04-21 RX ADMIN — SODIUM CHLORIDE: 9 INJECTION, SOLUTION INTRAVENOUS at 09:04

## 2025-04-21 RX ADMIN — FLUOXETINE HYDROCHLORIDE 40 MG: 20 CAPSULE ORAL at 02:04

## 2025-04-21 RX ADMIN — THERA TABS 1 TABLET: TAB at 02:04

## 2025-04-21 RX ADMIN — TACROLIMUS 5 MG: 1 CAPSULE ORAL at 05:04

## 2025-04-21 RX ADMIN — VILAZODONE HYDROCHLORIDE 40 MG: 10 TABLET ORAL at 02:04

## 2025-04-21 RX ADMIN — MUPIROCIN 1 G: 20 OINTMENT TOPICAL at 08:04

## 2025-04-21 RX ADMIN — METHOCARBAMOL 500 MG: 500 TABLET ORAL at 08:04

## 2025-04-21 RX ADMIN — ACETAMINOPHEN 650 MG: 325 TABLET ORAL at 03:04

## 2025-04-21 RX ADMIN — OXYCODONE HYDROCHLORIDE 10 MG: 10 TABLET ORAL at 08:04

## 2025-04-21 RX ADMIN — BUSPIRONE HYDROCHLORIDE 5 MG: 5 TABLET ORAL at 02:04

## 2025-04-21 RX ADMIN — LIDOCAINE AND PRILOCAINE: 25; 25 CREAM TOPICAL at 06:04

## 2025-04-21 RX ADMIN — INSULIN ASPART 1 UNITS: 100 INJECTION, SOLUTION INTRAVENOUS; SUBCUTANEOUS at 08:04

## 2025-04-21 RX ADMIN — MYCOPHENOLATE MOFETIL 1000 MG: 250 CAPSULE ORAL at 07:04

## 2025-04-21 RX ADMIN — METHYLPREDNISOLONE SODIUM SUCCINATE 250 MG: 125 INJECTION, POWDER, FOR SOLUTION INTRAMUSCULAR; INTRAVENOUS at 02:04

## 2025-04-21 RX ADMIN — SODIUM CHLORIDE 125 MG: 9 INJECTION, SOLUTION INTRAVENOUS at 04:04

## 2025-04-21 RX ADMIN — DOCUSATE SODIUM 100 MG: 100 CAPSULE, LIQUID FILLED ORAL at 02:04

## 2025-04-21 RX ADMIN — HEPARIN SODIUM 5000 UNITS: 5000 INJECTION INTRAVENOUS; SUBCUTANEOUS at 02:04

## 2025-04-21 RX ADMIN — FAMOTIDINE 20 MG: 20 TABLET, FILM COATED ORAL at 08:04

## 2025-04-21 RX ADMIN — BISACODYL 10 MG: 5 TABLET, COATED ORAL at 08:04

## 2025-04-21 RX ADMIN — ACETAMINOPHEN 650 MG: 325 TABLET ORAL at 06:04

## 2025-04-21 RX ADMIN — BUSPIRONE HYDROCHLORIDE 5 MG: 5 TABLET ORAL at 08:04

## 2025-04-21 RX ADMIN — HEPARIN SODIUM 5000 UNITS: 5000 INJECTION INTRAVENOUS; SUBCUTANEOUS at 08:04

## 2025-04-21 RX ADMIN — CALCIUM GLUCONATE 1 G: 20 INJECTION, SOLUTION INTRAVENOUS at 06:04

## 2025-04-21 RX ADMIN — DOCUSATE SODIUM 100 MG: 100 CAPSULE, LIQUID FILLED ORAL at 08:04

## 2025-04-21 RX ADMIN — MYCOPHENOLATE MOFETIL 1000 MG: 250 CAPSULE ORAL at 08:04

## 2025-04-21 RX ADMIN — OXYCODONE HYDROCHLORIDE 10 MG: 10 TABLET ORAL at 02:04

## 2025-04-21 RX ADMIN — ACETAMINOPHEN 650 MG: 325 TABLET ORAL at 08:04

## 2025-04-21 NOTE — ASSESSMENT & PLAN NOTE
- s/p DBD kidney transplant for DM2. 2d elayne. STAN x 2  - Post-op HD for hyperkalemia on POD 0  - K remains elevated this am. HD this am  - making minimal urine.  - Kidney US ordered.  - plan for repeat labs pm.

## 2025-04-21 NOTE — SUBJECTIVE & OBJECTIVE
Interval HPI:   Overnight events: No acute events overnight. Patient in room 35420/30813 A. Blood glucose stable. BG at goal on current insulin regimen (SSI ). Steroid use- Methylprednisolone  500 mg. 2 Days Post-Op  Renal function- Abnormal - Creatinine 6.2   Vasopressors-  None     Endocrine will continue to follow and manage insulin orders inpatient.     Diet Renal Non-Dialysis     Eatin%  Nausea: No  Hypoglycemia and intervention: No  Fever: No  TPN and/or TF: No    PMH, PSH, FH, SH updated and reviewed     ROS:  Review of Systems   Constitutional:  Negative for unexpected weight change.   Eyes:  Negative for visual disturbance.   Respiratory:  Negative for cough.    Cardiovascular:  Negative for chest pain.   Gastrointestinal:  Negative for nausea and vomiting.   Endocrine: Negative for polydipsia and polyuria.   Musculoskeletal:  Negative for back pain.   Skin:  Negative for rash.   Neurological:  Negative for syncope.   Psychiatric/Behavioral:  Negative for agitation and dysphoric mood.        Current Medications and/or Treatments Impacting Glycemic Control  Immunotherapy:    Immunosuppressants           Stop Route Frequency     tacrolimus capsule 5 mg         -- Oral 2 times daily     antithymocyte globulin (rabbit) 125 mg, hydrocortisone sodium succinate (SOLU-CORTEF) 20 mg in 0.9% NaCl 500 mL (FOR PERIPHERAL LINE ADMINISTRATION ONLY)         25 0859 IV Daily     mycophenolate capsule 1,000 mg         -- Oral 2 times daily          Steroids:   Hormones (From admission, onward)      Start     Stop Route Frequency Ordered    25 0900  predniSONE tablet 20 mg  (IP TXP KIDNEY POST-OP THYMO WITH PERIPHERAL PRECHECKED IBW)         -- Oral Daily 25 0324    25 0900  antithymocyte globulin (rabbit) 125 mg, hydrocortisone sodium succinate (SOLU-CORTEF) 20 mg in 0.9% NaCl 500 mL (FOR PERIPHERAL LINE ADMINISTRATION ONLY)  (IP TXP KIDNEY POST-OP THYMO WITH PERIPHERAL PRECHECKED IBW)          04/22/25 0859 IV Daily 04/20/25 0324    04/20/25 0343  hydrocortisone sodium succinate injection 100 mg  (IP TXP KIDNEY POST-OP THYMO WITH PERIPHERAL PRECHECKED IBW)         09/15/36 1843 IV Once as needed 04/20/25 0324          Pressors:    Autonomic Drugs (From admission, onward)      Start     Stop Route Frequency Ordered    04/20/25 0343  EPINEPHrine (PF) injection 1 mg  (IP TXP KIDNEY POST-OP THYMO WITH PERIPHERAL PRECHECKED IBW)         09/15/36 1843 SubQ Once as needed 04/20/25 0324          Hyperglycemia/Diabetes Medications:   Antihyperglycemics (From admission, onward)      Start     Stop Route Frequency Ordered    04/20/25 1557  insulin aspart U-100 pen 0-10 Units         -- SubQ Every 4 hours PRN 04/20/25 1458             PHYSICAL EXAMINATION:  Vitals:    04/21/25 1631   BP: 129/63   Pulse: 90   Resp: 16   Temp: 98.6 °F (37 °C)     Body mass index is 38.52 kg/m².     Physical Exam  Constitutional:       Appearance: She is well-developed.   HENT:      Head: Normocephalic.   Eyes:      Conjunctiva/sclera: Conjunctivae normal.   Pulmonary:      Effort: Pulmonary effort is normal.   Musculoskeletal:         General: Normal range of motion.   Skin:     General: Skin is warm.      Findings: No rash.   Neurological:      Mental Status: She is alert and oriented to person, place, and time.

## 2025-04-21 NOTE — CONSULTS
[facility] - Transplant Stepdown  Endocrinology  Diabetes Consult Note    Consult Requested by: Josh Cameron Jr., *   Reason for admit: Status post -donor kidney transplantation    HISTORY OF PRESENT ILLNESS:  Reason for Consult: Management of T2DM, Hyperglycemia     Surgical Procedure and Date: s/p kidney transplant on 2025    Diabetes diagnosis year: > 5 years    Home Diabetes Medications:  Mounjaro 15 mg weekly     How often checking glucose at home? 1-3 x day   BG readings on regimen: 100-160's  Hypoglycemia on the regimen?  No  Missed doses on regimen?  No    Diabetes Complications include:     Hyperglycemia    Complicating diabetes co morbidities:   CKD and Glucocorticoid use       HPI: Ms. Reyer is a 49 y.o. female with ESRD secondary to diabetic nephropathy. She is now s/p kidney transplant on 2025. Endocrine consulted to manage hyperglycemia and type 2 diabetes in the consult of high-dose steroids.     Hemoglobin A1C   Date Value Ref Range Status   2025 5.2 4.8 - 5.9 % Final   2024 4.9 4.8 - 5.9 % Final   2024 5.4 4.8 - 5.9 % Final   2010 6.1 4.0 - 6.2 % Final              Interval HPI:   Overnight events: No acute events overnight. Patient in room 95166/17120 A. Blood glucose stable. BG at goal on current insulin regimen (SSI ). Steroid use- Methylprednisolone  500 mg. 2 Days Post-Op  Renal function- Abnormal - Creatinine 6.2   Vasopressors-  None     Endocrine will continue to follow and manage insulin orders inpatient.     Diet Renal Non-Dialysis     Eatin%  Nausea: No  Hypoglycemia and intervention: No  Fever: No  TPN and/or TF: No    PMH, PSH, FH, SH updated and reviewed     ROS:  Review of Systems   Constitutional:  Negative for unexpected weight change.   Eyes:  Negative for visual disturbance.   Respiratory:  Negative for cough.    Cardiovascular:  Negative for chest pain.   Gastrointestinal:  Negative for nausea and vomiting.   Endocrine:  Negative for polydipsia and polyuria.   Musculoskeletal:  Negative for back pain.   Skin:  Negative for rash.   Neurological:  Negative for syncope.   Psychiatric/Behavioral:  Negative for agitation and dysphoric mood.        Current Medications and/or Treatments Impacting Glycemic Control  Immunotherapy:    Immunosuppressants           Stop Route Frequency     tacrolimus capsule 5 mg         -- Oral 2 times daily     antithymocyte globulin (rabbit) 125 mg, hydrocortisone sodium succinate (SOLU-CORTEF) 20 mg in 0.9% NaCl 500 mL (FOR PERIPHERAL LINE ADMINISTRATION ONLY)         04/22/25 0859 IV Daily     mycophenolate capsule 1,000 mg         -- Oral 2 times daily          Steroids:   Hormones (From admission, onward)      Start     Stop Route Frequency Ordered    04/22/25 0900  predniSONE tablet 20 mg  (IP TXP KIDNEY POST-OP THYMO WITH PERIPHERAL PRECHECKED IBW)         -- Oral Daily 04/20/25 0324    04/21/25 0900  antithymocyte globulin (rabbit) 125 mg, hydrocortisone sodium succinate (SOLU-CORTEF) 20 mg in 0.9% NaCl 500 mL (FOR PERIPHERAL LINE ADMINISTRATION ONLY)  (IP TXP KIDNEY POST-OP THYMO WITH PERIPHERAL PRECHECKED IBW)         04/22/25 0859 IV Daily 04/20/25 0324    04/20/25 0343  hydrocortisone sodium succinate injection 100 mg  (IP TXP KIDNEY POST-OP THYMO WITH PERIPHERAL PRECHECKED IBW)         09/15/36 1843 IV Once as needed 04/20/25 0324          Pressors:    Autonomic Drugs (From admission, onward)      Start     Stop Route Frequency Ordered    04/20/25 0343  EPINEPHrine (PF) injection 1 mg  (IP TXP KIDNEY POST-OP THYMO WITH PERIPHERAL PRECHECKED IBW)         09/15/36 1843 SubQ Once as needed 04/20/25 0324          Hyperglycemia/Diabetes Medications:   Antihyperglycemics (From admission, onward)      Start     Stop Route Frequency Ordered    04/20/25 1557  insulin aspart U-100 pen 0-10 Units         -- SubQ Every 4 hours PRN 04/20/25 1458             PHYSICAL EXAMINATION:  Vitals:    04/21/25 1631  "  BP: 129/63   Pulse: 90   Resp: 16   Temp: 98.6 °F (37 °C)     Body mass index is 38.52 kg/m².     Physical Exam  Constitutional:       Appearance: She is well-developed.   HENT:      Head: Normocephalic.   Eyes:      Conjunctiva/sclera: Conjunctivae normal.   Pulmonary:      Effort: Pulmonary effort is normal.   Musculoskeletal:         General: Normal range of motion.   Skin:     General: Skin is warm.      Findings: No rash.   Neurological:      Mental Status: She is alert and oriented to person, place, and time.            Labs Reviewed and Include   Recent Labs   Lab 25  0539   CALCIUM 8.5*   ALBUMIN 2.9*   *   K 6.9*   CO2 27   CL 95   BUN 38*   CREATININE 6.2*     Lab Results   Component Value Date    WBC 9.81 2025    HGB 8.5 (L) 2025    HCT 26.8 (L) 2025     (H) 2025    PLT 75 (L) 2025     No results for input(s): "TSH", "FREET4" in the last 168 hours.  Lab Results   Component Value Date    HGBA1C 5.2 2025       Nutritional status:   Body mass index is 38.52 kg/m².  Lab Results   Component Value Date    ALBUMIN 2.9 (L) 2025    ALBUMIN 3.0 (L) 2025    ALBUMIN 3.2 (L) 2025     No results found for: "PREALBUMIN"    Estimated Creatinine Clearance: 12.6 mL/min (A) (based on SCr of 6.2 mg/dL (H)).    Accu-Checks  Recent Labs     25  0843 25  1237 25  1737 25  2004 25  2230 25  2304 25  2358 25  0411 25  1254 25  1726   POCTGLUCOSE 180* 136* 128* 128* 333* 249* 190* 123* 122* 142*        ASSESSMENT and PLAN    Cardiac/Vascular  Essential (primary) hypertension  Uncontrolled HTN can worsen insulin resistance.         Hyperlipidemia    On statin therapy per ADA guidelines.       Renal/  * Status post -donor kidney transplantation  Titrate insulin slowly to avoid hypoglycemia as the risk of hypoglycemia increases with decreased creatinine clearance.  Estimated Creatinine " Clearance: 12.6 mL/min (A) (based on SCr of 6.2 mg/dL (H)).  Glucocorticoids markedly increase glucose levels. Expect the steroid taper will help glucose control.   Optimize BG control to improve wound healing        Endocrine  Type 2 diabetes mellitus with diabetic nephropathy  Endocrinology consulted for BG management.   BG goal 140-180    - Novolog (Insulin Aspart)  prn for BG excursions [facility] [place] (150/25)  - BG checks q4hr  - Hypoglycemia protocol in place    ** Please notify Endocrine for any change and/or advance in diet**  ** Please call Endocrine for any BG related issues **    Discharge Planning:   TBD. Please notify endocrinology prior to discharge.            Plan discussed with patient, family, and RN at bedside.        Isaac Aviles, DNP, FNP  Endocrinology  [facility] - Transplant Stepdown

## 2025-04-21 NOTE — HPI
Reason for Consult: Management of T2DM, Hyperglycemia     Surgical Procedure and Date: s/p kidney transplant on 04/20/2025    Diabetes diagnosis year: > 5 years    Home Diabetes Medications:  Mounjaro 15 mg weekly     How often checking glucose at home? 1-3 x day   BG readings on regimen: 100-160's  Hypoglycemia on the regimen?  No  Missed doses on regimen?  No    Diabetes Complications include:     Hyperglycemia    Complicating diabetes co morbidities:   CKD and Glucocorticoid use       HPI: Ms. Reyer is a 49 y.o. female with ESRD secondary to diabetic nephropathy. She is now s/p kidney transplant on 04/20/2025. Endocrine consulted to manage hyperglycemia and type 2 diabetes in the consult of high-dose steroids.     Hemoglobin A1C   Date Value Ref Range Status   03/13/2025 5.2 4.8 - 5.9 % Final   12/11/2024 4.9 4.8 - 5.9 % Final   09/16/2024 5.4 4.8 - 5.9 % Final   08/04/2010 6.1 4.0 - 6.2 % Final

## 2025-04-21 NOTE — PROGRESS NOTES
"[facility] - Transplant Stepdown  Adult Nutrition  Progress Note    SUMMARY       Recommendations  1. Continue Renal non-dialysis diet- recommend liberalizing as renal labs stabilize  2. Add Novasource Renal BID if po intake declines <50% meal consumption   3. RD following    Goals: Meet % of EEN/EPN by RD f/u date  Nutrition Goal Status: goal not met  Communication of RD Recs: POC    Nutrition Discharge Planning    Nutrition Discharge Planning: Post transplant nutrition education handouts provided RD name/contact information, education material left.  No other needs identified.     Assessment and Plan    Nutrition Problem  Increased nutrient needs (protein, calorie)     Related to (etiology):  Increased metabolic stress associated with transplant     Signs and Symptoms (as evidenced by):  S/p kidney transplantation and presence of surgical wound     Interventions(treatment strategy):  Collaboration of nutrition care w/ other providers     Nutrition Diagnosis Status:  New         Reason for Assessment    Reason For Assessment: other (see comments)  Diagnosis: other (see comments) (no active principal problem)  General Information Comments: RD follow-up:; s/p kidney transplant. Pt consuming 25% of meals provided. No issues chewing/swallowing noted. UBW unknown.Per chart review no major weight fluctuations noted. NFPE at follow-up if clinically indicated.   Food safety education handouts left at bedside- will follow up with full education. NFPE at follow-up as clinically warranted. LBM noted- 4/19    Nutrition/Diet History    Spiritual, Cultural Beliefs, Christianity Practices, Values that Affect Care: no  Food Allergies: NKFA    Anthropometrics    Height: 5' 4" (162.6 cm)  Height (inches): 64 in  Height Method: Stated  Weight: 101.8 kg (224 lb 6.9 oz)  Weight (lb): 224.43 lb  Weight Method: Standard Scale  Ideal Body Weight (IBW), Female: 120 lb  % Ideal Body Weight, Female (lb): 187.03 %  BMI (Calculated): " 38.5  BMI Grade: 35 - 39.9 - obesity - grade II       Lab/Procedures/Meds    Pertinent Labs Reviewed: reviewed  Pertinent Labs Comments: Glucose 130  Pertinent Medications Reviewed: reviewed  Pertinent Medications Comments: tacrolimus    Estimated/Assessed Needs    Weight Used For Calorie Calculations: 54.5 kg (120 lb 3.8 oz)  Energy Calorie Requirements (kcal): 5319-8426  Energy Need Method: Kcal/kg (25-35 kcal/kg of IBW)  Protein Requirements:  g (0.8-1.5 g/kg)  Weight Used For Protein Calculations: 101.8 kg (224 lb 6.9 oz)  RDA Method (mL): 1361  CHO Requirement: 170 g      Nutrition Prescription Ordered    Current Diet Order: Renal, non-dialysis    Evaluation of Received Nutrient/Fluid Intake    I/O: -  % Intake of Estimated Energy Needs: 25%  % Meal Intake: 25%    Nutrition Risk    Level of Risk/Frequency of Follow-up: low ((1-2x/week))     Monitor and Evaluation    Monitor and Evaluation: Energy intake, Food and beverage intake, Protein intake     Nutrition Follow-Up    RD Follow-up?: Yes

## 2025-04-21 NOTE — PLAN OF CARE
AAOx4. Afebrile. On RA. VSS. C/o back pain, and incisional pain. Waffle mattress overlay placed on bed. Pt transferring with 1-2p assist. Tramadol PRN given 1x for pain. Post op holly catheter in place with minimal bloody urine output. Pt encouraged to increase oral hydration. 120mg IV lasix given. Patient shifted with D50, insulin and calcium gluconate for K+ of 5.7 following dialysis. K+ now at 4.9. Blood sugars monitored. Q4 accuchecks per endocrinology. 2 Jps in place with sanguineous output. RLQ surgical incision covered with island border dressing. STAN and surgical dressings CD&I. Pt educated on self meds. Pulled them with 100% accuracy. Plans for kidney US in AM. No one at bedside with patient this shift. Bed in low position. Side rails raisedx2, wheels locked. Nonskid shoes when OOB. Call bell and personal items within reach.

## 2025-04-21 NOTE — SUBJECTIVE & OBJECTIVE
Subjective:   History of Present Illness:  Ms. Reyer is a 49 y.o. female with ESRD secondary to diabetic nephropathy. She has been on the wait list for a kidney transplant at [facility] since 5/27/2022. Patient is currently on hemodialysis started on 5/27/2022. Patient is dialyzing on home HD 4 days/week, last HD on 4/17. Patient reports that she is tolerating dialysis well. She has a LUE AV fistula. Patient denies any recent hospitalizations or ED visits. Dr. Cameron will be the primary surgeon. Pre op labs and imaging will be reviewed prior to surgery. Patient is NPO    Ms. Reyer is a 49 y.o. year old female who is status post Kidney Transplant - 4/20/2025  (#1).  Her maintenance immunosuppression consists of:   Immunosuppressants (From admission, onward)      Start     Stop Route Frequency Ordered    04/21/25 1800  tacrolimus capsule 5 mg  (IP TXP KIDNEY POST-OP THYMO WITH PERIPHERAL PRECHECKED IBW)         -- Oral 2 times daily 04/21/25 1003    04/21/25 0900  antithymocyte globulin (rabbit) 125 mg, hydrocortisone sodium succinate (SOLU-CORTEF) 20 mg in 0.9% NaCl 500 mL (FOR PERIPHERAL LINE ADMINISTRATION ONLY)  (IP TXP KIDNEY POST-OP THYMO WITH PERIPHERAL PRECHECKED IBW)         04/22/25 0859 IV Daily 04/20/25 0324    04/20/25 1000  mycophenolate capsule 1,000 mg  (IP TXP KIDNEY POST-OP THYMO WITH PERIPHERAL PRECHECKED IBW)         -- Oral 2 times daily 04/20/25 0324            Hospital Course:  Now S/P KTX DBD on 4/20/25 (CIT 10hrs 25 mins)  2 day holly; STAN drains x 2 (lateral one in retroperitoneum, medial one in subcutaneous space). Post-op with hyperkalemia requiring HD. Repeat K this am 6.9. Additional dialysis this am. Making minimal urine (~200cc). Holly to be removed in the am. Encouraged PO intake. Reports low back pain- prn lidocaine patch. Kidney US ordered. VSS. Cont to monitor      Past Medical, Surgical, Family, and Social History:   Unchanged from H&P.    Scheduled Meds:   acetaminophen  650 mg Oral  Q8H    antithymocyte globulin (rabbit) 125 mg, hydrocortisone sodium succinate (SOLU-CORTEF) 20 mg in 0.9% NaCl 500 mL (FOR PERIPHERAL LINE ADMINISTRATION ONLY)  2.25 mg/kg (Ideal) Intravenous Daily    bisacodyL  10 mg Oral QHS    busPIRone  5 mg Oral BID    carvediloL  3.125 mg Oral BID    docusate sodium  100 mg Oral TID    famotidine  20 mg Oral QHS    FLUoxetine  40 mg Oral Daily    heparin (porcine)  5,000 Units Subcutaneous Q8H    LIDOcaine  2 patch Transdermal Q24H    methocarbamoL  500 mg Oral QID    multivitamin  1 tablet Oral Daily    mupirocin  1 g Nasal BID    mycophenolate  1,000 mg Oral BID    [START ON 4/22/2025] predniSONE  20 mg Oral Daily    [START ON 4/30/2025] sulfamethoxazole-trimethoprim 400-80mg  1 tablet Oral Daily AM    tacrolimus  5 mg Oral BID    [START ON 4/30/2025] valGANciclovir  450 mg Oral Daily AM    vilazodone  40 mg Oral Daily     Continuous Infusions:   glucagon (human recombinant)  1 mg Intramuscular Continuous PRN         PRN Meds:  Current Facility-Administered Medications:     0.9% NaCl, , Intravenous, PRN    0.9% NaCl, , Intravenous, PRN    [COMPLETED] calcium gluconate IVPB, 1 g, Intravenous, Once **AND** calcium gluconate IVPB, 1 g, Intravenous, Q10 Min PRN    dextrose 50%, 12.5 g, Intravenous, PRN    dextrose 50%, 12.5 g, Intravenous, PRN    [COMPLETED] dextrose 50%, 50 g, Intravenous, Once **AND** dextrose 50%, 25 g, Intravenous, PRN **AND** [COMPLETED] insulin regular, 10 Units, Intravenous, Once    diphenhydrAMINE, 50 mg, Oral, Once PRN    EPINEPHrine (PF), 1 mg, Subcutaneous, Once PRN    glucagon (human recombinant), 1 mg, Intramuscular, Continuous PRN    glucose, 16 g, Oral, PRN    glucose, 16 g, Oral, PRN    glucose, 24 g, Oral, PRN    haloperidol lactate, 0.5 mg, Intravenous, Q10 Min PRN    hydrocortisone sodium succinate, 100 mg, Intravenous, Once PRN    insulin aspart U-100, 0-10 Units, Subcutaneous, Q4H PRN    LIDOcaine-prilocaine, , Topical (Top), PRN     ondansetron, 4 mg, Intravenous, Q6H PRN    oxyCODONE, 5 mg, Oral, Q6H PRN    oxyCODONE, 10 mg, Oral, Q6H PRN    sodium chloride 0.9%, 250 mL, Intravenous, PRN    sodium chloride 0.9%, 250 mL, Intravenous, PRN    sodium chloride 0.9%, 10 mL, Intravenous, PRN    Intake/Output - Last 3 Shifts         04/19 0700 04/20 0659 04/20 0700 04/21 0659 04/21 0700 04/22 0659    P.O. 40 810     I.V. (mL/kg) 580.2 (5.7) 186.5 (1.8)     Other  500 350    IV Piggyback 2000 50     Total Intake(mL/kg) 2620.2 (25.7) 1546.5 (15.2) 350 (3.4)    Urine (mL/kg/hr) 1400 215 (0.1) 30 (0)    Drains 100 270 45    Other  500 1308    Total Output 8208 884 1304    Net +1120.2 +561.5 -1033                    Review of Systems   Constitutional:  Negative for activity change and appetite change.   HENT: Negative.     Eyes:  Negative for visual disturbance.   Respiratory:  Negative for shortness of breath.    Cardiovascular:  Negative for chest pain, palpitations and leg swelling.   Gastrointestinal:  Positive for abdominal distention and abdominal pain. Negative for diarrhea, nausea and vomiting.   Genitourinary:  Negative for difficulty urinating.        [place] with pink tinged urine    Musculoskeletal:  Negative for arthralgias, back pain and joint swelling.   Skin:  Positive for wound.        Right lower quad inc with dressing ion place    Allergic/Immunologic: Positive for immunocompromised state.   Neurological:  Negative for dizziness and facial asymmetry.   Hematological:  Negative for adenopathy. Does not bruise/bleed easily.   Psychiatric/Behavioral:  Negative for agitation and behavioral problems.    All other systems reviewed and are negative.     Objective:     Vital Signs (Most Recent):  Temp: 98.3 °F (36.8 °C) (04/21/25 1445)  Pulse: 88 (04/21/25 1445)  Resp: 14 (04/21/25 1445)  BP: 118/65 (04/21/25 1445)  SpO2: 100 % (04/21/25 1445) Vital Signs (24h Range):  Temp:  [97.9 °F (36.6 °C)-99.4 °F (37.4 °C)] 98.3 °F (36.8 °C)  Pulse:   "[] 88  Resp:  [14-18] 14  SpO2:  [93 %-100 %] 100 %  BP: (107-134)/(57-72) 118/65     Weight: 101.8 kg (224 lb 6.9 oz)  Height: 5' 4" (162.6 cm)  Body mass index is 38.52 kg/m².     Physical Exam  Vitals and nursing note reviewed.   Constitutional:       Appearance: She is well-developed.   HENT:      Head: Normocephalic.   Eyes:      Conjunctiva/sclera: Conjunctivae normal.   Cardiovascular:      Rate and Rhythm: Normal rate and regular rhythm.      Heart sounds: No murmur heard.  Pulmonary:      Effort: Pulmonary effort is normal.      Breath sounds: Normal breath sounds.   Abdominal:      General: Bowel sounds are normal. There is no distension.      Palpations: Abdomen is soft.      Tenderness: There is no abdominal tenderness.      Comments: RLQ incision w/ staples.   STAN x 2   Musculoskeletal:         General: Normal range of motion.      Cervical back: Normal range of motion.   Skin:     General: Skin is warm and dry.      Capillary Refill: Capillary refill takes less than 2 seconds.   Neurological:      Mental Status: She is alert and oriented to person, place, and time.   Psychiatric:         Behavior: Behavior normal.         Thought Content: Thought content normal.         Judgment: Judgment normal.          Laboratory:  CBC:   Recent Labs   Lab 04/19/25  1950 04/19/25 2044 04/20/25  0345 04/20/25  0410 04/21/25  0539   WBC 7.55  --   --  4.00 9.81   RBC 2.48*  --   --  2.60* 2.53*   HGB 8.3*  --   --  8.8* 8.5*   HCT 25.7*   < > 26* 27.0* 26.8*     --   --  95* 75*   *  --   --  104* 106*   MCH 33.5*  --   --  33.8* 33.6*   MCHC 32.3  --   --  32.6 31.7*    < > = values in this interval not displayed.     CMP:   Recent Labs   Lab 04/19/25  1950 04/20/25  0410 04/20/25  0837 04/20/25  1721 04/20/25  1916 04/20/25  2317 04/21/25  0539   CALCIUM 8.9 8.2* 8.1*  --   --  8.5* 8.5*   ALBUMIN 3.4* 3.2* 3.0*  --   --   --  2.9*    136 135*  --   --  136 131*   K 5.4* 5.3* 6.8*   < > " 5.7* 4.9 6.9*   CO2 26 22* 21*  --   --  27 27   CL 99 99 99  --   --  97 95   BUN 44* 48* 46*  --   --  30* 38*   CREATININE 8.7* 8.7* 9.0*  --   --  5.8* 6.2*   ALKPHOS 47  --   --   --   --   --   --    ALT 21  --   --   --   --   --   --    AST 14  --   --   --   --   --   --     < > = values in this interval not displayed.     Labs within the past 24 hours have been reviewed.    Diagnostic Results:  Kidney US ordered

## 2025-04-21 NOTE — PROGRESS NOTES
[facility] - Trans[facility]  Kidney Transplant  Progress Note      Reason for Follow-up: Reassessment of Kidney Transplant - 4/20/2025  (#1) recipient and management of immunosuppression.    ORGAN:   RIGHT KIDNEY    Donor Type:   Donation after Brain Death    Donor CMV Status: Negative  Donor HBcAB:Negative  Donor HCV Status:Negative  Donor HBV MARKO:   Donor HCV MARKO: Negative      Subjective:   History of Present Illness:  Ms. Reyer is a 49 y.o. female with ESRD secondary to diabetic nephropathy. She has been on the wait list for a kidney transplant at [facility] since 5/27/2022. Patient is currently on hemodialysis started on 5/27/2022. Patient is dialyzing on home HD 4 days/week, last HD on 4/17. Patient reports that she is tolerating dialysis well. She has a LUE AV fistula. Patient denies any recent hospitalizations or ED visits. Dr. Cameron will be the primary surgeon. Pre op labs and imaging will be reviewed prior to surgery. Patient is NPO    Ms. Reyer is a 49 y.o. year old female who is status post Kidney Transplant - 4/20/2025  (#1).  Her maintenance immunosuppression consists of:   Immunosuppressants (From admission, onward)      Start     Stop Route Frequency Ordered    04/21/25 1800  tacrolimus capsule 5 mg  (IP TXP KIDNEY POST-OP THYMO WITH PERIPHERAL PRECHECKED IBW)         -- Oral 2 times daily 04/21/25 1003    04/21/25 0900  antithymocyte globulin (rabbit) 125 mg, hydrocortisone sodium succinate (SOLU-CORTEF) 20 mg in 0.9% NaCl 500 mL (FOR PERIPHERAL LINE ADMINISTRATION ONLY)  (IP TXP KIDNEY POST-OP THYMO WITH PERIPHERAL PRECHECKED IBW)         04/22/25 0859 IV Daily 04/20/25 0324    04/20/25 1000  mycophenolate capsule 1,000 mg  (IP TXP KIDNEY POST-OP THYMO WITH PERIPHERAL PRECHECKED IBW)         -- Oral 2 times daily 04/20/25 0324            Hospital Course:  Now S/P KTX DBD on 4/20/25 (CIT 10hrs 25 mins)  2 day holly; STAN drains x 2 (lateral one in retroperitoneum, medial one in subcutaneous space).  Post-op with hyperkalemia requiring HD. Repeat K this am 6.9. Additional dialysis this am. Making minimal urine (~200cc). [place] to be removed in the am. Encouraged PO intake. Reports low back pain- prn lidocaine patch. Kidney US ordered. VSS. Cont to monitor      Past Medical, Surgical, Family, and Social History:   Unchanged from H&P.    Scheduled Meds:   acetaminophen  650 mg Oral Q8H    antithymocyte globulin (rabbit) 125 mg, hydrocortisone sodium succinate (SOLU-CORTEF) 20 mg in 0.9% NaCl 500 mL (FOR PERIPHERAL LINE ADMINISTRATION ONLY)  2.25 mg/kg (Ideal) Intravenous Daily    bisacodyL  10 mg Oral QHS    busPIRone  5 mg Oral BID    carvediloL  3.125 mg Oral BID    docusate sodium  100 mg Oral TID    famotidine  20 mg Oral QHS    FLUoxetine  40 mg Oral Daily    heparin (porcine)  5,000 Units Subcutaneous Q8H    LIDOcaine  2 patch Transdermal Q24H    methocarbamoL  500 mg Oral QID    multivitamin  1 tablet Oral Daily    mupirocin  1 g Nasal BID    mycophenolate  1,000 mg Oral BID    [START ON 4/22/2025] predniSONE  20 mg Oral Daily    [START ON 4/30/2025] sulfamethoxazole-trimethoprim 400-80mg  1 tablet Oral Daily AM    tacrolimus  5 mg Oral BID    [START ON 4/30/2025] valGANciclovir  450 mg Oral Daily AM    vilazodone  40 mg Oral Daily     Continuous Infusions:   glucagon (human recombinant)  1 mg Intramuscular Continuous PRN         PRN Meds:  Current Facility-Administered Medications:     0.9% NaCl, , Intravenous, PRN    0.9% NaCl, , Intravenous, PRN    [COMPLETED] calcium gluconate IVPB, 1 g, Intravenous, Once **AND** calcium gluconate IVPB, 1 g, Intravenous, Q10 Min PRN    dextrose 50%, 12.5 g, Intravenous, PRN    dextrose 50%, 12.5 g, Intravenous, PRN    [COMPLETED] dextrose 50%, 50 g, Intravenous, Once **AND** dextrose 50%, 25 g, Intravenous, PRN **AND** [COMPLETED] insulin regular, 10 Units, Intravenous, Once    diphenhydrAMINE, 50 mg, Oral, Once PRN    EPINEPHrine (PF), 1 mg, Subcutaneous, Once PRN     glucagon (human recombinant), 1 mg, Intramuscular, Continuous PRN    glucose, 16 g, Oral, PRN    glucose, 16 g, Oral, PRN    glucose, 24 g, Oral, PRN    haloperidol lactate, 0.5 mg, Intravenous, Q10 Min PRN    hydrocortisone sodium succinate, 100 mg, Intravenous, Once PRN    insulin aspart U-100, 0-10 Units, Subcutaneous, Q4H PRN    LIDOcaine-prilocaine, , Topical (Top), PRN    ondansetron, 4 mg, Intravenous, Q6H PRN    oxyCODONE, 5 mg, Oral, Q6H PRN    oxyCODONE, 10 mg, Oral, Q6H PRN    sodium chloride 0.9%, 250 mL, Intravenous, PRN    sodium chloride 0.9%, 250 mL, Intravenous, PRN    sodium chloride 0.9%, 10 mL, Intravenous, PRN    Intake/Output - Last 3 Shifts         04/19 0700  04/20 0659 04/20 0700  04/21 0659 04/21 0700  04/22 0659    P.O. 40 810     I.V. (mL/kg) 580.2 (5.7) 186.5 (1.8)     Other  500 350    IV Piggyback 2000 50     Total Intake(mL/kg) 2620.2 (25.7) 1546.5 (15.2) 350 (3.4)    Urine (mL/kg/hr) 1400 215 (0.1) 30 (0)    Drains 100 270 45    Other  500 1308    Total Output 9959 838 1873    Net +1120.2 +561.5 -1033                    Review of Systems   Constitutional:  Negative for activity change and appetite change.   HENT: Negative.     Eyes:  Negative for visual disturbance.   Respiratory:  Negative for shortness of breath.    Cardiovascular:  Negative for chest pain, palpitations and leg swelling.   Gastrointestinal:  Positive for abdominal distention and abdominal pain. Negative for diarrhea, nausea and vomiting.   Genitourinary:  Negative for difficulty urinating.        [place] with pink tinged urine    Musculoskeletal:  Negative for arthralgias, back pain and joint swelling.   Skin:  Positive for wound.        Right lower quad inc with dressing ion place    Allergic/Immunologic: Positive for immunocompromised state.   Neurological:  Negative for dizziness and facial asymmetry.   Hematological:  Negative for adenopathy. Does not bruise/bleed easily.   Psychiatric/Behavioral:  Negative for  "agitation and behavioral problems.    All other systems reviewed and are negative.     Objective:     Vital Signs (Most Recent):  Temp: 98.3 °F (36.8 °C) (04/21/25 1445)  Pulse: 88 (04/21/25 1445)  Resp: 14 (04/21/25 1445)  BP: 118/65 (04/21/25 1445)  SpO2: 100 % (04/21/25 1445) Vital Signs (24h Range):  Temp:  [97.9 °F (36.6 °C)-99.4 °F (37.4 °C)] 98.3 °F (36.8 °C)  Pulse:  [] 88  Resp:  [14-18] 14  SpO2:  [93 %-100 %] 100 %  BP: (107-134)/(57-72) 118/65     Weight: 101.8 kg (224 lb 6.9 oz)  Height: 5' 4" (162.6 cm)  Body mass index is 38.52 kg/m².     Physical Exam  Vitals and nursing note reviewed.   Constitutional:       Appearance: She is well-developed.   HENT:      Head: Normocephalic.   Eyes:      Conjunctiva/sclera: Conjunctivae normal.   Cardiovascular:      Rate and Rhythm: Normal rate and regular rhythm.      Heart sounds: No murmur heard.  Pulmonary:      Effort: Pulmonary effort is normal.      Breath sounds: Normal breath sounds.   Abdominal:      General: Bowel sounds are normal. There is no distension.      Palpations: Abdomen is soft.      Tenderness: There is no abdominal tenderness.      Comments: RLQ incision w/ staples.   STAN x 2   Musculoskeletal:         General: Normal range of motion.      Cervical back: Normal range of motion.   Skin:     General: Skin is warm and dry.      Capillary Refill: Capillary refill takes less than 2 seconds.   Neurological:      Mental Status: She is alert and oriented to person, place, and time.   Psychiatric:         Behavior: Behavior normal.         Thought Content: Thought content normal.         Judgment: Judgment normal.          Laboratory:  CBC:   Recent Labs   Lab 04/19/25  1950 04/19/25  2044 04/20/25  0345 04/20/25  0410 04/21/25  0539   WBC 7.55  --   --  4.00 9.81   RBC 2.48*  --   --  2.60* 2.53*   HGB 8.3*  --   --  8.8* 8.5*   HCT 25.7*   < > 26* 27.0* 26.8*     --   --  95* 75*   *  --   --  104* 106*   MCH 33.5*  --   --  " "33.8* 33.6*   MCHC 32.3  --   --  32.6 31.7*    < > = values in this interval not displayed.     CMP:   Recent Labs   Lab 25  1950 25  0410 25  0837 25  1721 25  1916 25  2317 25  0539   CALCIUM 8.9 8.2* 8.1*  --   --  8.5* 8.5*   ALBUMIN 3.4* 3.2* 3.0*  --   --   --  2.9*    136 135*  --   --  136 131*   K 5.4* 5.3* 6.8*   < > 5.7* 4.9 6.9*   CO2 26 22* 21*  --   --  27 27   CL 99 99 99  --   --  97 95   BUN 44* 48* 46*  --   --  30* 38*   CREATININE 8.7* 8.7* 9.0*  --   --  5.8* 6.2*   ALKPHOS 47  --   --   --   --   --   --    ALT 21  --   --   --   --   --   --    AST 14  --   --   --   --   --   --     < > = values in this interval not displayed.     Labs within the past 24 hours have been reviewed.    Diagnostic Results:  Kidney US ordered  Assessment/Plan:     Hyperkalemia  - post-op requiring HD  - Low K diet      Prophylactic immunotherapy  - Continue Cellcept and Prograf. Will monitor for signs of toxic side effects, check daily tacrolimus troughs, and change meds accordingly.       Long-term use of immunosuppressant medication  - see "prophylactic immuno"      At risk for opportunistic infections  - Valcyte for CMV prophylaxis  - Bactrim for PCP prophylaxis   - To be started on POD 10    Acute blood loss anemia  - expected to improve post-op      Status post -donor kidney transplantation  - s/p DBD kidney transplant for DM2. 2d elayne. STAN x 2  - Post-op HD for hyperkalemia on POD 0  - K remains elevated this am. HD this am  - making minimal urine.  - Kidney US ordered.  - plan for repeat labs pm.     Type 2 diabetes mellitus with diabetic nephropathy  -endocrine consulted       Obesity, morbid        Major depressive disorder, single episode, moderate  - cont home meds      Essential (primary) hypertension  Resume home meds          Discharge Planning: not current candidate  Medical decision making for this encounter includes review of pertinent labs " and diagnostic studies, assessment and planning, discussions with consulting providers, discussion with patient/family, and participation in multidisciplinary rounds. Time spent caring for patient: 60 minutes    Suzanne Newton PA-C  Kidney Transplant  [facility] - Transplant Stepdown

## 2025-04-21 NOTE — ASSESSMENT & PLAN NOTE
Endocrinology consulted for BG management.   BG goal 140-180    - Novolog (Insulin Aspart)  prn for BG excursions [facility] SSI (150/25)  - BG checks q4hr  - Hypoglycemia protocol in place    ** Please notify Endocrine for any change and/or advance in diet**  ** Please call Endocrine for any BG related issues **    Discharge Planning:   TBD. Please notify endocrinology prior to discharge.

## 2025-04-21 NOTE — ASSESSMENT & PLAN NOTE
Titrate insulin slowly to avoid hypoglycemia as the risk of hypoglycemia increases with decreased creatinine clearance.  Estimated Creatinine Clearance: 12.6 mL/min (A) (based on SCr of 6.2 mg/dL (H)).  Glucocorticoids markedly increase glucose levels. Expect the steroid taper will help glucose control.   Optimize BG control to improve wound healing

## 2025-04-21 NOTE — PROGRESS NOTES
Patient arrived to [facility] via stretcher, [id number].    0920 HD treatment started via BRENDA AV Fistula; site looks clean and dry and no complication noted; accessed with good blood flow; 15G needles used; VSS and recorded, DIOMEDES

## 2025-04-21 NOTE — PROGRESS NOTES
04/21/25 1320   Post-Hemodialysis Assessment   Rinseback Volume (mL) 150 mL   Blood Volume Processed (Liters) 76.7 L   Dialyzer Clearance Lightly streaked   Duration of Treatment 240 minutes   Additional Fluid Intake (mL) 350 mL   Total UF (mL) 1308 mL   Net Fluid Removal 800   Patient Response to Treatment tolerated   Post-Treatment Weight 102.5 kg (225 lb 15.5 oz)   Treatment Weight Change -0.6   Post-Hemodialysis Comments NADN     4hrs HD tx completed; 800ml of fluid removed.    Patient tolerated with c/o pain to Left hand, Dr Adhikari at bedside per order to decrease BFR and DFR, see flowsheets.     Blood returned; lines flushed with NS; needles pulled;manual pressure held x 10 mins until hemostasis was achieved.    Report given to GREYSON Flores.    3078 Patient departed [facility] with tele box via wheelchair by unit PCT to Ultrasound. RYLEE

## 2025-04-21 NOTE — PLAN OF CARE
Pt aao x4. Call bell within reach. She is up with standby to 1 person assist. Up to chair throughout shift. Had HD and kidney ultrasound. Daughter and son in law at bedside. Attentive to pt's needs. Pt with c/o of poor pain control. Meds adjusted. She pulled her self meds correctly. Plan to have lab work at 6pm to check potassium level. She is receiving dose #2/2 of thymo. Endocrine following for blood sugar control. She verbalized understanding of plan of care.

## 2025-04-21 NOTE — PROGRESS NOTES
Admit Note     Met with Derek Street daughter and Kailash, son-in-law  to assess patients needs due to patient being in dialysis.  Patient is a 49 y.o.  female, admitted for kidney transplant.     Patient admitted from home on 4/19/2025 .  At this time, patients caregiver presents as alert and oriented x 4, pleasant, good eye contact, well groomed, recall good, concentration/judgement good, average intelligence, calm, communicative, cooperative, and asking and answering questions appropriately.      Household/Family Systems (as reported by patients caregiver)     Patient resides with patient's  and daughter, at [address] [place] [address].  Support system includes , children, sister-in-law and additional family members.  Patient does have dependents that are in need of being cared for. Patient's 10 year old daughter are being cared for by father.     Patients primary caregiver is Mia Reyer, patients sister-in-law phone number [phone].  Mia is a RN and lives next door to patient. Mia has trained family on how to care for patient if needed.     Confirmed patients contact information is [phone] (home);     During admission, patient's caregiver plans to stay at home.  Confirmed patient and patients caregivers do have access to reliable transportation.    Cognitive Status/Learning     Patients caregiver reports patients reading ability as college and states patient does not have difficulty with reading, writing, seeing, hearing, comprehension, learning, and memory.  Patients caregiver reports patient learns best by multisensory instructions.   Needed: No.   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  Adherence counseling and education provided.  Patient's caregiver verbalizes understanding.    Substance Use    Patients caregiver reports patients substance usage as the following:    Tobacco: none, patient denies any use.  Alcohol: none, patient denies any use.  Illicit Drugs/Non-prescribed Medications: none, patient denies any use.  Patients caregiver states clear understanding of the potential impact of substance use.  Substance abstinence/cessation counseling, education and resources provided and reviewed.     Services Utilizing/ADLS (as reported by patients caregiver)    Infusion Service: Prior to admission, patient utilizing? no  Home Health: Prior to admission, patient utilizing? no  DME: Prior to admission, yes home hemo equipment and supplies   Pulmonary/Cardiac Rehab: Prior to admission, no  Dialysis:  Prior to admission, yes Patient does home hemo.   Transplant Specialty Pharmacy:  Prior to admission, no.    Prior to admission, patients caregiver reports patient was independent with ADLS and was driving.  Patients caregiver reports patient is able to care for self at this time..  Patients caregiver reports patient indicates a willingness to care for self once medically cleared to do so.    Insurance/Medications    Insured by   Payer/Plan Subscr  Sex Relation Sub. Ins. ID Effective Group Num   1. [employer] MANAGE* REYER,BRANDE 1975 Female Self [id number]                                   [address] [address]      Primary Insurance (for UNOS reporting): Private Insurance  Secondary Insurance (for UNOS reporting): None    Patients caregiver reports patient is able to obtain and afford medications at this time and at time of discharge.    Living Will/Healthcare Power of     Patients caregiver reports patient does not have a LW and/or HCPA.   provided education regarding LW and HCPA and the completion of forms.    Coping/Mental Health (as reported by  patients caregiver)    Patient is coping adequately with the aid of  family members, service dog and medication. Patients caregiver is coping adequately with the aid of  family members.     Discharge Planning (as reported by patients caregiver)    At time of discharge, patient plans to return to patient's home under the care of Shandy Reyer Patients daughter will transport patient.  Per rounds today, expected discharge date has not been medically determined at this time. Patients caretaker verbalizes understanding and is involved in treatment planning and discharge process.    Additional Concerns    Patient's caretaker denies additional needs and/or concerns at this time. Patient is being followed for needs, education, resources, information, emotional support, supportive counseling, and for supportive and skilled discharge plan of care.  providing ongoing psychosocial support, education, resources and d/c planning as needed.  SW remains available.  remains available. Patient's caregiver verbalizes understanding and agreement with information reviewed,  availability and how to access available resources as needed.    Balbina Ugalde LCSW, Kidney Transplant

## 2025-04-21 NOTE — NURSING
Pt to room from HD and ultrasound at this time. Assisted to chair. Daughter and son in law at bedside.    Per chart: Utox positive for cocaine today, many years history of use.

## 2025-04-22 DIAGNOSIS — Z94.0 KIDNEY REPLACED BY TRANSPLANT: Primary | ICD-10-CM

## 2025-04-22 PROBLEM — N18.9 ANEMIA OF RENAL DISEASE: Status: ACTIVE | Noted: 2025-04-22

## 2025-04-22 PROBLEM — D63.1 ANEMIA OF RENAL DISEASE: Status: ACTIVE | Noted: 2025-04-22

## 2025-04-22 LAB
ABSOLUTE EOSINOPHIL (OHS): 0 K/UL
ABSOLUTE MONOCYTE (OHS): 0.26 K/UL (ref 0.3–1)
ABSOLUTE NEUTROPHIL COUNT (OHS): 6.42 K/UL (ref 1.8–7.7)
ALBUMIN SERPL BCP-MCNC: 2.8 G/DL (ref 3.5–5.2)
ANION GAP (OHS): 10 MMOL/L (ref 8–16)
BASOPHILS # BLD AUTO: 0 K/UL
BASOPHILS NFR BLD AUTO: 0 %
BUN SERPL-MCNC: 34 MG/DL (ref 6–20)
CALCIUM SERPL-MCNC: 8.5 MG/DL (ref 8.7–10.5)
CHLORIDE SERPL-SCNC: 99 MMOL/L (ref 95–110)
CO2 SERPL-SCNC: 24 MMOL/L (ref 23–29)
CREAT SERPL-MCNC: 4.6 MG/DL (ref 0.5–1.4)
ERYTHROCYTE [DISTWIDTH] IN BLOOD BY AUTOMATED COUNT: 12.9 % (ref 11.5–14.5)
GFR SERPLBLD CREATININE-BSD FMLA CKD-EPI: 11 ML/MIN/1.73/M2
GLUCOSE SERPL-MCNC: 148 MG/DL (ref 70–110)
HAPTOGLOB SERPL-MCNC: 111 MG/DL (ref 30–250)
HCT VFR BLD AUTO: 24.7 % (ref 37–48.5)
HCV RNA SERPL NAA+PROBE-LOG IU: NOT DETECTED {LOG_IU}/ML
HGB BLD-MCNC: 7.9 GM/DL (ref 12–16)
IMM GRANULOCYTES # BLD AUTO: 0.03 K/UL (ref 0–0.04)
IMM GRANULOCYTES NFR BLD AUTO: 0.4 % (ref 0–0.5)
LYMPHOCYTES # BLD AUTO: 0.04 K/UL (ref 1–4.8)
MAGNESIUM SERPL-MCNC: 2.2 MG/DL (ref 1.6–2.6)
MCH RBC QN AUTO: 33.2 PG (ref 27–31)
MCHC RBC AUTO-ENTMCNC: 32 G/DL (ref 32–36)
MCV RBC AUTO: 104 FL (ref 82–98)
NUCLEATED RBC (/100WBC) (OHS): 0 /100 WBC
PHOSPHATE SERPL-MCNC: 6.4 MG/DL (ref 2.7–4.5)
PLATELET # BLD AUTO: 66 K/UL (ref 150–450)
PMV BLD AUTO: 9.8 FL (ref 9.2–12.9)
POCT GLUCOSE: 139 MG/DL (ref 70–110)
POCT GLUCOSE: 149 MG/DL (ref 70–110)
POCT GLUCOSE: 150 MG/DL (ref 70–110)
POCT GLUCOSE: 163 MG/DL (ref 70–110)
POCT GLUCOSE: 165 MG/DL (ref 70–110)
POCT GLUCOSE: 167 MG/DL (ref 70–110)
POTASSIUM SERPL-SCNC: 4.8 MMOL/L (ref 3.5–5.1)
POTASSIUM SERPL-SCNC: 5.1 MMOL/L (ref 3.5–5.1)
RBC # BLD AUTO: 2.38 M/UL (ref 4–5.4)
RELATIVE EOSINOPHIL (OHS): 0 %
RELATIVE LYMPHOCYTE (OHS): 0.6 % (ref 18–48)
RELATIVE MONOCYTE (OHS): 3.9 % (ref 4–15)
RELATIVE NEUTROPHIL (OHS): 95.1 % (ref 38–73)
SODIUM SERPL-SCNC: 133 MMOL/L (ref 136–145)
TACROLIMUS BLD-MCNC: 3.4 NG/ML (ref 5–15)
WBC # BLD AUTO: 6.75 K/UL (ref 3.9–12.7)

## 2025-04-22 PROCEDURE — 63600175 PHARM REV CODE 636 W HCPCS: Performed by: TRANSPLANT SURGERY

## 2025-04-22 PROCEDURE — 27000207 HC ISOLATION

## 2025-04-22 PROCEDURE — 83735 ASSAY OF MAGNESIUM: CPT | Performed by: TRANSPLANT SURGERY

## 2025-04-22 PROCEDURE — 85025 COMPLETE CBC W/AUTO DIFF WBC: CPT | Performed by: TRANSPLANT SURGERY

## 2025-04-22 PROCEDURE — 84132 ASSAY OF SERUM POTASSIUM: CPT

## 2025-04-22 PROCEDURE — 63600175 PHARM REV CODE 636 W HCPCS: Performed by: PHYSICIAN ASSISTANT

## 2025-04-22 PROCEDURE — 25000003 PHARM REV CODE 250: Performed by: PHYSICIAN ASSISTANT

## 2025-04-22 PROCEDURE — 80197 ASSAY OF TACROLIMUS: CPT | Performed by: TRANSPLANT SURGERY

## 2025-04-22 PROCEDURE — 80069 RENAL FUNCTION PANEL: CPT | Performed by: TRANSPLANT SURGERY

## 2025-04-22 PROCEDURE — 36415 COLL VENOUS BLD VENIPUNCTURE: CPT

## 2025-04-22 PROCEDURE — 36415 COLL VENOUS BLD VENIPUNCTURE: CPT | Performed by: TRANSPLANT SURGERY

## 2025-04-22 PROCEDURE — 99232 SBSQ HOSP IP/OBS MODERATE 35: CPT | Mod: ,,, | Performed by: NURSE PRACTITIONER

## 2025-04-22 PROCEDURE — 25000003 PHARM REV CODE 250: Performed by: TRANSPLANT SURGERY

## 2025-04-22 PROCEDURE — 83010 ASSAY OF HAPTOGLOBIN QUANT: CPT

## 2025-04-22 PROCEDURE — 51798 US URINE CAPACITY MEASURE: CPT

## 2025-04-22 PROCEDURE — 63600175 PHARM REV CODE 636 W HCPCS

## 2025-04-22 PROCEDURE — 20600001 HC STEP DOWN PRIVATE ROOM

## 2025-04-22 PROCEDURE — 25000003 PHARM REV CODE 250: Performed by: NURSE PRACTITIONER

## 2025-04-22 PROCEDURE — 25000003 PHARM REV CODE 250

## 2025-04-22 RX ORDER — BISACODYL 10 MG/1
10 SUPPOSITORY RECTAL DAILY PRN
Status: DISCONTINUED | OUTPATIENT
Start: 2025-04-22 | End: 2025-04-23 | Stop reason: HOSPADM

## 2025-04-22 RX ORDER — TACROLIMUS 1 MG/1
2 CAPSULE ORAL ONCE
Status: COMPLETED | OUTPATIENT
Start: 2025-04-22 | End: 2025-04-22

## 2025-04-22 RX ORDER — HEPARIN SODIUM 1000 [USP'U]/ML
1000 INJECTION, SOLUTION INTRAVENOUS; SUBCUTANEOUS
Status: CANCELLED | OUTPATIENT
Start: 2025-04-23

## 2025-04-22 RX ORDER — MICONAZOLE NITRATE 2 G/100G
POWDER TOPICAL 2 TIMES DAILY
Status: DISCONTINUED | OUTPATIENT
Start: 2025-04-22 | End: 2025-04-23 | Stop reason: HOSPADM

## 2025-04-22 RX ORDER — OXYBUTYNIN CHLORIDE 5 MG/1
5 TABLET ORAL 3 TIMES DAILY PRN
Status: DISCONTINUED | OUTPATIENT
Start: 2025-04-22 | End: 2025-04-23 | Stop reason: HOSPADM

## 2025-04-22 RX ORDER — LIDOCAINE HYDROCHLORIDE 10 MG/ML
10 INJECTION, SOLUTION INFILTRATION; PERINEURAL ONCE
Status: COMPLETED | OUTPATIENT
Start: 2025-04-22 | End: 2025-04-22

## 2025-04-22 RX ORDER — LIDOCAINE 50 MG/G
2 PATCH TOPICAL
Status: DISCONTINUED | OUTPATIENT
Start: 2025-04-22 | End: 2025-04-23 | Stop reason: HOSPADM

## 2025-04-22 RX ORDER — TACROLIMUS 1 MG/1
7 CAPSULE ORAL 2 TIMES DAILY
Status: DISCONTINUED | OUTPATIENT
Start: 2025-04-22 | End: 2025-04-23

## 2025-04-22 RX ADMIN — BISACODYL 10 MG: 5 TABLET, COATED ORAL at 08:04

## 2025-04-22 RX ADMIN — VILAZODONE HYDROCHLORIDE 40 MG: 10 TABLET ORAL at 07:04

## 2025-04-22 RX ADMIN — METHOCARBAMOL 500 MG: 500 TABLET ORAL at 01:04

## 2025-04-22 RX ADMIN — BUSPIRONE HYDROCHLORIDE 5 MG: 5 TABLET ORAL at 07:04

## 2025-04-22 RX ADMIN — FLUOXETINE HYDROCHLORIDE 40 MG: 20 CAPSULE ORAL at 07:04

## 2025-04-22 RX ADMIN — DOCUSATE SODIUM 100 MG: 100 CAPSULE, LIQUID FILLED ORAL at 02:04

## 2025-04-22 RX ADMIN — HEPARIN SODIUM 5000 UNITS: 5000 INJECTION INTRAVENOUS; SUBCUTANEOUS at 05:04

## 2025-04-22 RX ADMIN — MICONAZOLE NITRATE 2 % TOPICAL POWDER: at 08:04

## 2025-04-22 RX ADMIN — TACROLIMUS 5 MG: 1 CAPSULE ORAL at 07:04

## 2025-04-22 RX ADMIN — HEPARIN SODIUM 5000 UNITS: 5000 INJECTION INTRAVENOUS; SUBCUTANEOUS at 08:04

## 2025-04-22 RX ADMIN — HEPARIN SODIUM 5000 UNITS: 5000 INJECTION INTRAVENOUS; SUBCUTANEOUS at 02:04

## 2025-04-22 RX ADMIN — ONDANSETRON 4 MG: 2 INJECTION INTRAMUSCULAR; INTRAVENOUS at 08:04

## 2025-04-22 RX ADMIN — MYCOPHENOLATE MOFETIL 1000 MG: 250 CAPSULE ORAL at 08:04

## 2025-04-22 RX ADMIN — LIDOCAINE 2 PATCH: 50 PATCH CUTANEOUS at 04:04

## 2025-04-22 RX ADMIN — FUROSEMIDE 200 MG: 10 INJECTION, SOLUTION INTRAMUSCULAR; INTRAVENOUS at 10:04

## 2025-04-22 RX ADMIN — TACROLIMUS 7 MG: 1 CAPSULE ORAL at 05:04

## 2025-04-22 RX ADMIN — DOCUSATE SODIUM 100 MG: 100 CAPSULE, LIQUID FILLED ORAL at 07:04

## 2025-04-22 RX ADMIN — METHOCARBAMOL 500 MG: 500 TABLET ORAL at 08:04

## 2025-04-22 RX ADMIN — TACROLIMUS 2 MG: 1 CAPSULE ORAL at 12:04

## 2025-04-22 RX ADMIN — PREDNISONE 20 MG: 20 TABLET ORAL at 07:04

## 2025-04-22 RX ADMIN — MUPIROCIN 1 G: 20 OINTMENT TOPICAL at 07:04

## 2025-04-22 RX ADMIN — THERA TABS 1 TABLET: TAB at 07:04

## 2025-04-22 RX ADMIN — INSULIN ASPART 2 UNITS: 100 INJECTION, SOLUTION INTRAVENOUS; SUBCUTANEOUS at 05:04

## 2025-04-22 RX ADMIN — OXYCODONE HYDROCHLORIDE 10 MG: 10 TABLET ORAL at 10:04

## 2025-04-22 RX ADMIN — FAMOTIDINE 20 MG: 20 TABLET, FILM COATED ORAL at 08:04

## 2025-04-22 RX ADMIN — CARVEDILOL 3.12 MG: 3.12 TABLET, FILM COATED ORAL at 07:04

## 2025-04-22 RX ADMIN — OXYCODONE HYDROCHLORIDE 10 MG: 10 TABLET ORAL at 09:04

## 2025-04-22 RX ADMIN — BUSPIRONE HYDROCHLORIDE 5 MG: 5 TABLET ORAL at 08:04

## 2025-04-22 RX ADMIN — CARVEDILOL 3.12 MG: 3.12 TABLET, FILM COATED ORAL at 08:04

## 2025-04-22 RX ADMIN — MICONAZOLE NITRATE 2 % TOPICAL POWDER: at 01:04

## 2025-04-22 RX ADMIN — INSULIN ASPART 1 UNITS: 100 INJECTION, SOLUTION INTRAVENOUS; SUBCUTANEOUS at 05:04

## 2025-04-22 RX ADMIN — METHOCARBAMOL 500 MG: 500 TABLET ORAL at 07:04

## 2025-04-22 RX ADMIN — LIDOCAINE HYDROCHLORIDE 10 ML: 10 INJECTION, SOLUTION INFILTRATION; PERINEURAL at 10:04

## 2025-04-22 RX ADMIN — DOCUSATE SODIUM 100 MG: 100 CAPSULE, LIQUID FILLED ORAL at 08:04

## 2025-04-22 RX ADMIN — MYCOPHENOLATE MOFETIL 1000 MG: 250 CAPSULE ORAL at 09:04

## 2025-04-22 RX ADMIN — INSULIN ASPART 2 UNITS: 100 INJECTION, SOLUTION INTRAVENOUS; SUBCUTANEOUS at 09:04

## 2025-04-22 RX ADMIN — METHOCARBAMOL 500 MG: 500 TABLET ORAL at 05:04

## 2025-04-22 RX ADMIN — OXYBUTYNIN CHLORIDE 5 MG: 5 TABLET ORAL at 12:04

## 2025-04-22 RX ADMIN — MUPIROCIN 1 G: 20 OINTMENT TOPICAL at 08:04

## 2025-04-22 NOTE — SUBJECTIVE & OBJECTIVE
Subjective:   History of Present Illness:  Ms. Reyer is a 49 y.o. female with ESRD secondary to diabetic nephropathy. She has been on the wait list for a kidney transplant at [facility] since 5/27/2022. Patient is currently on hemodialysis started on 5/27/2022. Patient is dialyzing on home HD 4 days/week, last HD on 4/17. Patient reports that she is tolerating dialysis well. She has a LUE AV fistula. Patient denies any recent hospitalizations or ED visits. Dr. Cameron will be the primary surgeon. Pre op labs and imaging will be reviewed prior to surgery. Patient is NPO    Ms. Reyer is a 49 y.o. year old female who is status post Kidney Transplant - 4/20/2025  (#1).  Her maintenance immunosuppression consists of:   Immunosuppressants (From admission, onward)      Start     Stop Route Frequency Ordered    04/21/25 1800  tacrolimus capsule 5 mg  (IP TXP KIDNEY POST-OP THYMO WITH PERIPHERAL PRECHECKED IBW)         -- Oral 2 times daily 04/21/25 1003    04/20/25 1000  mycophenolate capsule 1,000 mg  (IP TXP KIDNEY POST-OP THYMO WITH PERIPHERAL PRECHECKED IBW)         -- Oral 2 times daily 04/20/25 0324            Her post transplant course has been complicated by delayed graft function requiring dialysis.    Hospital Course:  Patient is now S/P KTX DBD on 4/20/25 (CIT 10hrs 25 mins)  2 day holly; STAN drains x 2 (lateral one in retroperitoneum, medial one in subcutaneous space). Post-op with hyperkalemia requiring HD on POD0. Also had HD on POD 1 due to hyperkalemia. Not clearing, making minimal urine (~200cc).      Interval History: NAEON. Pateint OOB to chair this morning. H&H down but stable this morning, no s/s of overt bleeding present on exam. Plan to check hapto this afternoon. She reports having some nausea this morning. She reports passing flatus. Bowel sounds hypoactive. Tolerated breakfast this morning. Cr not clearing, remains with < 200ml of urine output. Holly catheter removed this morning. K 5.1 today. Plan for  200mg IV lasix x1 today. Will recheck K this afternoon (1500).  Kidney US 4/22 satisfactory, no fluid collection. Both drains with minial serous sang output. Plan to remove medial drain today. VSS. Cont to monitor      Past Medical, Surgical, Family, and Social History:   Unchanged from H&P.    Scheduled Meds:   bisacodyL  10 mg Oral QHS    busPIRone  5 mg Oral BID    carvediloL  3.125 mg Oral BID    docusate sodium  100 mg Oral TID    famotidine  20 mg Oral QHS    FLUoxetine  40 mg Oral Daily    heparin (porcine)  5,000 Units Subcutaneous Q8H    LIDOcaine  2 patch Transdermal Q24H    methocarbamoL  500 mg Oral QID    miconazole NITRATE 2 %   Topical (Top) BID    multivitamin  1 tablet Oral Daily    mupirocin  1 g Nasal BID    mycophenolate  1,000 mg Oral BID    predniSONE  20 mg Oral Daily    [START ON 4/30/2025] sulfamethoxazole-trimethoprim 400-80mg  1 tablet Oral Daily AM    tacrolimus  5 mg Oral BID    [START ON 4/30/2025] valGANciclovir  450 mg Oral Daily AM    vilazodone  40 mg Oral Daily     Continuous Infusions:   glucagon (human recombinant)  1 mg Intramuscular Continuous PRN         PRN Meds:  Current Facility-Administered Medications:     bisacodyL, 10 mg, Rectal, Daily PRN    [COMPLETED] calcium gluconate IVPB, 1 g, Intravenous, Once **AND** calcium gluconate IVPB, 1 g, Intravenous, Q10 Min PRN    dextrose 50%, 12.5 g, Intravenous, PRN    diphenhydrAMINE, 50 mg, Oral, Once PRN    glucagon (human recombinant), 1 mg, Intramuscular, Continuous PRN    glucose, 16 g, Oral, PRN    glucose, 16 g, Oral, PRN    glucose, 24 g, Oral, PRN    insulin aspart U-100, 0-10 Units, Subcutaneous, Q4H PRN    LIDOcaine-prilocaine, , Topical (Top), PRN    ondansetron, 4 mg, Intravenous, Q6H PRN    oxybutynin, 5 mg, Oral, TID PRN    oxyCODONE, 5 mg, Oral, Q6H PRN    oxyCODONE, 10 mg, Oral, Q6H PRN    sodium chloride 0.9%, 10 mL, Intravenous, PRN    Intake/Output - Last 3 Shifts         04/20 0700 04/21 0659 04/21 0700 04/22  "0659 04/22 0700  04/23 0659    P.O. 810 600     I.V. (mL/kg) 186.5 (1.8)      Other 500 350     IV Piggyback 50 49.4     Total Intake(mL/kg) 1546.5 (15.2) 999.4 (9.8)     Urine (mL/kg/hr) 215 (0.1) 160 (0.1)     Drains 270 88     Other 500 1308     Total Output 985 1556     Net +561.5 -556.6            Urine Occurrence   0 x             Review of Systems   Constitutional:  Negative for activity change and appetite change.   HENT: Negative.  Negative for facial swelling and trouble swallowing.    Eyes:  Negative for visual disturbance.   Respiratory:  Negative for shortness of breath.    Cardiovascular:  Negative for chest pain, palpitations and leg swelling.   Gastrointestinal:  Positive for abdominal distention, abdominal pain and nausea. Negative for diarrhea and vomiting.   Genitourinary:  Positive for decreased urine volume.        [place] with pink tinged urine    Musculoskeletal:  Negative for arthralgias, back pain and joint swelling.   Skin:  Positive for wound.        Right lower quad inc with dressing ion place    Allergic/Immunologic: Positive for immunocompromised state.   Neurological:  Negative for dizziness, tremors, facial asymmetry and weakness.   Hematological:  Negative for adenopathy. Does not bruise/bleed easily.   Psychiatric/Behavioral:  Negative for agitation and behavioral problems.    All other systems reviewed and are negative.     Objective:     Vital Signs (Most Recent):  Temp: 98 °F (36.7 °C) (04/22/25 1105)  Pulse: 87 (04/22/25 1105)  Resp: 18 (04/22/25 1105)  BP: 131/65 (04/22/25 1105)  SpO2: 100 % (04/22/25 1105) Vital Signs (24h Range):  Temp:  [97.9 °F (36.6 °C)-98.8 °F (37.1 °C)] 98 °F (36.7 °C)  Pulse:  [86-92] 87  Resp:  [14-18] 18  SpO2:  [94 %-100 %] 100 %  BP: (113-136)/(57-79) 131/65     Weight: 102.1 kg (225 lb 1.4 oz)  Height: 5' 4" (162.6 cm)  Body mass index is 38.64 kg/m².     Physical Exam  Vitals and nursing note reviewed.   Constitutional:       Appearance: She is " well-developed.   HENT:      Head: Normocephalic.   Eyes:      Conjunctiva/sclera: Conjunctivae normal.   Cardiovascular:      Rate and Rhythm: Normal rate and regular rhythm.      Heart sounds: No murmur heard.  Pulmonary:      Effort: Pulmonary effort is normal.      Breath sounds: Normal breath sounds.   Abdominal:      General: Bowel sounds are decreased. There is no distension.      Palpations: Abdomen is soft.      Tenderness: There is no abdominal tenderness.      Comments: RLQ incision w/ staples.   STAN x 2- serous sang output   Musculoskeletal:         General: Normal range of motion.      Cervical back: Normal range of motion.      Right lower le+ Edema present.      Left lower le+ Edema present.   Skin:     General: Skin is warm and dry.      Capillary Refill: Capillary refill takes less than 2 seconds.   Neurological:      Mental Status: She is alert and oriented to person, place, and time.   Psychiatric:         Attention and Perception: Attention normal.         Mood and Affect: Mood normal.         Speech: Speech normal.         Behavior: Behavior normal. Behavior is cooperative.         Thought Content: Thought content normal.         Judgment: Judgment normal.          Laboratory:  CBC:   Recent Labs   Lab 25  0410 25  0539 25  0550   WBC 4.00 9.81 6.75   RBC 2.60* 2.53* 2.38*   HGB 8.8* 8.5* 7.9*   HCT 27.0* 26.8* 24.7*   PLT 95* 75* 66*   * 106* 104*   MCH 33.8* 33.6* 33.2*   MCHC 32.6 31.7* 32.0     CMP:   Recent Labs   Lab 25  1950 25  0410 25  0539 25  1815 25  0550   CALCIUM 8.9   < > 8.5* 8.5* 8.5*   ALBUMIN 3.4*   < > 2.9* 2.9* 2.8*      < > 131* 133* 133*   K 5.4*   < > 6.9* 4.7 5.1   CO2 26   < > 27 25 24   CL 99   < > 95 99 99   BUN 44*   < > 38* 20 34*   CREATININE 8.7*   < > 6.2* 3.5* 4.6*   ALKPHOS 47  --   --   --   --    ALT 21  --   --   --   --    AST 14  --   --   --   --     < > = values in this interval not  displayed.     Labs within the past 24 hours have been reviewed.    Diagnostic Results:  US - Kidney: Results for orders placed during the hospital encounter of 04/19/25    US Transplant Kidney With Doppler    Narrative  EXAMINATION:  US TRANSPLANT KIDNEY WITH DOPPLER    CLINICAL HISTORY:  complication after renal transplant;    TECHNIQUE:  Real time gray scale and doppler ultrasound was performed over the patient's renal allograft.    COMPARISON:  06/20/2024    FINDINGS:  Renal allograft in the right lower quadrant.  The allograft measures 10.7 cm. Normal perfusion.  Single artery and vein.  No hydronephrosis.  Stent present, difficult to visualize.  [place] catheter within the bladder.    No fluid collections.    Vasculature:    Resistive indices ranged from 0.58 to 0.79.    Main renal artery peak systolic velocity: 176cm/sec with normal waveform.    Renal artery/iliac ratio: 1.1.    The main renal vein is patent.    Impression  Satisfactory gray scale and doppler evaluation of renal allograft.      Electronically signed by: Kendrick Cleaning MD  Date:    04/21/2025  Time:    15:17

## 2025-04-22 NOTE — PLAN OF CARE
AAOx4.  OOBTC w 1x assist.  VSS.  Afebrile.  NSR on tele.  On room air.  Pt w DGF, s/p HD 4/21.  Kid u/s yesterday satisfactory.  Thymo completed o/n.  RLQ incision w staples CDI.  R STAN drain x2 w scant ss output.  Renal diet, Q4 accuchecks.  (+)flatus, (-)BM.  [place] w red UOP, removed this AM.  Pt declined to pull self meds last night d/t being tired and in pain.  PRN oxy given for pain and ditropan ordered for bladder spasms.  Waffle mattress in place.  Fall pxns maintained.

## 2025-04-22 NOTE — PROGRESS NOTES
"[facility] - Transplant Stepdown  Endocrinology  Progress Note    Admit Date: 2025     Reason for Consult: Management of T2DM, Hyperglycemia     Surgical Procedure and Date: s/p kidney transplant on 2025    Diabetes diagnosis year: > 5 years    Home Diabetes Medications:  Mounjaro 15 mg weekly     How often checking glucose at home? 1-3 x day   BG readings on regimen: 100-160's  Hypoglycemia on the regimen?  No  Missed doses on regimen?  No    Diabetes Complications include:     Hyperglycemia    Complicating diabetes co morbidities:   CKD and Glucocorticoid use       HPI: Ms. Reyer is a 49 y.o. female with ESRD secondary to diabetic nephropathy. She is now s/p kidney transplant on 2025. Endocrine consulted to manage hyperglycemia and type 2 diabetes in the consult of high-dose steroids.     Hemoglobin A1C   Date Value Ref Range Status   2025 5.2 4.8 - 5.9 % Final   2024 4.9 4.8 - 5.9 % Final   2024 5.4 4.8 - 5.9 % Final   2010 6.1 4.0 - 6.2 % Final              Interval HPI:   Overnight events: No acute events overnight. Patient in room 69560/03987 A. Blood glucose stable. BG at and above goal on current insulin regimen (SSI ). Steroid use- Prednisone 20 mg. 3 Days Post-Op  Renal function- Abnormal - Creatinine 4.8   Vasopressors-  None       Endocrine will continue to follow and manage insulin orders inpatient.         Diet Renal Non-Dialysis     Eatin%  Nausea: No  Hypoglycemia and intervention: No  Fever: No  TPN and/or TF: No      BP (!) 120/57 (BP Location: Right arm, Patient Position: Sitting)   Pulse 91   Temp 98.8 °F (37.1 °C) (Oral)   Resp 18   Ht 5' 4" (1.626 m)   Wt 102.1 kg (225 lb 1.4 oz)   SpO2 95%   Breastfeeding No   BMI 38.64 kg/m²     Labs Reviewed and Include    Recent Labs   Lab 25  0550   CALCIUM 8.5*   ALBUMIN 2.8*   *   K 5.1   CO2 24   CL 99   BUN 34*   CREATININE 4.6*     Lab Results   Component Value Date    WBC 6.75 " "2025    HGB 7.9 (L) 2025    HCT 24.7 (L) 2025     (H) 2025    PLT 66 (L) 2025     No results for input(s): "TSH", "FREET4" in the last 168 hours.  Lab Results   Component Value Date    HGBA1C 5.2 2025       Nutritional status:   Body mass index is 38.64 kg/m².  Lab Results   Component Value Date    ALBUMIN 2.8 (L) 2025    ALBUMIN 2.9 (L) 2025    ALBUMIN 2.9 (L) 2025     No results found for: "PREALBUMIN"    Estimated Creatinine Clearance: 17 mL/min (A) (based on SCr of 4.6 mg/dL (H)).    Accu-Checks  Recent Labs     25  2230 25  2304 25  2358 25  0411 25  1254 25  1726 25  2057 25  0020 25  0536 25  0815   POCTGLUCOSE 333* 249* 190* 123* 122* 142* 175* 150* 163* 165*       Current Medications and/or Treatments Impacting Glycemic Control  Immunotherapy:    Immunosuppressants           Stop Route Frequency     tacrolimus capsule 5 mg         -- Oral 2 times daily     mycophenolate capsule 1,000 mg         -- Oral 2 times daily          Steroids:   Hormones (From admission, onward)      Start     Stop Route Frequency Ordered    25 0900  predniSONE tablet 20 mg  (IP TXP KIDNEY POST-OP THYMO WITH PERIPHERAL PRECHECKED IBW)         -- Oral Daily 25 0324          Pressors:    Autonomic Drugs (From admission, onward)      None          Hyperglycemia/Diabetes Medications:   Antihyperglycemics (From admission, onward)      Start     Stop Route Frequency Ordered    25 1557  insulin aspart U-100 pen 0-10 Units         -- SubQ Every 4 hours PRN 25 1458            ASSESSMENT and PLAN    Cardiac/Vascular  Essential (primary) hypertension  Uncontrolled HTN can worsen insulin resistance.         Hyperlipidemia    On statin therapy per ADA guidelines.       Renal/  * Status post -donor kidney transplantation  Titrate insulin slowly to avoid hypoglycemia as the risk of hypoglycemia " increases with decreased creatinine clearance.  Estimated Creatinine Clearance: 17 mL/min (A) (based on SCr of 4.6 mg/dL (H)).  Glucocorticoids markedly increase glucose levels. Expect the steroid taper will help glucose control.   Optimize BG control to improve wound healing        Endocrine  Type 2 diabetes mellitus with diabetic nephropathy  Endocrinology consulted for BG management.   BG goal 140-180    - Novolog (Insulin Aspart)  prn for BG excursions [facility] (150/25)  - BG checks q4hr  - Hypoglycemia protocol in place    ** Please notify Endocrine for any change and/or advance in diet**  ** Please call Endocrine for any BG related issues **    Discharge Planning:   TBD. Please notify endocrinology prior to discharge.             Isaac Aviles, DNP, FNP  Endocrinology  [facility] - Transplant Stepdown

## 2025-04-22 NOTE — SUBJECTIVE & OBJECTIVE
"Interval HPI:   Overnight events: No acute events overnight. Patient in room 66914/24770 A. Blood glucose stable. BG at and above goal on current insulin regimen (SSI ). Steroid use- Prednisone 20 mg. 3 Days Post-Op  Renal function- Abnormal - Creatinine 4.8   Vasopressors-  None       Endocrine will continue to follow and manage insulin orders inpatient.         Diet Renal Non-Dialysis     Eatin%  Nausea: No  Hypoglycemia and intervention: No  Fever: No  TPN and/or TF: No      BP (!) 120/57 (BP Location: Right arm, Patient Position: Sitting)   Pulse 91   Temp 98.8 °F (37.1 °C) (Oral)   Resp 18   Ht 5' 4" (1.626 m)   Wt 102.1 kg (225 lb 1.4 oz)   SpO2 95%   Breastfeeding No   BMI 38.64 kg/m²     Labs Reviewed and Include    Recent Labs   Lab 25  0550   CALCIUM 8.5*   ALBUMIN 2.8*   *   K 5.1   CO2 24   CL 99   BUN 34*   CREATININE 4.6*     Lab Results   Component Value Date    WBC 6.75 2025    HGB 7.9 (L) 2025    HCT 24.7 (L) 2025     (H) 2025    PLT 66 (L) 2025     No results for input(s): "TSH", "FREET4" in the last 168 hours.  Lab Results   Component Value Date    HGBA1C 5.2 2025       Nutritional status:   Body mass index is 38.64 kg/m².  Lab Results   Component Value Date    ALBUMIN 2.8 (L) 2025    ALBUMIN 2.9 (L) 2025    ALBUMIN 2.9 (L) 2025     No results found for: "PREALBUMIN"    Estimated Creatinine Clearance: 17 mL/min (A) (based on SCr of 4.6 mg/dL (H)).    Accu-Checks  Recent Labs     25  2230 25  2304 25  2358 25  0411 25  1254 25  1726 25  2057 25  0020 25  0536 25  0815   POCTGLUCOSE 333* 249* 190* 123* 122* 142* 175* 150* 163* 165*       Current Medications and/or Treatments Impacting Glycemic Control  Immunotherapy:    Immunosuppressants           Stop Route Frequency     tacrolimus capsule 5 mg         -- Oral 2 times daily     mycophenolate capsule " 1,000 mg         -- Oral 2 times daily          Steroids:   Hormones (From admission, onward)      Start     Stop Route Frequency Ordered    04/22/25 0900  predniSONE tablet 20 mg  (IP TXP KIDNEY POST-OP THYMO WITH PERIPHERAL PRECHECKED IBW)         -- Oral Daily 04/20/25 0324          Pressors:    Autonomic Drugs (From admission, onward)      None          Hyperglycemia/Diabetes Medications:   Antihyperglycemics (From admission, onward)      Start     Stop Route Frequency Ordered    04/20/25 1557  insulin aspart U-100 pen 0-10 Units         -- SubQ Every 4 hours PRN 04/20/25 4873

## 2025-04-22 NOTE — PROGRESS NOTES
Patient admitted for Kidney transplant.  Transplant coordinator met with the patient on rounds to introduce self and explain the coordinator role.     The post-transplant teaching book was given.   Transplant Coordinator explained that she will follow the patient while in the hospital and assist with discharge.    Transplant Specifics  ESRD 2/2 DM (home hemo)  SCD, KDPI 61%  Thymo induction  CMV D-,R+

## 2025-04-22 NOTE — PLAN OF CARE
Pt. AAO x 3  Pt. Educated on home meds; verbalizes understanding.  Pt. has a chevron incision with staples no redness or drainage.  Pt. Has a mohan drain in place with sanguineous drainage noted.  Pt. Cbg's has been WNL.  Pt. Compliant with care.  Pt. Ambulates to and from the bathroom.

## 2025-04-22 NOTE — ANESTHESIA POSTPROCEDURE EVALUATION
Anesthesia Post Evaluation    Patient: Brande Reyer    Procedure(s) Performed: Procedure(s) (LRB):  TRANSPLANT, KIDNEY (N/A)    Final Anesthesia Type: general      Patient location during evaluation: PACU  Patient participation: Yes- Able to Participate  Level of consciousness: awake and alert  Post-procedure vital signs: reviewed and stable  Pain management: adequate  Airway patency: patent  TIA mitigation strategies: Extubation and recovery carried out in lateral, semiupright, or other nonsupine position  PONV status at discharge: No PONV  Anesthetic complications: no      Cardiovascular status: blood pressure returned to baseline  Respiratory status: room air  Hydration status: euvolemic  Follow-up not needed.              Vitals Value Taken Time   /65 04/22/25 11:05   Temp 36.7 °C (98 °F) 04/22/25 11:05   Pulse 87 04/22/25 11:05   Resp 18 04/22/25 11:05   SpO2 100 % 04/22/25 11:05         Event Time   Out of Recovery 04/20/2025 04:30:00         Pain/Jesse Score: Pain Rating Prior to Med Admin: 8 (4/22/2025 10:18 AM)  Pain Rating Post Med Admin: 4 (4/21/2025  4:17 PM)

## 2025-04-22 NOTE — PROGRESS NOTES
[facility] Transplant [facility]  Kidney Transplant  Progress Note      Reason for Follow-up: Reassessment of Kidney Transplant - 4/20/2025  (#1) recipient and management of immunosuppression.    ORGAN:   RIGHT KIDNEY    Donor Type:   Donation after Brain Death    Donor CMV Status: Negative  Donor HBcAB:Negative  Donor HCV Status:Negative  Donor HBV MARKO:   Donor HCV MARKO: Negative      Subjective:   History of Present Illness:  Ms. Reyer is a 49 y.o. female with ESRD secondary to diabetic nephropathy. She has been on the wait list for a kidney transplant at [facility] since 5/27/2022. Patient is currently on hemodialysis started on 5/27/2022. Patient is dialyzing on home HD 4 days/week, last HD on 4/17. Patient reports that she is tolerating dialysis well. She has a LUE AV fistula. Patient denies any recent hospitalizations or ED visits. Dr. Cameron will be the primary surgeon. Pre op labs and imaging will be reviewed prior to surgery. Patient is NPO    Ms. Reyer is a 49 y.o. year old female who is status post Kidney Transplant - 4/20/2025  (#1).  Her maintenance immunosuppression consists of:   Immunosuppressants (From admission, onward)      Start     Stop Route Frequency Ordered    04/21/25 1800  tacrolimus capsule 5 mg  (IP TXP KIDNEY POST-OP THYMO WITH PERIPHERAL PRECHECKED IBW)         -- Oral 2 times daily 04/21/25 1003    04/20/25 1000  mycophenolate capsule 1,000 mg  (IP TXP KIDNEY POST-OP THYMO WITH PERIPHERAL PRECHECKED IBW)         -- Oral 2 times daily 04/20/25 0324            Her post transplant course has been complicated by delayed graft function requiring dialysis.    Hospital Course:  Patient is now S/P KTX DBD on 4/20/25 (CIT 10hrs 25 mins)  2 day holly; STAN drains x 2 (lateral one in retroperitoneum, medial one in subcutaneous space). Post-op with hyperkalemia requiring HD on POD0. Also had HD on POD 1 due to hyperkalemia. Not clearing, making minimal urine (~200cc).      Interval History: SHIMON. Nathaliaeinluis alberto  OOB to chair this morning. H&H down but stable this morning, no s/s of overt bleeding present on exam. Plan to check hapto this afternoon. She reports having some nausea this morning. She reports passing flatus. Bowel sounds hypoactive. Tolerated breakfast this morning. Cr not clearing, remains with < 200ml of urine output. [place] catheter removed this morning. K 5.1 today. Plan for 200mg IV lasix x1 today. Will recheck K this afternoon (1500).  Kidney US 4/22 satisfactory, no fluid collection. Both drains with minial serous sang output. Plan to remove medial drain today. VSS. Cont to monitor      Past Medical, Surgical, Family, and Social History:   Unchanged from H&P.    Scheduled Meds:   bisacodyL  10 mg Oral QHS    busPIRone  5 mg Oral BID    carvediloL  3.125 mg Oral BID    docusate sodium  100 mg Oral TID    famotidine  20 mg Oral QHS    FLUoxetine  40 mg Oral Daily    heparin (porcine)  5,000 Units Subcutaneous Q8H    LIDOcaine  2 patch Transdermal Q24H    methocarbamoL  500 mg Oral QID    miconazole NITRATE 2 %   Topical (Top) BID    multivitamin  1 tablet Oral Daily    mupirocin  1 g Nasal BID    mycophenolate  1,000 mg Oral BID    predniSONE  20 mg Oral Daily    [START ON 4/30/2025] sulfamethoxazole-trimethoprim 400-80mg  1 tablet Oral Daily AM    tacrolimus  5 mg Oral BID    [START ON 4/30/2025] valGANciclovir  450 mg Oral Daily AM    vilazodone  40 mg Oral Daily     Continuous Infusions:   glucagon (human recombinant)  1 mg Intramuscular Continuous PRN         PRN Meds:  Current Facility-Administered Medications:     bisacodyL, 10 mg, Rectal, Daily PRN    [COMPLETED] calcium gluconate IVPB, 1 g, Intravenous, Once **AND** calcium gluconate IVPB, 1 g, Intravenous, Q10 Min PRN    dextrose 50%, 12.5 g, Intravenous, PRN    diphenhydrAMINE, 50 mg, Oral, Once PRN    glucagon (human recombinant), 1 mg, Intramuscular, Continuous PRN    glucose, 16 g, Oral, PRN    glucose, 16 g, Oral, PRN    glucose, 24 g, Oral,  PRN    insulin aspart U-100, 0-10 Units, Subcutaneous, Q4H PRN    LIDOcaine-prilocaine, , Topical (Top), PRN    ondansetron, 4 mg, Intravenous, Q6H PRN    oxybutynin, 5 mg, Oral, TID PRN    oxyCODONE, 5 mg, Oral, Q6H PRN    oxyCODONE, 10 mg, Oral, Q6H PRN    sodium chloride 0.9%, 10 mL, Intravenous, PRN    Intake/Output - Last 3 Shifts         04/20 0700 04/21 0659 04/21 0700 04/22 0659 04/22 0700 04/23 0659    P.O. 810 600     I.V. (mL/kg) 186.5 (1.8)      Other 500 350     IV Piggyback 50 49.4     Total Intake(mL/kg) 1546.5 (15.2) 999.4 (9.8)     Urine (mL/kg/hr) 215 (0.1) 160 (0.1)     Drains 270 88     Other 500 1308     Total Output 985 1556     Net +561.5 -556.6            Urine Occurrence   0 x             Review of Systems   Constitutional:  Negative for activity change and appetite change.   HENT: Negative.  Negative for facial swelling and trouble swallowing.    Eyes:  Negative for visual disturbance.   Respiratory:  Negative for shortness of breath.    Cardiovascular:  Negative for chest pain, palpitations and leg swelling.   Gastrointestinal:  Positive for abdominal distention, abdominal pain and nausea. Negative for diarrhea and vomiting.   Genitourinary:  Positive for decreased urine volume.        [place] with pink tinged urine    Musculoskeletal:  Negative for arthralgias, back pain and joint swelling.   Skin:  Positive for wound.        Right lower quad inc with dressing ion place    Allergic/Immunologic: Positive for immunocompromised state.   Neurological:  Negative for dizziness, tremors, facial asymmetry and weakness.   Hematological:  Negative for adenopathy. Does not bruise/bleed easily.   Psychiatric/Behavioral:  Negative for agitation and behavioral problems.    All other systems reviewed and are negative.     Objective:     Vital Signs (Most Recent):  Temp: 98 °F (36.7 °C) (04/22/25 1105)  Pulse: 87 (04/22/25 1105)  Resp: 18 (04/22/25 1105)  BP: 131/65 (04/22/25 1105)  SpO2: 100 %  "(25 1105) Vital Signs (24h Range):  Temp:  [97.9 °F (36.6 °C)-98.8 °F (37.1 °C)] 98 °F (36.7 °C)  Pulse:  [86-92] 87  Resp:  [14-18] 18  SpO2:  [94 %-100 %] 100 %  BP: (113-136)/(57-79) 131/65     Weight: 102.1 kg (225 lb 1.4 oz)  Height: 5' 4" (162.6 cm)  Body mass index is 38.64 kg/m².     Physical Exam  Vitals and nursing note reviewed.   Constitutional:       Appearance: She is well-developed.   HENT:      Head: Normocephalic.   Eyes:      Conjunctiva/sclera: Conjunctivae normal.   Cardiovascular:      Rate and Rhythm: Normal rate and regular rhythm.      Heart sounds: No murmur heard.  Pulmonary:      Effort: Pulmonary effort is normal.      Breath sounds: Normal breath sounds.   Abdominal:      General: Bowel sounds are decreased. There is no distension.      Palpations: Abdomen is soft.      Tenderness: There is no abdominal tenderness.      Comments: RLQ incision w/ staples.   STAN x 2- serous sang output   Musculoskeletal:         General: Normal range of motion.      Cervical back: Normal range of motion.      Right lower le+ Edema present.      Left lower le+ Edema present.   Skin:     General: Skin is warm and dry.      Capillary Refill: Capillary refill takes less than 2 seconds.   Neurological:      Mental Status: She is alert and oriented to person, place, and time.   Psychiatric:         Attention and Perception: Attention normal.         Mood and Affect: Mood normal.         Speech: Speech normal.         Behavior: Behavior normal. Behavior is cooperative.         Thought Content: Thought content normal.         Judgment: Judgment normal.          Laboratory:  CBC:   Recent Labs   Lab 25  0410 25  0539 25  0550   WBC 4.00 9.81 6.75   RBC 2.60* 2.53* 2.38*   HGB 8.8* 8.5* 7.9*   HCT 27.0* 26.8* 24.7*   PLT 95* 75* 66*   * 106* 104*   MCH 33.8* 33.6* 33.2*   MCHC 32.6 31.7* 32.0     CMP:   Recent Labs   Lab 25  1950 25  0410 25  0539 " 25  1815 25  0550   CALCIUM 8.9   < > 8.5* 8.5* 8.5*   ALBUMIN 3.4*   < > 2.9* 2.9* 2.8*      < > 131* 133* 133*   K 5.4*   < > 6.9* 4.7 5.1   CO2 26   < > 27 25 24   CL 99   < > 95 99 99   BUN 44*   < > 38* 20 34*   CREATININE 8.7*   < > 6.2* 3.5* 4.6*   ALKPHOS 47  --   --   --   --    ALT 21  --   --   --   --    AST 14  --   --   --   --     < > = values in this interval not displayed.     Labs within the past 24 hours have been reviewed.    Diagnostic Results:  US - Kidney: Results for orders placed during the hospital encounter of 25    US Transplant Kidney With Doppler    Narrative  EXAMINATION:  US TRANSPLANT KIDNEY WITH DOPPLER    CLINICAL HISTORY:  complication after renal transplant;    TECHNIQUE:  Real time gray scale and doppler ultrasound was performed over the patient's renal allograft.    COMPARISON:  2024    FINDINGS:  Renal allograft in the right lower quadrant.  The allograft measures 10.7 cm. Normal perfusion.  Single artery and vein.  No hydronephrosis.  Stent present, difficult to visualize.  Holly catheter within the bladder.    No fluid collections.    Vasculature:    Resistive indices ranged from 0.58 to 0.79.    Main renal artery peak systolic velocity: 176cm/sec with normal waveform.    Renal artery/iliac ratio: 1.1.    The main renal vein is patent.    Impression  Satisfactory gray scale and doppler evaluation of renal allograft.      Electronically signed by: Kendrick Cleaning MD  Date:    2025  Time:    15:17  Assessment/Plan:     * Status post -donor kidney transplantation  - s/p DBD kidney transplant for DM2. 2d holly. STAN x 2  - Post-op HD for hyperkalemia on POD 0  - hyperkalemic on POD 1, had HD  - Kidney US  satisfactory; no fluid collections; surgeon reviewed.  - making minimal urine.  - Cr not clearing  - lasix 200mg x1 today  - patient home hemo prior to txp. Will need outpatient chair  - holly removed , due to void  - STAN drains  "x 2 with minimal serous sang output  - plan to remove medical drain today  - encourage PO intake, OOB to chair and ambulation  - (+) flatus, (-) BM  - having some nausea this morning, improved with PRN zofran. Tolerated breakfast without issues  - cont bowel regimen  - monitor        Anemia of renal disease  - H&H down but stable  - no overt s/s of bleeding   - no fluid collections seen on US  - plan to check hapto level this afternoon  - cont to monitor      Hyperkalemia  - post-op requiring HD POD 0  - required HD again on POD due to hyperkalemia  - Low K diet  - K 5.1 today, lasix 200mg IV x1  - recheck K scheduled for 1500  - continue tele  - monitor    Prophylactic immunotherapy  - Continue Cellcept and Prograf. Will monitor for signs of toxic side effects, check daily tacrolimus troughs, and change meds accordingly.       Long-term use of immunosuppressant medication  - see "prophylactic immuno"      At risk for opportunistic infections  - Valcyte for CMV prophylaxis  - Bactrim for PCP prophylaxis   - To be started on POD 10    Acute blood loss anemia  - expected to improve post-op      Type 2 diabetes mellitus with diabetic nephropathy  -endocrine consulted       Major depressive disorder, single episode, moderate  - cont home meds      Essential (primary) hypertension  - continue carvedilol       Hyperlipidemia  - restart statin prior to dc          Discharge Planning:  Not a candidate at this time    Medical decision making for this encounter includes review of pertinent labs and diagnostic studies, assessment and planning, discussions with consulting providers, discussion with patient/family, and participation in multidisciplinary rounds. Time spent caring for patient: 60 minutes    Amada Pro NP  Kidney Transplant  [facility] - Transplant Stepdown  "

## 2025-04-22 NOTE — ASSESSMENT & PLAN NOTE
Titrate insulin slowly to avoid hypoglycemia as the risk of hypoglycemia increases with decreased creatinine clearance.  Estimated Creatinine Clearance: 17 mL/min (A) (based on SCr of 4.6 mg/dL (H)).  Glucocorticoids markedly increase glucose levels. Expect the steroid taper will help glucose control.   Optimize BG control to improve wound healing

## 2025-04-22 NOTE — PLAN OF CARE
Recommendations  1. Continue Renal non-dialysis diet- recommend liberalizing as renal labs stabilize  2. Add Novasource Renal BID if po intake declines <50% meal consumption   3. RD following     Goals: Meet % of EEN/EPN by RD f/u date  Nutrition Goal Status: goal not met  Communication of RD Recs: POC   Is This A New Presentation, Or A Follow-Up?: Follow Up Isotretinoin Additional History: The patient reports she weighs 140 pounds. She is taking 60mg of Isotritenoin.

## 2025-04-22 NOTE — DISCHARGE SUMMARY
[facility] - Transplant Stepdown  Kidney Transplant  Discharge Summary    Patient Name: Brande Reyer  MRN: [id number]  Admission Date: 4/19/2025  Hospital Length of Stay: 4 days  Discharge Date and Time: 04/23/2025 12:01 PM  Attending Physician: Josh Cameron Jr., *   Discharging Provider: Amdaa Phillips PA-C  Primary Care Provider: Janie Shankar FNP-C    HPI:   Ms. Reyer is a 49 y.o. female with ESRD secondary to diabetic nephropathy. She has been on the wait list for a kidney transplant at [facility] since 5/27/2022. Patient is currently on hemodialysis started on 5/27/2022. Patient is dialyzing on home HD 4 days/week, last HD on 4/17. Patient reports that she is tolerating dialysis well. She has a LUE AV fistula. Patient denies any recent hospitalizations or ED visits. Dr. Cameron will be the primary surgeon. Pre op labs and imaging will be reviewed prior to surgery. Patient is NPO  Procedure(s) (LRB):  TRANSPLANT, KIDNEY (N/A)     Hospital Course:    Patient is now S/P KTX DBD on 4/20/25 (CIT 10hrs 25 mins)  2 day holly; STAN drains x 2 (lateral one in retroperitoneum, medial one in subcutaneous space). Post-op with hyperkalemia requiring HD on POD0. Also had HD on POD 1 due to hyperkalemia. Not clearing, making minimal urine (~200cc), DGF. Kidney US 4/21 satisfactory, no fluid collection. Holly catheter and medial STAN drain removed on 4/22 without issues. Lasix trial with 200mg IV lasix x1 on 4/22 without much urine output. Patient to continue HD until DGF resolves. Set up with outpatient chair MW.      Patient is medically stable for discharge. She reports feeling well on day of discharge. Lateral drain removed prior to dc.  She is passing flatus, tolerating a diet, and ambulating independently. She will follow up with labs and clinic tomorrow.  Patient verbalizes understanding and importance of discharge instructions and follow up.      Length of Stay was longer than expected due to: Discharged as  expected    Goals of Care Treatment Preferences:  Code Status: Full Code      Final Active Diagnoses:    Diagnosis Date Noted POA    PRINCIPAL PROBLEM:  Status post -donor kidney transplantation [Z94.0] 2025 Not Applicable    Anemia of renal disease [N18.9, D63.1] 2025 Yes    Hyperkalemia [E87.5] 2025 Yes    Acute blood loss anemia [D62] 2025 No    At risk for opportunistic infections [Z91.89] 2025 No    Long-term use of immunosuppressant medication [Z79.60] 2025 Not Applicable    Prophylactic immunotherapy [Z29.89] 2025 Not Applicable    Essential (primary) hypertension [I10] 2023 Yes    Hyperlipidemia [E78.5] 2023 Yes    Type 2 diabetes mellitus with diabetic nephropathy [E11.21] 2023 Yes    Major depressive disorder, single episode, moderate [F32.1] 2023 Yes      Problems Resolved During this Admission:    Diagnosis Date Noted Date Resolved POA    Kidney transplant candidate [Z76.82] 2024 Not Applicable    ESRD on hemodialysis [N18.6, Z99.2] 2022 Not Applicable     Treatments:  As above    Consults (From admission, onward)          Status Ordering Provider     Inpatient consult to Endocrinology  Once        Provider:  (Not yet assigned)    Completed KAREEN LEYVA     Inpatient consult to Registered Dietitian/Nutritionist  Once        Provider:  (Not yet assigned)    Completed EVY ODOM JR     Inpatient consult to Transplant Nephrology (KTM)  Once        Provider:  (Not yet assigned)    Acknowledged EVY ODOM JR            Pending Diagnostic Studies:       Procedure Component Value Units Date/Time    Hepatitis B Surface Antibody, Qual/Quant [[id number]] Collected: 25 1950    Order Status: Sent Lab Status: In process Updated: 25    Specimen: Blood           Significant Diagnostic Studies: Labs: CMP   Recent Labs   Lab 25  1815 25  0550 25  1437  04/23/25  0449   * 133*  --  135*   K 4.7 5.1 4.8 5.3*   CL 99 99  --  100   CO2 25 24  --  22*   BUN 20 34*  --  56*   CREATININE 3.5* 4.6*  --  6.4*   CALCIUM 8.5* 8.5*  --  9.0   ALBUMIN 2.9* 2.8*  --  3.0*   ANIONGAP 9 10  --  13   , CBC   Recent Labs   Lab 04/22/25  0550 04/23/25  0449   WBC 6.75 6.96   HGB 7.9* 8.1*   HCT 24.7* 24.7*   PLT 66* 80*   , and All labs within the past 24 hours have been reviewed    Discharged Condition: good    Disposition: Home or Self Care    Follow Up:    Patient Instructions:      Diet renal     No dressing needed     Notify your health care provider if you experience any of the following:  temperature >100.4     Notify your health care provider if you experience any of the following:  persistent nausea and vomiting or diarrhea     Notify your health care provider if you experience any of the following:  severe uncontrolled pain     Notify your health care provider if you experience any of the following:  redness, tenderness, or signs of infection (pain, swelling, redness, odor or green/yellow discharge around incision site)     Notify your health care provider if you experience any of the following:  difficulty breathing or increased cough     Notify your health care provider if you experience any of the following:  severe persistent headache     Notify your health care provider if you experience any of the following:  worsening rash     Notify your health care provider if you experience any of the following:  persistent dizziness, light-headedness, or visual disturbances     Notify your health care provider if you experience any of the following:  increased confusion or weakness     Notify your health care provider if you experience any of the following:   Order Comments: Any other concerning signs or symptoms. If you have not received your scheduled follow up within 24 hours of your discharge or for any questions or concerns, please call [phone] for further  assistance.     Type And Screen Preop   Standing Status: Future Standing Exp. Date: 07/18/26     Activity as tolerated     Medications:  Reconciled Home Medications:      Medication List        PAUSE taking these medications      LOKELMA 10 gram packet  Wait to take this until your doctor or other care provider tells you to start again.  Generic drug: sodium zirconium cyclosilicate  Take 10 g by mouth. Mix entire contents of packet(s) into drinking glass containing 3 tablespoons of water; stir well and drink immediately. Add water and repeat until no powder remains to receive entire dose.     MOUNJARO 2.5 mg/0.5 mL Pnij  Wait to take this until your doctor or other care provider tells you to start again.  Generic drug: tirzepatide  Inject 2.5 mg into the skin every 7 days.            START taking these medications      carvediloL 6.25 MG tablet  Commonly known as: COREG  Take 1 tablet (6.25 mg total) by mouth 2 (two) times daily.     famotidine 20 MG tablet  Commonly known as: PEPCID  Take 1 tablet (20 mg total) by mouth once daily.     FLUoxetine 40 MG capsule  Take 1 capsule (40 mg total) by mouth once daily.     insulin aspart U-100 100 unit/mL (3 mL) Inpn pen  Commonly known as: NovoLOG  Inject 0-10 Units into the skin 3 (three) times daily with meals. Per sliding scale. (MDD: 30 units)     methocarbamoL 500 MG Tab  Commonly known as: Robaxin  Take 1 tablet (500 mg total) by mouth 4 (four) times daily as needed (muscle spasms).     multivitamin Tab  Take 1 tablet by mouth once daily.     mycophenolate 250 mg Cap  Commonly known as: CELLCEPT  Take 4 capsules (1,000 mg total) by mouth 2 (two) times daily.     oxybutynin 5 MG Tab  Commonly known as: DITROPAN  Take 1 tablet (5 mg total) by mouth 3 (three) times daily as needed (Bladder spasms).     oxyCODONE 5 MG immediate release tablet  Commonly known as: ROXICODONE  Take 1 tablet (5 mg total) by mouth every 6 (six) hours as needed for Pain.     pen needle,  "diabetic 32 gauge x 5/32" Ndle  Inject 1 Needle into the skin 3 (three) times daily with meals.     predniSONE 5 MG tablet  Commonly known as: DELTASONE  Take by mouth daily: 4/22 to 4/28 20 mg, 4/29 to 5/5 15 mg, 5/6 to 5/12 10 mg, 5/13/2025 to FOREVER 5 mg, DO NOT STOP     sulfamethoxazole-trimethoprim 400-80mg 400-80 mg per tablet  Commonly known as: BACTRIM,SEPTRA  Take 1 tablet by mouth 3 (three) times a week. Stop 10/20/25     tacrolimus 1 MG Cap  Commonly known as: PROGRAF  Take 9 capsules (9 mg total) by mouth every 12 (twelve) hours.     valGANciclovir 450 mg Tab  Commonly known as: VALCYTE  Take 1 tablet (450 mg total) by mouth 3 (three) times a week. Stop 7/20/25     vilazodone 10 mg Tab tablet  Commonly known as: VIIBRYD  Take 4 tablets (40 mg total) by mouth once daily.            CONTINUE taking these medications      busPIRone 5 MG Tab  Commonly known as: BUSPAR  Take 5 mg by mouth 2 (two) times daily.     gabapentin 100 MG capsule  Commonly known as: NEURONTIN  Take 100 mg by mouth 3 (three) times daily.     montelukast 10 mg tablet  Commonly known as: SINGULAIR  Take 10 mg by mouth nightly.     pravastatin 40 MG tablet  Commonly known as: PRAVACHOL  Take 1 tablet (40 mg total) by mouth once daily.     XANAX 0.5 MG tablet  Generic drug: ALPRAZolam  Take 0.5 mg by mouth 3 (three) times daily.            STOP taking these medications      ACIDOPHILUS ORAL     AURYXIA 210 mg iron Tab  Generic drug: ferric citrate     biotin 10,000 mcg Cap     calcitRIOL 0.25 MCG Cap  Commonly known as: ROCALTROL     cloNIDine 0.2 MG tablet  Commonly known as: CATAPRES     COLACE 100 mg capsule  Generic drug: docusate sodium     INV atenolol 25 MG Tab  Commonly known as: TENORMIN     lactobacillus acidophilus & bulgar 100 million cell packet  Commonly known as: LACTINEX     lanthanum 750 mg chewable tablet  Commonly known as: FOSRENOL     VIRGIL-GIO RX 1- mg-mg-mcg Tab  Generic drug: vit b cmplx 3-fa-vit c-biotin " 1- mg-mg-mcg     SHINGRIX (PF) 50 mcg/0.5 mL injection  Generic drug: varicella-zoster [id number] (PF)            Time spent caring for patient (Greater than 1/2 spent in direct face-to-face contact): > 30 minutes    Amada Phillips PA-C  Kidney Transplant  [facility] - Transplant Stepdown

## 2025-04-22 NOTE — PROGRESS NOTES
JASS notified during morning rounds patient will need in-center HD arranged. [place] [facility] [place] JASS spoke with Florence at [facility] [address], [place] [address], [phone] to arrange in-center HD. Patient was placed on MWF schedule for 12pm. Patient is able to start on Friday 4/25.     Balbina Ugalde LCSW, Kidney Transplant

## 2025-04-22 NOTE — ASSESSMENT & PLAN NOTE
- H&H down but stable  - no overt s/s of bleeding   - no fluid collections seen on US  - plan to check hapto level this afternoon  - cont to monitor

## 2025-04-22 NOTE — ASSESSMENT & PLAN NOTE
Endocrinology consulted for BG management.   BG goal 140-180    - Novolog (Insulin Aspart)  prn for BG excursions [facility] SSI (150/25)  - BG checks q4hr  - Hypoglycemia protocol in place    ** Please notify Endocrine for any change and/or advance in diet**  ** Please call Endocrine for any BG related issues **    Discharge Planning:   TBD. Please notify endocrinology prior to discharge.

## 2025-04-22 NOTE — ASSESSMENT & PLAN NOTE
- post-op requiring HD POD 0  - required HD again on POD due to hyperkalemia  - Low K diet  - K 5.1 today, lasix 200mg IV x1  - recheck K scheduled for 1500  - continue tele  - monitor

## 2025-04-22 NOTE — ASSESSMENT & PLAN NOTE
- s/p DBD kidney transplant for DM2. 2d holly. STAN x 2  - Post-op HD for hyperkalemia on POD 0  - hyperkalemic on POD 1, had HD  - Kidney US 4/21 satisfactory; no fluid collections; surgeon reviewed.  - making minimal urine.  - Cr not clearing  - lasix 200mg x1 today  - patient home hemo prior to txp. Will need outpatient chair  - holly removed 4/22, due to void  - STAN drains x 2 with minimal serous sang output  - plan to remove medical drain today  - encourage PO intake, OOB to chair and ambulation  - (+) flatus, (-) BM  - having some nausea this morning, improved with PRN zofran. Tolerated breakfast without issues  - cont bowel regimen  - monitor

## 2025-04-23 ENCOUNTER — PATIENT MESSAGE (OUTPATIENT)
Dept: ENDOCRINOLOGY | Facility: HOSPITAL | Age: 50
End: 2025-04-23
Payer: MEDICARE

## 2025-04-23 VITALS
WEIGHT: 226.63 LBS | OXYGEN SATURATION: 99 % | TEMPERATURE: 97 F | DIASTOLIC BLOOD PRESSURE: 88 MMHG | HEIGHT: 64 IN | HEART RATE: 92 BPM | RESPIRATION RATE: 18 BRPM | BODY MASS INDEX: 38.69 KG/M2 | SYSTOLIC BLOOD PRESSURE: 152 MMHG

## 2025-04-23 DIAGNOSIS — D63.1 ANEMIA OF RENAL DISEASE: ICD-10-CM

## 2025-04-23 DIAGNOSIS — Z94.0 KIDNEY REPLACED BY TRANSPLANT: Primary | ICD-10-CM

## 2025-04-23 DIAGNOSIS — Z57.8 EMPLOYEE EXPOSURE TO BODY FLUIDS: ICD-10-CM

## 2025-04-23 DIAGNOSIS — N18.9 ANEMIA OF RENAL DISEASE: ICD-10-CM

## 2025-04-23 DIAGNOSIS — Z91.89 PERSONAL HISTORY OF POISONING, PRESENTING HAZARDS TO HEALTH: ICD-10-CM

## 2025-04-23 LAB
ABSOLUTE EOSINOPHIL (OHS): 0.09 K/UL
ABSOLUTE MONOCYTE (OHS): 0.43 K/UL (ref 0.3–1)
ABSOLUTE NEUTROPHIL COUNT (OHS): 6.25 K/UL (ref 1.8–7.7)
ALBUMIN SERPL BCP-MCNC: 3 G/DL (ref 3.5–5.2)
ANION GAP (OHS): 13 MMOL/L (ref 8–16)
BASOPHILS # BLD AUTO: 0 K/UL
BASOPHILS NFR BLD AUTO: 0 %
BUN SERPL-MCNC: 56 MG/DL (ref 6–20)
CALCIUM SERPL-MCNC: 9 MG/DL (ref 8.7–10.5)
CHLORIDE SERPL-SCNC: 100 MMOL/L (ref 95–110)
CO2 SERPL-SCNC: 22 MMOL/L (ref 23–29)
CREAT SERPL-MCNC: 6.4 MG/DL (ref 0.5–1.4)
ERYTHROCYTE [DISTWIDTH] IN BLOOD BY AUTOMATED COUNT: 13.1 % (ref 11.5–14.5)
GFR SERPLBLD CREATININE-BSD FMLA CKD-EPI: 7 ML/MIN/1.73/M2
GLUCOSE SERPL-MCNC: 131 MG/DL (ref 70–110)
HCT VFR BLD AUTO: 24.7 % (ref 37–48.5)
HGB BLD-MCNC: 8.1 GM/DL (ref 12–16)
IMM GRANULOCYTES # BLD AUTO: 0.05 K/UL (ref 0–0.04)
IMM GRANULOCYTES NFR BLD AUTO: 0.7 % (ref 0–0.5)
LYMPHOCYTES # BLD AUTO: 0.14 K/UL (ref 1–4.8)
MAGNESIUM SERPL-MCNC: 2.4 MG/DL (ref 1.6–2.6)
MCH RBC QN AUTO: 33.9 PG (ref 27–31)
MCHC RBC AUTO-ENTMCNC: 32.8 G/DL (ref 32–36)
MCV RBC AUTO: 103 FL (ref 82–98)
NUCLEATED RBC (/100WBC) (OHS): 0 /100 WBC
PHOSPHATE SERPL-MCNC: 6.8 MG/DL (ref 2.7–4.5)
PLATELET # BLD AUTO: 80 K/UL (ref 150–450)
PMV BLD AUTO: 9.9 FL (ref 9.2–12.9)
POCT GLUCOSE: 120 MG/DL (ref 70–110)
POCT GLUCOSE: 121 MG/DL (ref 70–110)
POCT GLUCOSE: 150 MG/DL (ref 70–110)
POTASSIUM SERPL-SCNC: 5.3 MMOL/L (ref 3.5–5.1)
RBC # BLD AUTO: 2.39 M/UL (ref 4–5.4)
RELATIVE EOSINOPHIL (OHS): 1.3 %
RELATIVE LYMPHOCYTE (OHS): 2 % (ref 18–48)
RELATIVE MONOCYTE (OHS): 6.2 % (ref 4–15)
RELATIVE NEUTROPHIL (OHS): 89.8 % (ref 38–73)
SODIUM SERPL-SCNC: 135 MMOL/L (ref 136–145)
TACROLIMUS BLD-MCNC: 3.7 NG/ML (ref 5–15)
W HEPATITIS B SURFACE ANTIBODY, QUALITATIVE: NEGATIVE
W HEPATITIS B SURFACE ANTIBODY, QUANTITATIVE: <3 MIU/ML
WBC # BLD AUTO: 6.96 K/UL (ref 3.9–12.7)

## 2025-04-23 PROCEDURE — 85025 COMPLETE CBC W/AUTO DIFF WBC: CPT | Performed by: TRANSPLANT SURGERY

## 2025-04-23 PROCEDURE — 36415 COLL VENOUS BLD VENIPUNCTURE: CPT | Performed by: TRANSPLANT SURGERY

## 2025-04-23 PROCEDURE — 90935 HEMODIALYSIS ONE EVALUATION: CPT

## 2025-04-23 PROCEDURE — 80197 ASSAY OF TACROLIMUS: CPT | Performed by: TRANSPLANT SURGERY

## 2025-04-23 PROCEDURE — 63600175 PHARM REV CODE 636 W HCPCS: Performed by: TRANSPLANT SURGERY

## 2025-04-23 PROCEDURE — 63600175 PHARM REV CODE 636 W HCPCS: Performed by: PHYSICIAN ASSISTANT

## 2025-04-23 PROCEDURE — 25000003 PHARM REV CODE 250: Performed by: INTERNAL MEDICINE

## 2025-04-23 PROCEDURE — 83735 ASSAY OF MAGNESIUM: CPT | Performed by: TRANSPLANT SURGERY

## 2025-04-23 PROCEDURE — 25000003 PHARM REV CODE 250: Performed by: TRANSPLANT SURGERY

## 2025-04-23 PROCEDURE — 82040 ASSAY OF SERUM ALBUMIN: CPT | Performed by: TRANSPLANT SURGERY

## 2025-04-23 PROCEDURE — 63600175 PHARM REV CODE 636 W HCPCS

## 2025-04-23 PROCEDURE — 25000003 PHARM REV CODE 250

## 2025-04-23 PROCEDURE — 25000003 PHARM REV CODE 250: Performed by: PHYSICIAN ASSISTANT

## 2025-04-23 RX ORDER — FLUOXETINE HYDROCHLORIDE 40 MG/1
40 CAPSULE ORAL DAILY
Start: 2025-04-23 | End: 2025-04-23 | Stop reason: HOSPADM

## 2025-04-23 RX ORDER — CARVEDILOL 6.25 MG/1
6.25 TABLET ORAL 2 TIMES DAILY
Qty: 60 TABLET | Refills: 11 | Status: SHIPPED | OUTPATIENT
Start: 2025-04-23 | End: 2026-04-23

## 2025-04-23 RX ORDER — METHOCARBAMOL 500 MG/1
500 TABLET, FILM COATED ORAL 4 TIMES DAILY PRN
Qty: 40 TABLET | Refills: 0 | Status: SHIPPED | OUTPATIENT
Start: 2025-04-23 | End: 2025-05-03

## 2025-04-23 RX ORDER — INSULIN ASPART 100 [IU]/ML
0-10 INJECTION, SOLUTION INTRAVENOUS; SUBCUTANEOUS
Qty: 9 ML | Refills: 5 | Status: SHIPPED | OUTPATIENT
Start: 2025-04-23

## 2025-04-23 RX ORDER — TACROLIMUS 1 MG/1
9 CAPSULE ORAL 2 TIMES DAILY
Status: DISCONTINUED | OUTPATIENT
Start: 2025-04-23 | End: 2025-04-23 | Stop reason: HOSPADM

## 2025-04-23 RX ORDER — SODIUM CHLORIDE 9 MG/ML
INJECTION, SOLUTION INTRAVENOUS ONCE
Status: DISCONTINUED | OUTPATIENT
Start: 2025-04-24 | End: 2025-04-23 | Stop reason: HOSPADM

## 2025-04-23 RX ORDER — VALGANCICLOVIR 450 MG/1
450 TABLET, FILM COATED ORAL
Qty: 12 TABLET | Refills: 2 | Status: SHIPPED | OUTPATIENT
Start: 2025-04-23 | End: 2025-07-22

## 2025-04-23 RX ORDER — TACROLIMUS 1 MG/1
2 CAPSULE ORAL ONCE
Status: COMPLETED | OUTPATIENT
Start: 2025-04-23 | End: 2025-04-23

## 2025-04-23 RX ORDER — TACROLIMUS 1 MG/1
9 CAPSULE ORAL EVERY 12 HOURS
Qty: 540 CAPSULE | Refills: 11 | Status: SHIPPED | OUTPATIENT
Start: 2025-04-23 | End: 2025-04-24 | Stop reason: DRUGHIGH

## 2025-04-23 RX ORDER — CARVEDILOL 6.25 MG/1
6.25 TABLET ORAL 2 TIMES DAILY
Status: DISCONTINUED | OUTPATIENT
Start: 2025-04-23 | End: 2025-04-23 | Stop reason: HOSPADM

## 2025-04-23 RX ORDER — SULFAMETHOXAZOLE AND TRIMETHOPRIM 400; 80 MG/1; MG/1
1 TABLET ORAL
Qty: 12 TABLET | Refills: 5 | Status: SHIPPED | OUTPATIENT
Start: 2025-04-23 | End: 2025-10-22

## 2025-04-23 RX ORDER — VILAZODONE HYDROCHLORIDE 10 MG/1
40 TABLET ORAL DAILY
Start: 2025-04-23 | End: 2026-04-23

## 2025-04-23 RX ORDER — OXYCODONE HYDROCHLORIDE 5 MG/1
5 TABLET ORAL EVERY 6 HOURS PRN
Qty: 28 TABLET | Refills: 0 | Status: SHIPPED | OUTPATIENT
Start: 2025-04-23

## 2025-04-23 RX ORDER — PEN NEEDLE, DIABETIC 30 GX3/16"
1 NEEDLE, DISPOSABLE MISCELLANEOUS
Qty: 100 EACH | Refills: 11 | Status: SHIPPED | OUTPATIENT
Start: 2025-04-23

## 2025-04-23 RX ORDER — LIDOCAINE HYDROCHLORIDE 10 MG/ML
1 INJECTION, SOLUTION INFILTRATION; PERINEURAL ONCE
Status: COMPLETED | OUTPATIENT
Start: 2025-04-23 | End: 2025-04-23

## 2025-04-23 RX ORDER — OXYBUTYNIN CHLORIDE 5 MG/1
5 TABLET ORAL 3 TIMES DAILY PRN
Qty: 30 TABLET | Refills: 0 | Status: SHIPPED | OUTPATIENT
Start: 2025-04-23 | End: 2025-05-03

## 2025-04-23 RX ADMIN — MUPIROCIN 1 G: 20 OINTMENT TOPICAL at 12:04

## 2025-04-23 RX ADMIN — MICONAZOLE NITRATE 2 % TOPICAL POWDER: at 07:04

## 2025-04-23 RX ADMIN — METHOCARBAMOL 500 MG: 500 TABLET ORAL at 06:04

## 2025-04-23 RX ADMIN — FLUOXETINE HYDROCHLORIDE 40 MG: 20 CAPSULE ORAL at 12:04

## 2025-04-23 RX ADMIN — TACROLIMUS 7 MG: 1 CAPSULE ORAL at 06:04

## 2025-04-23 RX ADMIN — VILAZODONE HYDROCHLORIDE 40 MG: 10 TABLET ORAL at 12:04

## 2025-04-23 RX ADMIN — HEPARIN SODIUM 5000 UNITS: 5000 INJECTION INTRAVENOUS; SUBCUTANEOUS at 05:04

## 2025-04-23 RX ADMIN — BUSPIRONE HYDROCHLORIDE 5 MG: 5 TABLET ORAL at 12:04

## 2025-04-23 RX ADMIN — THERA TABS 1 TABLET: TAB at 12:04

## 2025-04-23 RX ADMIN — LIDOCAINE AND PRILOCAINE: 25; 25 CREAM TOPICAL at 07:04

## 2025-04-23 RX ADMIN — METHOCARBAMOL 500 MG: 500 TABLET ORAL at 01:04

## 2025-04-23 RX ADMIN — CARVEDILOL 3.12 MG: 3.12 TABLET, FILM COATED ORAL at 09:04

## 2025-04-23 RX ADMIN — BISACODYL 10 MG: 10 SUPPOSITORY RECTAL at 02:04

## 2025-04-23 RX ADMIN — TACROLIMUS 2 MG: 1 CAPSULE ORAL at 12:04

## 2025-04-23 RX ADMIN — PREDNISONE 20 MG: 20 TABLET ORAL at 06:04

## 2025-04-23 RX ADMIN — DOCUSATE SODIUM 100 MG: 100 CAPSULE, LIQUID FILLED ORAL at 12:04

## 2025-04-23 RX ADMIN — MYCOPHENOLATE MOFETIL 1000 MG: 250 CAPSULE ORAL at 06:04

## 2025-04-23 RX ADMIN — LIDOCAINE HYDROCHLORIDE 1 ML: 10 INJECTION, SOLUTION INFILTRATION; PERINEURAL at 10:04

## 2025-04-23 SDOH — SOCIAL DETERMINANTS OF HEALTH (SDOH): OCCUPATIONAL EXPOSURE TO OTHER RISK FACTORS: Z57.8

## 2025-04-23 NOTE — PROGRESS NOTES
"DISCHARGE NOTE:    Brande Reyer is a 49 y.o. female s/p   RIGHT KIDNEY     Donation after Brain Death   transplant on 4/20/2025 (Kidney) for ESRD secondary to Diabetes Mellitus - Type II.      Past Medical History:   Diagnosis Date    Allergy     Anemia     Anxiety     Depression     Diabetes mellitus, type 2     Dialysis patient     Disorder of kidney and ureter     Hyperlipidemia     Hypertension     Obesity        Hospital Course: Brande Reyer is a 49 y.o. female s/p    Donation after Brain Death   transplant on 4/20/2025 (Kidney) for Diabetes Mellitus - Type II. Post operative course complicated by hyperkalemia requiring emergent dialysis. Patient is DGF with last dialysis 4/23/25, she will discharge with a F chair. Lokelma not resumed as patient has not had a BM since surgery, and bactrim continued per nephrologist since she is getting K clearance with dialysis.     Allergies:   Review of patient's allergies indicates:   Allergen Reactions    Amlodipine Itching and Swelling       Patient Pharmacy: [facility]    Discharge Medications:     Medication List        PAUSE taking these medications      LOKELMA 10 gram packet  Wait to take this until your doctor or other care provider tells you to start again.  Generic drug: sodium zirconium cyclosilicate     MOUNJARO 2.5 mg/0.5 mL Pnij  Wait to take this until your doctor or other care provider tells you to start again.  Generic drug: tirzepatide            START taking these medications      BD ULTRA-FINE ANNE PEN NEEDLE 32 gauge x 5/32" Ndle  Generic drug: pen needle, diabetic  Inject 1 Needle into the skin 3 (three) times daily with meals.     carvediloL 6.25 MG tablet  Commonly known as: COREG  Take 1 tablet (6.25 mg total) by mouth 2 (two) times daily.     famotidine 20 MG tablet  Commonly known as: PEPCID  Take 1 tablet (20 mg total) by mouth once daily.     methocarbamoL 500 MG Tab  Commonly known as: Robaxin  Take 1 tablet (500 mg total) by mouth 4 (four) times " daily as needed (muscle spasms).     multivitamin Tab  Take 1 tablet by mouth once daily.     mycophenolate 250 mg Cap  Commonly known as: CELLCEPT  Take 4 capsules (1,000 mg total) by mouth 2 (two) times daily.     NovoLOG Flexpen U-100 Insulin 100 unit/mL (3 mL) Inpn pen  Generic drug: insulin aspart U-100  Inject 0-10 Units into the skin 3 (three) times daily with meals. Per sliding scale. (MDD: 30 units)     oxybutynin 5 MG Tab  Commonly known as: DITROPAN  Take 1 tablet (5 mg total) by mouth 3 (three) times daily as needed (Bladder spasms).     oxyCODONE 5 MG immediate release tablet  Commonly known as: ROXICODONE  Take 1 tablet (5 mg total) by mouth every 6 (six) hours as needed for Pain.     predniSONE 5 MG tablet  Commonly known as: DELTASONE  Take by mouth daily: 4/22 to 4/28 20 mg, 4/29 to 5/5 15 mg, 5/6 to 5/12 10 mg, 5/13/2025 to FOREVER 5 mg, DO NOT STOP     sulfamethoxazole-trimethoprim 400-80mg 400-80 mg per tablet  Commonly known as: BACTRIM,SEPTRA  Take 1 tablet by mouth 3 (three) times a week. Stop 10/20/25     tacrolimus 1 MG Cap  Commonly known as: PROGRAF  Take 9 capsules (9 mg total) by mouth every 12 (twelve) hours.     valGANciclovir 450 mg Tab  Commonly known as: VALCYTE  Take 1 tablet (450 mg total) by mouth 3 (three) times a week. Stop 7/20/25     vilazodone 10 mg Tab tablet  Commonly known as: VIIBRYD  Take 4 tablets (40 mg total) by mouth once daily.            CONTINUE taking these medications      busPIRone 5 MG Tab  Commonly known as: BUSPAR     gabapentin 100 MG capsule  Commonly known as: NEURONTIN     montelukast 10 mg tablet  Commonly known as: SINGULAIR     pravastatin 40 MG tablet  Commonly known as: PRAVACHOL  Take 1 tablet (40 mg total) by mouth once daily.     XANAX 0.5 MG tablet  Generic drug: ALPRAZolam            STOP taking these medications      ACIDOPHILUS ORAL     AURYXIA 210 mg iron Tab  Generic drug: ferric citrate     biotin 10,000 mcg Cap     calcitRIOL 0.25 MCG  "Cap  Commonly known as: ROCALTROL     cloNIDine 0.2 MG tablet  Commonly known as: CATAPRES     COLACE 100 mg capsule  Generic drug: docusate sodium     INV atenolol 25 MG Tab  Commonly known as: TENORMIN     lactobacillus acidophilus & bulgar 100 million cell packet  Commonly known as: LACTINEX     lanthanum 750 mg chewable tablet  Commonly known as: FOSRENOL     VIRGIL-GIO RX 1- mg-mg-mcg Tab  Generic drug: vit b cmplx 3-fa-vit c-biotin 1- mg-mg-mcg     SHINGRIX (PF) 50 mcg/0.5 mL injection  Generic drug: varicella-zoster [id number] (PF)               Where to Get Your Medications        These medications were sent to [facility] [address], [place] [address]      Hours: Always Open Phone: [phone]   BD ULTRA-FINE ANNE PEN NEEDLE 32 gauge x 5/32" Ndle  carvediloL 6.25 MG tablet  famotidine 20 MG tablet  methocarbamoL 500 MG Tab  mycophenolate 250 mg Cap  NovoLOG Flexpen U-100 Insulin 100 unit/mL (3 mL) Inpn pen  oxybutynin 5 MG Tab  oxyCODONE 5 MG immediate release tablet  predniSONE 5 MG tablet  sulfamethoxazole-trimethoprim 400-80mg 400-80 mg per tablet  tacrolimus 1 MG Cap  valGANciclovir 450 mg Tab       You can get these medications from any pharmacy    You don't need a prescription for these medications  multivitamin Tab       Information about where to get these medications is not yet available    Ask your nurse or doctor about these medications  vilazodone 10 mg Tab tablet          Pharmacy Interventions/Recommendations:  1) Transplant Immunosuppression: Induction thymo, and maintenance Tac, MMF, steroid taper    2) Opportunistic Infection prophylaxis: Bactrim x6 mo, Valcyte x3 mo (CMV -/+)    3) Osteoporosis Prevention measures (liver txp): N/A    4) Patient Counseling/Education: Demonstrated the use of the BP cuff, thermometer.    5) Follow-Up/Discharge Needs:  Endocrine follow up; add K binder and consider phos binder in clinic after patient has BM    6) Patient " Assistance Information: none    7) The following medications have been placed on HOLD and should be restarted in the outpatient setting (when appropriate): Evert Ramos and her caregiver verbalized their understanding and had the opportunity to ask questions.

## 2025-04-23 NOTE — PROGRESS NOTES
04/23/25 1205   Post-Hemodialysis Assessment   Rinseback Volume (mL) 150 mL   Blood Volume Processed (Liters) 46.9 L   Dialyzer Clearance Lightly streaked   Duration of Treatment 195 minutes   Total UF (mL) 927 mL   Net Fluid Removal 500   Patient Response to Treatment tolerated   Post-Treatment Weight 102 kg (224 lb 13.9 oz)   Treatment Weight Change -0.7   Post-Hemodialysis Comments NADN     3hrs & 15mins HD tx completed; 500ml of fluid removed.    Patient tolerated well.  @ 1120.    Blood returned; lines flushed with NS; needles pulled;manual pressure held x 10 mins until hemostasis was achieved.    Report given to GREYSON Malcolm.    1220 Patient departed [facility] with tele box via w/c by patient transport. RYLEE

## 2025-04-23 NOTE — PROGRESS NOTES
Kidney Transplant  Discharge Note:    Present during SW visit was Fernando, , Derek, daughter, Kailash, son-in-law and Nuria aunt. Per KTS rounds morning rounds patient is expected to discharge today. Patient will discharge home under the care of Jimmy Reyer, . Patient reports  will transport her home. Family notified of new in-center chair time information. Per rounds this morning patient does not require any additional referrals from this  at time of discharge. Patient denied needing or wanting mental health resources or referrals at this time. Patient denied additional needs or concerns from this . Patient verbalize understanding and involved in treatment planning and discharge process. Patient agrees to contact transplant team with needs, questions or concerns as they arise. Patient aware of, involved in, and coping well with this discharge plan. Patient did not have any concerns with the discharge plan at this time.  remains available at [phone].    Balbina Ugalde LCSW, Kidney Transplant

## 2025-04-23 NOTE — ASSESSMENT & PLAN NOTE
Titrate insulin slowly to avoid hypoglycemia as the risk of hypoglycemia increases with decreased creatinine clearance.  Estimated Creatinine Clearance: 12.3 mL/min (A) (based on SCr of 6.4 mg/dL (H)).  Glucocorticoids markedly increase glucose levels. Expect the steroid taper will help glucose control.   Optimize BG control to improve wound healing

## 2025-04-23 NOTE — SUBJECTIVE & OBJECTIVE
"Interval HPI:   Overnight events: No acute events overnight. Patient in room 56854/24975 A. Blood glucose stable. BG at goal on current insulin regimen (SSI ). Steroid use- Prednisone 20 mg.   Renal function-   Lab Results   Component Value Date    CREATININE 6.4 (H) 2025       Vasopressors-  None           Diet Renal Non-Dialysis      Eatin%  Nausea: No  Hypoglycemia and intervention: No  Fever: No  TPN and/or TF: No    BP (!) 141/68   Pulse 82   Temp 98 °F (36.7 °C) (Oral)   Resp 18   Ht 5' 4" (1.626 m)   Wt 102.8 kg (226 lb 10.1 oz)   SpO2 100%   Breastfeeding No   BMI 38.90 kg/m²     Labs Reviewed and Include    Recent Labs   Lab 25  0449   CALCIUM 9.0   ALBUMIN 3.0*   *   K 5.3*   CO2 22*      BUN 56*   CREATININE 6.4*     Lab Results   Component Value Date    WBC 6.96 2025    HGB 8.1 (L) 2025    HCT 24.7 (L) 2025     (H) 2025    PLT 80 (L) 2025     No results for input(s): "TSH", "FREET4" in the last 168 hours.  Lab Results   Component Value Date    HGBA1C 5.2 2025       Nutritional status:   Body mass index is 38.9 kg/m².  Lab Results   Component Value Date    ALBUMIN 3.0 (L) 2025    ALBUMIN 2.8 (L) 2025    ALBUMIN 2.9 (L) 2025     No results found for: "PREALBUMIN"    Estimated Creatinine Clearance: 12.3 mL/min (A) (based on SCr of 6.4 mg/dL (H)).    Accu-Checks  Recent Labs     25  1726 25  2057 25  0020 25  0536 25  0815 25  1136 25  1659 25  2023 25  0501 25  0811   POCTGLUCOSE 142* 175* 150* 163* 165* 139* 167* 149* 150* 121*       Current Medications and/or Treatments Impacting Glycemic Control  Immunotherapy:    Immunosuppressants           Stop Route Frequency     tacrolimus capsule 9 mg         -- Oral 2 times daily     tacrolimus capsule 2 mg         -- Oral Once     mycophenolate capsule 1,000 mg         -- Oral 2 times daily      "     Steroids:   Hormones (From admission, onward)      Start     Stop Route Frequency Ordered    04/22/25 0900  predniSONE tablet 20 mg  (IP TXP KIDNEY POST-OP THYMO WITH PERIPHERAL PRECHECKED IBW)         -- Oral Daily 04/20/25 0324          Pressors:    Autonomic Drugs (From admission, onward)      None          Hyperglycemia/Diabetes Medications:   Antihyperglycemics (From admission, onward)      Start     Stop Route Frequency Ordered    04/20/25 1557  insulin aspart U-100 pen 0-10 Units         -- SubQ Every 4 hours PRN 04/20/25 6724

## 2025-04-23 NOTE — ASSESSMENT & PLAN NOTE
BG goal 140-180    - Novolog (Insulin Aspart)  prn for BG excursions [place] SSI (150/25)  - BG checks AC/HS  - Hypoglycemia protocol in place    ** Please notify Endocrine for any change and/or advance in diet**  ** Please call Endocrine for any BG related issues **    Discharge Planning:   TBD. Please notify endocrinology prior to discharge.

## 2025-04-23 NOTE — PROGRESS NOTES
Transplant Coordinator  4/19-4/23/25    Pt admitted for a cadaveric kdiney transplant.ESRD 2/2 DM. Thymo induction, KDPI 61%, CMV D-,R+    Pt with DGF post-tx. Not clearing with minimal UOP  Last inpt HD 4/23/25  Outpt HD at [facility] [place] MWF 12noon.    RLQ incision LESA with staples  [place] and STAN (2) removed prior to d/c    Pt has not had a BM post surgery, Lokelma and phos binder ON HOLD    Labs and RTC 4/24/25 to meet with coordinator/pharmD

## 2025-04-23 NOTE — PROGRESS NOTES
Pt. D/c to home with family at the bedside; educated pt. And family on d/c instructions; pt. Verbalized understanding of instructions; left via w/c per pt. Transport.

## 2025-04-23 NOTE — PROGRESS NOTES
04/23/25 0845   During Hemodialysis Assessment   Blood Flow Rate (mL/min) 150 mL/min   Dialysate Flow Rate (mL/min) 600 ml/min   Ultrafiltration Rate (mL/Hr) 760 mL/Hr   Arteriovenous Lines Secure Yes   Arterial Pressure (mmHg) -120 mmHg   Venous Pressure (mmHg) 80   Blood Volume Processed (Liters) 0 L   UF Removed (mL) 0 mL   TMP 50   Venous Line in Air Detector Yes   Intra-Hemodialysis Comments HD tx started     Patient arrived to [facility] via wheelchair, AAOX4.    HD treatment started via BRENDA AV Fistula ; site looks clean and dry and no complication noted; accessed with good blood flow; 16G needles used; VSS and recorded, NADN.

## 2025-04-23 NOTE — PLAN OF CARE
AAOx4.  Ambulatory, mobility improving.  VSS.  Afebrile.  NSR on tele.  On room air.  Pt w DGF and oliguric, plan for HD this AM.  RLQ incision w staples CDI, painted w betadine.  R STAN drain x1 w scant ss output.  Renal diet, pt not eating a lot bc she does not like the hospital food.  Q4 accuchecks.  (+)flatus(+)belching, (-)BM.  Self meds 100% per pt.  PRN oxy given for pain before bed.  Waffle mattress in place.  Fall pxns maintained.

## 2025-04-24 ENCOUNTER — RESULTS FOLLOW-UP (OUTPATIENT)
Dept: TRANSPLANT | Facility: HOSPITAL | Age: 50
End: 2025-04-24
Payer: MEDICARE

## 2025-04-24 ENCOUNTER — CLINICAL SUPPORT (OUTPATIENT)
Dept: TRANSPLANT | Facility: CLINIC | Age: 50
End: 2025-04-24
Payer: MEDICARE

## 2025-04-24 VITALS
OXYGEN SATURATION: 100 % | HEIGHT: 64 IN | RESPIRATION RATE: 18 BRPM | OXYGEN SATURATION: 100 % | WEIGHT: 224.44 LBS | RESPIRATION RATE: 18 BRPM | DIASTOLIC BLOOD PRESSURE: 74 MMHG | DIASTOLIC BLOOD PRESSURE: 74 MMHG | HEART RATE: 82 BPM | SYSTOLIC BLOOD PRESSURE: 158 MMHG | BODY MASS INDEX: 38.32 KG/M2 | BODY MASS INDEX: 38.32 KG/M2 | WEIGHT: 224.44 LBS | SYSTOLIC BLOOD PRESSURE: 158 MMHG | HEART RATE: 82 BPM | TEMPERATURE: 97 F | HEIGHT: 64 IN | TEMPERATURE: 97 F

## 2025-04-24 DIAGNOSIS — Z94.0 STATUS POST DECEASED-DONOR KIDNEY TRANSPLANTATION: ICD-10-CM

## 2025-04-24 PROCEDURE — 99999 PR PBB SHADOW E&M-EST. PATIENT-LVL II: CPT | Mod: PBBFAC,,,

## 2025-04-24 PROCEDURE — 99999 PR PBB SHADOW E&M-EST. PATIENT-LVL III: CPT | Mod: PBBFAC,,,

## 2025-04-24 RX ORDER — TACROLIMUS 1 MG/1
10 CAPSULE ORAL EVERY 12 HOURS
Qty: 540 CAPSULE | Refills: 11 | Status: SHIPPED | OUTPATIENT
Start: 2025-04-24

## 2025-04-24 NOTE — PROGRESS NOTES
Clinic Note: First Return to Clinic Post-  Kidney Transplant    Brande Reyer  is a 49 y.o. female  S/p   RIGHT KIDNEY   transplant on 4/20/2025 (Kidney) for Diabetes Mellitus - Type II.      Discharge Course (Issues/Concerns): Patient presents with minimal issues. Patient able to sleep, and has had mulitple BM's since discharge.    Objective:   There were no vitals filed for this visit.    Met with patient and her caregiver in the clinic to review current medication list.     Current Medications[1]    Pharmacy Interventions/Recommendations:     1) Graft Function & Immunosuppression Issues: Prograf 9/9, MMF, and Pred taper.    2) Opportunistic Infection prophylaxis:   PCP ppx: Bactrim until 10/20/25  CMV ppx: Valcyte until 7/20/25  Fungal ppx: None    3) Donor Serologies & Monitoring:     Donor CMV Status:  Negative  Donor HCV Status:  Negative  Donor HBcAb:  Negative  Donor HBV MARKO: Negative  Donor HCV MARKO: Negative      4) Pain Management & Bowel Regimen: Oxycodone    5) Blood Pressure Management: Carvedilol    6) Blood Sugar Management & Follow-up: Patient needs ENDO follow up. No appt scheduled as of yet.    7) Electrolyte Management: None, patient has HD chair MWF.    8) Osteoporosis Prevention Strategy (Liver Transplant): NA    9) OTHER medication follow-up (patient assistance, held medications, etc): Patient continues to hold her lokelma (on HD MWF)    10) Reinforced medication education conducted in the hospital, including medication indications, dosing, administration, side effects, monitoring-- including timing of immunosuppressant levels.     Patient received their FIRST fill of medications from [facility].  Discussed the process for obtaining refills of medications, including verifying the dose and mailing address to have medications delivered.     Quiana and her caregivers verbalized understanding and had the opportunity to ask questions.               [1]   Current Outpatient Medications   Medication Sig  "Dispense Refill    ALPRAZolam (XANAX) 0.5 MG tablet Take 0.5 mg by mouth 3 (three) times daily.      busPIRone (BUSPAR) 5 MG Tab Take 5 mg by mouth 2 (two) times daily.      carvediloL (COREG) 6.25 MG tablet Take 1 tablet (6.25 mg total) by mouth 2 (two) times daily. 60 tablet 11    famotidine (PEPCID) 20 MG tablet Take 1 tablet (20 mg total) by mouth once daily. 30 tablet 0    gabapentin (NEURONTIN) 100 MG capsule Take 100 mg by mouth 3 (three) times daily.      insulin aspart U-100 (NOVOLOG) 100 unit/mL (3 mL) InPn pen Inject 0-10 Units into the skin 3 (three) times daily with meals. Per sliding scale. (MDD: 30 units) 9 mL 5    methocarbamoL (ROBAXIN) 500 MG Tab Take 1 tablet (500 mg total) by mouth 4 (four) times daily as needed (muscle spasms). 40 tablet 0    montelukast (SINGULAIR) 10 mg tablet Take 10 mg by mouth nightly.      multivitamin Tab Take 1 tablet by mouth once daily.      mycophenolate (CELLCEPT) 250 mg Cap Take 4 capsules (1,000 mg total) by mouth 2 (two) times daily. 240 capsule 11    oxybutynin (DITROPAN) 5 MG Tab Take 1 tablet (5 mg total) by mouth 3 (three) times daily as needed (Bladder spasms). 30 tablet 0    oxyCODONE (ROXICODONE) 5 MG immediate release tablet Take 1 tablet (5 mg total) by mouth every 6 (six) hours as needed for Pain. 28 tablet 0    pen needle, diabetic 32 gauge x 5/32" Ndle Inject 1 Needle into the skin 3 (three) times daily with meals. 100 each 11    pravastatin (PRAVACHOL) 40 MG tablet Take 1 tablet (40 mg total) by mouth once daily. 90 tablet 3    predniSONE (DELTASONE) 5 MG tablet Take by mouth daily: 4/22 to 4/28 20 mg, 4/29 to 5/5 15 mg, 5/6 to 5/12 10 mg, 5/13/2025 to FOREVER 5 mg, DO NOT STOP 70 tablet 0    [Paused] sodium zirconium cyclosilicate (LOKELMA) 10 gram packet Take 10 g by mouth. Mix entire contents of packet(s) into drinking glass containing 3 tablespoons of water; stir well and drink immediately. Add water and repeat until no powder remains to receive " entire dose.      sulfamethoxazole-trimethoprim 400-80mg (BACTRIM,SEPTRA) 400-80 mg per tablet Take 1 tablet by mouth 3 (three) times a week. Stop 10/20/25 12 tablet 5    tacrolimus (PROGRAF) 1 MG Cap Take 9 capsules (9 mg total) by mouth every 12 (twelve) hours. 540 capsule 11    [Paused] tirzepatide (MOUNJARO) 2.5 mg/0.5 mL PnIj Inject 2.5 mg into the skin every 7 days.      valGANciclovir (VALCYTE) 450 mg Tab Take 1 tablet (450 mg total) by mouth 3 (three) times a week. Stop 7/20/25 12 tablet 2    vilazodone (VIIBRYD) 10 mg Tab tablet Take 4 tablets (40 mg total) by mouth once daily.       No current facility-administered medications for this visit.

## 2025-04-24 NOTE — TELEPHONE ENCOUNTER
Message sent.     ----- Message from Andrea Adhikari MD sent at 4/24/2025 12:51 PM CDT -----  DGF assessment Friday. No dialysis today. Increase tac from 9/9 to 10/10 and repeat with next labs. Other labs acceptable.  ----- Message -----  From: Lab, Background User  Sent: 4/24/2025   9:00 AM CDT  To: Andrea Adhikari Jr., MD

## 2025-04-25 ENCOUNTER — LAB VISIT (OUTPATIENT)
Dept: LAB | Facility: HOSPITAL | Age: 50
End: 2025-04-25
Attending: STUDENT IN AN ORGANIZED HEALTH CARE EDUCATION/TRAINING PROGRAM
Payer: MEDICARE

## 2025-04-25 ENCOUNTER — RESULTS FOLLOW-UP (OUTPATIENT)
Dept: TRANSPLANT | Facility: CLINIC | Age: 50
End: 2025-04-25
Payer: MEDICARE

## 2025-04-25 DIAGNOSIS — Z94.0 KIDNEY REPLACED BY TRANSPLANT: ICD-10-CM

## 2025-04-25 LAB
ABSOLUTE EOSINOPHIL (SMH): 0.15 K/UL
ABSOLUTE MONOCYTE (SMH): 0.46 K/UL (ref 0.3–1)
ABSOLUTE NEUTROPHIL COUNT (SMH): 4.9 K/UL (ref 1.8–7.7)
ALBUMIN SERPL-MCNC: 3 G/DL (ref 3.5–5.2)
ANION GAP (SMH): 13 MMOL/L (ref 8–16)
BASOPHILS # BLD AUTO: 0.01 K/UL
BASOPHILS NFR BLD AUTO: 0.2 %
BUN SERPL-MCNC: 70 MG/DL (ref 6–20)
CALCIUM SERPL-MCNC: 8.6 MG/DL (ref 8.7–10.5)
CHLORIDE SERPL-SCNC: 98 MMOL/L (ref 95–110)
CO2 SERPL-SCNC: 24 MMOL/L (ref 23–29)
CREAT SERPL-MCNC: 7.8 MG/DL (ref 0.5–1.4)
ERYTHROCYTE [DISTWIDTH] IN BLOOD BY AUTOMATED COUNT: 12.8 % (ref 11.5–14.5)
GFR SERPLBLD CREATININE-BSD FMLA CKD-EPI: 6 ML/MIN/1.73/M2
GLUCOSE SERPL-MCNC: 120 MG/DL (ref 70–110)
HCT VFR BLD AUTO: 21.5 % (ref 37–48.5)
HGB BLD-MCNC: 7.4 GM/DL (ref 12–16)
IMM GRANULOCYTES # BLD AUTO: 0.08 K/UL (ref 0–0.04)
IMM GRANULOCYTES NFR BLD AUTO: 1.4 % (ref 0–0.5)
LYMPHOCYTES # BLD AUTO: 0.22 K/UL (ref 1–4.8)
MAGNESIUM SERPL-MCNC: 2.5 MG/DL (ref 1.6–2.6)
MCH RBC QN AUTO: 34.6 PG (ref 27–31)
MCHC RBC AUTO-ENTMCNC: 34.4 G/DL (ref 32–36)
MCV RBC AUTO: 101 FL (ref 82–98)
NUCLEATED RBC (/100WBC) (SMH): 0 /100 WBC
PHOSPHATE SERPL-MCNC: 6.2 MG/DL (ref 2.7–4.5)
PLATELET # BLD AUTO: 116 K/UL (ref 150–450)
PMV BLD AUTO: 9.1 FL (ref 9.2–12.9)
POTASSIUM SERPL-SCNC: 3.8 MMOL/L (ref 3.5–5.1)
RBC # BLD AUTO: 2.14 M/UL (ref 4–5.4)
RELATIVE EOSINOPHIL (SMH): 2.6 % (ref 0–8)
RELATIVE LYMPHOCYTE (SMH): 3.8 % (ref 18–48)
RELATIVE MONOCYTE (SMH): 7.8 % (ref 4–15)
RELATIVE NEUTROPHIL (SMH): 84.2 % (ref 38–73)
SODIUM SERPL-SCNC: 135 MMOL/L (ref 136–145)
WBC # BLD AUTO: 5.86 K/UL (ref 3.9–12.7)

## 2025-04-25 PROCEDURE — 36415 COLL VENOUS BLD VENIPUNCTURE: CPT

## 2025-04-25 PROCEDURE — 85025 COMPLETE CBC W/AUTO DIFF WBC: CPT

## 2025-04-25 PROCEDURE — 99999 PR PBB SHADOW E&M-EST. PATIENT-LVL I: CPT | Mod: PBBFAC,,,

## 2025-04-25 PROCEDURE — 80069 RENAL FUNCTION PANEL: CPT

## 2025-04-25 PROCEDURE — 83735 ASSAY OF MAGNESIUM: CPT

## 2025-04-25 NOTE — TELEPHONE ENCOUNTER
DGF Nursing Assessment:    Called patient but she was currently at HD. Re-explained that we want to review her labs and VSS prior to HD. Pt did not have txp book-pt to send RN a picture of her tx book when she gets home today.

## 2025-04-28 ENCOUNTER — TELEPHONE (OUTPATIENT)
Dept: TRANSPLANT | Facility: CLINIC | Age: 50
End: 2025-04-28
Payer: MEDICARE

## 2025-04-28 ENCOUNTER — LAB VISIT (OUTPATIENT)
Dept: LAB | Facility: HOSPITAL | Age: 50
End: 2025-04-28
Attending: STUDENT IN AN ORGANIZED HEALTH CARE EDUCATION/TRAINING PROGRAM
Payer: MEDICARE

## 2025-04-28 DIAGNOSIS — Z94.0 KIDNEY REPLACED BY TRANSPLANT: ICD-10-CM

## 2025-04-28 LAB
ABSOLUTE EOSINOPHIL (OHS): 0.17 K/UL
ABSOLUTE MONOCYTE (OHS): 0.48 K/UL (ref 0.3–1)
ABSOLUTE NEUTROPHIL COUNT (OHS): 6.05 K/UL (ref 1.8–7.7)
ALBUMIN SERPL-MCNC: 2.9 G/DL (ref 3.5–5.2)
ANION GAP (SMH): 12 MMOL/L (ref 8–16)
BASOPHILS # BLD AUTO: 0.01 K/UL
BASOPHILS NFR BLD AUTO: 0.1 %
BUN SERPL-MCNC: 90 MG/DL (ref 6–20)
CALCIUM SERPL-MCNC: 8.4 MG/DL (ref 8.7–10.5)
CHLORIDE SERPL-SCNC: 100 MMOL/L (ref 95–110)
CO2 SERPL-SCNC: 23 MMOL/L (ref 23–29)
CREAT SERPL-MCNC: 8.1 MG/DL (ref 0.5–1.4)
ERYTHROCYTE [DISTWIDTH] IN BLOOD BY AUTOMATED COUNT: 13.1 % (ref 11.5–14.5)
GFR SERPLBLD CREATININE-BSD FMLA CKD-EPI: 6 ML/MIN/1.73/M2
GLUCOSE SERPL-MCNC: 126 MG/DL (ref 70–110)
HCT VFR BLD AUTO: 19.8 % (ref 37–48.5)
HGB BLD-MCNC: 6.9 GM/DL (ref 12–16)
IMM GRANULOCYTES # BLD AUTO: 0.12 K/UL (ref 0–0.04)
IMM GRANULOCYTES NFR BLD AUTO: 1.7 % (ref 0–0.5)
LYMPHOCYTES # BLD AUTO: 0.44 K/UL (ref 1–4.8)
MAGNESIUM SERPL-MCNC: 2.4 MG/DL (ref 1.6–2.6)
MCH RBC QN AUTO: 35.2 PG (ref 27–31)
MCHC RBC AUTO-ENTMCNC: 34.8 G/DL (ref 32–36)
MCV RBC AUTO: 101 FL (ref 82–98)
NUCLEATED RBC (/100WBC) (OHS): 0 /100 WBC
PHOSPHATE SERPL-MCNC: 8.4 MG/DL (ref 2.7–4.5)
PLATELET # BLD AUTO: 223 K/UL (ref 150–450)
PLATELET BLD QL SMEAR: NORMAL
PMV BLD AUTO: 9.2 FL (ref 9.2–12.9)
POTASSIUM SERPL-SCNC: 4.2 MMOL/L (ref 3.5–5.1)
RBC # BLD AUTO: 1.96 M/UL (ref 4–5.4)
RELATIVE EOSINOPHIL (OHS): 2.3 %
RELATIVE LYMPHOCYTE (OHS): 6.1 % (ref 18–48)
RELATIVE MONOCYTE (OHS): 6.6 % (ref 4–15)
RELATIVE NEUTROPHIL (OHS): 83.2 % (ref 38–73)
SODIUM SERPL-SCNC: 135 MMOL/L (ref 136–145)
TACROLIMUS BLD-MCNC: 16.9 NG/ML (ref 5–15)
WBC # BLD AUTO: 7.27 K/UL (ref 3.9–12.7)

## 2025-04-28 PROCEDURE — 80197 ASSAY OF TACROLIMUS: CPT

## 2025-04-28 PROCEDURE — 85025 COMPLETE CBC W/AUTO DIFF WBC: CPT | Mod: PO

## 2025-04-28 PROCEDURE — 82040 ASSAY OF SERUM ALBUMIN: CPT | Performed by: STUDENT IN AN ORGANIZED HEALTH CARE EDUCATION/TRAINING PROGRAM

## 2025-04-28 PROCEDURE — 36415 COLL VENOUS BLD VENIPUNCTURE: CPT | Mod: PO

## 2025-04-28 PROCEDURE — 83735 ASSAY OF MAGNESIUM: CPT | Performed by: STUDENT IN AN ORGANIZED HEALTH CARE EDUCATION/TRAINING PROGRAM

## 2025-04-28 NOTE — TELEPHONE ENCOUNTER
DGF Nursing Assessment:    I called pt and completed the following assessment prior to calling to review with MD, to determine need for Dialysis:    B/P:145/89,159/91, 152/84, 167/88, 158/83, 153/85, 167/88    Pulse: 84, 93, 85,     Dry Weight/ Current Weight 101.8, 102.2, 104.6    Swelling/ fluid retention: denies    SOB Denies    Intake: 1774, 2691, 2425    Output 320, 700, 900      These should include the past 2 or more days since last assessed.

## 2025-04-28 NOTE — TELEPHONE ENCOUNTER
"Unable to advise patient on HD 2/2 labs not resulted yet. As soon as labs result, will let pt know decision on HD.     ----- Message from Raul sent at 4/28/2025  8:33 AM CDT -----  Regarding: call back  Consult/Advisory:  Name Of Caller: Self Contact Preference?:[phone] What is the nature of the call?: Calling to speak w/  Eva in regards to some question she has about her lab results requesting call back  Additional Notes:"Thank you for all that you do for our patients"  "

## 2025-04-29 ENCOUNTER — LAB VISIT (OUTPATIENT)
Dept: LAB | Facility: HOSPITAL | Age: 50
End: 2025-04-29
Attending: STUDENT IN AN ORGANIZED HEALTH CARE EDUCATION/TRAINING PROGRAM
Payer: MEDICARE

## 2025-04-29 DIAGNOSIS — Z94.0 STATUS POST DECEASED-DONOR KIDNEY TRANSPLANTATION: Primary | ICD-10-CM

## 2025-04-29 DIAGNOSIS — Z94.0 KIDNEY REPLACED BY TRANSPLANT: ICD-10-CM

## 2025-04-29 LAB — TACROLIMUS BLD-MCNC: 3.9 NG/ML (ref 5–15)

## 2025-04-29 PROCEDURE — 80197 ASSAY OF TACROLIMUS: CPT

## 2025-04-29 PROCEDURE — 36415 COLL VENOUS BLD VENIPUNCTURE: CPT | Mod: PO

## 2025-04-29 NOTE — PROGRESS NOTES
Kidney Post-Transplant Assessment    Referring Physician: Samuel Knutson  Current Nephrologist: Tam Mathis    ORGAN: RIGHT KIDNEY  Donor Type: donation after brain death  PHS Increased Risk: no  Cold Ischemia: 625 mins  Induction Medications: thymo    Subjective:     CC:  Reassessment of renal allograft function and management of chronic immunosuppression.    HPI:  Ms. Reyer is a 49 y.o. 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 Her most recent creatinine is 8.1. She takes mycophenolate mofetil, prednisone, and tacrolimus for maintenance immunosuppression. Her post transplant course has been complicated by delayed graft function requiring dialysis.    Last HD 4/28. Had labs done first thing this morning that have still not resulted. She is drinking 2+ L water daily, urinating 1.5 L without issue. No peripheral edema. She feels her energy levels are slowly increasing, no SOB or CP. She went to restroom last night and did have some bleeding from lower part of incision under pannus. She had her caregiver look at it for her and she said was coming from staple line. Home SBP 140s. She was previously taking clonidine 0.2 mg at night for night sweats and she is requesting to restart. No nausea or vomiting. Tolerating IS without issue.     Current Medications[1]    Past Medical History:   Diagnosis Date    Allergy     Anemia     Anxiety     Depression     Diabetes mellitus, type 2     Dialysis patient     Disorder of kidney and ureter     Hyperlipidemia     Hypertension     Obesity        Review of Systems   Constitutional:  Positive for fatigue. Negative for activity change and fever.   Eyes:  Negative for visual disturbance.   Respiratory:  Negative for shortness of breath.   "  Cardiovascular:  Negative for chest pain and leg swelling.   Gastrointestinal:  Negative for constipation, diarrhea and nausea.   Genitourinary:  Negative for difficulty urinating, frequency and hematuria.   Musculoskeletal:  Negative for arthralgias and myalgias.   Skin:  Positive for wound.   Allergic/Immunologic: Positive for immunocompromised state.   Neurological:  Negative for weakness and numbness.   Psychiatric/Behavioral:  Negative for sleep disturbance. The patient is not nervous/anxious.      Objective:   Blood pressure (!) 145/78, pulse 87, temperature 97.3 °F (36.3 °C), temperature source Temporal, resp. rate 20, height 5' 4.02" (1.626 m), weight 108.3 kg (238 lb 12.1 oz), SpO2 99%.body mass index is 40.96 kg/m².    Physical Exam  Vitals and nursing note reviewed.   Constitutional:       Appearance: Normal appearance.   Cardiovascular:      Rate and Rhythm: Normal rate and regular rhythm.      Heart sounds: Normal heart sounds.   Pulmonary:      Effort: Pulmonary effort is normal.      Breath sounds: Normal breath sounds.   Abdominal:      General: There is no distension.      Comments: Surgical incision well approximated with staples, scant blood on guaze pad, no significant pain   Musculoskeletal:         General: Normal range of motion.   Skin:     General: Skin is warm and dry.   Neurological:      Mental Status: She is alert.         Labs:  Lab Results   Component Value Date    WBC 6.73 04/30/2025    HGB 6.1 (L) 04/30/2025    HCT 18.9 (LL) 04/30/2025     (L) 04/28/2025    K 4.2 04/28/2025     04/28/2025    CO2 23 04/28/2025    BUN 90 (H) 04/28/2025    CREATININE 8.1 (H) 04/28/2025    EGFRNORACEVR 6 (L) 04/28/2025    CALCIUM 8.4 (L) 04/28/2025    PHOS 8.4 (H) 04/28/2025    MG 2.4 04/28/2025    ALBUMIN 2.9 (L) 04/28/2025    AST 14 04/19/2025    ALT 21 04/19/2025    .6 (H) 04/24/2025    TACROLIMUS 3.9 (L) 04/29/2025     Labs were reviewed with the patient    Assessment:     1. " Status post -donor kidney transplantation    2. Long-term use of immunosuppressant medication    3. Essential (primary) hypertension    4. Diabetic nephropathy associated with type 2 diabetes mellitus    5. Delayed graft function of kidney transplant due to ATN requiring acute dialysis    6. At risk for opportunistic infections      Plan:   Restarted nightly clonidine for night sweats, continue monitoring BP at home       1. Immunosuppression: Prograf trough 3.9, which is SUBtherapeutic. Continue Prograf 7/7 (dose adjusted today), MMF 1000 mg BID, and Prednisone taper. Will continue to monitor for drug toxicities    2. Allograft Function: DGF, last HD . Morning labs have still yet to result. She is making good urine.   - ESRD secondary to diabetic nephropathy s/p donation after brain death kidney transplant on 25 (CIT 10hrs 25 mins, cPRA 94%)   - post transplant course  complicated by delayed graft function requiring dialysis.  Lab Results   Component Value Date    CREATININE 8.1 (H) 2025       3. Hypertension management:   - advise low salt diet and home BP monitoring    - coreg 6.25 mg BID    4.Hyperphosphatemia     - low phosphorous diet      5. Electrolytes: stable. No need for intervention   Lab Results   Component Value Date     (L) 2025    K 4.2 2025     2025    CO2 23 2025       6. Anemia: procrit today in clinic. She did have bleeding from incision last night that has resolved. She will notify if she bleeds again or becomes symptomatic   Lab Results   Component Value Date    WBC 6.73 2025    HGB 6.1 (L) 2025    HCT 18.9 (LL) 2025     (H) 2025     2025         7. Proteinuria: continue p/c ratio as per guidelines     8. BK virus infection screening:  BK PCR per protocol     9. Weight education: provided during the clinic visit   Body mass index is 40.96 kg/m².     10. Patient safety education regarding  immunosuppression including prophylaxis posttransplant for CMV, PCP   - PCP PROPHYLAXIS: Bactrim until 10/20/25   - CMV PROPHYLAXIS: Valcyte until 7/20/25        Follow-up:   Clinic: return to transplant clinic weekly for the first month after transplant; every 2 weeks during months 2-3; then at 6-, 9-, 12-, 18-, 24-, and 36- months post-transplant to reassess for complications from immunosuppression toxicity and monitor for rejection.  Annually thereafter.    Labs: since patient remains at high risk for rejection and drug-related complications that warrant close monitoring, labs will be ordered as follows: continue twice weekly CBC, renal panel, and drug level for first month; then same labs once weekly through 3rd month post-transplant.  Urine for UA and protein/creatinine ratio monthly.  Serum BK - PCR at 1-, 3-, 6-, 9-, 12-, 18-, 24-, 36-, 48-, and 60 months post-transplant.  Hepatic panel at 1-, 2-, 3-, 6-, 9-, 12-, 18-, 24-, and 36- months post-transplant.    Birgit Romano NP            [1]   Current Outpatient Medications   Medication Sig Dispense Refill    ALPRAZolam (XANAX) 0.5 MG tablet Take 0.5 mg by mouth 3 (three) times daily.      busPIRone (BUSPAR) 5 MG Tab Take 5 mg by mouth 2 (two) times daily.      carvediloL (COREG) 6.25 MG tablet Take 1 tablet (6.25 mg total) by mouth 2 (two) times daily. 60 tablet 11    famotidine (PEPCID) 20 MG tablet Take 1 tablet (20 mg total) by mouth once daily. 30 tablet 0    gabapentin (NEURONTIN) 100 MG capsule Take 100 mg by mouth 3 (three) times daily.      insulin aspart U-100 (NOVOLOG) 100 unit/mL (3 mL) InPn pen Inject 0-10 Units into the skin 3 (three) times daily with meals. Per sliding scale. (MDD: 30 units) 9 mL 5    methocarbamoL (ROBAXIN) 500 MG Tab Take 1 tablet (500 mg total) by mouth 4 (four) times daily as needed (muscle spasms). 40 tablet 0    montelukast (SINGULAIR) 10 mg tablet Take 10 mg by mouth nightly.      multivitamin Tab Take 1 tablet by mouth  "once daily.      mycophenolate (CELLCEPT) 250 mg Cap Take 4 capsules (1,000 mg total) by mouth 2 (two) times daily. 240 capsule 11    oxybutynin (DITROPAN) 5 MG Tab Take 1 tablet (5 mg total) by mouth 3 (three) times daily as needed (Bladder spasms). 30 tablet 0    oxyCODONE (ROXICODONE) 5 MG immediate release tablet Take 1 tablet (5 mg total) by mouth every 6 (six) hours as needed for Pain. 28 tablet 0    pen needle, diabetic 32 gauge x 5/32" Ndle Inject 1 Needle into the skin 3 (three) times daily with meals. 100 each 11    pravastatin (PRAVACHOL) 40 MG tablet Take 1 tablet (40 mg total) by mouth once daily. 90 tablet 3    predniSONE (DELTASONE) 5 MG tablet Take by mouth daily: 4/22 to 4/28 20 mg, 4/29 to 5/5 15 mg, 5/6 to 5/12 10 mg, 5/13/2025 to FOREVER 5 mg, DO NOT STOP 70 tablet 0    sulfamethoxazole-trimethoprim 400-80mg (BACTRIM,SEPTRA) 400-80 mg per tablet Take 1 tablet by mouth 3 (three) times a week. Stop 10/20/25 12 tablet 5    tacrolimus (PROGRAF) 1 MG Cap Take 7 capsules (7 mg total) by mouth every 12 (twelve) hours. 540 capsule 11    valGANciclovir (VALCYTE) 450 mg Tab Take 1 tablet (450 mg total) by mouth 3 (three) times a week. Stop 7/20/25 12 tablet 2    vilazodone (VIIBRYD) 10 mg Tab tablet Take 4 tablets (40 mg total) by mouth once daily.      cloNIDine (CATAPRES) 0.1 MG tablet Take 2 tablets (0.2 mg total) by mouth every evening. 60 tablet 11    [Paused] sodium zirconium cyclosilicate (LOKELMA) 10 gram packet Take 10 g by mouth. Mix entire contents of packet(s) into drinking glass containing 3 tablespoons of water; stir well and drink immediately. Add water and repeat until no powder remains to receive entire dose. (Patient not taking: Reported on 4/30/2025)      [Paused] tirzepatide (MOUNJARO) 2.5 mg/0.5 mL PnIj Inject 2.5 mg into the skin every 7 days. (Patient not taking: Reported on 4/30/2025)       No current facility-administered medications for this visit.     "

## 2025-04-30 ENCOUNTER — HOSPITAL ENCOUNTER (OUTPATIENT)
Facility: HOSPITAL | Age: 50
Discharge: HOME | End: 2025-05-01
Attending: EMERGENCY MEDICINE | Admitting: INTERNAL MEDICINE
Payer: MEDICARE

## 2025-04-30 ENCOUNTER — CLINICAL SUPPORT (OUTPATIENT)
Dept: TRANSPLANT | Facility: CLINIC | Age: 50
End: 2025-04-30
Payer: MEDICARE

## 2025-04-30 ENCOUNTER — OFFICE VISIT (OUTPATIENT)
Dept: TRANSPLANT | Facility: CLINIC | Age: 50
End: 2025-04-30
Payer: MEDICARE

## 2025-04-30 ENCOUNTER — TELEPHONE (OUTPATIENT)
Dept: TRANSPLANT | Facility: CLINIC | Age: 50
End: 2025-04-30

## 2025-04-30 ENCOUNTER — DOCUMENTATION ONLY (OUTPATIENT)
Dept: TRANSPLANT | Facility: CLINIC | Age: 50
End: 2025-04-30

## 2025-04-30 VITALS
DIASTOLIC BLOOD PRESSURE: 78 MMHG | RESPIRATION RATE: 20 BRPM | TEMPERATURE: 97 F | HEIGHT: 64 IN | WEIGHT: 238.75 LBS | SYSTOLIC BLOOD PRESSURE: 145 MMHG | BODY MASS INDEX: 40.76 KG/M2 | WEIGHT: 238.75 LBS | SYSTOLIC BLOOD PRESSURE: 145 MMHG | BODY MASS INDEX: 40.76 KG/M2 | HEART RATE: 87 BPM | OXYGEN SATURATION: 99 % | TEMPERATURE: 97 F | RESPIRATION RATE: 20 BRPM | OXYGEN SATURATION: 99 % | HEART RATE: 87 BPM | HEIGHT: 64 IN | DIASTOLIC BLOOD PRESSURE: 78 MMHG

## 2025-04-30 DIAGNOSIS — I10 ESSENTIAL (PRIMARY) HYPERTENSION: ICD-10-CM

## 2025-04-30 DIAGNOSIS — Z99.2 DELAYED GRAFT FUNCTION OF KIDNEY TRANSPLANT DUE TO ATN REQUIRING ACUTE DIALYSIS: ICD-10-CM

## 2025-04-30 DIAGNOSIS — T86.19 DELAYED GRAFT FUNCTION OF KIDNEY TRANSPLANT DUE TO ATN REQUIRING ACUTE DIALYSIS: ICD-10-CM

## 2025-04-30 DIAGNOSIS — Z79.60 LONG-TERM USE OF IMMUNOSUPPRESSANT MEDICATION: ICD-10-CM

## 2025-04-30 DIAGNOSIS — Z91.89 AT RISK FOR OPPORTUNISTIC INFECTIONS: ICD-10-CM

## 2025-04-30 DIAGNOSIS — D64.9 ANEMIA: ICD-10-CM

## 2025-04-30 DIAGNOSIS — D64.9 ACUTE ANEMIA: ICD-10-CM

## 2025-04-30 DIAGNOSIS — F32.A ANXIETY AND DEPRESSION: ICD-10-CM

## 2025-04-30 DIAGNOSIS — N18.9 ANEMIA OF RENAL DISEASE: ICD-10-CM

## 2025-04-30 DIAGNOSIS — E11.21 TYPE 2 DIABETES MELLITUS WITH DIABETIC NEPHROPATHY, UNSPECIFIED WHETHER LONG TERM INSULIN USE: ICD-10-CM

## 2025-04-30 DIAGNOSIS — N30.00 ACUTE CYSTITIS WITHOUT HEMATURIA: ICD-10-CM

## 2025-04-30 DIAGNOSIS — N18.9 ANEMIA IN CHRONIC KIDNEY DISEASE, UNSPECIFIED CKD STAGE: Primary | ICD-10-CM

## 2025-04-30 DIAGNOSIS — D63.1 ANEMIA IN CHRONIC KIDNEY DISEASE, UNSPECIFIED CKD STAGE: Primary | ICD-10-CM

## 2025-04-30 DIAGNOSIS — Z94.0 STATUS POST DECEASED-DONOR KIDNEY TRANSPLANTATION: ICD-10-CM

## 2025-04-30 DIAGNOSIS — Z94.0 KIDNEY TRANSPLANT STATUS: Primary | ICD-10-CM

## 2025-04-30 DIAGNOSIS — Z94.0 STATUS POST DECEASED-DONOR KIDNEY TRANSPLANTATION: Primary | ICD-10-CM

## 2025-04-30 DIAGNOSIS — D63.1 ANEMIA OF RENAL DISEASE: ICD-10-CM

## 2025-04-30 DIAGNOSIS — Z29.89 PROPHYLACTIC IMMUNOTHERAPY: ICD-10-CM

## 2025-04-30 DIAGNOSIS — E11.21 DIABETIC NEPHROPATHY ASSOCIATED WITH TYPE 2 DIABETES MELLITUS: ICD-10-CM

## 2025-04-30 DIAGNOSIS — F41.9 ANXIETY AND DEPRESSION: ICD-10-CM

## 2025-04-30 LAB
ABO + RH BLD: NORMAL
ABSOLUTE EOSINOPHIL (OHS): 0.05 K/UL
ABSOLUTE MONOCYTE (OHS): 0.28 K/UL (ref 0.3–1)
ABSOLUTE NEUTROPHIL COUNT (OHS): 10.86 K/UL (ref 1.8–7.7)
ALBUMIN SERPL BCP-MCNC: 3.2 G/DL (ref 3.5–5.2)
ALP SERPL-CCNC: 49 UNIT/L (ref 40–150)
ALT SERPL W/O P-5'-P-CCNC: 8 UNIT/L (ref 10–44)
ANION GAP (OHS): 12 MMOL/L (ref 8–16)
AST SERPL-CCNC: 10 UNIT/L (ref 11–45)
BACTERIA #/AREA URNS AUTO: ABNORMAL /HPF
BASOPHILS # BLD AUTO: 0.01 K/UL
BASOPHILS NFR BLD AUTO: 0.1 %
BILIRUB SERPL-MCNC: 0.5 MG/DL (ref 0.1–1)
BILIRUB UR QL STRIP.AUTO: NEGATIVE
BLD PROD TYP BPU: NORMAL
BLOOD UNIT EXPIRATION DATE: NORMAL
BLOOD UNIT TYPE CODE: 6200
BUN SERPL-MCNC: 73 MG/DL (ref 6–20)
CALCIUM SERPL-MCNC: 8.8 MG/DL (ref 8.7–10.5)
CHLORIDE SERPL-SCNC: 102 MMOL/L (ref 95–110)
CLARITY UR: CLEAR
CO2 SERPL-SCNC: 27 MMOL/L (ref 23–29)
COLOR UR AUTO: YELLOW
CREAT SERPL-MCNC: 5.2 MG/DL (ref 0.5–1.4)
CROSSMATCH INTERPRETATION: NORMAL
DISPENSE STATUS: NORMAL
ERYTHROCYTE [DISTWIDTH] IN BLOOD BY AUTOMATED COUNT: 13.2 % (ref 11.5–14.5)
GFR SERPLBLD CREATININE-BSD FMLA CKD-EPI: 10 ML/MIN/1.73/M2
GLUCOSE SERPL-MCNC: 199 MG/DL (ref 70–110)
GLUCOSE UR QL STRIP: NEGATIVE
HCT VFR BLD AUTO: 20.8 % (ref 37–48.5)
HGB BLD-MCNC: 6.6 GM/DL (ref 12–16)
HGB UR QL STRIP: ABNORMAL
HOLD SPECIMEN: NORMAL
HYALINE CASTS UR QL AUTO: 0 /LPF (ref 0–1)
IMM GRANULOCYTES # BLD AUTO: 0.1 K/UL (ref 0–0.04)
IMM GRANULOCYTES NFR BLD AUTO: 0.9 % (ref 0–0.5)
INDIRECT COOMBS: NORMAL
KETONES UR QL STRIP: NEGATIVE
LEUKOCYTE ESTERASE UR QL STRIP: ABNORMAL
LYMPHOCYTES # BLD AUTO: 0.27 K/UL (ref 1–4.8)
MCH RBC QN AUTO: 33 PG (ref 27–31)
MCHC RBC AUTO-ENTMCNC: 31.7 G/DL (ref 32–36)
MCV RBC AUTO: 104 FL (ref 82–98)
MICROSCOPIC COMMENT: ABNORMAL
NITRITE UR QL STRIP: NEGATIVE
NUCLEATED RBC (/100WBC) (OHS): 0 /100 WBC
PH UR STRIP: 6 [PH]
PLATELET # BLD AUTO: 283 K/UL (ref 150–450)
PMV BLD AUTO: 9 FL (ref 9.2–12.9)
POTASSIUM SERPL-SCNC: 4.9 MMOL/L (ref 3.5–5.1)
PROT SERPL-MCNC: 6 GM/DL (ref 6–8.4)
PROT UR QL STRIP: ABNORMAL
RBC # BLD AUTO: 2 M/UL (ref 4–5.4)
RBC #/AREA URNS AUTO: 35 /HPF (ref 0–4)
RELATIVE EOSINOPHIL (OHS): 0.4 %
RELATIVE LYMPHOCYTE (OHS): 2.3 % (ref 18–48)
RELATIVE MONOCYTE (OHS): 2.4 % (ref 4–15)
RELATIVE NEUTROPHIL (OHS): 93.9 % (ref 38–73)
RH BLD: NORMAL
SODIUM SERPL-SCNC: 141 MMOL/L (ref 136–145)
SP GR UR STRIP: 1.01
SPECIMEN OUTDATE: NORMAL
SQUAMOUS #/AREA URNS AUTO: 1 /HPF
UNIT NUMBER: NORMAL
UROBILINOGEN UR STRIP-ACNC: NEGATIVE EU/DL
WBC # BLD AUTO: 11.57 K/UL (ref 3.9–12.7)
WBC #/AREA URNS AUTO: 23 /HPF (ref 0–5)
YEAST UR QL AUTO: ABNORMAL /HPF

## 2025-04-30 PROCEDURE — 84466 ASSAY OF TRANSFERRIN: CPT

## 2025-04-30 PROCEDURE — 99285 EMERGENCY DEPT VISIT HI MDM: CPT | Mod: 25

## 2025-04-30 PROCEDURE — 99223 1ST HOSP IP/OBS HIGH 75: CPT | Mod: AI,,,

## 2025-04-30 PROCEDURE — 85025 COMPLETE CBC W/AUTO DIFF WBC: CPT | Performed by: STUDENT IN AN ORGANIZED HEALTH CARE EDUCATION/TRAINING PROGRAM

## 2025-04-30 PROCEDURE — 25000003 PHARM REV CODE 250

## 2025-04-30 PROCEDURE — 99999 PR PBB SHADOW E&M-EST. PATIENT-LVL V: CPT | Mod: PBBFAC,,, | Performed by: NURSE PRACTITIONER

## 2025-04-30 PROCEDURE — 86901 BLOOD TYPING SEROLOGIC RH(D): CPT | Performed by: STUDENT IN AN ORGANIZED HEALTH CARE EDUCATION/TRAINING PROGRAM

## 2025-04-30 PROCEDURE — P9016 RBC LEUKOCYTES REDUCED: HCPCS

## 2025-04-30 PROCEDURE — G0378 HOSPITAL OBSERVATION PER HR: HCPCS

## 2025-04-30 PROCEDURE — 96372 THER/PROPH/DIAG INJ SC/IM: CPT

## 2025-04-30 PROCEDURE — 87086 URINE CULTURE/COLONY COUNT: CPT | Performed by: STUDENT IN AN ORGANIZED HEALTH CARE EDUCATION/TRAINING PROGRAM

## 2025-04-30 PROCEDURE — 36430 TRANSFUSION BLD/BLD COMPNT: CPT

## 2025-04-30 PROCEDURE — 85045 AUTOMATED RETICULOCYTE COUNT: CPT

## 2025-04-30 PROCEDURE — 82728 ASSAY OF FERRITIN: CPT

## 2025-04-30 PROCEDURE — 81001 URINALYSIS AUTO W/SCOPE: CPT | Performed by: STUDENT IN AN ORGANIZED HEALTH CARE EDUCATION/TRAINING PROGRAM

## 2025-04-30 PROCEDURE — 86920 COMPATIBILITY TEST SPIN: CPT

## 2025-04-30 PROCEDURE — 99999 PR PBB SHADOW E&M-EST. PATIENT-LVL III: CPT | Mod: PBBFAC,,,

## 2025-04-30 PROCEDURE — 63600175 PHARM REV CODE 636 W HCPCS

## 2025-04-30 RX ORDER — DIAZEPAM 5 MG/1
5 TABLET ORAL
Status: COMPLETED | OUTPATIENT
Start: 2025-04-30 | End: 2025-04-30

## 2025-04-30 RX ORDER — TALC
6 POWDER (GRAM) TOPICAL NIGHTLY PRN
Status: DISCONTINUED | OUTPATIENT
Start: 2025-04-30 | End: 2025-05-01 | Stop reason: HOSPADM

## 2025-04-30 RX ORDER — HYDROCODONE BITARTRATE AND ACETAMINOPHEN 500; 5 MG/1; MG/1
TABLET ORAL
Status: DISCONTINUED | OUTPATIENT
Start: 2025-04-30 | End: 2025-05-01 | Stop reason: HOSPADM

## 2025-04-30 RX ORDER — VANCOMYCIN 2 GRAM/500 ML IN 0.9 % SODIUM CHLORIDE INTRAVENOUS
20 ONCE
Status: COMPLETED | OUTPATIENT
Start: 2025-04-30 | End: 2025-05-01

## 2025-04-30 RX ORDER — GABAPENTIN 100 MG/1
100 CAPSULE ORAL 3 TIMES DAILY
Status: DISCONTINUED | OUTPATIENT
Start: 2025-05-01 | End: 2025-05-01 | Stop reason: HOSPADM

## 2025-04-30 RX ORDER — PROCHLORPERAZINE EDISYLATE 5 MG/ML
5 INJECTION INTRAMUSCULAR; INTRAVENOUS EVERY 6 HOURS PRN
Status: DISCONTINUED | OUTPATIENT
Start: 2025-04-30 | End: 2025-05-01 | Stop reason: HOSPADM

## 2025-04-30 RX ORDER — PRAVASTATIN SODIUM 40 MG/1
40 TABLET ORAL DAILY
Status: DISCONTINUED | OUTPATIENT
Start: 2025-05-01 | End: 2025-05-01 | Stop reason: HOSPADM

## 2025-04-30 RX ORDER — ONDANSETRON 8 MG/1
8 TABLET, ORALLY DISINTEGRATING ORAL EVERY 6 HOURS PRN
Status: DISCONTINUED | OUTPATIENT
Start: 2025-04-30 | End: 2025-05-01 | Stop reason: HOSPADM

## 2025-04-30 RX ORDER — ALPRAZOLAM 0.25 MG/1
0.5 TABLET ORAL 3 TIMES DAILY PRN
Status: DISCONTINUED | OUTPATIENT
Start: 2025-04-30 | End: 2025-05-01 | Stop reason: HOSPADM

## 2025-04-30 RX ORDER — OXYCODONE HYDROCHLORIDE 5 MG/1
5 TABLET ORAL EVERY 6 HOURS PRN
Refills: 0 | Status: DISCONTINUED | OUTPATIENT
Start: 2025-04-30 | End: 2025-05-01 | Stop reason: HOSPADM

## 2025-04-30 RX ORDER — VALGANCICLOVIR 450 MG/1
450 TABLET, FILM COATED ORAL
Status: DISCONTINUED | OUTPATIENT
Start: 2025-04-30 | End: 2025-05-01 | Stop reason: HOSPADM

## 2025-04-30 RX ORDER — FAMOTIDINE 20 MG/1
20 TABLET, FILM COATED ORAL DAILY
Status: DISCONTINUED | OUTPATIENT
Start: 2025-05-01 | End: 2025-05-01 | Stop reason: HOSPADM

## 2025-04-30 RX ORDER — MYCOPHENOLATE MOFETIL 250 MG/1
1000 CAPSULE ORAL 2 TIMES DAILY
Status: DISCONTINUED | OUTPATIENT
Start: 2025-04-30 | End: 2025-05-01 | Stop reason: HOSPADM

## 2025-04-30 RX ORDER — CLONIDINE HYDROCHLORIDE 0.1 MG/1
0.2 TABLET ORAL NIGHTLY
Status: DISCONTINUED | OUTPATIENT
Start: 2025-04-30 | End: 2025-05-01 | Stop reason: HOSPADM

## 2025-04-30 RX ORDER — CARVEDILOL 6.25 MG/1
6.25 TABLET ORAL 2 TIMES DAILY
Status: DISCONTINUED | OUTPATIENT
Start: 2025-04-30 | End: 2025-05-01 | Stop reason: HOSPADM

## 2025-04-30 RX ORDER — CLONIDINE HYDROCHLORIDE 0.1 MG/1
0.2 TABLET ORAL NIGHTLY
Qty: 60 TABLET | Refills: 11 | Status: SHIPPED | OUTPATIENT
Start: 2025-04-30

## 2025-04-30 RX ORDER — VILAZODONE HYDROCHLORIDE 10 MG/1
40 TABLET ORAL DAILY
Status: DISCONTINUED | OUTPATIENT
Start: 2025-05-01 | End: 2025-05-01 | Stop reason: HOSPADM

## 2025-04-30 RX ORDER — SULFAMETHOXAZOLE AND TRIMETHOPRIM 400; 80 MG/1; MG/1
1 TABLET ORAL
Status: DISCONTINUED | OUTPATIENT
Start: 2025-04-30 | End: 2025-05-01 | Stop reason: HOSPADM

## 2025-04-30 RX ORDER — ACETAMINOPHEN 325 MG/1
650 TABLET ORAL EVERY 4 HOURS PRN
Status: DISCONTINUED | OUTPATIENT
Start: 2025-04-30 | End: 2025-05-01 | Stop reason: HOSPADM

## 2025-04-30 RX ORDER — HEPARIN SODIUM 5000 [USP'U]/ML
5000 INJECTION, SOLUTION INTRAVENOUS; SUBCUTANEOUS EVERY 8 HOURS
Status: DISCONTINUED | OUTPATIENT
Start: 2025-04-30 | End: 2025-05-01 | Stop reason: HOSPADM

## 2025-04-30 RX ORDER — SODIUM CHLORIDE 0.9 % (FLUSH) 0.9 %
10 SYRINGE (ML) INJECTION
Status: DISCONTINUED | OUTPATIENT
Start: 2025-04-30 | End: 2025-05-01 | Stop reason: HOSPADM

## 2025-04-30 RX ORDER — CEFTRIAXONE 1 G/1
1 INJECTION, POWDER, FOR SOLUTION INTRAMUSCULAR; INTRAVENOUS
Status: DISCONTINUED | OUTPATIENT
Start: 2025-04-30 | End: 2025-05-01

## 2025-04-30 RX ORDER — MONTELUKAST SODIUM 10 MG/1
10 TABLET ORAL NIGHTLY
Status: DISCONTINUED | OUTPATIENT
Start: 2025-04-30 | End: 2025-05-01 | Stop reason: HOSPADM

## 2025-04-30 RX ORDER — TACROLIMUS 1 MG/1
7 CAPSULE ORAL 2 TIMES DAILY
Status: DISCONTINUED | OUTPATIENT
Start: 2025-05-01 | End: 2025-05-01 | Stop reason: HOSPADM

## 2025-04-30 RX ORDER — BUSPIRONE HYDROCHLORIDE 5 MG/1
5 TABLET ORAL 2 TIMES DAILY
Status: DISCONTINUED | OUTPATIENT
Start: 2025-04-30 | End: 2025-05-01 | Stop reason: HOSPADM

## 2025-04-30 RX ADMIN — CLONIDINE HYDROCHLORIDE 0.2 MG: 0.1 TABLET ORAL at 09:04

## 2025-04-30 RX ADMIN — CARVEDILOL 6.25 MG: 6.25 TABLET, FILM COATED ORAL at 09:04

## 2025-04-30 RX ADMIN — BUSPIRONE HYDROCHLORIDE 5 MG: 5 TABLET ORAL at 09:04

## 2025-04-30 RX ADMIN — HEPARIN SODIUM 5000 UNITS: 5000 INJECTION INTRAVENOUS; SUBCUTANEOUS at 09:04

## 2025-04-30 RX ADMIN — DIAZEPAM 5 MG: 5 TABLET ORAL at 06:04

## 2025-04-30 RX ADMIN — SULFAMETHOXAZOLE AND TRIMETHOPRIM 1 TABLET: 400; 80 TABLET ORAL at 10:04

## 2025-04-30 RX ADMIN — VALGANCICLOVIR 450 MG: 450 TABLET, FILM COATED ORAL at 10:04

## 2025-04-30 RX ADMIN — MYCOPHENOLATE MOFETIL 1000 MG: 250 CAPSULE ORAL at 10:04

## 2025-04-30 RX ADMIN — MONTELUKAST 10 MG: 10 TABLET, FILM COATED ORAL at 09:04

## 2025-04-30 NOTE — ED PROVIDER NOTES
Encounter Date: 2025       History     Chief Complaint   Patient presents with    Abnormal labs      Pt had a kidney transplant 10 days ago, sent for low H and H sent for blood transfusion.      HPI Brande Reyer is a 49 y.o. female with PMH of ESRD secondary to diabetic nephropathy, kidney transplant 2025, presenting to [facility] ED for anemia.  Patient was sent in from clinic for anemia with hemoglobin of 6.6.  Patient states that she has been starting to feel better and having more energy.  Denies chest pain, shortness of breath, headache, lethargy, weakness.  She has had good urine output.  She had a small amount of bleeding from her surgical incision site that has since resolved.  Gauze was changed in clinic.  There is a small amount of serosanguineous blood.  Denies fever, cough, congestion, rhinorrhea, diarrhea, nausea/vomiting.  Patient endorsing significant anxiety about being admitted to the hospital.      Review of patient's allergies indicates:   Allergen Reactions    Amlodipine Itching and Swelling     Past Medical History:   Diagnosis Date    Allergy     Anemia     Anxiety     Depression     Diabetes mellitus, type 2     Dialysis patient     Disorder of kidney and ureter     Hyperlipidemia     Hypertension     Obesity      Past Surgical History:   Procedure Laterality Date    AV FISTULA PLACEMENT  2022     SECTION      COLONOSCOPY N/A 2023    Procedure: COLONOSCOPY;  Surgeon: Hermilo Orozco III, MD;  Location: [facility];  Service: Endoscopy;  Laterality: N/A;    COLONOSCOPY N/A 2024    Procedure: COLONOSCOPY;  Surgeon: Hermilo Orozco III, MD;  Location: [facility];  Service: Endoscopy;  Laterality: N/A;    HYSTERECTOMY      INSERTION OF DIALYSIS CATHETER      KIDNEY TRANSPLANT N/A 2025    Procedure: TRANSPLANT, KIDNEY;  Surgeon: Josh Cameron Jr., MD;  Location: [facility] OR [address] [place] [address];  Service: Transplant;  Laterality: N/A;  Out of Ice 0030  Reperfusion 0102     TUBAL LIGATION       Family History   Problem Relation Name Age of Onset    Heart disease Father      No Known Problems Daughter       Social History[1]  Review of Systems   Constitutional:  Negative for fever.   HENT:  Negative for congestion, rhinorrhea and sore throat.    Eyes:  Negative for visual disturbance.   Respiratory:  Negative for cough and shortness of breath.    Cardiovascular:  Negative for chest pain and leg swelling.   Gastrointestinal:  Negative for abdominal pain, diarrhea, nausea and vomiting.   Genitourinary:  Negative for dysuria and hematuria.   Skin:  Negative for rash.   Neurological:  Negative for weakness.   Psychiatric/Behavioral:  The patient is nervous/anxious.        Physical Exam     Initial Vitals [04/30/25 1630]   BP Pulse Resp Temp SpO2   (!) 181/78 90 20 98.1 °F (36.7 °C) 100 %      MAP       --         Physical Exam    Nursing note and vitals reviewed.  Constitutional: Vital signs are normal. She is cooperative.  Non-toxic appearance. She does not appear ill.       HENT:   Head: Normocephalic and atraumatic. Mouth/Throat: Mucous membranes are normal. Mucous membranes are not dry.   Eyes: Conjunctivae are normal.   Neck: Phonation normal.   Cardiovascular:  Normal rate, regular rhythm and normal heart sounds.     Exam reveals no gallop, no S3, no S4 and no friction rub.       No murmur heard.  Pulmonary/Chest: Breath sounds normal. No respiratory distress. She has no wheezes. She has no rhonchi. She has no rales.   Abdominal: Abdomen is soft. She exhibits no distension. There is no abdominal tenderness.   Musculoskeletal:      Right lower leg: No edema.      Left lower leg: No edema.     Neurological: She is alert.   Skin: Skin is warm, dry and intact. Capillary refill takes less than 2 seconds.   Psychiatric: She has a normal mood and affect. Her speech is normal.         ED Course   Procedures  Labs Reviewed   COMPREHENSIVE METABOLIC PANEL - Abnormal       Result Value     Sodium 141      Potassium 4.9      Chloride 102      CO2 27      Glucose 199 (*)     BUN 73 (*)     Creatinine 5.2 (*)     Calcium 8.8      Protein Total 6.0      Albumin 3.2 (*)     Bilirubin Total 0.5      ALP 49      AST 10 (*)     ALT 8 (*)     Anion Gap 12      eGFR 10 (*)    URINALYSIS, REFLEX TO URINE CULTURE - Abnormal    Color, UA Yellow      Appearance, UA Clear      pH, UA 6.0      Spec Grav UA 1.010      Protein, UA 1+ (*)     Glucose, UA Negative      Ketones, UA Negative      Bilirubin, UA Negative      Blood, UA 3+ (*)     Nitrites, UA Negative      Urobilinogen, UA Negative      Leukocyte Esterase, UA 2+ (*)    CBC WITH DIFFERENTIAL - Abnormal    WBC 11.57      RBC 2.00 (*)     HGB 6.6 (*)     HCT 20.8 (*)      (*)     MCH 33.0 (*)     MCHC 31.7 (*)     RDW 13.2      Platelet Count 283      MPV 9.0 (*)     Nucleated RBC 0      Neut % 93.9 (*)     Lymph % 2.3 (*)     Mono % 2.4 (*)     Eos % 0.4      Basophil % 0.1      Imm Grans % 0.9 (*)     Neut # 10.86 (*)     Lymph # 0.27 (*)     Mono # 0.28 (*)     Eos # 0.05      Baso # 0.01      Imm Grans # 0.10 (*)    URINALYSIS MICROSCOPIC - Abnormal    RBC, UA 35 (*)     WBC, UA 23 (*)     Bacteria, UA Rare      Yeast, UA None      Squamous Epithelial Cells, UA 1      Hyaline Casts, UA 0      Microscopic Comment       CULTURE, URINE   CBC W/ AUTO DIFFERENTIAL    Narrative:     The following orders were created for panel order CBC auto differential.  Procedure                               Abnormality         Status                     ---------                               -----------         ------                     CBC with Differential[[id number]]       Abnormal            Final result                 Please view results for these tests on the individual orders.   GREY TOP URINE HOLD    Extra Tube Hold for add-ons.     TYPE & SCREEN    Specimen Outdate 05/03/2025 23:59      Group & Rh A POS      Indirect [place] NEG     PREPARE RBC SOFT    UNIT  NUMBER [id number]      UNIT ABO/RH A POS      DISPENSE STATUS Transfused      Unit Expiration [id number]      Product Code [id number]      Unit Blood Type Code 6200      CROSSMATCH INTERPRETATION Compatible            Imaging Results              US Transplant Kidney With Doppler (Final result)  Result time 05/01/25 03:35:07      Final result by Iglesia Gunn MD (05/01/25 03:35:07)                   Impression:      Satisfactory Doppler evaluation of the renal allograft.    Mild free fluid adjacent to the allograft.  No organized marce-transplant collections.    Some renal resistive indices have increased, a finding which can be seen in the setting of drug toxicity (such as calcineurin inhibitor toxicity), rejection, or medical renal disease.    Recommendations include clinical correlation and continued imaging follow-up.    This final report has been modified from the preliminary report.  Please review this final report for changes.  The preliminary report does remain available in the Epic Chart.    Electronically signed by resident: Adan Conroy  Date:    05/01/2025  Time:    02:36    Electronically signed by: Iglesia Gunn  Date:    05/01/2025  Time:    03:35               Narrative:    EXAMINATION:  US TRANSPLANT KIDNEY WITH DOPPLER    CLINICAL HISTORY:  anemia, bleeding;    TECHNIQUE:  Real time gray scale and doppler ultrasound was performed over the patient's renal allograft.    COMPARISON:  Transplant kidney Doppler 04/21/2025    FINDINGS:  Patient is status post kidney transplant 04/20/2025.  Renal allograft in the right lower quadrant.  The allograft measures 10.8 cm. Normal perfusion. No hydronephrosis.  Ureteral stent in place.    No organized fluid collections.  Mild free fluid adjacent to the allograft.    Vasculature:    Interlobar arterial resistive index measures 0.73 (previously 0.74) with normal waveform.    Upper segmental arterial resistive index measures 0.83 (previously 0.79) with  normal waveform.    Middle segmental arterial resistive index measures 0.78 (previously 0.58) with normal waveform.    Lower segmental arterial resistive index measures 0.75 (previously 0.75) with normal waveform.    Main renal artery peak systolic velocity: 145cm/sec with normal waveform, previously 176 cm/sec.    Renal artery/iliac ratio: 0.6.    The main renal vein is patent.                                       Medications   diazePAM tablet 5 mg (5 mg Oral Given 4/30/25 1834)   vancomycin 2 g in 0.9% sodium chloride 500 mL IVPB (0 mg Intravenous Stopped 5/1/25 0405)   fluconazole tablet 150 mg (150 mg Oral Given 5/1/25 1423)     Medical Decision Making  49-year-old female with recent kidney transplant presenting for anemia.  Initially, patient is afebrile, hypertensive but overall hemodynamically stable and well-appearing.      Suspect that patient's anemia is secondary to poor renal function.  Patient had minimal amount of bleeding but will obtain ultrasound to assess for hematoma around transplant of kidney.  Patient consented and will transfuse 1 unit PRBCs.  Patient does not exhibit any symptoms of symptomatic anemia.  Patient has significant anxiety, normally takes Xanax, will give p.o. Valium.    Patient evaluated by kidney transplant service who agreed to admit patient to their service for anemia and further monitoring.  Patient is stable at time of transfer of care.    Amount and/or Complexity of Data Reviewed  Labs:  Decision-making details documented in ED Course.  Radiology: ordered.    Risk  Prescription drug management.               ED Course as of 05/01/25 1841 Wed Apr 30, 2025   1755 Hemoglobin(!): 6.6 [ES]   1918 Patient is a 49-year-old female with past medical history ESRD secondary to diabetic nephropathy, kidney transplant 04/20/2025 that is presenting for anemia.  Patient was sent here by transplant team for anemia, hemoglobin 6.6.  Patient otherwise denies any symptoms.  Patient denies  any fevers, chills, chest pains, shortness of breath, nausea, vomiting, diarrhea, abdominal pain.  Patient denies any blood in stool or urine.    Physical exam: Very well-appearing 49-year-old female, no distress, appropriately conversational.  Benign cardiac, respiratory, abdominal exam.    Plan: Anemia noted, we will order blood transfusion, consult transplant team.  Consider likely admission.    Attestation of Dr. Patterson (Attending Physician):      I have reviewed the record and the patient care provided by the Resident provider and agree with the documented HPI, ROS, PE.  I also agree with the treatment plan and work up and I have performed an independent history / physical exam and MDM.   [RT]      ED Course User Index  [ES] Mary Orosco MD  [RT] Kendrick Patterson MD                           Clinical Impression:  Final diagnoses:  [D64.9] Anemia  [Z94.0] Kidney transplant status (Primary)          ED Disposition Condition    Observation                   Mary Orosco MD  Resident  05/01/25 0013         [1]   Social History  Tobacco Use    Smoking status: Former     Current packs/day: 0.00     Types: Cigarettes     Quit date: 2022     Years since quitting: 3.3    Smokeless tobacco: Never   Substance Use Topics    Alcohol use: Not Currently    Drug use: Never        Kendrick Patterson MD  05/01/25 4648

## 2025-04-30 NOTE — ED TRIAGE NOTES
Brande Reyer, a 49 y.o. female presents to the ED w/ complaint of sent over from transplant due to needing an US and a transfusion.    Triage note:  Chief Complaint   Patient presents with    Abnormal labs      Pt had a kidney transplant 10 days ago, sent for low H and H sent for blood transfusion.      Review of patient's allergies indicates:   Allergen Reactions    Amlodipine Itching and Swelling     Past Medical History:   Diagnosis Date    Allergy     Anemia     Anxiety     Depression     Diabetes mellitus, type 2     Dialysis patient     Disorder of kidney and ureter     Hyperlipidemia     Hypertension     Obesity

## 2025-04-30 NOTE — FIRST PROVIDER EVALUATION
"Medical screening examination initiated.  I have conducted a focused provider triage encounter, findings are as follows:    Brief history of present illness:  Patient sent in from transplant clinic after labs today showed hgb 6.1.    Vitals:    04/30/25 1630   BP: (!) 181/78   Pulse: 90   Resp: 20   Temp: 98.1 °F (36.7 °C)   TempSrc: Oral   SpO2: 100%   Weight: 107 kg (235 lb 14.3 oz)   Height: 5' 4" (1.626 m)       Pertinent physical exam:  General: A&Ox3, NAD  Pulmonary: No respiratory distress    Brief workup plan:  Repeat labs, type and screen.    Preliminary workup initiated; this workup will be continued and followed by the physician or advanced practice provider that is assigned to the patient when roomed.  "

## 2025-04-30 NOTE — LETTER
April 30, 2025        Tam Mathis  [address]  [facility] [address]  Phone: [phone]  Fax: [phone]             [facility] 1st Fl  [address]  [facility] [address]  Phone: [phone]   Patient: Brande Reyer   MR Number: [id number]   YOB: 1975   Date of Visit: 4/30/2025       Dear Dr. Tam Mathis    Thank you for referring Brande Reyer to me for evaluation. Attached you will find relevant portions of my assessment and plan of care.    If you have questions, please do not hesitate to call me. I look forward to following Brande Reyer along with you.    Sincerely,    Birgit Romano, LAZARO    Enclosure    If you would like to receive this communication electronically, please contact [email or web address] or [phone] to request [employer] Link access.    [employer] Link is a tool which provides read-only access to select patient information with whom you have a relationship. Its easy to use and provides real time access to review your patients record including encounter summaries, notes, results, and demographic information.    If you feel you have received this communication in error or would no longer like to receive these types of communications, please e-mail [email or web address]

## 2025-04-30 NOTE — PROGRESS NOTES
Procrit 10,000 units administered sq to right posterior arm per MD order. Patient tolerated with no complaints. Patient informed of side effects including bone pain, muscle aches and tiredness. Verbalized acknowledgment.

## 2025-04-30 NOTE — TELEPHONE ENCOUNTER
D/w patient need for ER 2/2 low hgb and need for ultrasound     Pt in accord with plan.     ----- Message from Nazanin sent at 4/30/2025  3:23 PM CDT -----  Regarding: Nurse Velasquez  Contact: Patient can be contacted @# [phone]  PT is calling to speak to Nurse Velasquez. PT is req return call today before you leave please - she needs to know if need to home hemo or not. Please advisePatient can be contacted @# [phone]

## 2025-05-01 ENCOUNTER — RESULTS FOLLOW-UP (OUTPATIENT)
Dept: TRANSPLANT | Facility: CLINIC | Age: 50
End: 2025-05-01

## 2025-05-01 VITALS
DIASTOLIC BLOOD PRESSURE: 80 MMHG | HEIGHT: 64 IN | BODY MASS INDEX: 40.27 KG/M2 | TEMPERATURE: 98 F | SYSTOLIC BLOOD PRESSURE: 154 MMHG | HEART RATE: 83 BPM | RESPIRATION RATE: 18 BRPM | WEIGHT: 235.88 LBS | OXYGEN SATURATION: 97 %

## 2025-05-01 PROBLEM — F41.9 ANXIETY AND DEPRESSION: Status: ACTIVE | Noted: 2025-05-01

## 2025-05-01 PROBLEM — F32.A ANXIETY AND DEPRESSION: Status: ACTIVE | Noted: 2025-05-01

## 2025-05-01 PROBLEM — D64.9 ACUTE ANEMIA: Status: ACTIVE | Noted: 2025-05-01

## 2025-05-01 PROBLEM — N30.00 ACUTE CYSTITIS WITHOUT HEMATURIA: Status: ACTIVE | Noted: 2025-05-01

## 2025-05-01 LAB
ABO + RH BLD: NORMAL
ABSOLUTE EOSINOPHIL (OHS): 0.06 K/UL
ABSOLUTE EOSINOPHIL (OHS): 0.17 K/UL
ABSOLUTE MONOCYTE (OHS): 0.16 K/UL (ref 0.3–1)
ABSOLUTE MONOCYTE (OHS): 0.24 K/UL (ref 0.3–1)
ABSOLUTE NEUTROPHIL COUNT (OHS): 10.64 K/UL (ref 1.8–7.7)
ABSOLUTE NEUTROPHIL COUNT (OHS): 7.23 K/UL (ref 1.8–7.7)
ALBUMIN SERPL BCP-MCNC: 2.7 G/DL (ref 3.5–5.2)
ANION GAP (OHS): 10 MMOL/L (ref 8–16)
BASOPHILS # BLD AUTO: 0.02 K/UL
BASOPHILS # BLD AUTO: 0.03 K/UL
BASOPHILS NFR BLD AUTO: 0.2 %
BASOPHILS NFR BLD AUTO: 0.3 %
BLD PROD TYP BPU: NORMAL
BLOOD UNIT EXPIRATION DATE: NORMAL
BLOOD UNIT TYPE CODE: 6200
BUN SERPL-MCNC: 69 MG/DL (ref 6–20)
CALCIUM SERPL-MCNC: 8.3 MG/DL (ref 8.7–10.5)
CHLORIDE SERPL-SCNC: 105 MMOL/L (ref 95–110)
CO2 SERPL-SCNC: 27 MMOL/L (ref 23–29)
CREAT SERPL-MCNC: 4.8 MG/DL (ref 0.5–1.4)
CROSSMATCH INTERPRETATION: NORMAL
DISPENSE STATUS: NORMAL
ERYTHROCYTE [DISTWIDTH] IN BLOOD BY AUTOMATED COUNT: 17.2 % (ref 11.5–14.5)
ERYTHROCYTE [DISTWIDTH] IN BLOOD BY AUTOMATED COUNT: 18 % (ref 11.5–14.5)
FERRITIN SERPL-MCNC: 1839 NG/ML (ref 20–300)
FOLATE SERPL-MCNC: 11.7 NG/ML (ref 4–24)
GFR SERPLBLD CREATININE-BSD FMLA CKD-EPI: 10 ML/MIN/1.73/M2
GLUCOSE SERPL-MCNC: 128 MG/DL (ref 70–110)
HAPTOGLOB SERPL-MCNC: 41 MG/DL (ref 30–250)
HCT VFR BLD AUTO: 21.7 % (ref 37–48.5)
HCT VFR BLD AUTO: 24.4 % (ref 37–48.5)
HGB BLD-MCNC: 7.3 GM/DL (ref 12–16)
HGB BLD-MCNC: 7.9 GM/DL (ref 12–16)
IMM GRANULOCYTES # BLD AUTO: 0.07 K/UL (ref 0–0.04)
IMM GRANULOCYTES # BLD AUTO: 0.08 K/UL (ref 0–0.04)
IMM GRANULOCYTES NFR BLD AUTO: 0.6 % (ref 0–0.5)
IMM GRANULOCYTES NFR BLD AUTO: 1 % (ref 0–0.5)
IRON SATN MFR SERPL: 36 % (ref 20–50)
IRON SERPL-MCNC: 89 UG/DL (ref 30–160)
LDH SERPL-CCNC: 173 U/L (ref 110–260)
LYMPHOCYTES # BLD AUTO: 0.19 K/UL (ref 1–4.8)
LYMPHOCYTES # BLD AUTO: 0.46 K/UL (ref 1–4.8)
MAGNESIUM SERPL-MCNC: 2 MG/DL (ref 1.6–2.6)
MCH RBC QN AUTO: 31.5 PG (ref 27–31)
MCH RBC QN AUTO: 32.3 PG (ref 27–31)
MCHC RBC AUTO-ENTMCNC: 32.4 G/DL (ref 32–36)
MCHC RBC AUTO-ENTMCNC: 33.6 G/DL (ref 32–36)
MCV RBC AUTO: 96 FL (ref 82–98)
MCV RBC AUTO: 97 FL (ref 82–98)
NUCLEATED RBC (/100WBC) (OHS): 0 /100 WBC
NUCLEATED RBC (/100WBC) (OHS): 0 /100 WBC
PHOSPHATE SERPL-MCNC: 5.9 MG/DL (ref 2.7–4.5)
PLATELET # BLD AUTO: 220 K/UL (ref 150–450)
PLATELET # BLD AUTO: 262 K/UL (ref 150–450)
PMV BLD AUTO: 8.7 FL (ref 9.2–12.9)
PMV BLD AUTO: 8.8 FL (ref 9.2–12.9)
POTASSIUM SERPL-SCNC: 4.7 MMOL/L (ref 3.5–5.1)
RBC # BLD AUTO: 2.26 M/UL (ref 4–5.4)
RBC # BLD AUTO: 2.51 M/UL (ref 4–5.4)
RELATIVE EOSINOPHIL (OHS): 0.5 %
RELATIVE EOSINOPHIL (OHS): 2.1 %
RELATIVE LYMPHOCYTE (OHS): 1.7 % (ref 18–48)
RELATIVE LYMPHOCYTE (OHS): 5.6 % (ref 18–48)
RELATIVE MONOCYTE (OHS): 1.4 % (ref 4–15)
RELATIVE MONOCYTE (OHS): 2.9 % (ref 4–15)
RELATIVE NEUTROPHIL (OHS): 88.2 % (ref 38–73)
RELATIVE NEUTROPHIL (OHS): 95.5 % (ref 38–73)
RETICS/RBC NFR AUTO: 3.3 % (ref 0.5–2.5)
SARS-COV-2 RDRP RESP QL NAA+PROBE: NEGATIVE
SODIUM SERPL-SCNC: 142 MMOL/L (ref 136–145)
TACROLIMUS BLD-MCNC: 4.1 NG/ML (ref 5–15)
TIBC SERPL-MCNC: 250 UG/DL (ref 250–450)
TRANSFERRIN SERPL-MCNC: 169 MG/DL (ref 200–375)
UNIT NUMBER: NORMAL
VIT B12 SERPL-MCNC: 570 PG/ML (ref 210–950)
WBC # BLD AUTO: 11.15 K/UL (ref 3.9–12.7)
WBC # BLD AUTO: 8.2 K/UL (ref 3.9–12.7)

## 2025-05-01 PROCEDURE — 25000003 PHARM REV CODE 250

## 2025-05-01 PROCEDURE — 63600175 PHARM REV CODE 636 W HCPCS

## 2025-05-01 PROCEDURE — 25000003 PHARM REV CODE 250: Performed by: PHYSICIAN ASSISTANT

## 2025-05-01 PROCEDURE — 25000003 PHARM REV CODE 250: Performed by: INTERNAL MEDICINE

## 2025-05-01 PROCEDURE — 96366 THER/PROPH/DIAG IV INF ADDON: CPT

## 2025-05-01 PROCEDURE — P9016 RBC LEUKOCYTES REDUCED: HCPCS

## 2025-05-01 PROCEDURE — 63600175 PHARM REV CODE 636 W HCPCS: Performed by: INTERNAL MEDICINE

## 2025-05-01 PROCEDURE — 36415 COLL VENOUS BLD VENIPUNCTURE: CPT | Mod: XB | Performed by: PHYSICIAN ASSISTANT

## 2025-05-01 PROCEDURE — 83735 ASSAY OF MAGNESIUM: CPT

## 2025-05-01 PROCEDURE — 99239 HOSP IP/OBS DSCHRG MGMT >30: CPT | Mod: ,,, | Performed by: PHYSICIAN ASSISTANT

## 2025-05-01 PROCEDURE — G0378 HOSPITAL OBSERVATION PER HR: HCPCS

## 2025-05-01 PROCEDURE — 83010 ASSAY OF HAPTOGLOBIN QUANT: CPT | Performed by: INTERNAL MEDICINE

## 2025-05-01 PROCEDURE — 85025 COMPLETE CBC W/AUTO DIFF WBC: CPT | Mod: 91 | Performed by: PHYSICIAN ASSISTANT

## 2025-05-01 PROCEDURE — 82746 ASSAY OF FOLIC ACID SERUM: CPT | Performed by: INTERNAL MEDICINE

## 2025-05-01 PROCEDURE — 36415 COLL VENOUS BLD VENIPUNCTURE: CPT

## 2025-05-01 PROCEDURE — 96375 TX/PRO/DX INJ NEW DRUG ADDON: CPT

## 2025-05-01 PROCEDURE — 87040 BLOOD CULTURE FOR BACTERIA: CPT | Mod: 91

## 2025-05-01 PROCEDURE — 82607 VITAMIN B-12: CPT

## 2025-05-01 PROCEDURE — U0002 COVID-19 LAB TEST NON-CDC: HCPCS | Performed by: INTERNAL MEDICINE

## 2025-05-01 PROCEDURE — 96365 THER/PROPH/DIAG IV INF INIT: CPT

## 2025-05-01 PROCEDURE — 80197 ASSAY OF TACROLIMUS: CPT

## 2025-05-01 PROCEDURE — 36430 TRANSFUSION BLD/BLD COMPNT: CPT

## 2025-05-01 PROCEDURE — 99214 OFFICE O/P EST MOD 30 MIN: CPT | Mod: ,,, | Performed by: NURSE PRACTITIONER

## 2025-05-01 PROCEDURE — 85025 COMPLETE CBC W/AUTO DIFF WBC: CPT

## 2025-05-01 PROCEDURE — 80069 RENAL FUNCTION PANEL: CPT

## 2025-05-01 PROCEDURE — 83615 LACTATE (LD) (LDH) ENZYME: CPT

## 2025-05-01 RX ORDER — IBUPROFEN 200 MG
16 TABLET ORAL
Status: DISCONTINUED | OUTPATIENT
Start: 2025-05-01 | End: 2025-05-01 | Stop reason: HOSPADM

## 2025-05-01 RX ORDER — IBUPROFEN 200 MG
24 TABLET ORAL
Status: DISCONTINUED | OUTPATIENT
Start: 2025-05-01 | End: 2025-05-01 | Stop reason: HOSPADM

## 2025-05-01 RX ORDER — INSULIN ASPART 100 [IU]/ML
0-5 INJECTION, SOLUTION INTRAVENOUS; SUBCUTANEOUS
Status: DISCONTINUED | OUTPATIENT
Start: 2025-05-01 | End: 2025-05-01 | Stop reason: HOSPADM

## 2025-05-01 RX ORDER — FLUCONAZOLE 150 MG/1
150 TABLET ORAL ONCE
Status: COMPLETED | OUTPATIENT
Start: 2025-05-01 | End: 2025-05-01

## 2025-05-01 RX ORDER — GLUCAGON 1 MG
1 KIT INJECTION
Status: DISCONTINUED | OUTPATIENT
Start: 2025-05-01 | End: 2025-05-01 | Stop reason: HOSPADM

## 2025-05-01 RX ADMIN — CARVEDILOL 6.25 MG: 6.25 TABLET, FILM COATED ORAL at 08:05

## 2025-05-01 RX ADMIN — PRAVASTATIN SODIUM 40 MG: 40 TABLET ORAL at 08:05

## 2025-05-01 RX ADMIN — SODIUM CHLORIDE 2000 MG: 9 INJECTION, SOLUTION INTRAVENOUS at 02:05

## 2025-05-01 RX ADMIN — ALPRAZOLAM 0.5 MG: 0.25 TABLET ORAL at 12:05

## 2025-05-01 RX ADMIN — GABAPENTIN 100 MG: 100 CAPSULE ORAL at 08:05

## 2025-05-01 RX ADMIN — PREDNISONE 15 MG: 5 TABLET ORAL at 08:05

## 2025-05-01 RX ADMIN — FLUCONAZOLE 150 MG: 150 TABLET ORAL at 02:05

## 2025-05-01 RX ADMIN — MYCOPHENOLATE MOFETIL 1000 MG: 250 CAPSULE ORAL at 08:05

## 2025-05-01 RX ADMIN — VILAZODONE HYDROCHLORIDE 40 MG: 10 TABLET ORAL at 08:05

## 2025-05-01 RX ADMIN — TACROLIMUS 7 MG: 1 CAPSULE ORAL at 08:05

## 2025-05-01 RX ADMIN — BUSPIRONE HYDROCHLORIDE 5 MG: 5 TABLET ORAL at 08:05

## 2025-05-01 RX ADMIN — CEFTRIAXONE 1 G: 1 INJECTION, POWDER, FOR SOLUTION INTRAMUSCULAR; INTRAVENOUS at 01:05

## 2025-05-01 RX ADMIN — GABAPENTIN 100 MG: 100 CAPSULE ORAL at 02:05

## 2025-05-01 RX ADMIN — FAMOTIDINE 20 MG: 20 TABLET, FILM COATED ORAL at 08:05

## 2025-05-01 NOTE — HPI
Reason for Consult: Management of T2DM, Hyperglycemia      Surgical Procedure and Date: s/p kidney transplant on 04/20/2025     Diabetes diagnosis year: > 5 years     Home Diabetes Medications:  Mounjaro 15 mg weekly      How often checking glucose at home? 1-3 x day   BG readings on regimen: 100-160's  Hypoglycemia on the regimen?  No  Missed doses on regimen?  No     Diabetes Complications include:     Hyperglycemia     Complicating diabetes co morbidities:   CKD and Glucocorticoid use         HPI: Ms. Reyer is a 49 y.o. woman who received a DBD KTX 4/20/24 for ESRD 2/2 DMII (CIT 10hrs 25 mins). Post op with DGF, HD started for hyperkalemia. She was told to go to ED where her labs were notable for hgb 6.6, Cr 5.2 from 5.4, K stable at 4.9. UA c/f UTI. Urine and blood cultures in process. Kidney US with satisfactory flow, no organized collections. Admit for UTI, anemia w/u, blood transfusion (2 PRBC units to start). Endocrine consulted to manage hyperglycemia and type 2 diabetes in the inpatient setting.     Lab Results   Component Value Date    HGBA1C 5.2 03/13/2025

## 2025-05-01 NOTE — HPI
Ms. Reyer is a 49 y.o. woman who received a DBD KTX 4/20/24 for ESRD 2/2 DMII (CIT 10hrs 25 mins). Post op with DGF, HD started for hyperkalemia. Discharged on HD, last 4/29. Since dc, UOP has increased to ~2L per day, Cr now starting to clear. On routine labs, found to be anemic with hgb <7 for 2 days in succession. She had a small amount of bleeding from her surgical incision site that has since resolved. She was told to go to ED where her labs were notable for hgb 6.6, Cr 5.2 from 5.4, K stable at 4.9. UA c/f UTI. Urine and blood cultures in process. Kidney US with satisfactory flow, no organized collections. Admit for UTI, anemia w/u, blood transfusion (2 PRBC units to start). D/w Dr. Jenkins

## 2025-05-01 NOTE — SUBJECTIVE & OBJECTIVE
Subjective:     Chief Complaint/Reason for Admission: anemia     History of Present Illness:  Ms. Reyer is a 49 y.o. woman who received a DBD KTX 4/20/24 for ESRD 2/2 DMII (CIT 10hrs 25 mins). Post op with DGF, HD started for hyperkalemia. Discharged on HD, last 4/29. Since dc, UOP has increased to ~2L per day, Cr now starting to clear. On routine labs, found to be anemic with hgb <7 for 2 days in succession. She had a small amount of bleeding from her surgical incision site that has since resolved. She was told to go to ED where her labs were notable for hgb 6.6, Cr 5.2 from 5.4, K stable at 4.9. UA c/f UTI. Urine and blood cultures in process. Kidney US with satisfactory flow, no organized collections. Admit for UTI, anemia w/u, blood transfusion (2 PRBC units to start). D/w Dr. Jenkins        Facility-Administered Medications Prior to Admission   Medication    [COMPLETED] epoetin kaity injection 10,000 Units     PTA Medications   Medication Sig    ALPRAZolam (XANAX) 0.5 MG tablet Take 0.5 mg by mouth 3 (three) times daily.    busPIRone (BUSPAR) 5 MG Tab Take 5 mg by mouth 2 (two) times daily.    carvediloL (COREG) 6.25 MG tablet Take 1 tablet (6.25 mg total) by mouth 2 (two) times daily.    cloNIDine (CATAPRES) 0.1 MG tablet Take 2 tablets (0.2 mg total) by mouth every evening.    famotidine (PEPCID) 20 MG tablet Take 1 tablet (20 mg total) by mouth once daily.    gabapentin (NEURONTIN) 100 MG capsule Take 100 mg by mouth 3 (three) times daily.    insulin aspart U-100 (NOVOLOG) 100 unit/mL (3 mL) InPn pen Inject 0-10 Units into the skin 3 (three) times daily with meals. Per sliding scale. (MDD: 30 units)    methocarbamoL (ROBAXIN) 500 MG Tab Take 1 tablet (500 mg total) by mouth 4 (four) times daily as needed (muscle spasms).    montelukast (SINGULAIR) 10 mg tablet Take 10 mg by mouth nightly.    multivitamin Tab Take 1 tablet by mouth once daily.    mycophenolate (CELLCEPT) 250 mg Cap Take 4 capsules (1,000 mg  "total) by mouth 2 (two) times daily.    oxybutynin (DITROPAN) 5 MG Tab Take 1 tablet (5 mg total) by mouth 3 (three) times daily as needed (Bladder spasms).    oxyCODONE (ROXICODONE) 5 MG immediate release tablet Take 1 tablet (5 mg total) by mouth every 6 (six) hours as needed for Pain.    pen needle, diabetic 32 gauge x 5/32" Ndle Inject 1 Needle into the skin 3 (three) times daily with meals.    pravastatin (PRAVACHOL) 40 MG tablet Take 1 tablet (40 mg total) by mouth once daily.    predniSONE (DELTASONE) 5 MG tablet Take by mouth daily: 4/22 to 4/28 20 mg, 4/29 to 5/5 15 mg, 5/6 to 5/12 10 mg, 5/13/2025 to FOREVER 5 mg, DO NOT STOP    [Paused] sodium zirconium cyclosilicate (LOKELMA) 10 gram packet Take 10 g by mouth. Mix entire contents of packet(s) into drinking glass containing 3 tablespoons of water; stir well and drink immediately. Add water and repeat until no powder remains to receive entire dose. (Patient not taking: Reported on 4/30/2025)    sulfamethoxazole-trimethoprim 400-80mg (BACTRIM,SEPTRA) 400-80 mg per tablet Take 1 tablet by mouth 3 (three) times a week. Stop 10/20/25    tacrolimus (PROGRAF) 1 MG Cap Take 7 capsules (7 mg total) by mouth every 12 (twelve) hours.    [Paused] tirzepatide (MOUNJARO) 2.5 mg/0.5 mL PnIj Inject 2.5 mg into the skin every 7 days. (Patient not taking: Reported on 4/30/2025)    valGANciclovir (VALCYTE) 450 mg Tab Take 1 tablet (450 mg total) by mouth 3 (three) times a week. Stop 7/20/25    vilazodone (VIIBRYD) 10 mg Tab tablet Take 4 tablets (40 mg total) by mouth once daily.       Review of patient's allergies indicates:   Allergen Reactions    Amlodipine Itching and Swelling       Past Medical History:   Diagnosis Date    Allergy     Anemia     Anxiety     Depression     Diabetes mellitus, type 2     Dialysis patient     Disorder of kidney and ureter     Hyperlipidemia     Hypertension     Obesity      Past Surgical History:   Procedure Laterality Date    AV FISTULA " "PLACEMENT  2022     SECTION      COLONOSCOPY N/A 2023    Procedure: COLONOSCOPY;  Surgeon: Hermilo Orozco III, MD;  Location: [facility] ENDO;  Service: Endoscopy;  Laterality: N/A;    COLONOSCOPY N/A 2024    Procedure: COLONOSCOPY;  Surgeon: Hermilo Orozco III, MD;  Location: [facility] ENDO;  Service: Endoscopy;  Laterality: N/A;    HYSTERECTOMY      INSERTION OF DIALYSIS CATHETER      KIDNEY TRANSPLANT N/A 2025    Procedure: TRANSPLANT, KIDNEY;  Surgeon: Josh Cameron Jr., MD;  Location: [facility] OR [address] [place] [address];  Service: Transplant;  Laterality: N/A;  Out of Ice 0030  Reperfusion 0102    TUBAL LIGATION       Family History       Problem Relation (Age of Onset)    Heart disease Father    No Known Problems Daughter          Tobacco Use    Smoking status: Former     Current packs/day: 0.00     Types: Cigarettes     Quit date:      Years since quitting: 3.3    Smokeless tobacco: Never   Substance and Sexual Activity    Alcohol use: Not Currently    Drug use: Never    Sexual activity: Yes     Partners: Male     Birth control/protection: See Surgical Hx        Review of Systems   Constitutional:  Negative for appetite change, fatigue and fever.   HENT:  Negative for trouble swallowing.    Respiratory:  Negative for cough and shortness of breath.    Cardiovascular:  Negative for chest pain and leg swelling.   Gastrointestinal:  Positive for abdominal pain. Negative for abdominal distention, diarrhea, nausea and vomiting.   Genitourinary:  Negative for decreased urine volume, difficulty urinating and dysuria.   Skin:  Positive for wound.   Allergic/Immunologic: Positive for immunocompromised state.   Neurological:  Negative for dizziness and weakness.     Objective:     Vital Signs (Most Recent):  Temp: 98.5 °F (36.9 °C) (25)  Pulse: 82 (25)  Resp: 18 (25)  BP: (!) 152/83 (25)  SpO2: (!) 94 % (25)  Height: 5' 4" (162.6 cm)  Weight: 107 kg " (235 lb 14.3 oz)  Body mass index is 40.49 kg/m².      Physical Exam  Vitals and nursing note reviewed.   Constitutional:       General: She is not in acute distress.  HENT:      Head: Normocephalic and atraumatic.      Mouth/Throat:      Mouth: Mucous membranes are moist.   Eyes:      Extraocular Movements: Extraocular movements intact.   Cardiovascular:      Rate and Rhythm: Normal rate and regular rhythm.      Pulses: Normal pulses.      Heart sounds: Normal heart sounds.   Pulmonary:      Effort: Pulmonary effort is normal. No respiratory distress.      Breath sounds: No wheezing or rales.   Abdominal:      General: Bowel sounds are normal. There is no distension.      Palpations: Abdomen is soft.      Tenderness: There is abdominal tenderness (mild, expected post op). There is no guarding or rebound.      Comments: Txp incision CDI with gauze under abd fold with minimal SS fluid on gauze, No SSI   Musculoskeletal:         General: No swelling. Normal range of motion.      Cervical back: Normal range of motion.   Skin:     General: Skin is warm and dry.   Neurological:      Mental Status: She is alert and oriented to person, place, and time.      Motor: No weakness.   Psychiatric:         Mood and Affect: Mood normal.         Behavior: Behavior normal.         Thought Content: Thought content normal.         Judgment: Judgment normal.          Laboratory  CBC:   Recent Labs   Lab 04/28/25  0737 04/30/25  0727 04/30/25  1639   WBC 7.27 6.73 11.57   RBC 1.96* 1.82* 2.00*   HGB 6.9* 6.1* 6.6*   HCT 19.8* 18.9* 20.8*    261 283   * 104* 104*   MCH 35.2* 33.5* 33.0*   MCHC 34.8 32.3 31.7*     CMP:   Recent Labs   Lab 04/28/25  0737 04/30/25  0727 04/30/25  1639   * 93 199*   CALCIUM 8.4* 8.3* 8.8   ALBUMIN 2.9* 2.9* 3.2*   PROT  --   --  6.0   * 141 141   K 4.2 4.2 4.9   CO2 23 29 27    102 102   BUN 90* 73* 73*   CREATININE 8.1* 5.4* 5.2*   ALKPHOS  --   --  49   ALT  --   --  8*    AST  --   --  10*     Labs within the past 24 hours have been reviewed.    Diagnostic Results:  US - Kidney: Results for orders placed during the hospital encounter of 04/30/25    US Transplant Kidney With Doppler    Narrative  EXAMINATION:  US TRANSPLANT KIDNEY WITH DOPPLER    CLINICAL HISTORY:  anemia, bleeding;    TECHNIQUE:  Real time gray scale and doppler ultrasound was performed over the patient's renal allograft.    COMPARISON:  Transplant kidney Doppler 04/21/2025    FINDINGS:  Patient is status post kidney transplant 04/20/2025.  Renal allograft in the right lower quadrant.  The allograft measures 10.8 cm. Normal perfusion. No hydronephrosis.  Ureteral stent in place.    No organized fluid collections.  Mild free fluid adjacent to the allograft.    Vasculature:    Interlobar arterial resistive index measures 0.73 (previously 0.74) with normal waveform.    Upper segmental arterial resistive index measures 0.83 (previously 0.79) with normal waveform.    Middle segmental arterial resistive index measures 0.78 (previously 0.58) with normal waveform.    Lower segmental arterial resistive index measures 0.75 (previously 0.75) with normal waveform.    Main renal artery peak systolic velocity: 145cm/sec with normal waveform, previously 176 cm/sec.    Renal artery/iliac ratio: 0.6.    The main renal vein is patent.    Impression  Satisfactory Doppler evaluation of the renal allograft.    Mild free fluid adjacent to the allograft.  No organized marce-transplant collections.    Some renal resistive indices have increased, a finding which can be seen in the setting of drug toxicity (such as calcineurin inhibitor toxicity), rejection, or medical renal disease.    Recommendations include clinical correlation and continued imaging follow-up.    This final report has been modified from the preliminary report.  Please review this final report for changes.  The preliminary report does remain available in the Epic  Chart.    Electronically signed by resident: Adan Conroy  Date:    05/01/2025  Time:    02:36    Electronically signed by: Iglesia Gunn  Date:    05/01/2025  Time:    03:35

## 2025-05-01 NOTE — PROGRESS NOTES
Kidney Transplant  Discharge Note:    Patient will discharge home under the care of Shandy Reyer, sister-in-law [phone]. Patient reports son-in-law will transport her home. Patient reported readiness to discharge at this time. Per rounds patient does not require referrals from this  at time of discharge. Patient denied needing or wanting mental health resources or referrals at this time. Patient denied additional needs or concerns from this . Patient verbalize understanding and involved in treatment planning and discharge process. Patient agrees to contact transplant team with needs, questions or concerns as they arise. Patient aware of, involved in, and coping well with this discharge plan. Patient did not have any concerns with the discharge plan at this time. SW remains available at [phone].    Balbina Ugalde LCSW, Kidney Transplant

## 2025-05-01 NOTE — NURSING
Pt arrived to floor at this time. Blood currently infusing at 150 ml/hr. VSS, afebrile. RLQ incision w staples w/ scant drainage and gauze applied. Son-in-law at bedside.

## 2025-05-01 NOTE — PLAN OF CARE
Pt AAOx4. VSS, afebrile. Tele monitoring in place. Pt denies pain throughout shift. Voids independently. No BM reported. Pt up independently. H&H 6.6 and 20.8--two units of blood given O/N--both tolerated well. Kidney US completed O/N showing mild free fluid adjacent to the allograft, no organized marce-transplant collections. UA collected in ED. Covid screening sent off. Blood cultures in process. Urine cultures in process. Vanc and rocephin given per MAR. RLQ incision w staples and gauze w scant sanguinous drainage. Son-in-law at bedside. Reviewed plan of care w/ pt. Remained free from falls/traumas/injuries. Questions answered and encouraged. Call light at bedside, non-skid socks on. Bed in low position, wheels locked. Standard precautions maintained.

## 2025-05-01 NOTE — CONSULTS
[facility] - Transplant Stepdown  Endocrinology  Diabetes Consult Note    Consult Requested by: Pepper Jenkins DO   Reason for admit: Acute anemia    HISTORY OF PRESENT ILLNESS:  Reason for Consult: Management of T2DM, Hyperglycemia      Surgical Procedure and Date: s/p kidney transplant on 2025     Diabetes diagnosis year: > 5 years     Home Diabetes Medications:  Mounjaro 15 mg weekly      How often checking glucose at home? 1-3 x day   BG readings on regimen: 100-160's  Hypoglycemia on the regimen?  No  Missed doses on regimen?  No     Diabetes Complications include:     Hyperglycemia     Complicating diabetes co morbidities:   CKD and Glucocorticoid use         HPI: Ms. Reyer is a 49 y.o. woman who received a DBD KTX 24 for ESRD 2/2 DMII (CIT 10hrs 25 mins). Post op with DGF, HD started for hyperkalemia. She was told to go to ED where her labs were notable for hgb 6.6, Cr 5.2 from 5.4, K stable at 4.9. UA c/f UTI. Urine and blood cultures in process. Kidney US with satisfactory flow, no organized collections. Admit for UTI, anemia w/u, blood transfusion (2 PRBC units to start). Endocrine consulted to manage hyperglycemia and type 2 diabetes in the inpatient setting.     Lab Results   Component Value Date    HGBA1C 5.2 2025         Interval HPI:   Overnight events: No acute events overnight. Patient in room 85866/56094 A. Blood glucose stable. BG at goal on current insulin regimen (SSI ). Steroid use- Prednisone 15 mg.    Renal function- Abnormal - Creatinine 4.8 (remains on HD)   Vasopressors-  None       Endocrine will continue to follow and manage insulin orders inpatient.         Diet Consistent Carbohydrate 2000 Calories (up to 75 gm per meal)     Eatin%  Nausea: No  Hypoglycemia and intervention: No  Fever: No  TPN and/or TF: No      PMH, PSH, FH, SH updated and reviewed     ROS:  Review of Systems   Constitutional:  Negative for unexpected weight change.   Eyes:  Negative for visual  disturbance.   Respiratory:  Negative for cough.    Cardiovascular:  Negative for chest pain.   Gastrointestinal:  Negative for nausea and vomiting.   Endocrine: Negative for polydipsia and polyuria.   Musculoskeletal:  Negative for back pain.   Skin:  Negative for rash.   Neurological:  Negative for syncope.   Psychiatric/Behavioral:  Negative for agitation and dysphoric mood.        Current Medications and/or Treatments Impacting Glycemic Control  Immunotherapy:    Immunosuppressants           Stop Route Frequency     tacrolimus capsule 7 mg         -- Oral 2 times daily     mycophenolate capsule 1,000 mg         -- Oral 2 times daily          Steroids:   Hormones (From admission, onward)      Start     Stop Route Frequency Ordered    05/01/25 0900  predniSONE tablet 15 mg         -- Oral Daily 04/30/25 2120 04/30/25 2221  melatonin tablet 6 mg         -- Oral Nightly PRN 04/30/25 2123          Pressors:    Autonomic Drugs (From admission, onward)      None          Hyperglycemia/Diabetes Medications:   Antihyperglycemics (From admission, onward)      Start     Stop Route Frequency Ordered    05/01/25 0517  insulin aspart U-100 pen 0-5 Units         -- SubQ Before meals & nightly PRN 05/01/25 0417             PHYSICAL EXAMINATION:  Vitals:    05/01/25 0816   BP: (!) 155/93   Pulse: 80   Resp:    Temp:      Body mass index is 40.49 kg/m².     Physical Exam  Constitutional:       Appearance: She is well-developed.   HENT:      Head: Normocephalic.   Eyes:      Conjunctiva/sclera: Conjunctivae normal.   Pulmonary:      Effort: Pulmonary effort is normal.   Musculoskeletal:         General: Normal range of motion.   Skin:     General: Skin is warm.      Findings: No rash.   Neurological:      Mental Status: She is alert and oriented to person, place, and time.            Labs Reviewed and Include   Recent Labs   Lab 04/30/25  1639 05/01/25  0531   * 128*   CALCIUM 8.8 8.3*   ALBUMIN 3.2* 2.7*   PROT 6.0  --   "   142   K 4.9 4.7   CO2 27 27    105   BUN 73* 69*   CREATININE 5.2* 4.8*   ALKPHOS 49  --    ALT 8*  --    AST 10*  --    BILITOT 0.5  --      Lab Results   Component Value Date    WBC 11.15 2025    HGB 7.9 (L) 2025    HCT 24.4 (L) 2025    MCV 97 2025     2025     No results for input(s): "TSH", "FREET4" in the last 168 hours.  Lab Results   Component Value Date    HGBA1C 5.2 2025       Nutritional status:   Body mass index is 40.49 kg/m².  Lab Results   Component Value Date    ALBUMIN 2.7 (L) 2025    ALBUMIN 3.2 (L) 2025    ALBUMIN 2.9 (L) 2025     No results found for: "PREALBUMIN"    Estimated Creatinine Clearance: 16.7 mL/min (A) (based on SCr of 4.8 mg/dL (H)).    Accu-Checks  No results for input(s): "POCTGLUCOSE" in the last 72 hours.     ASSESSMENT and PLAN    Renal/  Status post -donor kidney transplantation  Titrate insulin slowly to avoid hypoglycemia as the risk of hypoglycemia increases with decreased creatinine clearance.  Estimated Creatinine Clearance: 16.7 mL/min (A) (based on SCr of 4.8 mg/dL (H)).   Glucocorticoids markedly increase glucose levels. Expect the steroid taper will help glucose control.   Optimize BG control to improve wound healing        Oncology  * Acute anemia  Managed per primary team  Avoid hypoglycemia        Endocrine  Type 2 diabetes mellitus with diabetic nephropathy  Endocrinology consulted for BG management.   BG goal 140-180    - Novolog (Insulin Aspart) prn for BG excursions LDC SSI (150/50)  - BG checks AC/HS  - Hypoglycemia protocol in place    ** Please notify Endocrine for any change and/or advance in diet**  ** Please call Endocrine for any BG related issues **    Discharge Planning:   Patient okay to resume Mounjaro per newest guidelines since the patient was tolerating well prior to surgery.             Plan discussed with patient, family, and RN at bedside.        Isaac GONZALEZ" SB Aviles, FNP  Endocrinology  [facility] - Transplant Stepdown

## 2025-05-01 NOTE — ASSESSMENT & PLAN NOTE
- SSI   - endocrine consulted     
- UA c/f infection   - started on rocephin and vanc   - BC and urine cx pending       
- cont OI ppx per protocol     
- cont home meds     
- cont tac, mmf, pred  - monitor tac levels for toxicity and therapeutic efforts     
- hgb 6.6   - T&S updated   - transfusing 2 units PRBCs   - anemia w/u in process   - monitor with CBC       
- see prophylactic immunotherapy     
- w DGF   - last HD 4/29   - Cr starting to clear, UOP increasing   - kidney US stable, no collections  - monitor with renal panel   - strict Is and Os     
Endocrinology consulted for BG management.   BG goal 140-180    - Novolog (Insulin Aspart) prn for BG excursions LDC SSI (150/50)  - BG checks AC/HS  - Hypoglycemia protocol in place    ** Please notify Endocrine for any change and/or advance in diet**  ** Please call Endocrine for any BG related issues **    Discharge Planning:   Patient okay to resume Mounjaro per newest guidelines since the patient was tolerating well prior to surgery.       
Managed per primary team  Avoid hypoglycemia      
Titrate insulin slowly to avoid hypoglycemia as the risk of hypoglycemia increases with decreased creatinine clearance.  Estimated Creatinine Clearance: 16.7 mL/min (A) (based on SCr of 4.8 mg/dL (H)).   Glucocorticoids markedly increase glucose levels. Expect the steroid taper will help glucose control.   Optimize BG control to improve wound healing      
all other ROS negative except as per HPI

## 2025-05-01 NOTE — PROGRESS NOTES
Pharmacokinetic Initial Assessment: IV Vancomycin    Assessment/Plan:    Initiate intravenous vancomycin with loading dose of 2000 mg once with subsequent doses when random concentrations are less than 20 mcg/mL.  - Ms. Reyer had a kidney transplant on 4/20/25 2/2 ESRD from diabetic nephropathy. Has DGF requiring HD. Last HD was 4/28/25. Kidney nephrology is managing her needs inpatient. Making good UOP per notes.   - Will pulse dose with RRT plans for now given DGF s/p Ktxp.   - Desired empiric serum trough concentration is 10 to 20 mcg/mL.  - Draw vancomycin random level on 5/2 at 0430 with AM labs.    Pharmacy will continue to follow and monitor vancomycin.      Please contact pharmacy at extension [phone] with any questions regarding this assessment.     Thank you for the consult,   Sharri Hilario       Patient brief summary:  Brande Reyer is a 49 y.o. female initiated on antimicrobial therapy with IV Vancomycin for treatment of suspected urinary tract infection    Actual Body Weight:   107 kg    Renal Function:   Estimated Creatinine Clearance: 15.4 mL/min (A) (based on SCr of 5.2 mg/dL (H)).     Dialysis Method (if applicable):  intermittent HD when needed - managed by Kidney nephrology s/p Ktxp 4/20/25 w/ DGF

## 2025-05-01 NOTE — PROGRESS NOTES
Admit Note     Met with patient and son-in-law Kailash Street  to assess needs. Patient is a 49 y.o.  female, admitted for Anemia.      Patient admitted from home on 4/30/2025 .  At this time, patient presents as alert and oriented x 4, pleasant, calm, communicative, cooperative, and asking and answering questions appropriately.  At this time, patients caregiver presents as alert and oriented x 4, pleasant, calm, communicative, cooperative, and asking and answering questions appropriately.    Household/Family Systems     Patient resides with patient's  and daughter, at [address] [facility]  [place] [address].  Support system includes  James Reyer, daughter eDrek Street, son-in-law Kailash Street, and Shandy Reyer sister-in-law.   .  Patient does have dependents that are in need of being cared for. Patient's daughter are being cared for by patient's .     Patients primary caregiver is Shandy Reyer, patients  sister in law , phone number [phone].  Confirmed patients contact information is [phone] (home);   Telephone Information:   Mobile [phone]   .    During admission, patient's caregiver plans to stay [place].  Confirmed patient and patients caregivers do have access to reliable transportation.    Cognitive Status/Learning     Patient reports reading ability as college and states patient does not have difficulty with reading, writing, seeing, hearing, comprehension, learning, and memory.  Patient reports patient learns best by multisensory instructions.   Needed: No.   Highest education level: Associate/Bachelor Degree    Vocation/Disability   Working for Income: No      Adherence     Patient reports a high level of adherence to patients health care regimen.  Adherence counseling and education provided. Patient verbalizes understanding.    Substance Use    Patient reports the following substance usage.    Tobacco: none, patient denies any use.  Alcohol:  none, patient denies any use.  Illicit Drugs/Non-prescribed Medications: none, patient denies any use.  Patient states clear understanding of the potential impact of substance use.  Substance abstinence/cessation counseling, education and resources provided and reviewed.     Services Utilizing/ADLS    Infusion Service: Prior to admission, patient utilizing? no  Home Health: Prior to admission, patient utilizing? no  DME: Prior to admission, no  Pulmonary/Cardiac Rehab: Prior to admission, no  Dialysis:  Prior to admission, yes [facility]   Transplant Specialty Pharmacy:  Prior to admission, yes; [facility] .    Prior to admission, patient reports patient was not independent with ADLS and was not driving.  Patient reports patient is not able to care for self at this time due to compromised medical condition (as documented in medical record) and physical weakness..  Patient indicates a willingness to care for self once medically cleared to do so.    Insurance/Medications    Insured by   Payer/Plan Subscr  Sex Relation Sub. Ins. ID Effective Group Num   1. [employer] MANAGE* REYER,BRANDE 1975 Female Self [id number]                                   [address] [address]      Primary Insurance (for UNOS reporting): Public Insurance - Medicare & Choice  Secondary Insurance (for UNOS reporting): None    Patient reports patient is able to obtain and afford medications at this time and at time of discharge.    Living Will/Healthcare Power of     Patient states patient does not have a LW and/or HCPA.   provided education regarding LW and HCPA and the completion of forms.    Coping/Mental Health    Patient is coping adequately with the aid of  family members.   Patient denies mental health difficulties.     Discharge Planning    At time of discharge, patient plans to return to patient's home under the care of Shandy Reyer.  Patients  son-in-law Kailash Lariostanner   will transport patient.   Per rounds today, expected discharge date has not been medically determined at this time. Patient and patients caregiver  verbalize understanding and are involved in treatment planning and discharge process.    Additional Concerns    Patient's caretaker denies additional needs and/or concerns at this time.  providing ongoing psychosocial support, education, resources and d/c planning as needed.  SW remains available.  provided resource list, patient choice, psychosocial and supportive counseling, resources, education, assistance and discharge planning with patient and caregiver involvement, ongoing SW availability and services as appropriate.  remains available.

## 2025-05-01 NOTE — SUBJECTIVE & OBJECTIVE
Interval HPI:   Overnight events: No acute events overnight. Patient in room 51884/76645 A. Blood glucose stable. BG at goal on current insulin regimen (SSI ). Steroid use- Prednisone 15 mg.    Renal function- Abnormal - Creatinine 4.8 (remains on HD)   Vasopressors-  None       Endocrine will continue to follow and manage insulin orders inpatient.         Diet Consistent Carbohydrate 2000 Calories (up to 75 gm per meal)     Eatin%  Nausea: No  Hypoglycemia and intervention: No  Fever: No  TPN and/or TF: No      PMH, PSH, FH, SH updated and reviewed     ROS:  Review of Systems   Constitutional:  Negative for unexpected weight change.   Eyes:  Negative for visual disturbance.   Respiratory:  Negative for cough.    Cardiovascular:  Negative for chest pain.   Gastrointestinal:  Negative for nausea and vomiting.   Endocrine: Negative for polydipsia and polyuria.   Musculoskeletal:  Negative for back pain.   Skin:  Negative for rash.   Neurological:  Negative for syncope.   Psychiatric/Behavioral:  Negative for agitation and dysphoric mood.        Current Medications and/or Treatments Impacting Glycemic Control  Immunotherapy:    Immunosuppressants           Stop Route Frequency     tacrolimus capsule 7 mg         -- Oral 2 times daily     mycophenolate capsule 1,000 mg         -- Oral 2 times daily          Steroids:   Hormones (From admission, onward)      Start     Stop Route Frequency Ordered    25 0900  predniSONE tablet 15 mg         -- Oral Daily 250    25 2221  melatonin tablet 6 mg         -- Oral Nightly PRN 25          Pressors:    Autonomic Drugs (From admission, onward)      None          Hyperglycemia/Diabetes Medications:   Antihyperglycemics (From admission, onward)      Start     Stop Route Frequency Ordered    25 0517  insulin aspart U-100 pen 0-5 Units         -- SubQ Before meals & nightly PRN 25 0417             PHYSICAL EXAMINATION:  Vitals:     05/01/25 0816   BP: (!) 155/93   Pulse: 80   Resp:    Temp:      Body mass index is 40.49 kg/m².     Physical Exam  Constitutional:       Appearance: She is well-developed.   HENT:      Head: Normocephalic.   Eyes:      Conjunctiva/sclera: Conjunctivae normal.   Pulmonary:      Effort: Pulmonary effort is normal.   Musculoskeletal:         General: Normal range of motion.   Skin:     General: Skin is warm.      Findings: No rash.   Neurological:      Mental Status: She is alert and oriented to person, place, and time.

## 2025-05-01 NOTE — PROGRESS NOTES
"Discharge Medication Note:    Hospital Course:  Ms Reyer is s/p kidney transplant from 4/20/25 readmitted due to anemia and concern for UTI.  Pt given 2u pRBC with adequate response in follow up CBC.  Abx d/c'ed at discharge, will need follow up on urine culture.  Fluconazole x1 give for sxs of yeast infection.  No med changes this admission.     Met with Brande Reyer at discharge to review discharge medications and to update the blue medication card.           Medication List        PAUSE taking these medications      LOKELMA 10 gram packet  Wait to take this until your doctor or other care provider tells you to start again.  Generic drug: sodium zirconium cyclosilicate     MOUNJARO 2.5 mg/0.5 mL Pnij  Wait to take this until your doctor or other care provider tells you to start again.  Generic drug: tirzepatide            CONTINUE taking these medications      BD ULTRA-FINE ANNE PEN NEEDLE 32 gauge x 5/32" Ndle  Generic drug: pen needle, diabetic  Inject 1 Needle into the skin 3 (three) times daily with meals.     busPIRone 5 MG Tab  Commonly known as: BUSPAR     carvediloL 6.25 MG tablet  Commonly known as: COREG  Take 1 tablet (6.25 mg total) by mouth 2 (two) times daily.     cloNIDine 0.1 MG tablet  Commonly known as: CATAPRES  Take 2 tablets (0.2 mg total) by mouth every evening.     famotidine 20 MG tablet  Commonly known as: PEPCID  Take 1 tablet (20 mg total) by mouth once daily.     gabapentin 100 MG capsule  Commonly known as: NEURONTIN     methocarbamoL 500 MG Tab  Commonly known as: Robaxin  Take 1 tablet (500 mg total) by mouth 4 (four) times daily as needed (muscle spasms).     montelukast 10 mg tablet  Commonly known as: SINGULAIR     multivitamin Tab  Take 1 tablet by mouth once daily.     mycophenolate 250 mg Cap  Commonly known as: CELLCEPT  Take 4 capsules (1,000 mg total) by mouth 2 (two) times daily.     NovoLOG Flexpen U-100 Insulin 100 unit/mL (3 mL) Inpn pen  Generic drug: insulin aspart " U-100  Inject 0-10 Units into the skin 3 (three) times daily with meals. Per sliding scale. (MDD: 30 units)     oxybutynin 5 MG Tab  Commonly known as: DITROPAN  Take 1 tablet (5 mg total) by mouth 3 (three) times daily as needed (Bladder spasms).     oxyCODONE 5 MG immediate release tablet  Commonly known as: ROXICODONE  Take 1 tablet (5 mg total) by mouth every 6 (six) hours as needed for Pain.     pravastatin 40 MG tablet  Commonly known as: PRAVACHOL  Take 1 tablet (40 mg total) by mouth once daily.     predniSONE 5 MG tablet  Commonly known as: DELTASONE  Take by mouth daily: 4/22 to 4/28 20 mg, 4/29 to 5/5 15 mg, 5/6 to 5/12 10 mg, 5/13/2025 to FOREVER 5 mg, DO NOT STOP     sulfamethoxazole-trimethoprim 400-80mg 400-80 mg per tablet  Commonly known as: BACTRIM,SEPTRA  Take 1 tablet by mouth 3 (three) times a week. Stop 10/20/25     tacrolimus 1 MG Cap  Commonly known as: PROGRAF  Take 7 capsules (7 mg total) by mouth every 12 (twelve) hours.     valGANciclovir 450 mg Tab  Commonly known as: VALCYTE  Take 1 tablet (450 mg total) by mouth 3 (three) times a week. Stop 7/20/25     vilazodone 10 mg Tab tablet  Commonly known as: VIIBRYD  Take 4 tablets (40 mg total) by mouth once daily.     XANAX 0.5 MG tablet  Generic drug: ALPRAZolam                 The following medications have been placed on HOLD and should be restarted in the outpatient setting (when appropriate): none Brande Reyer verbalized understanding and had the opportunity to ask questions.

## 2025-05-01 NOTE — PROGRESS NOTES
Pt given dc paperwork.  Verbalized understanding and had no questions.  Denies pain.  Pivs removed.  Tele removed earlier in shift.  Awaiting transport for ride to car

## 2025-05-01 NOTE — H&P
[facility]  Kidney Transplant  H&P      Subjective:     Chief Complaint/Reason for Admission: anemia     History of Present Illness:  Ms. Reyer is a 49 y.o. woman who received a DBD KTX 4/20/24 for ESRD 2/2 DMII (CIT 10hrs 25 mins). Post op with DGF, HD started for hyperkalemia. Discharged on HD, last 4/29. Since dc, UOP has increased to ~2L per day, Cr now starting to clear. On routine labs, found to be anemic with hgb <7 for 2 days in succession. She had a small amount of bleeding from her surgical incision site that has since resolved. She was told to go to ED where her labs were notable for hgb 6.6, Cr 5.2 from 5.4, K stable at 4.9. UA c/f UTI. Urine and blood cultures in process. Kidney US with satisfactory flow, no organized collections. Admit for UTI, anemia w/u, blood transfusion (2 PRBC units to start). D/w Dr. Jenkins        Facility-Administered Medications Prior to Admission   Medication    [COMPLETED] epoetin kaity injection 10,000 Units     PTA Medications   Medication Sig    ALPRAZolam (XANAX) 0.5 MG tablet Take 0.5 mg by mouth 3 (three) times daily.    busPIRone (BUSPAR) 5 MG Tab Take 5 mg by mouth 2 (two) times daily.    carvediloL (COREG) 6.25 MG tablet Take 1 tablet (6.25 mg total) by mouth 2 (two) times daily.    cloNIDine (CATAPRES) 0.1 MG tablet Take 2 tablets (0.2 mg total) by mouth every evening.    famotidine (PEPCID) 20 MG tablet Take 1 tablet (20 mg total) by mouth once daily.    gabapentin (NEURONTIN) 100 MG capsule Take 100 mg by mouth 3 (three) times daily.    insulin aspart U-100 (NOVOLOG) 100 unit/mL (3 mL) InPn pen Inject 0-10 Units into the skin 3 (three) times daily with meals. Per sliding scale. (MDD: 30 units)    methocarbamoL (ROBAXIN) 500 MG Tab Take 1 tablet (500 mg total) by mouth 4 (four) times daily as needed (muscle spasms).    montelukast (SINGULAIR) 10 mg tablet Take 10 mg by mouth nightly.    multivitamin Tab Take 1 tablet by mouth once daily.     "mycophenolate (CELLCEPT) 250 mg Cap Take 4 capsules (1,000 mg total) by mouth 2 (two) times daily.    oxybutynin (DITROPAN) 5 MG Tab Take 1 tablet (5 mg total) by mouth 3 (three) times daily as needed (Bladder spasms).    oxyCODONE (ROXICODONE) 5 MG immediate release tablet Take 1 tablet (5 mg total) by mouth every 6 (six) hours as needed for Pain.    pen needle, diabetic 32 gauge x 5/32" Ndle Inject 1 Needle into the skin 3 (three) times daily with meals.    pravastatin (PRAVACHOL) 40 MG tablet Take 1 tablet (40 mg total) by mouth once daily.    predniSONE (DELTASONE) 5 MG tablet Take by mouth daily: 4/22 to 4/28 20 mg, 4/29 to 5/5 15 mg, 5/6 to 5/12 10 mg, 5/13/2025 to FOREVER 5 mg, DO NOT STOP    [Paused] sodium zirconium cyclosilicate (LOKELMA) 10 gram packet Take 10 g by mouth. Mix entire contents of packet(s) into drinking glass containing 3 tablespoons of water; stir well and drink immediately. Add water and repeat until no powder remains to receive entire dose. (Patient not taking: Reported on 4/30/2025)    sulfamethoxazole-trimethoprim 400-80mg (BACTRIM,SEPTRA) 400-80 mg per tablet Take 1 tablet by mouth 3 (three) times a week. Stop 10/20/25    tacrolimus (PROGRAF) 1 MG Cap Take 7 capsules (7 mg total) by mouth every 12 (twelve) hours.    [Paused] tirzepatide (MOUNJARO) 2.5 mg/0.5 mL PnIj Inject 2.5 mg into the skin every 7 days. (Patient not taking: Reported on 4/30/2025)    valGANciclovir (VALCYTE) 450 mg Tab Take 1 tablet (450 mg total) by mouth 3 (three) times a week. Stop 7/20/25    vilazodone (VIIBRYD) 10 mg Tab tablet Take 4 tablets (40 mg total) by mouth once daily.       Review of patient's allergies indicates:   Allergen Reactions    Amlodipine Itching and Swelling       Past Medical History:   Diagnosis Date    Allergy     Anemia     Anxiety     Depression     Diabetes mellitus, type 2     Dialysis patient     Disorder of kidney and ureter     Hyperlipidemia     Hypertension     Obesity      Past " Surgical History:   Procedure Laterality Date    AV FISTULA PLACEMENT  2022     SECTION      COLONOSCOPY N/A 2023    Procedure: COLONOSCOPY;  Surgeon: Hermilo Orozco III, MD;  Location: [facility] ENDO;  Service: Endoscopy;  Laterality: N/A;    COLONOSCOPY N/A 2024    Procedure: COLONOSCOPY;  Surgeon: Hermilo Orozco III, MD;  Location: [facility] ENDO;  Service: Endoscopy;  Laterality: N/A;    HYSTERECTOMY      INSERTION OF DIALYSIS CATHETER      KIDNEY TRANSPLANT N/A 2025    Procedure: TRANSPLANT, KIDNEY;  Surgeon: Josh Cameron Jr., MD;  Location: [facility] OR [address] [place] [address];  Service: Transplant;  Laterality: N/A;  Out of Ice 0030  Reperfusion 0102    TUBAL LIGATION       Family History       Problem Relation (Age of Onset)    Heart disease Father    No Known Problems Daughter          Tobacco Use    Smoking status: Former     Current packs/day: 0.00     Types: Cigarettes     Quit date:      Years since quitting: 3.3    Smokeless tobacco: Never   Substance and Sexual Activity    Alcohol use: Not Currently    Drug use: Never    Sexual activity: Yes     Partners: Male     Birth control/protection: See Surgical Hx        Review of Systems   Constitutional:  Negative for appetite change, fatigue and fever.   HENT:  Negative for trouble swallowing.    Respiratory:  Negative for cough and shortness of breath.    Cardiovascular:  Negative for chest pain and leg swelling.   Gastrointestinal:  Positive for abdominal pain. Negative for abdominal distention, diarrhea, nausea and vomiting.   Genitourinary:  Negative for decreased urine volume, difficulty urinating and dysuria.   Skin:  Positive for wound.   Allergic/Immunologic: Positive for immunocompromised state.   Neurological:  Negative for dizziness and weakness.     Objective:     Vital Signs (Most Recent):  Temp: 98.5 °F (36.9 °C) (25)  Pulse: 82 (25 0321)  Resp: 18 (25)  BP: (!) 152/83 (25)  SpO2: (!) 94  "% (05/01/25 0247)  Height: 5' 4" (162.6 cm)  Weight: 107 kg (235 lb 14.3 oz)  Body mass index is 40.49 kg/m².      Physical Exam  Vitals and nursing note reviewed.   Constitutional:       General: She is not in acute distress.  HENT:      Head: Normocephalic and atraumatic.      Mouth/Throat:      Mouth: Mucous membranes are moist.   Eyes:      Extraocular Movements: Extraocular movements intact.   Cardiovascular:      Rate and Rhythm: Normal rate and regular rhythm.      Pulses: Normal pulses.      Heart sounds: Normal heart sounds.   Pulmonary:      Effort: Pulmonary effort is normal. No respiratory distress.      Breath sounds: No wheezing or rales.   Abdominal:      General: Bowel sounds are normal. There is no distension.      Palpations: Abdomen is soft.      Tenderness: There is abdominal tenderness (mild, expected post op). There is no guarding or rebound.      Comments: Txp incision CDI with gauze under abd fold with minimal SS fluid on gauze, No SSI   Musculoskeletal:         General: No swelling. Normal range of motion.      Cervical back: Normal range of motion.   Skin:     General: Skin is warm and dry.   Neurological:      Mental Status: She is alert and oriented to person, place, and time.      Motor: No weakness.   Psychiatric:         Mood and Affect: Mood normal.         Behavior: Behavior normal.         Thought Content: Thought content normal.         Judgment: Judgment normal.          Laboratory  CBC:   Recent Labs   Lab 04/28/25  0737 04/30/25  0727 04/30/25  1639   WBC 7.27 6.73 11.57   RBC 1.96* 1.82* 2.00*   HGB 6.9* 6.1* 6.6*   HCT 19.8* 18.9* 20.8*    261 283   * 104* 104*   MCH 35.2* 33.5* 33.0*   MCHC 34.8 32.3 31.7*     CMP:   Recent Labs   Lab 04/28/25  0737 04/30/25  0727 04/30/25  1639   * 93 199*   CALCIUM 8.4* 8.3* 8.8   ALBUMIN 2.9* 2.9* 3.2*   PROT  --   --  6.0   * 141 141   K 4.2 4.2 4.9   CO2 23 29 27    102 102   BUN 90* 73* 73*   CREATININE " 8.1* 5.4* 5.2*   ALKPHOS  --   --  49   ALT  --   --  8*   AST  --   --  10*     Labs within the past 24 hours have been reviewed.    Diagnostic Results:  US - Kidney: Results for orders placed during the hospital encounter of 04/30/25    US Transplant Kidney With Doppler    Narrative  EXAMINATION:  US TRANSPLANT KIDNEY WITH DOPPLER    CLINICAL HISTORY:  anemia, bleeding;    TECHNIQUE:  Real time gray scale and doppler ultrasound was performed over the patient's renal allograft.    COMPARISON:  Transplant kidney Doppler 04/21/2025    FINDINGS:  Patient is status post kidney transplant 04/20/2025.  Renal allograft in the right lower quadrant.  The allograft measures 10.8 cm. Normal perfusion. No hydronephrosis.  Ureteral stent in place.    No organized fluid collections.  Mild free fluid adjacent to the allograft.    Vasculature:    Interlobar arterial resistive index measures 0.73 (previously 0.74) with normal waveform.    Upper segmental arterial resistive index measures 0.83 (previously 0.79) with normal waveform.    Middle segmental arterial resistive index measures 0.78 (previously 0.58) with normal waveform.    Lower segmental arterial resistive index measures 0.75 (previously 0.75) with normal waveform.    Main renal artery peak systolic velocity: 145cm/sec with normal waveform, previously 176 cm/sec.    Renal artery/iliac ratio: 0.6.    The main renal vein is patent.    Impression  Satisfactory Doppler evaluation of the renal allograft.    Mild free fluid adjacent to the allograft.  No organized marce-transplant collections.    Some renal resistive indices have increased, a finding which can be seen in the setting of drug toxicity (such as calcineurin inhibitor toxicity), rejection, or medical renal disease.    Recommendations include clinical correlation and continued imaging follow-up.    This final report has been modified from the preliminary report.  Please review this final report for changes.  The  preliminary report does remain available in the Epic Chart.    Electronically signed by resident: Adan Conroy  Date:    2025  Time:    02:36    Electronically signed by: Iglesia Gunn  Date:    2025  Time:    03:35      Assessment/Plan:     Psychiatric  Anxiety and depression  - cont home meds       Renal/  Status post -donor kidney transplantation  - w DGF   - last HD    - Cr starting to clear, UOP increasing   - kidney US stable, no collections  - monitor with renal panel   - strict Is and Os       ID  At risk for opportunistic infections  - cont OI ppx per protocol       Immunology/Multi System  Prophylactic immunotherapy  - cont tac, mmf, pred  - monitor tac levels for toxicity and therapeutic efforts       Oncology  * Acute anemia  - hgb 6.6   - T&S updated   - transfusing 2 units PRBCs   - anemia w/u in process   - monitor with CBC         Endocrine  Type 2 diabetes mellitus with diabetic nephropathy  - SSI   - endocrine consulted       Palliative Care  Long-term use of immunosuppressant medication  - see prophylactic immunotherapy             Medical decision making for this encounter includes review of pertinent labs and diagnostic studies, assessment and planning, discussions with consulting providers, discussion with patient/family, and participation in multidisciplinary rounds. Time spent caring for patient: 90 minutes    Sherrill Santillan PA-C  Kidney Transplant  [facility] - Transplant Stepdown

## 2025-05-01 NOTE — DISCHARGE SUMMARY
[facility] Trans[facility] Stepdown  Kidney Transplant  Discharge Summary    Patient Name: Brande Reyer  MRN: [id number]  Admission Date: 4/30/2025  Hospital Length of Stay: 0 days  Discharge Date and Time: 05/01/2025 1:13 PM  Attending Physician: Pepper Jenkins DO   Discharging Provider: Amada Phillips PA-C  Primary Care Provider: Janie Shankar FNP-C    HPI:   Ms. Reyer is a 49 y.o. woman who received a DBD KTX 4/20/24 for ESRD 2/2 DMII (CIT 10hrs 25 mins). Post op with DGF, HD started for hyperkalemia. Discharged on HD, last 4/29. Since dc, UOP has increased to ~2L per day, Cr now starting to clear. On routine labs, found to be anemic with hgb <7 for 2 days in succession. She had a small amount of bleeding from her surgical incision site that has since resolved. She was told to go to ED where her labs were notable for hgb 6.6, Cr 5.2 from 5.4, K stable at 4.9. UA c/f UTI. Urine and blood cultures in process. Kidney US with satisfactory flow, no organized collections. Admit for UTI, anemia w/u, blood transfusion (2 PRBC units to start). D/w Dr. Jenkins     Hospital Course:    Pt admitted to observation for acute anemia and concern for UTI. She was transfused 2 units PRBC. Initial follow up CBC with inadequate response. No s/s of active bleeding. Incision c/d/I. CBC trended and improved. Underwent CT a/p which was unremarkable for acute source.  Reviewed by surgeon. Pt c/o symptoms of yeast infection - given diflucan x 1. Initially started on ABX for UTI, discontinued on discharge. Follow up urine culture. Pt is otherwise feeling well and she is ready for discharge. She will continue with labs MWF per DGF protocol. She has met with pharm D and received education. Pt expressed understanding of discharge instructions and importance of follow up.     Length of Stay was longer than expected due to: Discharged as expected    Goals of Care Treatment Preferences:  Code Status: Full Code      Final Active Diagnoses:    Diagnosis  Date Noted POA    PRINCIPAL PROBLEM:  Acute anemia [D64.9] 2025 Yes    Anxiety and depression [F41.9, F32.A] 2025 Yes    Acute cystitis without hematuria [N30.00] 2025 Yes    At risk for opportunistic infections [Z91.89] 2025 Yes    Long-term use of immunosuppressant medication [Z79.60] 2025 Not Applicable    Prophylactic immunotherapy [Z29.89] 2025 Not Applicable    Status post -donor kidney transplantation [Z94.0] 2025 Not Applicable    Type 2 diabetes mellitus with diabetic nephropathy [E11.21] 2023 Yes      Problems Resolved During this Admission:     Treatments: As above    Consults (From admission, onward)          Status Ordering Provider     Inpatient consult to Endocrinology  Once        Provider:  (Not yet assigned)    Acknowledged KADE MAC     Inpatient consult to Kidney Transplant Surgery  Once        Provider:  (Not yet assigned)    Acknowledged BEN ROY            Pending Diagnostic Studies:       Procedure Component Value Units Date/Time    CT Abdomen Pelvis  Without Contrast [[id number]] Resulted: 25 1243    Order Status: Sent Lab Status: In process Updated: 25 1243          Significant Diagnostic Studies: Labs: CMP   Recent Labs   Lab 25  0727 25  1639 25  0531    141 142   K 4.2 4.9 4.7    102 105   CO2 29 27 27   GLU 93 199* 128*   BUN 73* 73* 69*   CREATININE 5.4* 5.2* 4.8*   CALCIUM 8.3* 8.8 8.3*   PROT  --  6.0  --    ALBUMIN 2.9* 3.2* 2.7*   BILITOT  --  0.5  --    ALKPHOS  --  49  --    AST  --  10*  --    ALT  --  8*  --    ANIONGAP 10 12 10   , CBC   Recent Labs   Lab 25  1639 25  0531 25  1248   WBC 11.57 8.20 11.15   HGB 6.6* 7.3* 7.9*   HCT 20.8* 21.7* 24.4*    262 220   , and All labs within the past 24 hours have been reviewed    Discharged Condition: good    Disposition:     Follow Up:    Patient Instructions:   No discharge procedures on  "file.  Medications:  Reconciled Home Medications:      Medication List        ASK your doctor about these medications      BD ULTRA-FINE ANNE PEN NEEDLE 32 gauge x 5/32" Ndle  Generic drug: pen needle, diabetic  Inject 1 Needle into the skin 3 (three) times daily with meals.     busPIRone 5 MG Tab  Commonly known as: BUSPAR  Take 5 mg by mouth 2 (two) times daily.     carvediloL 6.25 MG tablet  Commonly known as: COREG  Take 1 tablet (6.25 mg total) by mouth 2 (two) times daily.     cloNIDine 0.1 MG tablet  Commonly known as: CATAPRES  Take 2 tablets (0.2 mg total) by mouth every evening.     famotidine 20 MG tablet  Commonly known as: PEPCID  Take 1 tablet (20 mg total) by mouth once daily.     gabapentin 100 MG capsule  Commonly known as: NEURONTIN  Take 100 mg by mouth 3 (three) times daily.     LOKELMA 10 gram packet  Wait to take this until your doctor or other care provider tells you to start again.  Generic drug: sodium zirconium cyclosilicate  Take 10 g by mouth. Mix entire contents of packet(s) into drinking glass containing 3 tablespoons of water; stir well and drink immediately. Add water and repeat until no powder remains to receive entire dose.     methocarbamoL 500 MG Tab  Commonly known as: Robaxin  Take 1 tablet (500 mg total) by mouth 4 (four) times daily as needed (muscle spasms).     montelukast 10 mg tablet  Commonly known as: SINGULAIR  Take 10 mg by mouth nightly.     MOUNJARO 2.5 mg/0.5 mL Pnij  Wait to take this until your doctor or other care provider tells you to start again.  Generic drug: tirzepatide  Inject 2.5 mg into the skin every 7 days.     multivitamin Tab  Take 1 tablet by mouth once daily.     mycophenolate 250 mg Cap  Commonly known as: CELLCEPT  Take 4 capsules (1,000 mg total) by mouth 2 (two) times daily.     NovoLOG Flexpen U-100 Insulin 100 unit/mL (3 mL) Inpn pen  Generic drug: insulin aspart U-100  Inject 0-10 Units into the skin 3 (three) times daily with meals. Per " sliding scale. (MDD: 30 units)     oxybutynin 5 MG Tab  Commonly known as: DITROPAN  Take 1 tablet (5 mg total) by mouth 3 (three) times daily as needed (Bladder spasms).     oxyCODONE 5 MG immediate release tablet  Commonly known as: ROXICODONE  Take 1 tablet (5 mg total) by mouth every 6 (six) hours as needed for Pain.     pravastatin 40 MG tablet  Commonly known as: PRAVACHOL  Take 1 tablet (40 mg total) by mouth once daily.     predniSONE 5 MG tablet  Commonly known as: DELTASONE  Take by mouth daily: 4/22 to 4/28 20 mg, 4/29 to 5/5 15 mg, 5/6 to 5/12 10 mg, 5/13/2025 to FOREVER 5 mg, DO NOT STOP     sulfamethoxazole-trimethoprim 400-80mg 400-80 mg per tablet  Commonly known as: BACTRIM,SEPTRA  Take 1 tablet by mouth 3 (three) times a week. Stop 10/20/25     tacrolimus 1 MG Cap  Commonly known as: PROGRAF  Take 7 capsules (7 mg total) by mouth every 12 (twelve) hours.     valGANciclovir 450 mg Tab  Commonly known as: VALCYTE  Take 1 tablet (450 mg total) by mouth 3 (three) times a week. Stop 7/20/25     vilazodone 10 mg Tab tablet  Commonly known as: VIIBRYD  Take 4 tablets (40 mg total) by mouth once daily.     XANAX 0.5 MG tablet  Generic drug: ALPRAZolam  Take 0.5 mg by mouth 3 (three) times daily.            Time spent caring for patient (Greater than 1/2 spent in direct face-to-face contact): > 30 minutes    Amada Phillips PA-C  Kidney Transplant  [facility] - Transplant Stepdown

## 2025-05-02 ENCOUNTER — RESULTS FOLLOW-UP (OUTPATIENT)
Dept: TRANSPLANT | Facility: CLINIC | Age: 50
End: 2025-05-02
Payer: MEDICARE

## 2025-05-02 ENCOUNTER — LAB VISIT (OUTPATIENT)
Dept: LAB | Facility: HOSPITAL | Age: 50
End: 2025-05-02
Attending: STUDENT IN AN ORGANIZED HEALTH CARE EDUCATION/TRAINING PROGRAM
Payer: MEDICARE

## 2025-05-02 DIAGNOSIS — Z94.0 STATUS POST DECEASED-DONOR KIDNEY TRANSPLANTATION: ICD-10-CM

## 2025-05-02 DIAGNOSIS — Z94.0 KIDNEY REPLACED BY TRANSPLANT: ICD-10-CM

## 2025-05-02 LAB
ABSOLUTE EOSINOPHIL (OHS): 0.18 K/UL
ABSOLUTE MONOCYTE (OHS): 0.16 K/UL (ref 0.3–1)
ABSOLUTE NEUTROPHIL COUNT (OHS): 6.71 K/UL (ref 1.8–7.7)
ALBUMIN SERPL BCP-MCNC: 2.9 G/DL (ref 3.5–5.2)
ANION GAP (OHS): 9 MMOL/L (ref 8–16)
BACTERIA UR CULT: NORMAL
BASOPHILS # BLD AUTO: 0.03 K/UL
BASOPHILS NFR BLD AUTO: 0.4 %
BUN SERPL-MCNC: 74 MG/DL (ref 6–20)
CALCIUM SERPL-MCNC: 8.6 MG/DL (ref 8.7–10.5)
CHLORIDE SERPL-SCNC: 107 MMOL/L (ref 95–110)
CO2 SERPL-SCNC: 27 MMOL/L (ref 23–29)
CREAT SERPL-MCNC: 4.6 MG/DL (ref 0.5–1.4)
ERYTHROCYTE [DISTWIDTH] IN BLOOD BY AUTOMATED COUNT: 17.2 % (ref 11.5–14.5)
GFR SERPLBLD CREATININE-BSD FMLA CKD-EPI: 11 ML/MIN/1.73/M2
GLUCOSE SERPL-MCNC: 101 MG/DL (ref 70–110)
HCT VFR BLD AUTO: 21.7 % (ref 37–48.5)
HGB BLD-MCNC: 7.2 GM/DL (ref 12–16)
IMM GRANULOCYTES # BLD AUTO: 0.04 K/UL (ref 0–0.04)
IMM GRANULOCYTES NFR BLD AUTO: 0.5 % (ref 0–0.5)
LYMPHOCYTES # BLD AUTO: 0.42 K/UL (ref 1–4.8)
MAGNESIUM SERPL-MCNC: 2 MG/DL (ref 1.6–2.6)
MCH RBC QN AUTO: 32.7 PG (ref 27–31)
MCHC RBC AUTO-ENTMCNC: 33.2 G/DL (ref 32–36)
MCV RBC AUTO: 99 FL (ref 82–98)
NUCLEATED RBC (/100WBC) (OHS): 0 /100 WBC
PHOSPHATE SERPL-MCNC: 5.9 MG/DL (ref 2.7–4.5)
PLATELET # BLD AUTO: 221 K/UL (ref 150–450)
PMV BLD AUTO: 8.9 FL (ref 9.2–12.9)
POTASSIUM SERPL-SCNC: 5 MMOL/L (ref 3.5–5.1)
RBC # BLD AUTO: 2.2 M/UL (ref 4–5.4)
RELATIVE EOSINOPHIL (OHS): 2.4 %
RELATIVE LYMPHOCYTE (OHS): 5.6 % (ref 18–48)
RELATIVE MONOCYTE (OHS): 2.1 % (ref 4–15)
RELATIVE NEUTROPHIL (OHS): 89 % (ref 38–73)
SODIUM SERPL-SCNC: 143 MMOL/L (ref 136–145)
TACROLIMUS BLD-MCNC: 5.8 NG/ML (ref 5–15)
WBC # BLD AUTO: 7.54 K/UL (ref 3.9–12.7)

## 2025-05-02 PROCEDURE — 36415 COLL VENOUS BLD VENIPUNCTURE: CPT | Mod: PO

## 2025-05-02 PROCEDURE — 80069 RENAL FUNCTION PANEL: CPT

## 2025-05-02 PROCEDURE — 85025 COMPLETE CBC W/AUTO DIFF WBC: CPT | Mod: PO

## 2025-05-02 PROCEDURE — 80197 ASSAY OF TACROLIMUS: CPT

## 2025-05-02 PROCEDURE — 83735 ASSAY OF MAGNESIUM: CPT

## 2025-05-02 RX ORDER — TACROLIMUS 1 MG/1
8 CAPSULE ORAL EVERY 12 HOURS
Qty: 540 CAPSULE | Refills: 11 | Status: SHIPPED | OUTPATIENT
Start: 2025-05-02

## 2025-05-02 NOTE — TELEPHONE ENCOUNTER
Message sent.     ----- Message from Andrea Adhikari MD sent at 5/2/2025  3:13 PM CDT -----  If true tac trough, increase dose from 7/7 to 8/8 and repeat next week  ----- Message -----  From: Lab, Background User  Sent: 5/2/2025   9:50 AM CDT  To: Andrea Adhikari Jr., MD

## 2025-05-05 ENCOUNTER — TELEPHONE (OUTPATIENT)
Dept: TRANSPLANT | Facility: CLINIC | Age: 50
End: 2025-05-05
Payer: MEDICARE

## 2025-05-05 ENCOUNTER — LAB VISIT (OUTPATIENT)
Dept: LAB | Facility: HOSPITAL | Age: 50
End: 2025-05-05
Attending: STUDENT IN AN ORGANIZED HEALTH CARE EDUCATION/TRAINING PROGRAM
Payer: MEDICARE

## 2025-05-05 ENCOUNTER — RESULTS FOLLOW-UP (OUTPATIENT)
Dept: TRANSPLANT | Facility: HOSPITAL | Age: 50
End: 2025-05-05

## 2025-05-05 DIAGNOSIS — N18.9 ANEMIA OF RENAL DISEASE: ICD-10-CM

## 2025-05-05 DIAGNOSIS — Z57.8 EMPLOYEE EXPOSURE TO BODY FLUIDS: ICD-10-CM

## 2025-05-05 DIAGNOSIS — D63.1 ANEMIA OF RENAL DISEASE: ICD-10-CM

## 2025-05-05 DIAGNOSIS — Z94.0 KIDNEY TRANSPLANT STATUS: Primary | ICD-10-CM

## 2025-05-05 DIAGNOSIS — Z94.0 KIDNEY REPLACED BY TRANSPLANT: ICD-10-CM

## 2025-05-05 LAB
ABSOLUTE EOSINOPHIL (OHS): 0.23 K/UL
ABSOLUTE MONOCYTE (OHS): 0.16 K/UL (ref 0.3–1)
ABSOLUTE NEUTROPHIL COUNT (OHS): 5.14 K/UL (ref 1.8–7.7)
ALBUMIN SERPL BCP-MCNC: 2.9 G/DL (ref 3.5–5.2)
ANION GAP (OHS): 7 MMOL/L (ref 8–16)
BASOPHILS # BLD AUTO: 0.04 K/UL
BASOPHILS NFR BLD AUTO: 0.7 %
BUN SERPL-MCNC: 67 MG/DL (ref 6–20)
CALCIUM SERPL-MCNC: 8.5 MG/DL (ref 8.7–10.5)
CHLORIDE SERPL-SCNC: 110 MMOL/L (ref 95–110)
CO2 SERPL-SCNC: 23 MMOL/L (ref 23–29)
CREAT SERPL-MCNC: 3.6 MG/DL (ref 0.5–1.4)
ERYTHROCYTE [DISTWIDTH] IN BLOOD BY AUTOMATED COUNT: 15.9 % (ref 11.5–14.5)
FERRITIN SERPL-MCNC: 2248 NG/ML (ref 20–300)
GFR SERPLBLD CREATININE-BSD FMLA CKD-EPI: 15 ML/MIN/1.73/M2
GLUCOSE SERPL-MCNC: 115 MG/DL (ref 70–110)
HCT VFR BLD AUTO: 22.1 % (ref 37–48.5)
HGB BLD-MCNC: 7.1 GM/DL (ref 12–16)
IMM GRANULOCYTES # BLD AUTO: 0.1 K/UL (ref 0–0.04)
IMM GRANULOCYTES NFR BLD AUTO: 1.7 % (ref 0–0.5)
IRON SATN MFR SERPL: 53 % (ref 20–50)
IRON SERPL-MCNC: 116 UG/DL (ref 30–160)
LYMPHOCYTES # BLD AUTO: 0.35 K/UL (ref 1–4.8)
MAGNESIUM SERPL-MCNC: 1.8 MG/DL (ref 1.6–2.6)
MCH RBC QN AUTO: 32.7 PG (ref 27–31)
MCHC RBC AUTO-ENTMCNC: 32.1 G/DL (ref 32–36)
MCV RBC AUTO: 102 FL (ref 82–98)
NUCLEATED RBC (/100WBC) (OHS): 0 /100 WBC
PHOSPHATE SERPL-MCNC: 4.5 MG/DL (ref 2.7–4.5)
PLATELET # BLD AUTO: 202 K/UL (ref 150–450)
PMV BLD AUTO: 8.8 FL (ref 9.2–12.9)
POTASSIUM SERPL-SCNC: 5.2 MMOL/L (ref 3.5–5.1)
RBC # BLD AUTO: 2.17 M/UL (ref 4–5.4)
RELATIVE EOSINOPHIL (OHS): 3.8 %
RELATIVE LYMPHOCYTE (OHS): 5.8 % (ref 18–48)
RELATIVE MONOCYTE (OHS): 2.7 % (ref 4–15)
RELATIVE NEUTROPHIL (OHS): 85.3 % (ref 38–73)
SODIUM SERPL-SCNC: 140 MMOL/L (ref 136–145)
TIBC SERPL-MCNC: 219 UG/DL (ref 250–450)
TRANSFERRIN SERPL-MCNC: 148 MG/DL (ref 200–375)
WBC # BLD AUTO: 6.02 K/UL (ref 3.9–12.7)

## 2025-05-05 PROCEDURE — 83735 ASSAY OF MAGNESIUM: CPT

## 2025-05-05 PROCEDURE — 84466 ASSAY OF TRANSFERRIN: CPT

## 2025-05-05 PROCEDURE — 36415 COLL VENOUS BLD VENIPUNCTURE: CPT | Mod: PO

## 2025-05-05 PROCEDURE — 82728 ASSAY OF FERRITIN: CPT

## 2025-05-05 PROCEDURE — 87517 HEPATITIS B DNA QUANT: CPT

## 2025-05-05 PROCEDURE — 85025 COMPLETE CBC W/AUTO DIFF WBC: CPT | Mod: PO

## 2025-05-05 PROCEDURE — 87522 HEPATITIS C REVRS TRNSCRPJ: CPT

## 2025-05-05 PROCEDURE — 80197 ASSAY OF TACROLIMUS: CPT

## 2025-05-05 PROCEDURE — 82040 ASSAY OF SERUM ALBUMIN: CPT

## 2025-05-05 SDOH — SOCIAL DETERMINANTS OF HEALTH (SDOH): OCCUPATIONAL EXPOSURE TO OTHER RISK FACTORS: Z57.8

## 2025-05-05 NOTE — TELEPHONE ENCOUNTER
"Discussed with patient HD chair cancelled- pt to have labs on Thursday.     ----- Message from Raul sent at 5/5/2025  2:04 PM CDT -----  Regarding: call back  Consult/Advisory:  Name Of Caller: Self Contact Preference?:[phone] What is the nature of the call?: Calling to speak w/  Eva in regards to her lab results also states she has some question requesting call back   Additional Notes:"Thank you for all that you do for our patients"  "

## 2025-05-06 ENCOUNTER — TELEPHONE (OUTPATIENT)
Dept: TRANSPLANT | Facility: CLINIC | Age: 50
End: 2025-05-06
Payer: MEDICARE

## 2025-05-06 LAB
BACTERIA BLD CULT: NORMAL
BACTERIA BLD CULT: NORMAL
TACROLIMUS BLD-MCNC: 6.7 NG/ML (ref 5–15)

## 2025-05-06 RX ORDER — CHLORTHALIDONE 25 MG/1
12.5 TABLET ORAL DAILY
Qty: 45 TABLET | Refills: 3 | OUTPATIENT
Start: 2025-05-06 | End: 2026-05-06

## 2025-05-06 NOTE — TELEPHONE ENCOUNTER
[facility] did not answer call--left detailed VM that pt is no longer requiring HD.

## 2025-05-06 NOTE — PROGRESS NOTES
Kidney Post-Transplant Assessment    Referring Physician: Samuel Knutson  Current Nephrologist: Tam Mathis    ORGAN: RIGHT KIDNEY  Donor Type: donation after brain death  PHS Increased Risk: no  Cold Ischemia: 625 mins  Induction Medications: thymo    Subjective:     CC:  Reassessment of renal allograft function and management of chronic immunosuppression.    HPI:  Ms. Reyer is a 49 y.o. 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 Her most recent creatinine is 3.6. She takes mycophenolate mofetil, prednisone, and tacrolimus for maintenance immunosuppression. Her post transplant course has been complicated by delayed graft function requiring dialysis.    Overnight admit 4/30-5/1 for pRBC transfusion, received 2 units. Had CT abd/pelvis which was unremarkable. She continues on outpatient epo weekly x 4 doses. No obvious blood loss, no bleeding from incision line.     Last HD 4/28. HD chair cancelled 5/5. She continues with great urine output. She feels bloated, planning to start chlorthalidone 12.5 mg nightly, picking up rx today. No diarrhea or vomiting. Energy levels continue to improve. Tolerating IS without issue. She is back taking clonidine 0.2 mg at night time for her night sweats which are much improved.    Current Medications[1]    Past Medical History:   Diagnosis Date    Allergy     Anemia     Anxiety     Depression     Diabetes mellitus, type 2     Dialysis patient     Disorder of kidney and ureter     Hyperlipidemia     Hypertension     Obesity        Review of Systems   Constitutional:  Positive for fatigue. Negative for activity change and fever.   Eyes:  Negative for visual disturbance.   Respiratory:  Negative for shortness of breath.    Cardiovascular:  Negative for  "chest pain and leg swelling.   Gastrointestinal:  Negative for constipation, diarrhea and nausea.   Genitourinary:  Negative for difficulty urinating, frequency and hematuria.   Musculoskeletal:  Negative for arthralgias and myalgias.   Skin:  Positive for wound.   Allergic/Immunologic: Positive for immunocompromised state.   Neurological:  Negative for weakness and numbness.   Psychiatric/Behavioral:  Negative for sleep disturbance. The patient is not nervous/anxious.      Objective:   Blood pressure (!) 188/85, pulse 74, temperature 97.2 °F (36.2 °C), temperature source Tympanic, resp. rate 18, height 5' 4" (1.626 m), weight 111.1 kg (244 lb 14.9 oz), SpO2 100%, peak flow (!) 0 L/min.body mass index is 42.04 kg/m².    Physical Exam  Vitals and nursing note reviewed.   Constitutional:       Appearance: Normal appearance.   Cardiovascular:      Rate and Rhythm: Normal rate and regular rhythm.      Heart sounds: Normal heart sounds.   Pulmonary:      Effort: Pulmonary effort is normal.      Breath sounds: Normal breath sounds.   Abdominal:      General: There is no distension.      Comments: Surgical incision well approximated with staples, scant serous drainage    Musculoskeletal:         General: Normal range of motion.   Skin:     General: Skin is warm and dry.   Neurological:      Mental Status: She is alert.         Labs:  Lab Results   Component Value Date    WBC 6.02 2025    HGB 7.1 (L) 2025    HCT 22.1 (L) 2025     2025    K 5.2 (H) 2025     2025    CO2 23 2025    BUN 67 (H) 2025    CREATININE 3.6 (H) 2025    EGFRNORACEVR 15 (L) 2025    CALCIUM 8.5 (L) 2025    PHOS 4.5 2025    MG 1.8 2025    ALBUMIN 2.9 (L) 2025    AST 10 (L) 2025    ALT 8 (L) 2025    .6 (H) 2025    TACROLIMUS 6.7 2025     Labs were reviewed with the patient    Assessment:     1. Status post -donor kidney " transplantation    2. Delayed graft function of kidney transplant due to ATN requiring acute dialysis    3. Essential (primary) hypertension    4. Diabetic nephropathy associated with type 2 diabetes mellitus    5. Long-term use of immunosuppressant medication    6. At risk for opportunistic infections      Plan:   Increased tac 9/9  Rx for chlorthalidone 12.5 mg HS sent  Ureteral stent removal and surgery follow up visit 5/14  Epo injection in clinic    1. Immunosuppression: Prograf trough 6.7, which is SUBtherapeutic. Continue Prograf 9/9 (dose increased 5/6), MMF 1000 mg BID, and Prednisone taper. Will continue to monitor for drug toxicities    2. Allograft Function: DGF, last HD 4/28. Creatinine continues to trend down, continue good oral hydration   - ESRD secondary to diabetic nephropathy s/p donation after brain death kidney transplant on 4/20/25 (CIT 10hrs 25 mins, cPRA 94%)   - post transplant course  complicated by delayed graft function requiring dialysis.  Lab Results   Component Value Date    CREATININE 3.6 (H) 05/05/2025       3. Hypertension management:   - advise low salt diet and home BP monitoring    - coreg 6.25 mg BID, chlorthalidone 12.5 mg HS  - on clonidine 0.2 mg HS for night sweats    4.Hyperphosphatemia     - low phosphorous diet      5. Electrolytes: stable. No need for intervention   Lab Results   Component Value Date     05/05/2025    K 5.2 (H) 05/05/2025     05/05/2025    CO2 23 05/05/2025       6. Anemia: s/p 2 units pRBC, continue weekly epo injections   Lab Results   Component Value Date    WBC 6.02 05/05/2025    HGB 7.1 (L) 05/05/2025    HCT 22.1 (L) 05/05/2025     (H) 05/05/2025     05/05/2025         7. Proteinuria: continue p/c ratio as per guidelines     8. BK virus infection screening:  BK PCR per protocol     9. Weight education: provided during the clinic visit   Body mass index is 42.04 kg/m².     10. Patient safety education regarding  immunosuppression including prophylaxis posttransplant for CMV, PCP   - PCP PROPHYLAXIS: Bactrim until 10/20/25   - CMV PROPHYLAXIS: Valcyte until 7/20/25        Follow-up:   Clinic: return to transplant clinic weekly for the first month after transplant; every 2 weeks during months 2-3; then at 6-, 9-, 12-, 18-, 24-, and 36- months post-transplant to reassess for complications from immunosuppression toxicity and monitor for rejection.  Annually thereafter.    Labs: since patient remains at high risk for rejection and drug-related complications that warrant close monitoring, labs will be ordered as follows: continue twice weekly CBC, renal panel, and drug level for first month; then same labs once weekly through 3rd month post-transplant.  Urine for UA and protein/creatinine ratio monthly.  Serum BK - PCR at 1-, 3-, 6-, 9-, 12-, 18-, 24-, 36-, 48-, and 60 months post-transplant.  Hepatic panel at 1-, 2-, 3-, 6-, 9-, 12-, 18-, 24-, and 36- months post-transplant.    Birgit Romano NP              [1]   Current Outpatient Medications   Medication Sig Dispense Refill    ALPRAZolam (XANAX) 0.5 MG tablet Take 0.5 mg by mouth 3 (three) times daily.      busPIRone (BUSPAR) 5 MG Tab Take 5 mg by mouth 2 (two) times daily.      carvediloL (COREG) 6.25 MG tablet Take 1 tablet (6.25 mg total) by mouth 2 (two) times daily. 60 tablet 11    cloNIDine (CATAPRES) 0.1 MG tablet Take 2 tablets (0.2 mg total) by mouth every evening. 60 tablet 11    famotidine (PEPCID) 20 MG tablet Take 1 tablet (20 mg total) by mouth once daily. 30 tablet 0    gabapentin (NEURONTIN) 100 MG capsule Take 100 mg by mouth 3 (three) times daily.      insulin aspart U-100 (NOVOLOG) 100 unit/mL (3 mL) InPn pen Inject 0-10 Units into the skin 3 (three) times daily with meals. Per sliding scale. (MDD: 30 units) 9 mL 5    montelukast (SINGULAIR) 10 mg tablet Take 10 mg by mouth nightly.      multivitamin Tab Take 1 tablet by mouth once daily.       "mycophenolate (CELLCEPT) 250 mg Cap Take 4 capsules (1,000 mg total) by mouth 2 (two) times daily. 240 capsule 11    oxybutynin (DITROPAN) 5 MG Tab Take 1 tablet (5 mg total) by mouth 3 (three) times daily as needed (Bladder spasms). 30 tablet 0    oxyCODONE (ROXICODONE) 5 MG immediate release tablet Take 1 tablet (5 mg total) by mouth every 6 (six) hours as needed for Pain. 28 tablet 0    pen needle, diabetic 32 gauge x 5/32" Ndle Inject 1 Needle into the skin 3 (three) times daily with meals. 100 each 11    pravastatin (PRAVACHOL) 40 MG tablet Take 1 tablet (40 mg total) by mouth once daily. 90 tablet 3    predniSONE (DELTASONE) 5 MG tablet Take by mouth daily: 4/22 to 4/28 20 mg, 4/29 to 5/5 15 mg, 5/6 to 5/12 10 mg, 5/13/2025 to FOREVER 5 mg, DO NOT STOP 70 tablet 0    [Paused] sodium zirconium cyclosilicate (LOKELMA) 10 gram packet Take 10 g by mouth. Mix entire contents of packet(s) into drinking glass containing 3 tablespoons of water; stir well and drink immediately. Add water and repeat until no powder remains to receive entire dose. (Patient not taking: Reported on 4/30/2025)      sulfamethoxazole-trimethoprim 400-80mg (BACTRIM,SEPTRA) 400-80 mg per tablet Take 1 tablet by mouth 3 (three) times a week. Stop 10/20/25 12 tablet 5    tacrolimus (PROGRAF) 1 MG Cap Take 8 capsules (8 mg total) by mouth every 12 (twelve) hours. 540 capsule 11    [Paused] tirzepatide (MOUNJARO) 2.5 mg/0.5 mL PnIj Inject 2.5 mg into the skin every 7 days. (Patient not taking: Reported on 4/30/2025)      valGANciclovir (VALCYTE) 450 mg Tab Take 1 tablet (450 mg total) by mouth 3 (three) times a week. Stop 7/20/25 12 tablet 2    vilazodone (VIIBRYD) 10 mg Tab tablet Take 4 tablets (40 mg total) by mouth once daily.       No current facility-administered medications for this visit.     "

## 2025-05-06 NOTE — TELEPHONE ENCOUNTER
Pt aware of changes--pt BP in 150s-160's.   Pt taking 7 mg twice a day so will adjust to 8 mg.     ----- Message from GREYSON Velasquez sent at 5/5/2025  4:54 PM CDT -----    If true tac trough, increase dose from 8/8 to 9/9 and repeat in 1 week.     ----- Message -----  From: Andrea Adhikari Jr., MD  Sent: 5/5/2025   3:00 PM CDT  To: [facility] Post-Kidney Transplant Clinical    Cancel dialysis chair. Increase hydration. If systolic BP majority are over 130 start chlorthalidone 12.5mg nightly. Iron level sufficiency. Outpatient epo weekly for 4 doses if possible.  ----- Message -----  From: Lab, Background User  Sent: 5/5/2025   8:49 AM CDT  To: Andrea Adhikari Jr., MD

## 2025-05-07 ENCOUNTER — OFFICE VISIT (OUTPATIENT)
Dept: TRANSPLANT | Facility: CLINIC | Age: 50
End: 2025-05-07
Payer: MEDICARE

## 2025-05-07 ENCOUNTER — CLINICAL SUPPORT (OUTPATIENT)
Dept: TRANSPLANT | Facility: CLINIC | Age: 50
End: 2025-05-07
Payer: MEDICARE

## 2025-05-07 ENCOUNTER — NUTRITION (OUTPATIENT)
Dept: TRANSPLANT | Facility: CLINIC | Age: 50
End: 2025-05-07
Payer: MEDICARE

## 2025-05-07 VITALS
DIASTOLIC BLOOD PRESSURE: 68 MMHG | OXYGEN SATURATION: 100 % | HEART RATE: 73 BPM | HEIGHT: 64 IN | RESPIRATION RATE: 18 BRPM | TEMPERATURE: 97 F | SYSTOLIC BLOOD PRESSURE: 134 MMHG | WEIGHT: 244.94 LBS | BODY MASS INDEX: 41.82 KG/M2

## 2025-05-07 VITALS
BODY MASS INDEX: 41.82 KG/M2 | RESPIRATION RATE: 18 BRPM | WEIGHT: 244.94 LBS | DIASTOLIC BLOOD PRESSURE: 85 MMHG | SYSTOLIC BLOOD PRESSURE: 188 MMHG | RESPIRATION RATE: 18 BRPM | HEART RATE: 74 BPM | SYSTOLIC BLOOD PRESSURE: 188 MMHG | HEART RATE: 74 BPM | HEIGHT: 64 IN | BODY MASS INDEX: 41.82 KG/M2 | WEIGHT: 244.94 LBS | HEIGHT: 64 IN | DIASTOLIC BLOOD PRESSURE: 85 MMHG | TEMPERATURE: 97 F | TEMPERATURE: 97 F | OXYGEN SATURATION: 100 % | OXYGEN SATURATION: 100 %

## 2025-05-07 DIAGNOSIS — Z91.89 AT RISK FOR OPPORTUNISTIC INFECTIONS: ICD-10-CM

## 2025-05-07 DIAGNOSIS — D63.1 ANEMIA IN CHRONIC KIDNEY DISEASE, UNSPECIFIED CKD STAGE: Primary | ICD-10-CM

## 2025-05-07 DIAGNOSIS — Z79.60 LONG-TERM USE OF IMMUNOSUPPRESSANT MEDICATION: ICD-10-CM

## 2025-05-07 DIAGNOSIS — D63.1 ANEMIA OF RENAL DISEASE: ICD-10-CM

## 2025-05-07 DIAGNOSIS — E11.21 DIABETIC NEPHROPATHY ASSOCIATED WITH TYPE 2 DIABETES MELLITUS: ICD-10-CM

## 2025-05-07 DIAGNOSIS — N18.9 ANEMIA IN CHRONIC KIDNEY DISEASE, UNSPECIFIED CKD STAGE: Primary | ICD-10-CM

## 2025-05-07 DIAGNOSIS — T86.19 DELAYED GRAFT FUNCTION OF KIDNEY TRANSPLANT DUE TO ATN REQUIRING ACUTE DIALYSIS: ICD-10-CM

## 2025-05-07 DIAGNOSIS — I10 ESSENTIAL (PRIMARY) HYPERTENSION: ICD-10-CM

## 2025-05-07 DIAGNOSIS — Z76.82 ORGAN TRANSPLANT CANDIDATE: Primary | ICD-10-CM

## 2025-05-07 DIAGNOSIS — Z94.0 STATUS POST DECEASED-DONOR KIDNEY TRANSPLANTATION: Primary | ICD-10-CM

## 2025-05-07 DIAGNOSIS — Z99.2 DELAYED GRAFT FUNCTION OF KIDNEY TRANSPLANT DUE TO ATN REQUIRING ACUTE DIALYSIS: ICD-10-CM

## 2025-05-07 DIAGNOSIS — N18.9 ANEMIA OF RENAL DISEASE: ICD-10-CM

## 2025-05-07 LAB
HBV DNA SERPL NAA+PROBE-ACNC: NORMAL [IU]/ML
HCV RNA SERPL NAA+PROBE-LOG IU: NOT DETECTED {LOG_IU}/ML

## 2025-05-07 PROCEDURE — 99999 PR PBB SHADOW E&M-EST. PATIENT-LVL III: CPT | Mod: PBBFAC,,,

## 2025-05-07 PROCEDURE — 99999 PR PBB SHADOW E&M-EST. PATIENT-LVL V: CPT | Mod: PBBFAC,,, | Performed by: NURSE PRACTITIONER

## 2025-05-07 RX ORDER — CHLORTHALIDONE 25 MG/1
12.5 TABLET ORAL NIGHTLY
Qty: 45 TABLET | Refills: 3 | Status: SHIPPED | OUTPATIENT
Start: 2025-05-07 | End: 2026-05-07

## 2025-05-07 RX ORDER — TACROLIMUS 1 MG/1
9 CAPSULE ORAL EVERY 12 HOURS
Qty: 540 CAPSULE | Refills: 11 | Status: SHIPPED | OUTPATIENT
Start: 2025-05-07

## 2025-05-07 NOTE — PROGRESS NOTES
Food & Nutrition  Education    Reason For Visit: Low potassium diet education, Phosphorus Containing Foods, 20 Ways to Enjoy more Fruits and Vegetables     Diet Order: Low Potassium Diet  Time Spent: 30 minutes  Learners: Pt and family    Nutrition Education provided with handouts: Potassium Foods Education  and Phosphorus Foods Education , 20 Ways to Enjoy more Fruits and Vegetables     Education Comments: Potassium Diet- Provided a variety of low, moderate, and high potassium food to provide an average level of intake in accordance to the DRI. Educated pt on the potassium contents of foods available within pt's diet. Touched on a variety of foods that have potassium contents listed in the handout's provided.  and Phosphorus Diet- Provided information on natural and added sources of phosphorus. Noted for pt to pay attention as some foods and drinks have phosphorus added on the foods labels. Provided low and high phosphorus containing food list to educate pt on phosphorus contents of foods. Touched on a variety of foods that contain phosphorus that are listed in the handout provided. Encouraged pt to consume a variety of fruits and vegetables. Encouraged pt to incorporate more whole foods to minimize sodium intake and added phosphorus.     Nutrition Diagnoses: Post Kidney Transplant Diet Education   Nutrition Problem  Food and Nutrition Related Knowledge deficit    Related to (etiology):   Limited prior knowledge    Signs and Symptoms (as evidenced by):   Need and Completion for Education    Interventions/Recommendations (treatment strategy):  Actions Taken:   Handout's Given (encrypted email), Dietitian's Contact Information Provided, All questions and concerns answered., and Handout's Given In Office     Educational Need? yes  Barriers: none identified for education     Strategies Used: Motivation Interviewing, Goal Setting , and Problem Solving   Patient and/or family comprehend instructions: yes , adherence  expected  Outcome: Verbalizes understanding  Monitoring: Contact information provided, will f/u in clinic and communicate with the care team as needed.

## 2025-05-07 NOTE — LETTER
May 7, 2025        Tam Mathis  [address]  [facility] [address]  Phone: [phone]  Fax: [phone]             [facility] 1st Fl  [address]  [facility] [address]  Phone: [phone]   Patient: Brande Reyer   MR Number: [id number]   YOB: 1975   Date of Visit: 5/7/2025       Dear Dr. Tam Mathis    Thank you for referring Brande Reyer to me for evaluation. Attached you will find relevant portions of my assessment and plan of care.    If you have questions, please do not hesitate to call me. I look forward to following Brande Reyer along with you.    Sincerely,    Birgit Romano, LAZARO    Enclosure    If you would like to receive this communication electronically, please contact [email or web address] or [phone] to request [employer] access.    [employer] is a tool which provides read-only access to select patient information with whom you have a relationship. Its easy to use and provides real time access to review your patients record including encounter summaries, notes, results, and demographic information.    If you feel you have received this communication in error or would no longer like to receive these types of communications, please e-mail [email or web address]

## 2025-05-08 ENCOUNTER — RESULTS FOLLOW-UP (OUTPATIENT)
Dept: TRANSPLANT | Facility: HOSPITAL | Age: 50
End: 2025-05-08

## 2025-05-08 ENCOUNTER — LAB VISIT (OUTPATIENT)
Dept: LAB | Facility: HOSPITAL | Age: 50
End: 2025-05-08
Attending: STUDENT IN AN ORGANIZED HEALTH CARE EDUCATION/TRAINING PROGRAM
Payer: MEDICARE

## 2025-05-08 DIAGNOSIS — Z94.0 KIDNEY REPLACED BY TRANSPLANT: ICD-10-CM

## 2025-05-08 LAB
ABSOLUTE EOSINOPHIL (OHS): 0.12 K/UL
ABSOLUTE MONOCYTE (OHS): 0.22 K/UL (ref 0.3–1)
ABSOLUTE NEUTROPHIL COUNT (OHS): 3.75 K/UL (ref 1.8–7.7)
ALBUMIN SERPL-MCNC: 3.2 G/DL (ref 3.5–5.2)
ANION GAP (SMH): 9 MMOL/L (ref 8–16)
BASOPHILS # BLD AUTO: 0.04 K/UL
BASOPHILS NFR BLD AUTO: 0.9 %
BUN SERPL-MCNC: 60 MG/DL (ref 6–20)
CALCIUM SERPL-MCNC: 8.9 MG/DL (ref 8.7–10.5)
CHLORIDE SERPL-SCNC: 114 MMOL/L (ref 95–110)
CO2 SERPL-SCNC: 20 MMOL/L (ref 23–29)
CREAT SERPL-MCNC: 3.8 MG/DL (ref 0.5–1.4)
ERYTHROCYTE [DISTWIDTH] IN BLOOD BY AUTOMATED COUNT: 15.9 % (ref 11.5–14.5)
GFR SERPLBLD CREATININE-BSD FMLA CKD-EPI: 14 ML/MIN/1.73/M2
GLUCOSE SERPL-MCNC: 116 MG/DL (ref 70–110)
HCT VFR BLD AUTO: 22.4 % (ref 37–48.5)
HGB BLD-MCNC: 7.1 GM/DL (ref 12–16)
IMM GRANULOCYTES # BLD AUTO: 0.07 K/UL (ref 0–0.04)
IMM GRANULOCYTES NFR BLD AUTO: 1.5 % (ref 0–0.5)
LYMPHOCYTES # BLD AUTO: 0.35 K/UL (ref 1–4.8)
MAGNESIUM SERPL-MCNC: 1.7 MG/DL (ref 1.6–2.6)
MCH RBC QN AUTO: 32.9 PG (ref 27–31)
MCHC RBC AUTO-ENTMCNC: 31.7 G/DL (ref 32–36)
MCV RBC AUTO: 104 FL (ref 82–98)
NUCLEATED RBC (/100WBC) (OHS): 0 /100 WBC
PHOSPHATE SERPL-MCNC: 4.3 MG/DL (ref 2.7–4.5)
PLATELET # BLD AUTO: 186 K/UL (ref 150–450)
PMV BLD AUTO: 8.9 FL (ref 9.2–12.9)
POTASSIUM SERPL-SCNC: 5.1 MMOL/L (ref 3.5–5.1)
RBC # BLD AUTO: 2.16 M/UL (ref 4–5.4)
RELATIVE EOSINOPHIL (OHS): 2.6 %
RELATIVE LYMPHOCYTE (OHS): 7.7 % (ref 18–48)
RELATIVE MONOCYTE (OHS): 4.8 % (ref 4–15)
RELATIVE NEUTROPHIL (OHS): 82.5 % (ref 38–73)
SODIUM SERPL-SCNC: 143 MMOL/L (ref 136–145)
TACROLIMUS BLD-MCNC: 7.4 NG/ML (ref 5–15)
WBC # BLD AUTO: 4.55 K/UL (ref 3.9–12.7)

## 2025-05-08 PROCEDURE — 85025 COMPLETE CBC W/AUTO DIFF WBC: CPT | Mod: PO

## 2025-05-08 PROCEDURE — 84100 ASSAY OF PHOSPHORUS: CPT | Performed by: STUDENT IN AN ORGANIZED HEALTH CARE EDUCATION/TRAINING PROGRAM

## 2025-05-08 PROCEDURE — 80197 ASSAY OF TACROLIMUS: CPT

## 2025-05-08 PROCEDURE — 83735 ASSAY OF MAGNESIUM: CPT | Performed by: STUDENT IN AN ORGANIZED HEALTH CARE EDUCATION/TRAINING PROGRAM

## 2025-05-08 PROCEDURE — 36415 COLL VENOUS BLD VENIPUNCTURE: CPT | Mod: PO

## 2025-05-12 ENCOUNTER — RESULTS FOLLOW-UP (OUTPATIENT)
Dept: TRANSPLANT | Facility: HOSPITAL | Age: 50
End: 2025-05-12
Payer: MEDICARE

## 2025-05-12 ENCOUNTER — LAB VISIT (OUTPATIENT)
Dept: LAB | Facility: HOSPITAL | Age: 50
End: 2025-05-12
Attending: STUDENT IN AN ORGANIZED HEALTH CARE EDUCATION/TRAINING PROGRAM
Payer: MEDICARE

## 2025-05-12 DIAGNOSIS — Z94.0 KIDNEY REPLACED BY TRANSPLANT: ICD-10-CM

## 2025-05-12 DIAGNOSIS — Z94.0 KIDNEY TRANSPLANT STATUS: Primary | ICD-10-CM

## 2025-05-12 LAB
ABSOLUTE EOSINOPHIL (SMH): 0.06 K/UL
ABSOLUTE MONOCYTE (SMH): 0.21 K/UL (ref 0.3–1)
ABSOLUTE NEUTROPHIL COUNT (SMH): 3.1 K/UL (ref 1.8–7.7)
ALBUMIN SERPL-MCNC: 3.3 G/DL (ref 3.5–5.2)
ANION GAP (SMH): 9 MMOL/L (ref 8–16)
BASOPHILS # BLD AUTO: 0.03 K/UL
BASOPHILS NFR BLD AUTO: 0.8 %
BUN SERPL-MCNC: 52 MG/DL (ref 6–20)
CALCIUM SERPL-MCNC: 8.9 MG/DL (ref 8.7–10.5)
CHLORIDE SERPL-SCNC: 116 MMOL/L (ref 95–110)
CO2 SERPL-SCNC: 18 MMOL/L (ref 23–29)
CREAT SERPL-MCNC: 3.5 MG/DL (ref 0.5–1.4)
ERYTHROCYTE [DISTWIDTH] IN BLOOD BY AUTOMATED COUNT: 16.5 % (ref 11.5–14.5)
GFR SERPLBLD CREATININE-BSD FMLA CKD-EPI: 15 ML/MIN/1.73/M2
GLUCOSE SERPL-MCNC: 110 MG/DL (ref 70–110)
HCT VFR BLD AUTO: 22.7 % (ref 37–48.5)
HGB BLD-MCNC: 7 GM/DL (ref 12–16)
IMM GRANULOCYTES # BLD AUTO: 0.01 K/UL (ref 0–0.04)
IMM GRANULOCYTES NFR BLD AUTO: 0.3 % (ref 0–0.5)
LYMPHOCYTES # BLD AUTO: 0.31 K/UL (ref 1–4.8)
MAGNESIUM SERPL-MCNC: 1.9 MG/DL (ref 1.6–2.6)
MAGNESIUM SERPL-MCNC: 1.9 MG/DL (ref 1.6–2.6)
MCH RBC QN AUTO: 32.9 PG (ref 27–31)
MCHC RBC AUTO-ENTMCNC: 30.8 G/DL (ref 32–36)
MCV RBC AUTO: 107 FL (ref 82–98)
NUCLEATED RBC (/100WBC) (SMH): 0 /100 WBC
PHOSPHATE SERPL-MCNC: 3.7 MG/DL (ref 2.7–4.5)
PLATELET # BLD AUTO: 162 K/UL (ref 150–450)
PMV BLD AUTO: 8.8 FL (ref 9.2–12.9)
POTASSIUM SERPL-SCNC: 5 MMOL/L (ref 3.5–5.1)
RBC # BLD AUTO: 2.13 M/UL (ref 4–5.4)
RELATIVE EOSINOPHIL (SMH): 1.6 % (ref 0–8)
RELATIVE LYMPHOCYTE (SMH): 8.2 % (ref 18–48)
RELATIVE MONOCYTE (SMH): 5.6 % (ref 4–15)
RELATIVE NEUTROPHIL (SMH): 83.5 % (ref 38–73)
SODIUM SERPL-SCNC: 143 MMOL/L (ref 136–145)
WBC # BLD AUTO: 3.76 K/UL (ref 3.9–12.7)

## 2025-05-12 PROCEDURE — 80197 ASSAY OF TACROLIMUS: CPT

## 2025-05-12 PROCEDURE — 80069 RENAL FUNCTION PANEL: CPT

## 2025-05-12 PROCEDURE — 83735 ASSAY OF MAGNESIUM: CPT | Mod: 91

## 2025-05-12 PROCEDURE — 36415 COLL VENOUS BLD VENIPUNCTURE: CPT

## 2025-05-12 PROCEDURE — 85025 COMPLETE CBC W/AUTO DIFF WBC: CPT

## 2025-05-12 RX ORDER — LIDOCAINE HYDROCHLORIDE 10 MG/ML
1 INJECTION, SOLUTION EPIDURAL; INFILTRATION; INTRACAUDAL; PERINEURAL ONCE
Status: CANCELLED | OUTPATIENT
Start: 2025-05-12 | End: 2025-05-12

## 2025-05-12 NOTE — TELEPHONE ENCOUNTER
"----- Message from Andrea Adhikari MD sent at 5/12/2025 11:42 AM CDT -----  Continue epo. DSA, allosure, kidney ultrasound this week. Kidney biopsy in "1-2 weeks from date of order" in the comments. Other labs acceptable.   ----- Message -----  From: Lab, Background User  Sent: 5/12/2025   8:15 AM CDT  To: Andrea Adhikari Jr., MD    "

## 2025-05-12 NOTE — PROGRESS NOTES
Kidney Post-Transplant Assessment    Referring Physician: Samuel Knutson  Current Nephrologist: Tam Mathis    ORGAN: RIGHT KIDNEY  Donor Type: donation after brain death  PHS Increased Risk: no  Cold Ischemia: 625 mins  Induction Medications: thymo    Subjective:     CC:  Reassessment of renal allograft function and management of chronic immunosuppression.    HPI:  Ms. Reyer is a 49 y.o. 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 Her most recent creatinine is 3.5. She takes mycophenolate mofetil, prednisone, and tacrolimus for maintenance immunosuppression. Her post transplant course has been complicated by delayed graft function requiring dialysis and anemia requiring blood transfusion.    Called nursing line this morning as she had indigestion after eating copious amounts of salad last night, she feels better now. Planning for biopsy next week as creatinine remains elevated. Scheduled for allosure/dsa as well as US following visit. Epo injection in clinic, denies any obvious blood loss. She is eating well, energy levels stable. No problems with surgical incision. Has noticed improvement in her bloating since starting chlorthalidone. Tolerating IS without issue, no diarrhea or vomiting.     Current Medications[1]    Past Medical History:   Diagnosis Date    Allergy     Anemia     Anxiety     Depression     Diabetes mellitus, type 2     Dialysis patient     Disorder of kidney and ureter     Hyperlipidemia     Hypertension     Obesity        Review of Systems   Constitutional:  Positive for fatigue. Negative for activity change and fever.   Eyes:  Negative for visual disturbance.   Respiratory:  Negative for shortness of breath.    Cardiovascular:  Negative for chest pain and leg  swelling.   Gastrointestinal:  Negative for constipation, diarrhea and nausea.   Genitourinary:  Negative for difficulty urinating, frequency and hematuria.   Musculoskeletal:  Negative for arthralgias and myalgias.   Skin:  Positive for wound.   Allergic/Immunologic: Positive for immunocompromised state.   Neurological:  Negative for weakness and numbness.   Psychiatric/Behavioral:  Negative for sleep disturbance. The patient is not nervous/anxious.      Objective:   There were no vitals taken for this visit.body mass index is unknown because there is no height or weight on file.    Physical Exam  Vitals and nursing note reviewed.   Constitutional:       Appearance: Normal appearance.   Cardiovascular:      Rate and Rhythm: Normal rate and regular rhythm.      Heart sounds: Normal heart sounds.   Pulmonary:      Effort: Pulmonary effort is normal.      Breath sounds: Normal breath sounds.   Abdominal:      General: There is no distension.      Comments: Surgical incision well approximated with staples, scant serous drainage    Musculoskeletal:         General: Normal range of motion.   Skin:     General: Skin is warm and dry.   Neurological:      Mental Status: She is alert.         Labs:  Lab Results   Component Value Date    WBC 3.76 (L) 2025    HGB 7.0 (L) 2025    HCT 22.7 (L) 2025     2025    K 5.0 2025     (H) 2025    CO2 18 (L) 2025    BUN 52 (H) 2025    CREATININE 3.5 (H) 2025    EGFRNORACEVR 15 (L) 2025    CALCIUM 8.9 2025    PHOS 3.7 2025    MG 1.9 2025    MG 1.9 2025    ALBUMIN 3.3 (L) 2025    AST 10 (L) 2025    ALT 8 (L) 2025    .6 (H) 2025    TACROLIMUS 7.4 2025     Labs were reviewed with the patient    Assessment:     1. Status post -donor kidney transplantation    2. Delayed graft function of kidney transplant due to ATN requiring acute dialysis    3. Essential  (primary) hypertension    4. Anemia, unspecified type    5. At risk for opportunistic infections    6. Long-term use of immunosuppressant medication      Plan:       1. Immunosuppression: Prograf trough 7.1, which is acceptable. Continue Prograf 9/9, MMF 1000 mg BID, and Prednisone taper. Will continue to monitor for drug toxicities    2. Allograft Function: DGF, last HD 4/28. Creatinine continues to trend down, continue good oral hydration. Allosure and DSA drawn today, planning for follow up biopsy 5/21  - ESRD secondary to diabetic nephropathy s/p donation after brain death kidney transplant on 4/20/25 (CIT 10hrs 25 mins, cPRA 94%)   - post transplant course  complicated by delayed graft function requiring dialysis and anemia requiring blood transfusions  Lab Results   Component Value Date    CREATININE 3.5 (H) 05/12/2025       3. Hypertension management:   - advise low salt diet and home BP monitoring    - coreg 6.25 mg BID, chlorthalidone 12.5 mg HS  - on clonidine 0.2 mg HS for night sweats    4.Hyperphosphatemia     - low phosphorous diet      5. Acidosis   - monitor for now, expect improvement as renal function improves       6. Anemia: s/p 2 units pRBC, continue weekly epo injections. Last injection today. Will check parvo with labs   Lab Results   Component Value Date    WBC 3.76 (L) 05/12/2025    HGB 7.0 (L) 05/12/2025    HCT 22.7 (L) 05/12/2025     (H) 05/12/2025     05/12/2025         7. Proteinuria: continue p/c ratio as per guidelines     8. BK virus infection screening:  BK PCR per protocol     9. Weight education: provided during the clinic visit   There is no height or weight on file to calculate BMI.     10. Patient safety education regarding immunosuppression including prophylaxis posttransplant for CMV, PCP   - PCP PROPHYLAXIS: Bactrim until 10/20/25   - CMV PROPHYLAXIS: Valcyte until 7/20/25        Follow-up:   Clinic: return to transplant clinic weekly for the first month after  transplant; every 2 weeks during months 2-3; then at 6-, 9-, 12-, 18-, 24-, and 36- months post-transplant to reassess for complications from immunosuppression toxicity and monitor for rejection.  Annually thereafter.    Labs: since patient remains at high risk for rejection and drug-related complications that warrant close monitoring, labs will be ordered as follows: continue twice weekly CBC, renal panel, and drug level for first month; then same labs once weekly through 3rd month post-transplant.  Urine for UA and protein/creatinine ratio monthly.  Serum BK - PCR at 1-, 3-, 6-, 9-, 12-, 18-, 24-, 36-, 48-, and 60 months post-transplant.  Hepatic panel at 1-, 2-, 3-, 6-, 9-, 12-, 18-, 24-, and 36- months post-transplant.    Birgit Romano NP                [1]   Current Outpatient Medications   Medication Sig Dispense Refill    ALPRAZolam (XANAX) 0.5 MG tablet Take 0.5 mg by mouth 3 (three) times daily.      busPIRone (BUSPAR) 5 MG Tab Take 5 mg by mouth 2 (two) times daily.      carvediloL (COREG) 6.25 MG tablet Take 1 tablet (6.25 mg total) by mouth 2 (two) times daily. 60 tablet 11    chlorthalidone (HYGROTEN) 25 MG Tab Take 1/2 tablet (12.5 mg total) by mouth every evening. 45 tablet 3    cloNIDine (CATAPRES) 0.1 MG tablet Take 2 tablets (0.2 mg total) by mouth every evening. 60 tablet 11    famotidine (PEPCID) 20 MG tablet Take 1 tablet (20 mg total) by mouth once daily. 30 tablet 0    gabapentin (NEURONTIN) 100 MG capsule Take 100 mg by mouth 3 (three) times daily.      insulin aspart U-100 (NOVOLOG) 100 unit/mL (3 mL) InPn pen Inject 0-10 Units into the skin 3 (three) times daily with meals. Per sliding scale. (MDD: 30 units) 9 mL 5    montelukast (SINGULAIR) 10 mg tablet Take 10 mg by mouth nightly.      multivitamin Tab Take 1 tablet by mouth once daily.      mycophenolate (CELLCEPT) 250 mg Cap Take 4 capsules (1,000 mg total) by mouth 2 (two) times daily. 240 capsule 11    oxybutynin (DITROPAN) 5 MG  "Tab Take 1 tablet (5 mg total) by mouth 3 (three) times daily as needed (Bladder spasms). 30 tablet 0    oxyCODONE (ROXICODONE) 5 MG immediate release tablet Take 1 tablet (5 mg total) by mouth every 6 (six) hours as needed for Pain. 28 tablet 0    pen needle, diabetic 32 gauge x 5/32" Ndle Inject 1 Needle into the skin 3 (three) times daily with meals. 100 each 11    pravastatin (PRAVACHOL) 40 MG tablet Take 1 tablet (40 mg total) by mouth once daily. 90 tablet 3    predniSONE (DELTASONE) 5 MG tablet Take by mouth daily: 4/22 to 4/28 20 mg, 4/29 to 5/5 15 mg, 5/6 to 5/12 10 mg, 5/13/2025 to FOREVER 5 mg, DO NOT STOP 70 tablet 0    [Paused] sodium zirconium cyclosilicate (LOKELMA) 10 gram packet Take 10 g by mouth. Mix entire contents of packet(s) into drinking glass containing 3 tablespoons of water; stir well and drink immediately. Add water and repeat until no powder remains to receive entire dose. (Patient not taking: Reported on 4/30/2025)      sulfamethoxazole-trimethoprim 400-80mg (BACTRIM,SEPTRA) 400-80 mg per tablet Take 1 tablet by mouth 3 (three) times a week. Stop 10/20/25 12 tablet 5    tacrolimus (PROGRAF) 1 MG Cap Take 9 capsules (9 mg total) by mouth every 12 (twelve) hours. 540 capsule 11    [Paused] tirzepatide (MOUNJARO) 2.5 mg/0.5 mL PnIj Inject 2.5 mg into the skin every 7 days. (Patient not taking: Reported on 4/30/2025)      valGANciclovir (VALCYTE) 450 mg Tab Take 1 tablet (450 mg total) by mouth 3 (three) times a week. Stop 7/20/25 12 tablet 2    vilazodone (VIIBRYD) 10 mg Tab tablet Take 4 tablets (40 mg total) by mouth once daily.       No current facility-administered medications for this visit.     "

## 2025-05-13 ENCOUNTER — TELEPHONE (OUTPATIENT)
Dept: UROLOGY | Facility: CLINIC | Age: 50
End: 2025-05-13
Payer: MEDICARE

## 2025-05-13 LAB — TACROLIMUS BLD-MCNC: 7.1 NG/ML (ref 5–15)

## 2025-05-13 NOTE — TELEPHONE ENCOUNTER
Confirmed appt. Instructions given to park on the [place] parking lot. Check in on the 2nd floor of the [facility]. Please arrive 15 minutes prior to procedure start time and be ready to provide urine specimen. Thank you!

## 2025-05-14 ENCOUNTER — PROCEDURE VISIT (OUTPATIENT)
Dept: UROLOGY | Facility: CLINIC | Age: 50
End: 2025-05-14
Payer: MEDICARE

## 2025-05-14 ENCOUNTER — OFFICE VISIT (OUTPATIENT)
Dept: TRANSPLANT | Facility: CLINIC | Age: 50
End: 2025-05-14
Payer: MEDICARE

## 2025-05-14 ENCOUNTER — TELEPHONE (OUTPATIENT)
Dept: TRANSPLANT | Facility: CLINIC | Age: 50
End: 2025-05-14

## 2025-05-14 ENCOUNTER — CLINICAL SUPPORT (OUTPATIENT)
Dept: TRANSPLANT | Facility: CLINIC | Age: 50
End: 2025-05-14
Payer: MEDICARE

## 2025-05-14 ENCOUNTER — NURSE TRIAGE (OUTPATIENT)
Dept: ADMINISTRATIVE | Facility: CLINIC | Age: 50
End: 2025-05-14
Payer: MEDICARE

## 2025-05-14 ENCOUNTER — HOSPITAL ENCOUNTER (OUTPATIENT)
Dept: RADIOLOGY | Facility: HOSPITAL | Age: 50
Discharge: HOME | End: 2025-05-14
Attending: STUDENT IN AN ORGANIZED HEALTH CARE EDUCATION/TRAINING PROGRAM
Payer: MEDICARE

## 2025-05-14 VITALS
WEIGHT: 239.19 LBS | TEMPERATURE: 98 F | HEIGHT: 64 IN | RESPIRATION RATE: 18 BRPM | BODY MASS INDEX: 40.83 KG/M2 | HEART RATE: 77 BPM | DIASTOLIC BLOOD PRESSURE: 87 MMHG | SYSTOLIC BLOOD PRESSURE: 172 MMHG

## 2025-05-14 VITALS
WEIGHT: 238.75 LBS | WEIGHT: 238.75 LBS | OXYGEN SATURATION: 100 % | RESPIRATION RATE: 18 BRPM | SYSTOLIC BLOOD PRESSURE: 163 MMHG | OXYGEN SATURATION: 100 % | BODY MASS INDEX: 40.76 KG/M2 | SYSTOLIC BLOOD PRESSURE: 163 MMHG | HEIGHT: 64 IN | WEIGHT: 238.75 LBS | BODY MASS INDEX: 40.76 KG/M2 | DIASTOLIC BLOOD PRESSURE: 77 MMHG | SYSTOLIC BLOOD PRESSURE: 163 MMHG | TEMPERATURE: 98 F | TEMPERATURE: 98 F | BODY MASS INDEX: 40.76 KG/M2 | HEART RATE: 78 BPM | HEIGHT: 64 IN | RESPIRATION RATE: 18 BRPM | HEART RATE: 78 BPM | DIASTOLIC BLOOD PRESSURE: 77 MMHG | HEIGHT: 64 IN | DIASTOLIC BLOOD PRESSURE: 77 MMHG

## 2025-05-14 DIAGNOSIS — I10 ESSENTIAL (PRIMARY) HYPERTENSION: ICD-10-CM

## 2025-05-14 DIAGNOSIS — Z94.0 KIDNEY REPLACED BY TRANSPLANT: ICD-10-CM

## 2025-05-14 DIAGNOSIS — D64.9 ANEMIA, UNSPECIFIED TYPE: ICD-10-CM

## 2025-05-14 DIAGNOSIS — N18.9 ANEMIA IN CHRONIC KIDNEY DISEASE, UNSPECIFIED CKD STAGE: Primary | ICD-10-CM

## 2025-05-14 DIAGNOSIS — Z94.0 KIDNEY TRANSPLANT STATUS: ICD-10-CM

## 2025-05-14 DIAGNOSIS — D63.1 ANEMIA OF RENAL DISEASE: ICD-10-CM

## 2025-05-14 DIAGNOSIS — T86.19 DELAYED GRAFT FUNCTION OF KIDNEY TRANSPLANT DUE TO ATN REQUIRING ACUTE DIALYSIS: ICD-10-CM

## 2025-05-14 DIAGNOSIS — Z99.2 DELAYED GRAFT FUNCTION OF KIDNEY TRANSPLANT DUE TO ATN REQUIRING ACUTE DIALYSIS: ICD-10-CM

## 2025-05-14 DIAGNOSIS — Z94.0 STATUS POST DECEASED-DONOR KIDNEY TRANSPLANTATION: Primary | ICD-10-CM

## 2025-05-14 DIAGNOSIS — N18.9 ANEMIA OF RENAL DISEASE: ICD-10-CM

## 2025-05-14 DIAGNOSIS — Z91.89 AT RISK FOR OPPORTUNISTIC INFECTIONS: ICD-10-CM

## 2025-05-14 DIAGNOSIS — D63.1 ANEMIA IN CHRONIC KIDNEY DISEASE, UNSPECIFIED CKD STAGE: Primary | ICD-10-CM

## 2025-05-14 DIAGNOSIS — Z94.0 KIDNEY REPLACED BY TRANSPLANT: Primary | ICD-10-CM

## 2025-05-14 DIAGNOSIS — Z79.60 LONG-TERM USE OF IMMUNOSUPPRESSANT MEDICATION: ICD-10-CM

## 2025-05-14 PROCEDURE — 3078F DIAST BP <80 MM HG: CPT | Mod: CPTII,S$GLB,, | Performed by: TRANSPLANT SURGERY

## 2025-05-14 PROCEDURE — 3077F SYST BP >= 140 MM HG: CPT | Mod: CPTII,S$GLB,, | Performed by: TRANSPLANT SURGERY

## 2025-05-14 PROCEDURE — 99024 POSTOP FOLLOW-UP VISIT: CPT | Mod: S$GLB,,, | Performed by: TRANSPLANT SURGERY

## 2025-05-14 PROCEDURE — 99999 PR PBB SHADOW E&M-EST. PATIENT-LVL IV: CPT | Mod: PBBFAC,,,

## 2025-05-14 PROCEDURE — 3044F HG A1C LEVEL LT 7.0%: CPT | Mod: CPTII,S$GLB,, | Performed by: TRANSPLANT SURGERY

## 2025-05-14 PROCEDURE — 3066F NEPHROPATHY DOC TX: CPT | Mod: CPTII,S$GLB,, | Performed by: TRANSPLANT SURGERY

## 2025-05-14 PROCEDURE — 52310 CYSTOSCOPY AND TREATMENT: CPT | Mod: S$GLB,,, | Performed by: UROLOGY

## 2025-05-14 PROCEDURE — 99999 PR PBB SHADOW E&M-EST. PATIENT-LVL V: CPT | Mod: PBBFAC,,, | Performed by: NURSE PRACTITIONER

## 2025-05-14 PROCEDURE — 76776 US EXAM K TRANSPL W/DOPPLER: CPT | Mod: TC

## 2025-05-14 PROCEDURE — 76776 US EXAM K TRANSPL W/DOPPLER: CPT | Mod: 26,,, | Performed by: RADIOLOGY

## 2025-05-14 PROCEDURE — 99999 PR PBB SHADOW E&M-EST. PATIENT-LVL II: CPT | Mod: PBBFAC,,,

## 2025-05-14 RX ORDER — LIDOCAINE HYDROCHLORIDE 20 MG/ML
JELLY TOPICAL ONCE
Status: COMPLETED | OUTPATIENT
Start: 2025-05-14 | End: 2025-05-14

## 2025-05-14 RX ADMIN — LIDOCAINE HYDROCHLORIDE 5 ML: 20 JELLY TOPICAL at 01:05

## 2025-05-14 NOTE — TELEPHONE ENCOUNTER
Pt is a kidney transplant pt and she said that she ate a salad last night that didn't agree with her and she was inquiring about taking an antacid. Pt triaged and care advice was home care and then went over the BPA 21 with the pt for the meds she can take OTC and pt told about the antacids but would need to wait 2 hours before or after her meds cellcept. Pt said that she will just wait and she see'e Np today for check up Pt to call us back if any other questions or concerns  worsening                 Reason for Disposition   [1] Abdominal pain is intermittent AND [2] shoots into chest, with sour taste in mouth AND [3] symptoms same as previously diagnosed reflux and not tried antacids    Additional Information   Negative: SEVERE difficulty breathing (e.g., struggling for each breath, speaks in single words)   Negative: Shock suspected (e.g., cold/pale/clammy skin, too weak to stand, low BP, rapid pulse)   Negative: Difficult to awaken or acting confused (e.g., disoriented, slurred speech)   Negative: Passed out (e.g., fainted, lost consciousness, blacked out and was not responding)   Negative: Visible sweat on face or sweat dripping down face   Negative: Sounds like a life-threatening emergency to the triager   Negative: [1] SEVERE pain (e.g., excruciating) AND [2] present > 1 hour   Negative: [1] Pain lasts > 10 minutes AND [2] age > 50   Negative: [1] Pain lasts > 10 minutes AND [2] age > 40 AND [3] associated chest, arm, neck, upper back or jaw pain   Negative: [1] Pain lasts > 10 minutes AND [2] age > 35 AND [3] at least one cardiac risk factor (e.g., diabetes, high cholesterol, hypertension, obesity, smoker or strong family history of heart disease)   Negative: [1] Pain lasts > 10 minutes AND [2] history of heart disease (i.e., heart attack, bypass surgery, angina, angioplasty, CHF; not just a heart murmur)   Negative: [1] Pain lasts > 10 minutes AND [2] difficulty breathing   Negative: [1] Vomiting AND [2]  "contains red blood  (Exception: Few streaks and only occurred once.)   Negative: [1] Vomiting AND [2] contains black ("coffee ground") material   Negative: Blood in bowel movements  (Exception: Blood on surface of BM with constipation.)   Negative: Black or tarry bowel movements  (Exception: Chronic-unchanged black-grey BMs AND is taking iron pills or Pepto-Bismol.)   Negative: [1] Vomiting AND [2] contains bile (green color)   Negative: Patient sounds very sick or weak to the triager   Negative: [1] MILD-MODERATE pain AND [2] constant AND [3] present > 2 hours   Negative: [1] MILD-MODERATE pain AND [2] not relieved by antacid medicine   Negative: [1] Vomiting AND [2] abdomen looks much more swollen than usual   Negative: White of the eyes have turned yellow (i.e., jaundice)   Negative: Fever > 103 F (39.4 C)   Negative: [1] Fever > 101 F (38.3 C) AND [2] age > 60 years   Negative: [1] Fever > 100 F (37.8 C) AND [2] bedridden (e.g., CVA, chronic illness, recovering from surgery)   Negative: [1] Fever > 100 F (37.8 C) AND [2] diabetes mellitus or weak immune system (e.g., HIV positive, cancer chemo, splenectomy, organ transplant, chronic steroids)   Negative: [1] MODERATE pain (e.g., interferes with normal activities) AND [2] comes and goes (cramps) AND [3] present > 24 hours  (Exception: Pain with Vomiting or Diarrhea - see that Guideline.)   Negative: [1] MILD pain (e.g., does not interfere with normal activities) AND [2] comes and goes (cramps) AND [3] present > 72 hours  (Exception: This same abdominal pain is a chronic symptom recurrent or ongoing AND present > 4 weeks.)   Negative: Unhealthy alcohol use, known or suspected   Negative: [1] Abdominal pain is intermittent AND [2] shoots into chest, with sour taste in mouth  (Exception: Symptoms same as previously diagnosed reflux and not tried antacids.)   Negative: Abdominal pains regularly occur about 1 hour after meals   Negative: Abdominal pain is a chronic " symptom (recurrent or ongoing AND present > 4 weeks)   Negative: Abdominal pain    Protocols used: Abdominal Pain - Upper-A-AH

## 2025-05-14 NOTE — PATIENT INSTRUCTIONS
What to Expect After a Cystoscopy with Stent Removal  For the next 24-48 hours, you may feel a mild burning when you urinate. This burning is normal and expected. Drink plenty of water to dilute the urine to help relieve the burning sensation. You may also see a small amount of blood in your urine after the procedure.    Unless you are already taking antibiotics, you may be given an antibiotic after the test to prevent infection.    Signs and Symptoms to Report  Call the [facility] at [phone] if you develop any of the following:  Fever of 101 degrees or higher  Chills or persistent bleeding  Inability to urinate    After hours or on weekends, you may reach a urology resident on call at this number: [phone].

## 2025-05-14 NOTE — PROGRESS NOTES
49 F 23 s/p kidney transplant. Incisions well healed without evidence of leak.     Remove staples in clinic.    Eulogio Arias MD

## 2025-05-14 NOTE — TELEPHONE ENCOUNTER
Pt calling for indigestion and wanted to know what she can take. I triaged for abdominal pain to make sure nothing else needed and then went through medication for transplant BPH 21 and given instruction and pt has appt today and she opted not to take anything and will call back as needed

## 2025-05-14 NOTE — PROCEDURES
Procedure: Flexible cysto-uretheroscopy and stent removal   Pre Procedure Diagnosis:s/p kidney transplant   Post Procedure Diagnosis:same   Surgeon: Andi Yanes MD   Anesthesia: 2% uro-jet lidocaine jelly for local analgesia   Flexible cysto-urethroscopy was performed after consent was obtained. The risks and benefits were explained.   2% lidocaine urojet was used for local analgesia.   The genitalia was prepped and draped in the sterile fashion with betadine.   The flexible scope was advanced into the urethra and into the bladder. The stent was removed without difficulty.   The patient tolerated the procedure well without complication.   They will follow up with transplant.     Urine dip today was negative for any sign of infection

## 2025-05-14 NOTE — LETTER
May 14, 2025        Tam Mathis  [address]  [facility] [address]  Phone: [phone]  Fax: [phone]             [facility] Trans[facility] 1st Fl  [address]  [facility] [address]  Phone: [phone]   Patient: Brande Reyer   MR Number: [id number]   YOB: 1975   Date of Visit: 5/14/2025       Dear Dr. Tam Mathis    Thank you for referring Brande Reyer to me for evaluation. Attached you will find relevant portions of my assessment and plan of care.    If you have questions, please do not hesitate to call me. I look forward to following Brande Reyer along with you.    Sincerely,    Birgit Romano, LAZARO    Enclosure    If you would like to receive this communication electronically, please contact [email or web address] or [phone] to request [employer] access.    [employer] is a tool which provides read-only access to select patient information with whom you have a relationship. Its easy to use and provides real time access to review your patients record including encounter summaries, notes, results, and demographic information.    If you feel you have received this communication in error or would no longer like to receive these types of communications, please e-mail [email or web address]

## 2025-05-15 ENCOUNTER — RESULTS FOLLOW-UP (OUTPATIENT)
Dept: TRANSPLANT | Facility: HOSPITAL | Age: 50
End: 2025-05-15

## 2025-05-15 ENCOUNTER — LAB VISIT (OUTPATIENT)
Dept: LAB | Facility: HOSPITAL | Age: 50
End: 2025-05-15
Attending: STUDENT IN AN ORGANIZED HEALTH CARE EDUCATION/TRAINING PROGRAM
Payer: MEDICARE

## 2025-05-15 ENCOUNTER — TELEPHONE (OUTPATIENT)
Dept: TRANSPLANT | Facility: CLINIC | Age: 50
End: 2025-05-15
Payer: MEDICARE

## 2025-05-15 DIAGNOSIS — Z94.0 KIDNEY REPLACED BY TRANSPLANT: Primary | ICD-10-CM

## 2025-05-15 DIAGNOSIS — Z94.0 KIDNEY REPLACED BY TRANSPLANT: ICD-10-CM

## 2025-05-15 LAB
ABSOLUTE EOSINOPHIL (OHS): 0.05 K/UL
ABSOLUTE MONOCYTE (OHS): 0.22 K/UL (ref 0.3–1)
ABSOLUTE NEUTROPHIL COUNT (OHS): 3.18 K/UL (ref 1.8–7.7)
ALBUMIN SERPL BCP-MCNC: 3.3 G/DL (ref 3.5–5.2)
ANION GAP (OHS): 6 MMOL/L (ref 8–16)
BASOPHILS # BLD AUTO: 0.02 K/UL
BASOPHILS NFR BLD AUTO: 0.5 %
BUN SERPL-MCNC: 52 MG/DL (ref 6–20)
CALCIUM SERPL-MCNC: 8.9 MG/DL (ref 8.7–10.5)
CHLORIDE SERPL-SCNC: 118 MMOL/L (ref 95–110)
CO2 SERPL-SCNC: 19 MMOL/L (ref 23–29)
CREAT SERPL-MCNC: 3.5 MG/DL (ref 0.5–1.4)
ERYTHROCYTE [DISTWIDTH] IN BLOOD BY AUTOMATED COUNT: 16.7 % (ref 11.5–14.5)
GFR SERPLBLD CREATININE-BSD FMLA CKD-EPI: 15 ML/MIN/1.73/M2
GLUCOSE SERPL-MCNC: 121 MG/DL (ref 70–110)
HCT VFR BLD AUTO: 24.7 % (ref 37–48.5)
HGB BLD-MCNC: 7.3 GM/DL (ref 12–16)
IMM GRANULOCYTES # BLD AUTO: 0.01 K/UL (ref 0–0.04)
IMM GRANULOCYTES NFR BLD AUTO: 0.3 % (ref 0–0.5)
LYMPHOCYTES # BLD AUTO: 0.25 K/UL (ref 1–4.8)
MAGNESIUM SERPL-MCNC: 1.9 MG/DL (ref 1.6–2.6)
MCH RBC QN AUTO: 31.5 PG (ref 27–31)
MCHC RBC AUTO-ENTMCNC: 29.6 G/DL (ref 32–36)
MCV RBC AUTO: 107 FL (ref 82–98)
NUCLEATED RBC (/100WBC) (OHS): 0 /100 WBC
PHOSPHATE SERPL-MCNC: 3.4 MG/DL (ref 2.7–4.5)
PLATELET # BLD AUTO: 177 K/UL (ref 150–450)
PMV BLD AUTO: 8.9 FL (ref 9.2–12.9)
POTASSIUM SERPL-SCNC: 5.1 MMOL/L (ref 3.5–5.1)
RBC # BLD AUTO: 2.32 M/UL (ref 4–5.4)
RELATIVE EOSINOPHIL (OHS): 1.3 %
RELATIVE LYMPHOCYTE (OHS): 6.7 % (ref 18–48)
RELATIVE MONOCYTE (OHS): 5.9 % (ref 4–15)
RELATIVE NEUTROPHIL (OHS): 85.3 % (ref 38–73)
SODIUM SERPL-SCNC: 143 MMOL/L (ref 136–145)
TACROLIMUS BLD-MCNC: 8.1 NG/ML (ref 5–15)
WBC # BLD AUTO: 3.73 K/UL (ref 3.9–12.7)

## 2025-05-15 PROCEDURE — 80197 ASSAY OF TACROLIMUS: CPT

## 2025-05-15 PROCEDURE — 85025 COMPLETE CBC W/AUTO DIFF WBC: CPT

## 2025-05-15 PROCEDURE — 83735 ASSAY OF MAGNESIUM: CPT

## 2025-05-15 PROCEDURE — 36415 COLL VENOUS BLD VENIPUNCTURE: CPT

## 2025-05-15 PROCEDURE — 82374 ASSAY BLOOD CARBON DIOXIDE: CPT

## 2025-05-15 NOTE — TELEPHONE ENCOUNTER
DAPHNE from Dr DIAN Adhikari  5/14 Kidney Transplant U/S.  Repeat labs and post void residual tomorrow. Us this evening showed moderate hydronephrosis.  If unable to urinate before then, will need to go to ER.  _________________  Results reviewed with patient and agreed to have labs drawn in the transplant clinic on 5/15 followed by a clinic visit for a PVR.  Patient states she has voided X 4 today and agreed to present to the ER if she is unable to void.

## 2025-05-16 ENCOUNTER — HOSPITAL ENCOUNTER (OUTPATIENT)
Dept: PREADMISSION TESTING | Facility: HOSPITAL | Age: 50
Discharge: HOME | End: 2025-05-16
Attending: STUDENT IN AN ORGANIZED HEALTH CARE EDUCATION/TRAINING PROGRAM
Payer: MEDICARE

## 2025-05-16 VITALS — HEIGHT: 64 IN | BODY MASS INDEX: 40.63 KG/M2 | WEIGHT: 238 LBS

## 2025-05-16 DIAGNOSIS — E11.21 TYPE 2 DIABETES MELLITUS WITH DIABETIC NEPHROPATHY, UNSPECIFIED WHETHER LONG TERM INSULIN USE: ICD-10-CM

## 2025-05-16 DIAGNOSIS — Z94.0 STATUS POST DECEASED-DONOR KIDNEY TRANSPLANTATION: ICD-10-CM

## 2025-05-16 DIAGNOSIS — Z01.818 PRE-OP TESTING: Primary | ICD-10-CM

## 2025-05-16 DIAGNOSIS — Z79.01 LONG TERM CURRENT USE OF ANTICOAGULANT: ICD-10-CM

## 2025-05-16 RX ORDER — PREDNISONE 5 MG/1
5 TABLET ORAL DAILY
Qty: 30 TABLET | Refills: 11 | Status: SHIPPED | OUTPATIENT
Start: 2025-05-16

## 2025-05-16 NOTE — PRE-PROCEDURE INSTRUCTIONS
Pre-operative instructions, medication directives and pain scales reviewed with patient. All questions the patient had were answered. Re-assurance about surgical procedure and day of surgery routine given as needed, patient verbalized understanding of the pre-op instructions.  Patient instructed to report to [facility] for surgery.    Phone preop done.  Arrival time 7:30 AM

## 2025-05-19 ENCOUNTER — RESULTS FOLLOW-UP (OUTPATIENT)
Dept: TRANSPLANT | Facility: HOSPITAL | Age: 50
End: 2025-05-19

## 2025-05-19 ENCOUNTER — HOSPITAL ENCOUNTER (OUTPATIENT)
Dept: RADIOLOGY | Facility: HOSPITAL | Age: 50
Discharge: HOME | End: 2025-05-19
Attending: STUDENT IN AN ORGANIZED HEALTH CARE EDUCATION/TRAINING PROGRAM
Payer: MEDICARE

## 2025-05-19 ENCOUNTER — TELEPHONE (OUTPATIENT)
Dept: TRANSPLANT | Facility: CLINIC | Age: 50
End: 2025-05-19
Payer: MEDICARE

## 2025-05-19 DIAGNOSIS — Z94.0 KIDNEY REPLACED BY TRANSPLANT: ICD-10-CM

## 2025-05-19 PROCEDURE — 76776 US EXAM K TRANSPL W/DOPPLER: CPT | Mod: TC

## 2025-05-19 PROCEDURE — 76776 US EXAM K TRANSPL W/DOPPLER: CPT | Mod: 26,,, | Performed by: RADIOLOGY

## 2025-05-19 NOTE — TELEPHONE ENCOUNTER
"Pt to see her PCP----- Message from Raul sent at 5/19/2025  8:45 AM CDT -----  Regarding: call back  Consult/Advisory:  Name Of Caller: Self Contact Preference?:[phone] What is the nature of the call?: Calling to speak w/  coordinator in regards to her having an ear ache also pt states she's not sleeping requesting call back  Additional Notes:"Thank you for all that you do for our patients"  "

## 2025-05-20 ENCOUNTER — TELEPHONE (OUTPATIENT)
Dept: INTERVENTIONAL RADIOLOGY/VASCULAR | Facility: HOSPITAL | Age: 50
End: 2025-05-20
Payer: MEDICARE

## 2025-05-20 ENCOUNTER — PATIENT MESSAGE (OUTPATIENT)
Dept: TRANSPLANT | Facility: CLINIC | Age: 50
End: 2025-05-20
Payer: MEDICARE

## 2025-05-21 ENCOUNTER — CLINICAL SUPPORT (OUTPATIENT)
Dept: TRANSPLANT | Facility: CLINIC | Age: 50
End: 2025-05-21
Payer: MEDICARE

## 2025-05-21 ENCOUNTER — HOSPITAL ENCOUNTER (OUTPATIENT)
Dept: INTERVENTIONAL RADIOLOGY/VASCULAR | Facility: HOSPITAL | Age: 50
Discharge: HOME | End: 2025-05-21
Attending: STUDENT IN AN ORGANIZED HEALTH CARE EDUCATION/TRAINING PROGRAM
Payer: MEDICARE

## 2025-05-21 VITALS
HEIGHT: 64 IN | TEMPERATURE: 97 F | DIASTOLIC BLOOD PRESSURE: 74 MMHG | BODY MASS INDEX: 38.73 KG/M2 | WEIGHT: 226.88 LBS | SYSTOLIC BLOOD PRESSURE: 141 MMHG | OXYGEN SATURATION: 100 % | RESPIRATION RATE: 18 BRPM | HEART RATE: 96 BPM

## 2025-05-21 VITALS
DIASTOLIC BLOOD PRESSURE: 70 MMHG | TEMPERATURE: 99 F | OXYGEN SATURATION: 100 % | RESPIRATION RATE: 18 BRPM | SYSTOLIC BLOOD PRESSURE: 142 MMHG | HEART RATE: 90 BPM

## 2025-05-21 DIAGNOSIS — Z94.0 KIDNEY TRANSPLANT STATUS: ICD-10-CM

## 2025-05-21 DIAGNOSIS — Z94.0 HISTORY OF RENAL TRANSPLANT: ICD-10-CM

## 2025-05-21 DIAGNOSIS — N18.9 ANEMIA IN CHRONIC KIDNEY DISEASE, UNSPECIFIED CKD STAGE: Primary | ICD-10-CM

## 2025-05-21 DIAGNOSIS — N18.9 ANEMIA OF RENAL DISEASE: ICD-10-CM

## 2025-05-21 DIAGNOSIS — D63.1 ANEMIA IN CHRONIC KIDNEY DISEASE, UNSPECIFIED CKD STAGE: Primary | ICD-10-CM

## 2025-05-21 DIAGNOSIS — D63.1 ANEMIA OF RENAL DISEASE: ICD-10-CM

## 2025-05-21 PROCEDURE — 99153 MOD SED SAME PHYS/QHP EA: CPT

## 2025-05-21 PROCEDURE — 63600175 PHARM REV CODE 636 W HCPCS: Performed by: INTERNAL MEDICINE

## 2025-05-21 PROCEDURE — 99152 MOD SED SAME PHYS/QHP 5/>YRS: CPT

## 2025-05-21 PROCEDURE — 25000003 PHARM REV CODE 250: Performed by: NURSE PRACTITIONER

## 2025-05-21 PROCEDURE — 25000003 PHARM REV CODE 250: Performed by: INTERNAL MEDICINE

## 2025-05-21 PROCEDURE — 63600175 PHARM REV CODE 636 W HCPCS: Performed by: NURSE PRACTITIONER

## 2025-05-21 PROCEDURE — 99999 PR PBB SHADOW E&M-EST. PATIENT-LVL III: CPT | Mod: PBBFAC,,,

## 2025-05-21 RX ORDER — LIDOCAINE HYDROCHLORIDE 10 MG/ML
INJECTION, SOLUTION INFILTRATION; PERINEURAL
Status: COMPLETED | OUTPATIENT
Start: 2025-05-21 | End: 2025-05-21

## 2025-05-21 RX ORDER — ALPRAZOLAM 0.25 MG/1
0.25 TABLET ORAL ONCE
Status: COMPLETED | OUTPATIENT
Start: 2025-05-21 | End: 2025-05-21

## 2025-05-21 RX ORDER — MIDAZOLAM HYDROCHLORIDE 2 MG/2ML
INJECTION, SOLUTION INTRAMUSCULAR; INTRAVENOUS
Status: COMPLETED | OUTPATIENT
Start: 2025-05-21 | End: 2025-05-21

## 2025-05-21 RX ORDER — HYDRALAZINE HYDROCHLORIDE 20 MG/ML
10 INJECTION INTRAMUSCULAR; INTRAVENOUS ONCE
Status: COMPLETED | OUTPATIENT
Start: 2025-05-21 | End: 2025-05-21

## 2025-05-21 RX ORDER — FENTANYL CITRATE 50 UG/ML
INJECTION, SOLUTION INTRAMUSCULAR; INTRAVENOUS
Status: COMPLETED | OUTPATIENT
Start: 2025-05-21 | End: 2025-05-21

## 2025-05-21 RX ORDER — SODIUM CHLORIDE 9 MG/ML
INJECTION, SOLUTION INTRAVENOUS CONTINUOUS
Status: DISCONTINUED | OUTPATIENT
Start: 2025-05-21 | End: 2025-05-22 | Stop reason: HOSPADM

## 2025-05-21 RX ORDER — CARVEDILOL 6.25 MG/1
6.25 TABLET ORAL ONCE
Status: DISCONTINUED | OUTPATIENT
Start: 2025-05-21 | End: 2025-05-22 | Stop reason: HOSPADM

## 2025-05-21 RX ADMIN — HYDRALAZINE HYDROCHLORIDE 10 MG: 20 INJECTION INTRAMUSCULAR; INTRAVENOUS at 11:05

## 2025-05-21 RX ADMIN — LIDOCAINE HYDROCHLORIDE 10 ML: 10 INJECTION, SOLUTION INFILTRATION; PERINEURAL at 10:05

## 2025-05-21 RX ADMIN — ALPRAZOLAM 0.25 MG: 0.25 TABLET ORAL at 12:05

## 2025-05-21 RX ADMIN — FENTANYL CITRATE 50 MCG: 50 INJECTION, SOLUTION INTRAMUSCULAR; INTRAVENOUS at 10:05

## 2025-05-21 RX ADMIN — MIDAZOLAM HYDROCHLORIDE 1 MG: 1 INJECTION, SOLUTION INTRAMUSCULAR; INTRAVENOUS at 09:05

## 2025-05-21 RX ADMIN — HYDRALAZINE HYDROCHLORIDE 10 MG: 20 INJECTION INTRAMUSCULAR; INTRAVENOUS at 12:05

## 2025-05-21 RX ADMIN — FENTANYL CITRATE 50 MCG: 50 INJECTION, SOLUTION INTRAMUSCULAR; INTRAVENOUS at 09:05

## 2025-05-21 RX ADMIN — MIDAZOLAM HYDROCHLORIDE 1 MG: 1 INJECTION, SOLUTION INTRAMUSCULAR; INTRAVENOUS at 10:05

## 2025-05-21 RX ADMIN — GELATIN ABSORBABLE SPONGE SIZE 100 1 APPLICATOR: MISC at 10:05

## 2025-05-21 NOTE — Clinical Note
Right: Abdomen.   Scrubbed with Chlorhexidine/Alcohol.    Hair: N/A.  Skin prep dry before draping.  Prepped by: Cooper Lr MD 5/21/2025 10:12 AM.

## 2025-05-21 NOTE — DISCHARGE INSTRUCTIONS
KIDNEY BIOPSY DISCHARGE EDUCATION:    Information:   A kidney (renal) biopsy is a procedure in which a biopsy needle is used to remove small amounts of kidney tissue to evaluate for kidney function and/or if there is a kidney mass to evaluate if it is cancerous versus benign (non-cancerous).     What should I expect after the Kidney Biopsy?    You may have some blood in your urine after the procedure, this should resolve in a few days.    There may be some soreness around the biopsy site.    You may return to work after 24 hours unless your primary doctor instructs you otherwise.    You do not have any diet restrictions because of this procedure but should continue any that were given to you by any other doctors.    Continue all previously prescribed medications with the exception of Coumadin/warfarin which should be resumed after 24 hours. Pradaxa, Xarelto, Eliquis or Savaysa can be resumed after 48 hours.     Bathing & Wound Care:    You may shower after 24 hours; do not tub bath or submerge in water for 3 days (bath tub, hot tub, swimming pool, river or any other body of water).    Dressing to stay on 2-3 days (clean and dry)    If the biopsy site(s) become red, tender, swollen, or starts to drain, contact us.    Avoid heavy lifting and strenuous activity for 3 days.     Follow-up visit information:   Your ordering physician will typically get your biopsy results in three to five business days. If you do not hear from the ordering physician in 7 business days, please call his/her office to set up an appointment to review the results. Follow up with Interventional Radiology is not usually necessary.       Occasionally, a situation will require prompt attention and an emergency room visit is necessary:    Sudden chest pain, shortness of breath, or fainting    Increasing blood in your urine    Increasing redness, swelling or drainage from the biopsy site    Increasing pain not relieved by medication    Bleeding or  drainage from the needle site that is saturating the dressing    You have shaking, chills and/or a temperature over 100.3°F    New, sudden difficulty breathing    Drop in blood pressure, and/or light-headed feeling    Interventional Radiology Clinic   For complications   [phone]. Monday - Friday, 8:00 am - 4:00 pm    [phone] After hours and on holidays. Ask to speak with the interventional radiologist on call.     For Scheduling   [phone] Monday - Friday, 8:00 am - 4:00 pm       DIET: You may resume your home diet. If nausea is present, increase your diet gradually with fluids and bland  Foods    ACTIVITY LEVEL: You have received sedation or an anesthetic, you may feel sleepy for several hours. Rest until  you are more awake. Gradually resume your normal activities    Medications: Pain medication should be taken only if needed and as directed. If antibiotics are prescribed, the  medication should be taken until completed.    No driving, alcoholic beverages or signing legal documents for next 24 hours or while taking pain  medication.    CALL THE DOCTOR:  For any obvious bleeding (some dried blood over the incision is normal).  Redness, swelling, foul smell around incision or fever over 101.  Shortness of breath, Coughing up Bloody sputum, Pains or Swelling in your Calves .  Persistent pain or nausea not relieved by medication.    If any unusual problems or difficulties occur contact your doctor. If you cannot contact your doctor but  feel your signs and symptoms warrant a physicians attention return to the emergency room.

## 2025-05-21 NOTE — H&P
VIR Pre-Procedure H&P      SUBJECTIVE:          History of Present Illness:  Brande Reyer is a 49 y.o. female who presents for transplant renal bx.    Past Medical History:   Diagnosis Date    Allergy     Anemia     Anxiety     Depression     Diabetes mellitus, type 2     Dialysis patient     Disorder of kidney and ureter     Hyperlipidemia     Hypertension     Obesity      Past Surgical History:   Procedure Laterality Date    AV FISTULA PLACEMENT  2022     SECTION      COLONOSCOPY N/A 2023    Procedure: COLONOSCOPY;  Surgeon: Hermilo Orozco III, MD;  Location: [facility] ENDO;  Service: Endoscopy;  Laterality: N/A;    COLONOSCOPY N/A 2024    Procedure: COLONOSCOPY;  Surgeon: Hermilo Orozco III, MD;  Location: [facility] ENDO;  Service: Endoscopy;  Laterality: N/A;    HYSTERECTOMY      INSERTION OF DIALYSIS CATHETER      KIDNEY TRANSPLANT N/A 2025    Procedure: TRANSPLANT, KIDNEY;  Surgeon: Josh Cameron Jr., MD;  Location: [address];  Service: Transplant;  Laterality: N/A;  Out of Ice 0030  Reperfusion 0102    TUBAL LIGATION         Home Meds:   Prior to Admission medications    Medication Sig Start Date End Date Taking? Authorizing Provider   ALPRAZolam (XANAX) 0.5 MG tablet Take 0.5 mg by mouth 3 (three) times daily.   Yes Provider, Historical   busPIRone (BUSPAR) 5 MG Tab Take 5 mg by mouth 2 (two) times daily. 3/17/22  Yes Provider, Historical   carvediloL (COREG) 6.25 MG tablet Take 1 tablet (6.25 mg total) by mouth 2 (two) times daily. 25 Yes Andrea Adhikari Jr., MD   chlorthalidone (HYGROTEN) 25 MG Tab Take 1/2 tablet (12.5 mg total) by mouth every evening. 25 Yes Birgit Romano NP   cloNIDine (CATAPRES) 0.1 MG tablet Take 2 tablets (0.2 mg total) by mouth every evening. 25  Yes Birgit Romano NP   famotidine (PEPCID) 20 MG tablet Take 1 tablet (20 mg total) by mouth once daily. 25  Yes Amina Foote MD  "  gabapentin (NEURONTIN) 100 MG capsule Take 100 mg by mouth 3 (three) times daily.   Yes Provider, Historical   montelukast (SINGULAIR) 10 mg tablet Take 10 mg by mouth nightly. 4/19/22  Yes Provider, Historical   multivitamin Tab Take 1 tablet by mouth once daily. 4/23/25  Yes Andrea Adhikari Jr., MD   mycophenolate (CELLCEPT) 250 mg Cap Take 4 capsules (1,000 mg total) by mouth 2 (two) times daily. 4/21/25  Yes Amina Foote MD   oxyCODONE (ROXICODONE) 5 MG immediate release tablet Take 1 tablet (5 mg total) by mouth every 6 (six) hours as needed for Pain. 4/23/25  Yes Amada Phillips PA-C   predniSONE (DELTASONE) 5 MG tablet Take 1 tablet (5 mg total) by mouth once daily. 5/16/25  Yes Amina Foote MD   sulfamethoxazole-trimethoprim 400-80mg (BACTRIM,SEPTRA) 400-80 mg per tablet Take 1 tablet by mouth 3 (three) times a week. Stop 10/20/25 4/23/25 10/22/25 Yes Andrea Adhikari Jr., MD   tacrolimus (PROGRAF) 1 MG Cap Take 9 capsules (9 mg total) by mouth every 12 (twelve) hours. 5/7/25  Yes Birgit Romano NP   pen needle, diabetic 32 gauge x 5/32" Ndle Inject 1 Needle into the skin 3 (three) times daily with meals. 4/23/25   Andrea Adhikari Jr., MD   pravastatin (PRAVACHOL) 40 MG tablet Take 1 tablet (40 mg total) by mouth once daily. 1/9/25 1/9/26  Cassie Lee MD   sodium zirconium cyclosilicate (LOKELMA) 10 gram packet Take 10 g by mouth. Mix entire contents of packet(s) into drinking glass containing 3 tablespoons of water; stir well and drink immediately. Add water and repeat until no powder remains to receive entire dose.  Patient not taking: Reported on 5/14/2025    Provider, Historical   tirzepatide (MOUNJARO) 2.5 mg/0.5 mL PnIj Inject 2.5 mg into the skin every 7 days.  Patient not taking: Reported on 5/14/2025    Provider, Historical   valGANciclovir (VALCYTE) 450 mg Tab Take 1 tablet (450 mg total) by mouth 3 (three) times a week. Stop 7/20/25 4/23/25 " 7/22/25  Andrea Adhikari Jr., MD   vilazodone (VIIBRYD) 10 mg Tab tablet Take 4 tablets (40 mg total) by mouth once daily. 4/23/25 4/23/26  Andrea Adhikari Jr., MD          Allergies:   Review of patient's allergies indicates:   Allergen Reactions    Amlodipine Itching and Swelling          OBJECTIVE:     Vital Signs (Most Recent)  Temp: 98.2 °F (36.8 °C) (05/21/25 0808)  Pulse: 86 (05/21/25 0808)  Resp: 18 (05/21/25 0808)  BP: (!) 160/91 (05/21/25 0808)  SpO2: 100 % (05/21/25 0808)    Physical Exam:  ASA: III  Mallampati: III    General: no acute distress  Mental Status: alert and oriented to person, place and time  HEENT: normocephalic, atraumatic  Chest: unlabored breathing  Heart: regular heart rate  Abdomen: Soft, non tender.   Extremity: moves all extremities    Laboratory  Lab Results   Component Value Date    INR 1.0 05/19/2025       Lab Results   Component Value Date    WBC 3.34 (L) 05/19/2025    HGB 7.3 (L) 05/19/2025    HCT 23.6 (L) 05/19/2025     (H) 05/19/2025     05/19/2025      Lab Results   Component Value Date     (H) 05/19/2025     05/19/2025    K 4.9 05/19/2025     (H) 05/19/2025    CO2 17 (L) 05/19/2025    BUN 40 (H) 05/19/2025    CREATININE 3.3 (H) 05/19/2025    CALCIUM 9.1 05/19/2025    MG 1.8 05/19/2025    ALT 8 (L) 04/30/2025    AST 10 (L) 04/30/2025    ALBUMIN 3.6 05/19/2025    BILITOT 0.5 04/30/2025    BILIDIR 0.3 06/20/2023       Imaging    All recent imaging Independently reviewed by myself.       ASSESSMENT/PLAN:     Patient will undergo transplant renal bx.  Sedation Plan: moderate sedation.    Cooper Lr MD  VIR

## 2025-05-21 NOTE — DISCHARGE SUMMARY
VIR Discharge Summary      Hospital Course: No complications    Admit Date: 5/21/2025  Discharge Date: 05/21/2025     Instructions Given to Patient: Yes  Diet: Resume prior diet  Activity:   Activity as tolerated and no driving for today.    Description of Condition on Discharge: Stable  Vital Signs (Most Recent): Temp: 98.8 °F (37.1 °C) (05/21/25 1040)  Pulse: 80 (05/21/25 1040)  Resp: 16 (05/21/25 1040)  BP: (!) 173/101 (05/21/25 1040)  SpO2: 96 % (05/21/25 1040)    Discharge Disposition: Home    Discharge Diagnosis: renal bx.         Cooper Lr MD  VIR

## 2025-05-21 NOTE — PLAN OF CARE
Pt arrived to IR for kidney bx. Pt oriented to unit and staff. Plan of care reviewed with patient, patient verbalizes understanding. Comfort measures utilized. Pt safely transferred from stretcher to procedural table. Fall risk reviewed with patient, fall risk interventions maintained. Safety strap applied, positioner pillows utilized to minimize pressure points. Blankets applied. Pt prepped and draped utilizing standard sterile technique. Patient placed on continuous monitoring, as required by sedation policy. Timeouts completed utilizing standard universal time-out, per department and facility policy. RN to remain at bedside, continuous monitoring maintained. Pt resting comfortably. Denies pain/discomfort. Will continue to monitor. See flow sheets for monitoring, medication administration, and updates.

## 2025-05-21 NOTE — PROCEDURES
VIR Post-Procedure Note    Pre Op Diagnosis: Renal dysfunction    Post Op Diagnosis: Same    Procedure: Ultrasound guided renal biopsy    Procedure performed by:  Cooper Lr MD    Written Informed Consent Obtained: Yes    Specimen Removed:  Yes; core renal samples    Estimated Blood Loss:  Minimal    Findings:    Image guided biopsy of RLQ transplant kidney using 17/18-G coaxial system. 3 samples were sent to pathology.          Cooper Lr MD  VIR

## 2025-05-21 NOTE — PLAN OF CARE
Procedure completed, pt tolerated without s/sx of complication. Band aid applied CDI. Specimens collected and sent. Patient to be transferred [facility]. Report called to GREYSON Wen.

## 2025-05-21 NOTE — OR NURSING
Dr. Plasencia notified of blood pressure 173/102. MD Verbalized understanding and gave verbal orders for 1 time dose of 10 mg of hydralazine. Order repeated and verified.

## 2025-05-23 ENCOUNTER — TELEPHONE (OUTPATIENT)
Dept: TRANSPLANT | Facility: CLINIC | Age: 50
End: 2025-05-23
Payer: MEDICARE

## 2025-05-23 DIAGNOSIS — Z94.0 KIDNEY REPLACED BY TRANSPLANT: Primary | ICD-10-CM

## 2025-05-23 NOTE — TELEPHONE ENCOUNTER
Pt refusing to have biopsy next week. Stated the jello made her ill and does not want another biopsy for AT Least another 2-3 weeks and only wants it at main campus. Dr wen notified.

## 2025-05-26 ENCOUNTER — HOSPITAL ENCOUNTER (OUTPATIENT)
Dept: RADIOLOGY | Facility: HOSPITAL | Age: 50
Discharge: HOME | End: 2025-05-26
Attending: STUDENT IN AN ORGANIZED HEALTH CARE EDUCATION/TRAINING PROGRAM
Payer: MEDICARE

## 2025-05-26 ENCOUNTER — RESULTS FOLLOW-UP (OUTPATIENT)
Dept: TRANSPLANT | Facility: HOSPITAL | Age: 50
End: 2025-05-26

## 2025-05-26 DIAGNOSIS — Z94.0 KIDNEY REPLACED BY TRANSPLANT: ICD-10-CM

## 2025-05-26 PROCEDURE — 76776 US EXAM K TRANSPL W/DOPPLER: CPT | Mod: TC

## 2025-05-26 PROCEDURE — 76776 US EXAM K TRANSPL W/DOPPLER: CPT | Mod: 26,,, | Performed by: RADIOLOGY

## 2025-05-27 ENCOUNTER — TELEPHONE (OUTPATIENT)
Dept: INTERVENTIONAL RADIOLOGY/VASCULAR | Facility: CLINIC | Age: 50
End: 2025-05-27
Payer: MEDICARE

## 2025-05-28 ENCOUNTER — RESULTS FOLLOW-UP (OUTPATIENT)
Dept: TRANSPLANT | Facility: HOSPITAL | Age: 50
End: 2025-05-28

## 2025-05-29 ENCOUNTER — RESULTS FOLLOW-UP (OUTPATIENT)
Dept: TRANSPLANT | Facility: HOSPITAL | Age: 50
End: 2025-05-29

## 2025-05-29 ENCOUNTER — TELEPHONE (OUTPATIENT)
Dept: TRANSPLANT | Facility: CLINIC | Age: 50
End: 2025-05-29
Payer: MEDICARE

## 2025-05-29 NOTE — TELEPHONE ENCOUNTER
Pt aware that [facility] has no way of transferring abd pads to [place]. Pt aware [employer] sells them. Pt asking why can [facility] pay for someone's electricity but not a shreyas house stay when it is cheaper. Explained to pt I have no say in this and to discuss with social work.     ----- Message from Nazanin sent at 5/29/2025  3:57 PM CDT -----  Regarding: need supplies ab pads/gauze  Contact: Patient can be contacted @#  [phone]  PT is needing new supplies for her wound care  and is in desperate need Ab pad and gauze. PT states if she can pick them up in [place] today or tomorrow as she is about to run out. Please reach out to patient to advisePatient can be contacted @#  [phone]

## 2025-06-02 ENCOUNTER — LAB VISIT (OUTPATIENT)
Dept: LAB | Facility: HOSPITAL | Age: 50
End: 2025-06-02
Attending: STUDENT IN AN ORGANIZED HEALTH CARE EDUCATION/TRAINING PROGRAM
Payer: MEDICARE

## 2025-06-02 ENCOUNTER — TELEPHONE (OUTPATIENT)
Dept: INTERVENTIONAL RADIOLOGY/VASCULAR | Facility: HOSPITAL | Age: 50
End: 2025-06-02
Payer: MEDICARE

## 2025-06-02 DIAGNOSIS — Z94.0 KIDNEY REPLACED BY TRANSPLANT: ICD-10-CM

## 2025-06-02 LAB
ABSOLUTE EOSINOPHIL (SMH): 0.06 K/UL
ABSOLUTE MONOCYTE (SMH): 0.35 K/UL (ref 0.3–1)
ABSOLUTE NEUTROPHIL COUNT (SMH): 4.6 K/UL (ref 1.8–7.7)
ALBUMIN SERPL-MCNC: 3.7 G/DL (ref 3.5–5.2)
ANION GAP (SMH): 5 MMOL/L (ref 8–16)
BASOPHILS # BLD AUTO: 0.04 K/UL
BASOPHILS NFR BLD AUTO: 0.7 %
BUN SERPL-MCNC: 41 MG/DL (ref 6–20)
CALCIUM SERPL-MCNC: 8.7 MG/DL (ref 8.7–10.5)
CHLORIDE SERPL-SCNC: 116 MMOL/L (ref 95–110)
CO2 SERPL-SCNC: 18 MMOL/L (ref 23–29)
CREAT SERPL-MCNC: 3.2 MG/DL (ref 0.5–1.4)
ERYTHROCYTE [DISTWIDTH] IN BLOOD BY AUTOMATED COUNT: 15.9 % (ref 11.5–14.5)
GFR SERPLBLD CREATININE-BSD FMLA CKD-EPI: 17 ML/MIN/1.73/M2
GLUCOSE SERPL-MCNC: 116 MG/DL (ref 70–110)
HCT VFR BLD AUTO: 26.2 % (ref 37–48.5)
HGB BLD-MCNC: 7.7 GM/DL (ref 12–16)
IMM GRANULOCYTES # BLD AUTO: 0.03 K/UL (ref 0–0.04)
IMM GRANULOCYTES NFR BLD AUTO: 0.5 % (ref 0–0.5)
LYMPHOCYTES # BLD AUTO: 0.48 K/UL (ref 1–4.8)
MAGNESIUM SERPL-MCNC: 1.8 MG/DL (ref 1.6–2.6)
MCH RBC QN AUTO: 31.7 PG (ref 27–31)
MCHC RBC AUTO-ENTMCNC: 29.4 G/DL (ref 32–36)
MCV RBC AUTO: 108 FL (ref 82–98)
NUCLEATED RBC (/100WBC) (SMH): 0 /100 WBC
PHOSPHATE SERPL-MCNC: 3.4 MG/DL (ref 2.7–4.5)
PLATELET # BLD AUTO: 198 K/UL (ref 150–450)
PMV BLD AUTO: 8.9 FL (ref 9.2–12.9)
POTASSIUM SERPL-SCNC: 5 MMOL/L (ref 3.5–5.1)
RBC # BLD AUTO: 2.43 M/UL (ref 4–5.4)
RELATIVE EOSINOPHIL (SMH): 1.1 % (ref 0–8)
RELATIVE LYMPHOCYTE (SMH): 8.7 % (ref 18–48)
RELATIVE MONOCYTE (SMH): 6.3 % (ref 4–15)
RELATIVE NEUTROPHIL (SMH): 82.7 % (ref 38–73)
SODIUM SERPL-SCNC: 139 MMOL/L (ref 136–145)
WBC # BLD AUTO: 5.54 K/UL (ref 3.9–12.7)

## 2025-06-02 PROCEDURE — 83735 ASSAY OF MAGNESIUM: CPT

## 2025-06-02 PROCEDURE — 85025 COMPLETE CBC W/AUTO DIFF WBC: CPT

## 2025-06-02 PROCEDURE — 84100 ASSAY OF PHOSPHORUS: CPT

## 2025-06-02 PROCEDURE — 36415 COLL VENOUS BLD VENIPUNCTURE: CPT

## 2025-06-02 PROCEDURE — 80197 ASSAY OF TACROLIMUS: CPT

## 2025-06-03 ENCOUNTER — RESULTS FOLLOW-UP (OUTPATIENT)
Dept: TRANSPLANT | Facility: CLINIC | Age: 50
End: 2025-06-03

## 2025-06-03 PROBLEM — D84.821 IMMUNODEFICIENCY DUE TO LONG TERM DRUG THERAPY: Status: ACTIVE | Noted: 2025-06-03

## 2025-06-03 PROBLEM — Z79.899 IMMUNODEFICIENCY DUE TO LONG TERM DRUG THERAPY: Status: ACTIVE | Noted: 2025-06-03

## 2025-06-03 LAB — TACROLIMUS BLD-MCNC: 9.4 NG/ML (ref 5–15)

## 2025-06-04 ENCOUNTER — HOSPITAL ENCOUNTER (OUTPATIENT)
Dept: INTERVENTIONAL RADIOLOGY/VASCULAR | Facility: HOSPITAL | Age: 50
Discharge: HOME | End: 2025-06-04
Attending: STUDENT IN AN ORGANIZED HEALTH CARE EDUCATION/TRAINING PROGRAM
Payer: MEDICARE

## 2025-06-04 ENCOUNTER — OFFICE VISIT (OUTPATIENT)
Dept: TRANSPLANT | Facility: CLINIC | Age: 50
End: 2025-06-04
Payer: MEDICARE

## 2025-06-04 VITALS
DIASTOLIC BLOOD PRESSURE: 67 MMHG | SYSTOLIC BLOOD PRESSURE: 145 MMHG | HEART RATE: 79 BPM | RESPIRATION RATE: 18 BRPM | OXYGEN SATURATION: 100 % | TEMPERATURE: 98 F

## 2025-06-04 VITALS
TEMPERATURE: 97 F | HEART RATE: 83 BPM | HEIGHT: 64 IN | WEIGHT: 215.63 LBS | BODY MASS INDEX: 36.81 KG/M2 | SYSTOLIC BLOOD PRESSURE: 142 MMHG | RESPIRATION RATE: 18 BRPM | DIASTOLIC BLOOD PRESSURE: 72 MMHG | OXYGEN SATURATION: 100 %

## 2025-06-04 DIAGNOSIS — Z79.899 IMMUNODEFICIENCY DUE TO LONG TERM DRUG THERAPY: ICD-10-CM

## 2025-06-04 DIAGNOSIS — Z29.89 PROPHYLACTIC IMMUNOTHERAPY: ICD-10-CM

## 2025-06-04 DIAGNOSIS — D84.821 IMMUNODEFICIENCY DUE TO LONG TERM DRUG THERAPY: ICD-10-CM

## 2025-06-04 DIAGNOSIS — N18.9 ANEMIA OF RENAL DISEASE: ICD-10-CM

## 2025-06-04 DIAGNOSIS — Z94.0 STATUS POST DECEASED-DONOR KIDNEY TRANSPLANTATION: Primary | ICD-10-CM

## 2025-06-04 DIAGNOSIS — Z94.0 KIDNEY REPLACED BY TRANSPLANT: ICD-10-CM

## 2025-06-04 DIAGNOSIS — Z91.89 AT RISK FOR OPPORTUNISTIC INFECTIONS: ICD-10-CM

## 2025-06-04 DIAGNOSIS — D63.1 ANEMIA OF RENAL DISEASE: ICD-10-CM

## 2025-06-04 DIAGNOSIS — Z79.60 LONG-TERM USE OF IMMUNOSUPPRESSANT MEDICATION: ICD-10-CM

## 2025-06-04 DIAGNOSIS — E11.21 TYPE 2 DIABETES MELLITUS WITH DIABETIC NEPHROPATHY, UNSPECIFIED WHETHER LONG TERM INSULIN USE: ICD-10-CM

## 2025-06-04 LAB
ALBUMIN SERPL BCP-MCNC: 3.9 G/DL (ref 3.5–5.2)
ALP SERPL-CCNC: 62 UNIT/L (ref 40–150)
ALT SERPL W/O P-5'-P-CCNC: 6 UNIT/L (ref 10–44)
ANION GAP (OHS): 6 MMOL/L (ref 8–16)
AST SERPL-CCNC: 6 UNIT/L (ref 11–45)
BILIRUB SERPL-MCNC: 0.6 MG/DL (ref 0.1–1)
BUN SERPL-MCNC: 40 MG/DL (ref 6–20)
CALCIUM SERPL-MCNC: 8.7 MG/DL (ref 8.7–10.5)
CHLORIDE SERPL-SCNC: 117 MMOL/L (ref 95–110)
CO2 SERPL-SCNC: 15 MMOL/L (ref 23–29)
CREAT SERPL-MCNC: 3 MG/DL (ref 0.5–1.4)
GFR SERPLBLD CREATININE-BSD FMLA CKD-EPI: 18 ML/MIN/1.73/M2
GLUCOSE SERPL-MCNC: 130 MG/DL (ref 70–110)
POTASSIUM SERPL-SCNC: 5 MMOL/L (ref 3.5–5.1)
PROT SERPL-MCNC: 6.2 GM/DL (ref 6–8.4)
SODIUM SERPL-SCNC: 138 MMOL/L (ref 136–145)

## 2025-06-04 PROCEDURE — 3044F HG A1C LEVEL LT 7.0%: CPT | Mod: CPTII,S$GLB,, | Performed by: STUDENT IN AN ORGANIZED HEALTH CARE EDUCATION/TRAINING PROGRAM

## 2025-06-04 PROCEDURE — 3066F NEPHROPATHY DOC TX: CPT | Mod: CPTII,S$GLB,, | Performed by: STUDENT IN AN ORGANIZED HEALTH CARE EDUCATION/TRAINING PROGRAM

## 2025-06-04 PROCEDURE — 3008F BODY MASS INDEX DOCD: CPT | Mod: CPTII,S$GLB,, | Performed by: STUDENT IN AN ORGANIZED HEALTH CARE EDUCATION/TRAINING PROGRAM

## 2025-06-04 PROCEDURE — 25000003 PHARM REV CODE 250: Performed by: PHYSICIAN ASSISTANT

## 2025-06-04 PROCEDURE — 1159F MED LIST DOCD IN RCRD: CPT | Mod: CPTII,S$GLB,, | Performed by: STUDENT IN AN ORGANIZED HEALTH CARE EDUCATION/TRAINING PROGRAM

## 2025-06-04 PROCEDURE — G2211 COMPLEX E/M VISIT ADD ON: HCPCS | Mod: S$GLB,,, | Performed by: STUDENT IN AN ORGANIZED HEALTH CARE EDUCATION/TRAINING PROGRAM

## 2025-06-04 PROCEDURE — 63600175 PHARM REV CODE 636 W HCPCS: Performed by: STUDENT IN AN ORGANIZED HEALTH CARE EDUCATION/TRAINING PROGRAM

## 2025-06-04 PROCEDURE — 99999 PR PBB SHADOW E&M-EST. PATIENT-LVL V: CPT | Mod: PBBFAC,,, | Performed by: STUDENT IN AN ORGANIZED HEALTH CARE EDUCATION/TRAINING PROGRAM

## 2025-06-04 PROCEDURE — 3078F DIAST BP <80 MM HG: CPT | Mod: CPTII,S$GLB,, | Performed by: STUDENT IN AN ORGANIZED HEALTH CARE EDUCATION/TRAINING PROGRAM

## 2025-06-04 PROCEDURE — 82947 ASSAY GLUCOSE BLOOD QUANT: CPT

## 2025-06-04 PROCEDURE — 1160F RVW MEDS BY RX/DR IN RCRD: CPT | Mod: CPTII,S$GLB,, | Performed by: STUDENT IN AN ORGANIZED HEALTH CARE EDUCATION/TRAINING PROGRAM

## 2025-06-04 PROCEDURE — 3077F SYST BP >= 140 MM HG: CPT | Mod: CPTII,S$GLB,, | Performed by: STUDENT IN AN ORGANIZED HEALTH CARE EDUCATION/TRAINING PROGRAM

## 2025-06-04 PROCEDURE — 63600175 PHARM REV CODE 636 W HCPCS: Mod: TB | Performed by: PHYSICIAN ASSISTANT

## 2025-06-04 PROCEDURE — 99215 OFFICE O/P EST HI 40 MIN: CPT | Mod: S$GLB,,, | Performed by: STUDENT IN AN ORGANIZED HEALTH CARE EDUCATION/TRAINING PROGRAM

## 2025-06-04 RX ORDER — SODIUM CHLORIDE 9 MG/ML
INJECTION, SOLUTION INTRAVENOUS CONTINUOUS
Status: DISCONTINUED | OUTPATIENT
Start: 2025-06-04 | End: 2025-06-05 | Stop reason: HOSPADM

## 2025-06-04 RX ORDER — LIDOCAINE HYDROCHLORIDE 10 MG/ML
1 INJECTION, SOLUTION EPIDURAL; INFILTRATION; INTRACAUDAL; PERINEURAL ONCE
Status: DISCONTINUED | OUTPATIENT
Start: 2025-06-04 | End: 2025-06-05 | Stop reason: HOSPADM

## 2025-06-04 RX ORDER — FENTANYL CITRATE 50 UG/ML
INJECTION, SOLUTION INTRAMUSCULAR; INTRAVENOUS
Status: COMPLETED | OUTPATIENT
Start: 2025-06-04 | End: 2025-06-04

## 2025-06-04 RX ORDER — TRAZODONE HYDROCHLORIDE 50 MG/1
50 TABLET ORAL NIGHTLY
COMMUNITY
Start: 2025-05-19 | End: 2025-08-17

## 2025-06-04 RX ORDER — MIDAZOLAM HYDROCHLORIDE 1 MG/ML
INJECTION, SOLUTION INTRAMUSCULAR; INTRAVENOUS
Status: COMPLETED | OUTPATIENT
Start: 2025-06-04 | End: 2025-06-04

## 2025-06-04 RX ORDER — MYCOPHENOLIC ACID 180 MG/1
720 TABLET, DELAYED RELEASE ORAL 2 TIMES DAILY
Qty: 240 TABLET | Refills: 11 | Status: SHIPPED | OUTPATIENT
Start: 2025-06-04 | End: 2026-06-04

## 2025-06-04 RX ORDER — LOPERAMIDE HYDROCHLORIDE 2 MG/1
2 CAPSULE ORAL 4 TIMES DAILY PRN
Qty: 180 CAPSULE | Refills: 0 | Status: SHIPPED | OUTPATIENT
Start: 2025-06-04 | End: 2025-09-02

## 2025-06-04 RX ORDER — PANTOPRAZOLE SODIUM 40 MG/1
40 TABLET, DELAYED RELEASE ORAL DAILY
Qty: 90 TABLET | Refills: 3 | Status: SHIPPED | OUTPATIENT
Start: 2025-06-04 | End: 2026-06-04

## 2025-06-04 RX ADMIN — MIDAZOLAM HYDROCHLORIDE 1 MG: 2 INJECTION, SOLUTION INTRAMUSCULAR; INTRAVENOUS at 09:06

## 2025-06-04 RX ADMIN — FENTANYL CITRATE 50 MCG: 50 INJECTION, SOLUTION INTRAMUSCULAR; INTRAVENOUS at 09:06

## 2025-06-04 RX ADMIN — FENTANYL CITRATE 25 MCG: 50 INJECTION, SOLUTION INTRAMUSCULAR; INTRAVENOUS at 09:06

## 2025-06-04 RX ADMIN — MIDAZOLAM HYDROCHLORIDE 0.5 MG: 2 INJECTION, SOLUTION INTRAMUSCULAR; INTRAVENOUS at 09:06

## 2025-06-04 RX ADMIN — DESMOPRESSIN ACETATE 15.2 MCG: 4 SOLUTION INTRAVENOUS at 08:06

## 2025-06-09 ENCOUNTER — RESULTS FOLLOW-UP (OUTPATIENT)
Dept: NEPHROLOGY | Facility: CLINIC | Age: 50
End: 2025-06-09

## 2025-06-09 ENCOUNTER — LAB VISIT (OUTPATIENT)
Dept: LAB | Facility: HOSPITAL | Age: 50
End: 2025-06-09
Attending: STUDENT IN AN ORGANIZED HEALTH CARE EDUCATION/TRAINING PROGRAM
Payer: MEDICARE

## 2025-06-09 DIAGNOSIS — Z79.60 LONG-TERM USE OF IMMUNOSUPPRESSANT MEDICATION: ICD-10-CM

## 2025-06-09 DIAGNOSIS — Z94.0 KIDNEY REPLACED BY TRANSPLANT: ICD-10-CM

## 2025-06-09 DIAGNOSIS — Z94.0 STATUS POST DECEASED-DONOR KIDNEY TRANSPLANTATION: ICD-10-CM

## 2025-06-09 LAB
ABSOLUTE EOSINOPHIL (SMH): 0.11 K/UL
ABSOLUTE MONOCYTE (SMH): 0.25 K/UL (ref 0.3–1)
ABSOLUTE NEUTROPHIL COUNT (SMH): 4.4 K/UL (ref 1.8–7.7)
ALBUMIN SERPL-MCNC: 3.5 G/DL (ref 3.5–5.2)
ANION GAP (SMH): 6 MMOL/L (ref 8–16)
BASOPHILS # BLD AUTO: 0.03 K/UL
BASOPHILS NFR BLD AUTO: 0.6 %
BUN SERPL-MCNC: 44 MG/DL (ref 6–20)
CALCIUM SERPL-MCNC: 8.9 MG/DL (ref 8.7–10.5)
CHLORIDE SERPL-SCNC: 118 MMOL/L (ref 95–110)
CO2 SERPL-SCNC: 17 MMOL/L (ref 23–29)
CREAT SERPL-MCNC: 3.2 MG/DL (ref 0.5–1.4)
ERYTHROCYTE [DISTWIDTH] IN BLOOD BY AUTOMATED COUNT: 15.4 % (ref 11.5–14.5)
GFR SERPLBLD CREATININE-BSD FMLA CKD-EPI: 17 ML/MIN/1.73/M2
GLUCOSE SERPL-MCNC: 121 MG/DL (ref 70–110)
HCT VFR BLD AUTO: 27.1 % (ref 37–48.5)
HGB BLD-MCNC: 8.2 GM/DL (ref 12–16)
IMM GRANULOCYTES # BLD AUTO: 0.01 K/UL (ref 0–0.04)
IMM GRANULOCYTES NFR BLD AUTO: 0.2 % (ref 0–0.5)
LYMPHOCYTES # BLD AUTO: 0.49 K/UL (ref 1–4.8)
MAGNESIUM SERPL-MCNC: 1.7 MG/DL (ref 1.6–2.6)
MCH RBC QN AUTO: 31.8 PG (ref 27–31)
MCHC RBC AUTO-ENTMCNC: 30.3 G/DL (ref 32–36)
MCV RBC AUTO: 105 FL (ref 82–98)
NUCLEATED RBC (/100WBC) (SMH): 0 /100 WBC
PHOSPHATE SERPL-MCNC: 2.7 MG/DL (ref 2.7–4.5)
PLATELET # BLD AUTO: 208 K/UL (ref 150–450)
PMV BLD AUTO: 8.9 FL (ref 9.2–12.9)
POTASSIUM SERPL-SCNC: 4.9 MMOL/L (ref 3.5–5.1)
RBC # BLD AUTO: 2.58 M/UL (ref 4–5.4)
RELATIVE EOSINOPHIL (SMH): 2.1 % (ref 0–8)
RELATIVE LYMPHOCYTE (SMH): 9.2 % (ref 18–48)
RELATIVE MONOCYTE (SMH): 4.7 % (ref 4–15)
RELATIVE NEUTROPHIL (SMH): 83.2 % (ref 38–73)
SODIUM SERPL-SCNC: 141 MMOL/L (ref 136–145)
TACROLIMUS BLD-MCNC: 10.8 NG/ML (ref 5–15)
WBC # BLD AUTO: 5.3 K/UL (ref 3.9–12.7)

## 2025-06-09 PROCEDURE — 80197 ASSAY OF TACROLIMUS: CPT

## 2025-06-09 PROCEDURE — 85025 COMPLETE CBC W/AUTO DIFF WBC: CPT

## 2025-06-09 PROCEDURE — 36415 COLL VENOUS BLD VENIPUNCTURE: CPT

## 2025-06-09 PROCEDURE — 82374 ASSAY BLOOD CARBON DIOXIDE: CPT

## 2025-06-09 PROCEDURE — 83735 ASSAY OF MAGNESIUM: CPT

## 2025-06-09 RX ORDER — TACROLIMUS 1 MG/1
8 CAPSULE ORAL EVERY 12 HOURS
Qty: 540 CAPSULE | Refills: 11 | Status: ACTIVE | OUTPATIENT
Start: 2025-06-09 | End: 2025-07-08 | Stop reason: DRUGHIGH

## 2025-06-09 NOTE — TELEPHONE ENCOUNTER
Message sent.     ----- Message from Andrea Adhikari MD sent at 6/9/2025  1:48 PM CDT -----  Repeat DSA and allosure 1 month from prior, so in 1 week or so. If true tac trough, decrease dose from 9/9 to 8/8 and repeat in 1-2 weeks. Add belatacept labs even though I have not discussed this in   depth with her yet. I would consider elizabeth in her if creatinine not better in a month or 2. We still need to make sure she can urinate without issues. Other labs acceptable.   ----- Message -----  From: Lab, Background User  Sent: 6/9/2025   8:25 AM CDT  To: Andrea Adhikari Jr., MD

## 2025-06-10 ENCOUNTER — TELEPHONE (OUTPATIENT)
Dept: TRANSPLANT | Facility: CLINIC | Age: 50
End: 2025-06-10
Payer: MEDICARE

## 2025-06-10 NOTE — TELEPHONE ENCOUNTER
Copied from CRM #[id number]. Topic: General Inquiry - Patient Advice  >> Viral 10, 2025  3:33 PM Hanh wrote:     Consult/Advisory     Name Of Caller:Brande Reyer         Contact Preference:[phone] (home)      Nature of call:Patient is calling to speak to Eva about restrictions and what she can do are go. Requesting a call back

## 2025-06-11 ENCOUNTER — TELEPHONE (OUTPATIENT)
Dept: TRANSPLANT | Facility: CLINIC | Age: 50
End: 2025-06-11
Payer: MEDICARE

## 2025-06-11 NOTE — TELEPHONE ENCOUNTER
Spoke to patient regarding sick grandfather and an upcoming --stated ok for her to go as long as she wore a mask.

## 2025-06-16 ENCOUNTER — LAB VISIT (OUTPATIENT)
Dept: LAB | Facility: HOSPITAL | Age: 50
End: 2025-06-16
Attending: STUDENT IN AN ORGANIZED HEALTH CARE EDUCATION/TRAINING PROGRAM
Payer: MEDICARE

## 2025-06-16 ENCOUNTER — TELEPHONE (OUTPATIENT)
Dept: TRANSPLANT | Facility: CLINIC | Age: 50
End: 2025-06-16
Payer: MEDICARE

## 2025-06-16 DIAGNOSIS — Z94.0 KIDNEY REPLACED BY TRANSPLANT: ICD-10-CM

## 2025-06-16 LAB
ALBUMIN SERPL-MCNC: 3.8 G/DL (ref 3.5–5.2)
ANION GAP (SMH): 12 MMOL/L (ref 8–16)
BUN SERPL-MCNC: 75 MG/DL (ref 6–20)
CALCIUM SERPL-MCNC: 9.5 MG/DL (ref 8.7–10.5)
CHLORIDE SERPL-SCNC: 112 MMOL/L (ref 95–110)
CO2 SERPL-SCNC: 13 MMOL/L (ref 23–29)
CREAT SERPL-MCNC: 4.2 MG/DL (ref 0.5–1.4)
EOSINOPHIL NFR BLD MANUAL: 3 % (ref 0–8)
ERYTHROCYTE [DISTWIDTH] IN BLOOD BY AUTOMATED COUNT: 14.7 % (ref 11.5–14.5)
GFR SERPLBLD CREATININE-BSD FMLA CKD-EPI: 12 ML/MIN/1.73/M2
GLUCOSE SERPL-MCNC: 155 MG/DL (ref 70–110)
HCT VFR BLD AUTO: 32.5 % (ref 37–48.5)
HGB BLD-MCNC: 10.1 GM/DL (ref 12–16)
LYMPHOCYTES NFR BLD MANUAL: 3 % (ref 18–48)
MAGNESIUM SERPL-MCNC: 1.5 MG/DL (ref 1.6–2.6)
MCH RBC QN AUTO: 31.3 PG (ref 27–31)
MCHC RBC AUTO-ENTMCNC: 31.1 G/DL (ref 32–36)
MCV RBC AUTO: 101 FL (ref 82–98)
MONOCYTES NFR BLD MANUAL: 9 % (ref 4–15)
NEUTROPHILS NFR BLD MANUAL: 68 % (ref 38–73)
NEUTS BAND NFR BLD MANUAL: 17 %
NUCLEATED RBC (/100WBC) (SMH): 0 /100 WBC
PHOSPHATE SERPL-MCNC: 3.2 MG/DL (ref 2.7–4.5)
PLATELET # BLD AUTO: 235 K/UL (ref 150–450)
PLATELET BLD QL SMEAR: ABNORMAL
PMV BLD AUTO: 9.1 FL (ref 9.2–12.9)
POTASSIUM SERPL-SCNC: 5 MMOL/L (ref 3.5–5.1)
RBC # BLD AUTO: 3.23 M/UL (ref 4–5.4)
SODIUM SERPL-SCNC: 137 MMOL/L (ref 136–145)
WBC # BLD AUTO: 5.27 K/UL (ref 3.9–12.7)

## 2025-06-16 PROCEDURE — 80069 RENAL FUNCTION PANEL: CPT

## 2025-06-16 PROCEDURE — 85025 COMPLETE CBC W/AUTO DIFF WBC: CPT

## 2025-06-16 PROCEDURE — 36415 COLL VENOUS BLD VENIPUNCTURE: CPT

## 2025-06-16 PROCEDURE — 83735 ASSAY OF MAGNESIUM: CPT

## 2025-06-16 PROCEDURE — 80197 ASSAY OF TACROLIMUS: CPT

## 2025-06-17 ENCOUNTER — HOSPITAL ENCOUNTER (INPATIENT)
Facility: HOSPITAL | Age: 50
LOS: 7 days | Discharge: HOME | DRG: 699 | End: 2025-06-24
Attending: TRANSPLANT SURGERY | Admitting: TRANSPLANT SURGERY
Payer: MEDICARE

## 2025-06-17 ENCOUNTER — HOSPITAL ENCOUNTER (EMERGENCY)
Facility: HOSPITAL | Age: 50
Discharge: ANOTHER HOSPITAL | End: 2025-06-17
Attending: EMERGENCY MEDICINE
Payer: MEDICARE

## 2025-06-17 ENCOUNTER — RESULTS FOLLOW-UP (OUTPATIENT)
Dept: TRANSPLANT | Facility: HOSPITAL | Age: 50
End: 2025-06-17
Payer: MEDICARE

## 2025-06-17 VITALS
DIASTOLIC BLOOD PRESSURE: 75 MMHG | HEIGHT: 64 IN | SYSTOLIC BLOOD PRESSURE: 174 MMHG | OXYGEN SATURATION: 99 % | BODY MASS INDEX: 36.7 KG/M2 | RESPIRATION RATE: 20 BRPM | WEIGHT: 215 LBS | TEMPERATURE: 98 F | HEART RATE: 110 BPM

## 2025-06-17 DIAGNOSIS — N10 PYELONEPHRITIS, ACUTE: Primary | ICD-10-CM

## 2025-06-17 DIAGNOSIS — E86.9 VOLUME DEPLETION: ICD-10-CM

## 2025-06-17 DIAGNOSIS — R00.0 TACHYCARDIA: ICD-10-CM

## 2025-06-17 DIAGNOSIS — E87.20 METABOLIC ACIDOSIS: ICD-10-CM

## 2025-06-17 DIAGNOSIS — Z29.89 PROPHYLACTIC IMMUNOTHERAPY: ICD-10-CM

## 2025-06-17 DIAGNOSIS — Z79.899 IMMUNOCOMPROMISED STATE DUE TO DRUG THERAPY: ICD-10-CM

## 2025-06-17 DIAGNOSIS — R10.9 ABDOMINAL PAIN, UNSPECIFIED ABDOMINAL LOCATION: ICD-10-CM

## 2025-06-17 DIAGNOSIS — D84.821 IMMUNOCOMPROMISED STATE DUE TO DRUG THERAPY: ICD-10-CM

## 2025-06-17 DIAGNOSIS — Z91.89 AT RISK FOR OPPORTUNISTIC INFECTIONS: ICD-10-CM

## 2025-06-17 DIAGNOSIS — R19.7 DIARRHEA, UNSPECIFIED TYPE: ICD-10-CM

## 2025-06-17 DIAGNOSIS — N17.9 AKI (ACUTE KIDNEY INJURY): ICD-10-CM

## 2025-06-17 DIAGNOSIS — N18.9 ANEMIA IN CHRONIC KIDNEY DISEASE, UNSPECIFIED CKD STAGE: ICD-10-CM

## 2025-06-17 DIAGNOSIS — K82.8 THICKENING OF WALL OF GALLBLADDER: ICD-10-CM

## 2025-06-17 DIAGNOSIS — I10 ESSENTIAL (PRIMARY) HYPERTENSION: ICD-10-CM

## 2025-06-17 DIAGNOSIS — Z79.60 LONG-TERM USE OF IMMUNOSUPPRESSANT MEDICATION: ICD-10-CM

## 2025-06-17 DIAGNOSIS — D63.1 ANEMIA IN CHRONIC KIDNEY DISEASE, UNSPECIFIED CKD STAGE: ICD-10-CM

## 2025-06-17 DIAGNOSIS — Z94.0 STATUS POST DECEASED-DONOR KIDNEY TRANSPLANTATION: ICD-10-CM

## 2025-06-17 LAB
ABSOLUTE EOSINOPHIL (OHS): 0.18 K/UL
ABSOLUTE MONOCYTE (OHS): 0.68 K/UL (ref 0.3–1)
ABSOLUTE NEUTROPHIL COUNT (OHS): 4.35 K/UL (ref 1.8–7.7)
ALBUMIN SERPL BCP-MCNC: 3.4 G/DL (ref 3.5–5.2)
ALBUMIN SERPL-MCNC: 4 G/DL (ref 3.5–5.2)
ALP SERPL-CCNC: 69 UNIT/L (ref 55–135)
ALT SERPL-CCNC: 5 UNIT/L (ref 10–44)
ANION GAP (OHS): 10 MMOL/L (ref 8–16)
ANION GAP (SMH): 12 MMOL/L (ref 8–16)
AST SERPL-CCNC: 7 UNIT/L (ref 10–40)
BASOPHILS # BLD AUTO: 0.03 K/UL
BASOPHILS NFR BLD AUTO: 0.5 %
BILIRUB SERPL-MCNC: 0.5 MG/DL (ref 0.1–1)
BUN SERPL-MCNC: 81 MG/DL (ref 6–20)
BUN SERPL-MCNC: 81 MG/DL (ref 6–20)
CALCIUM SERPL-MCNC: 9.2 MG/DL (ref 8.7–10.5)
CALCIUM SERPL-MCNC: 9.6 MG/DL (ref 8.7–10.5)
CHLORIDE SERPL-SCNC: 111 MMOL/L (ref 95–110)
CHLORIDE SERPL-SCNC: 115 MMOL/L (ref 95–110)
CO2 SERPL-SCNC: 11 MMOL/L (ref 23–29)
CO2 SERPL-SCNC: 12 MMOL/L (ref 23–29)
CREAT SERPL-MCNC: 3.4 MG/DL (ref 0.5–1.4)
CREAT SERPL-MCNC: 3.7 MG/DL (ref 0.5–1.4)
EAG (OHS): 103 MG/DL (ref 68–131)
EOSINOPHIL NFR BLD MANUAL: 4 % (ref 0–8)
ERYTHROCYTE [DISTWIDTH] IN BLOOD BY AUTOMATED COUNT: 14.2 % (ref 11.5–14.5)
ERYTHROCYTE [DISTWIDTH] IN BLOOD BY AUTOMATED COUNT: 14.7 % (ref 11.5–14.5)
GFR SERPLBLD CREATININE-BSD FMLA CKD-EPI: 14 ML/MIN/1.73/M2
GFR SERPLBLD CREATININE-BSD FMLA CKD-EPI: 16 ML/MIN/1.73/M2
GLUCOSE SERPL-MCNC: 131 MG/DL (ref 70–110)
GLUCOSE SERPL-MCNC: 158 MG/DL (ref 70–110)
HBA1C MFR BLD: 5.2 % (ref 4–5.6)
HCT VFR BLD AUTO: 30.2 % (ref 37–48.5)
HCT VFR BLD AUTO: 33.4 % (ref 37–48.5)
HGB BLD-MCNC: 10.9 GM/DL (ref 12–16)
HGB BLD-MCNC: 9.7 GM/DL (ref 12–16)
IMM GRANULOCYTES # BLD AUTO: 0.04 K/UL (ref 0–0.04)
IMM GRANULOCYTES NFR BLD AUTO: 0.7 % (ref 0–0.5)
INFLUENZA A MOLECULAR (OHS): NEGATIVE
INFLUENZA B MOLECULAR (OHS): NEGATIVE
LDH SERPL L TO P-CCNC: 0.56 MMOL/L (ref 0.5–2.2)
LYMPHOCYTES # BLD AUTO: 0.33 K/UL (ref 1–4.8)
LYMPHOCYTES NFR BLD MANUAL: 3 % (ref 18–48)
MCH RBC QN AUTO: 31.2 PG (ref 27–31)
MCH RBC QN AUTO: 31.9 PG (ref 27–31)
MCHC RBC AUTO-ENTMCNC: 32.1 G/DL (ref 32–36)
MCHC RBC AUTO-ENTMCNC: 32.6 G/DL (ref 32–36)
MCV RBC AUTO: 97 FL (ref 82–98)
MCV RBC AUTO: 98 FL (ref 82–98)
MONOCYTES NFR BLD MANUAL: 14 % (ref 4–15)
NEUTROPHILS NFR BLD MANUAL: 52 % (ref 38–73)
NEUTS BAND NFR BLD MANUAL: 27 %
NUCLEATED RBC (/100WBC) (OHS): 0 /100 WBC
NUCLEATED RBC (/100WBC) (SMH): 0 /100 WBC
PHOSPHATE SERPL-MCNC: 2.1 MG/DL (ref 2.7–4.5)
PLATELET # BLD AUTO: 202 K/UL (ref 150–450)
PLATELET # BLD AUTO: 247 K/UL (ref 150–450)
PMV BLD AUTO: 9 FL (ref 9.2–12.9)
PMV BLD AUTO: 9.2 FL (ref 9.2–12.9)
POTASSIUM SERPL-SCNC: 4.2 MMOL/L (ref 3.5–5.1)
POTASSIUM SERPL-SCNC: 4.4 MMOL/L (ref 3.5–5.1)
PROT SERPL-MCNC: 6.6 GM/DL (ref 6–8.4)
RBC # BLD AUTO: 3.11 M/UL (ref 4–5.4)
RBC # BLD AUTO: 3.42 M/UL (ref 4–5.4)
RELATIVE EOSINOPHIL (OHS): 3.2 %
RELATIVE LYMPHOCYTE (OHS): 5.9 % (ref 18–48)
RELATIVE MONOCYTE (OHS): 12.1 % (ref 4–15)
RELATIVE NEUTROPHIL (OHS): 77.6 % (ref 38–73)
SAMPLE: NORMAL
SARS-COV-2 RDRP RESP QL NAA+PROBE: NEGATIVE
SODIUM SERPL-SCNC: 135 MMOL/L (ref 136–145)
SODIUM SERPL-SCNC: 136 MMOL/L (ref 136–145)
TACROLIMUS BLD-MCNC: 18.4 NG/ML (ref 5–15)
WBC # BLD AUTO: 5.61 K/UL (ref 3.9–12.7)
WBC # BLD AUTO: 6.31 K/UL (ref 3.9–12.7)

## 2025-06-17 PROCEDURE — 36415 COLL VENOUS BLD VENIPUNCTURE: CPT | Performed by: NURSE PRACTITIONER

## 2025-06-17 PROCEDURE — 96374 THER/PROPH/DIAG INJ IV PUSH: CPT

## 2025-06-17 PROCEDURE — 84460 ALANINE AMINO (ALT) (SGPT): CPT | Performed by: NURSE PRACTITIONER

## 2025-06-17 PROCEDURE — 25000003 PHARM REV CODE 250

## 2025-06-17 PROCEDURE — 93005 ELECTROCARDIOGRAM TRACING: CPT | Performed by: GENERAL PRACTICE

## 2025-06-17 PROCEDURE — 93010 ELECTROCARDIOGRAM REPORT: CPT | Mod: ,,, | Performed by: GENERAL PRACTICE

## 2025-06-17 PROCEDURE — 63600175 PHARM REV CODE 636 W HCPCS

## 2025-06-17 PROCEDURE — 20600001 HC STEP DOWN PRIVATE ROOM

## 2025-06-17 PROCEDURE — U0002 COVID-19 LAB TEST NON-CDC: HCPCS | Performed by: NURSE PRACTITIONER

## 2025-06-17 PROCEDURE — 25000003 PHARM REV CODE 250: Performed by: TRANSPLANT SURGERY

## 2025-06-17 PROCEDURE — 85025 COMPLETE CBC W/AUTO DIFF WBC: CPT

## 2025-06-17 PROCEDURE — 63600175 PHARM REV CODE 636 W HCPCS: Performed by: TRANSPLANT SURGERY

## 2025-06-17 PROCEDURE — 85027 COMPLETE CBC AUTOMATED: CPT | Performed by: NURSE PRACTITIONER

## 2025-06-17 PROCEDURE — 83036 HEMOGLOBIN GLYCOSYLATED A1C: CPT

## 2025-06-17 PROCEDURE — 87040 BLOOD CULTURE FOR BACTERIA: CPT | Performed by: NURSE PRACTITIONER

## 2025-06-17 PROCEDURE — 99285 EMERGENCY DEPT VISIT HI MDM: CPT | Mod: 25

## 2025-06-17 PROCEDURE — 96361 HYDRATE IV INFUSION ADD-ON: CPT

## 2025-06-17 PROCEDURE — 87502 INFLUENZA DNA AMP PROBE: CPT | Performed by: NURSE PRACTITIONER

## 2025-06-17 PROCEDURE — 80069 RENAL FUNCTION PANEL: CPT

## 2025-06-17 PROCEDURE — 27000207 HC ISOLATION

## 2025-06-17 RX ORDER — TALC
6 POWDER (GRAM) TOPICAL NIGHTLY PRN
Status: DISCONTINUED | OUTPATIENT
Start: 2025-06-17 | End: 2025-06-18

## 2025-06-17 RX ORDER — VANCOMYCIN 2 GRAM/500 ML IN 0.9 % SODIUM CHLORIDE INTRAVENOUS
20 ONCE
Status: COMPLETED | OUTPATIENT
Start: 2025-06-17 | End: 2025-06-18

## 2025-06-17 RX ORDER — PROCHLORPERAZINE EDISYLATE 5 MG/ML
5 INJECTION INTRAMUSCULAR; INTRAVENOUS EVERY 6 HOURS PRN
Status: DISCONTINUED | OUTPATIENT
Start: 2025-06-17 | End: 2025-06-24 | Stop reason: HOSPADM

## 2025-06-17 RX ORDER — ONDANSETRON HYDROCHLORIDE 2 MG/ML
4 INJECTION, SOLUTION INTRAVENOUS
Status: COMPLETED | OUTPATIENT
Start: 2025-06-17 | End: 2025-06-17

## 2025-06-17 RX ORDER — HEPARIN SODIUM 5000 [USP'U]/ML
5000 INJECTION, SOLUTION INTRAVENOUS; SUBCUTANEOUS EVERY 8 HOURS
Status: DISCONTINUED | OUTPATIENT
Start: 2025-06-17 | End: 2025-06-24 | Stop reason: HOSPADM

## 2025-06-17 RX ORDER — SODIUM CHLORIDE 0.9 % (FLUSH) 0.9 %
10 SYRINGE (ML) INJECTION
Status: DISCONTINUED | OUTPATIENT
Start: 2025-06-17 | End: 2025-06-24 | Stop reason: HOSPADM

## 2025-06-17 RX ORDER — BUSPIRONE HYDROCHLORIDE 5 MG/1
5 TABLET ORAL 2 TIMES DAILY
Status: DISCONTINUED | OUTPATIENT
Start: 2025-06-17 | End: 2025-06-24 | Stop reason: HOSPADM

## 2025-06-17 RX ORDER — VALGANCICLOVIR 450 MG/1
450 TABLET, FILM COATED ORAL
Status: DISCONTINUED | OUTPATIENT
Start: 2025-06-18 | End: 2025-06-24 | Stop reason: HOSPADM

## 2025-06-17 RX ORDER — TACROLIMUS 1 MG/1
8 CAPSULE ORAL 2 TIMES DAILY
Status: DISCONTINUED | OUTPATIENT
Start: 2025-06-18 | End: 2025-06-18

## 2025-06-17 RX ORDER — CALCIUM CARBONATE 200(500)MG
500 TABLET,CHEWABLE ORAL 3 TIMES DAILY PRN
Status: DISCONTINUED | OUTPATIENT
Start: 2025-06-17 | End: 2025-06-24 | Stop reason: HOSPADM

## 2025-06-17 RX ORDER — ACETAMINOPHEN 325 MG/1
650 TABLET ORAL EVERY 4 HOURS PRN
Status: DISCONTINUED | OUTPATIENT
Start: 2025-06-17 | End: 2025-06-24 | Stop reason: HOSPADM

## 2025-06-17 RX ORDER — SULFAMETHOXAZOLE AND TRIMETHOPRIM 400; 80 MG/1; MG/1
1 TABLET ORAL
Status: DISCONTINUED | OUTPATIENT
Start: 2025-06-18 | End: 2025-06-24 | Stop reason: HOSPADM

## 2025-06-17 RX ORDER — CARVEDILOL 6.25 MG/1
6.25 TABLET ORAL 2 TIMES DAILY
Status: DISCONTINUED | OUTPATIENT
Start: 2025-06-17 | End: 2025-06-24 | Stop reason: HOSPADM

## 2025-06-17 RX ORDER — PANTOPRAZOLE SODIUM 40 MG/1
40 TABLET, DELAYED RELEASE ORAL DAILY
Status: DISCONTINUED | OUTPATIENT
Start: 2025-06-18 | End: 2025-06-24 | Stop reason: HOSPADM

## 2025-06-17 RX ORDER — PRAVASTATIN SODIUM 40 MG/1
40 TABLET ORAL DAILY
Status: DISCONTINUED | OUTPATIENT
Start: 2025-06-18 | End: 2025-06-24 | Stop reason: HOSPADM

## 2025-06-17 RX ORDER — TRAZODONE HYDROCHLORIDE 50 MG/1
50 TABLET ORAL NIGHTLY
Status: DISCONTINUED | OUTPATIENT
Start: 2025-06-17 | End: 2025-06-24 | Stop reason: HOSPADM

## 2025-06-17 RX ORDER — ONDANSETRON 8 MG/1
8 TABLET, ORALLY DISINTEGRATING ORAL EVERY 6 HOURS PRN
Status: DISCONTINUED | OUTPATIENT
Start: 2025-06-17 | End: 2025-06-24 | Stop reason: HOSPADM

## 2025-06-17 RX ORDER — GABAPENTIN 100 MG/1
100 CAPSULE ORAL 3 TIMES DAILY
Status: DISCONTINUED | OUTPATIENT
Start: 2025-06-18 | End: 2025-06-24 | Stop reason: HOSPADM

## 2025-06-17 RX ORDER — ALPRAZOLAM 0.25 MG/1
0.5 TABLET ORAL 3 TIMES DAILY
Status: DISCONTINUED | OUTPATIENT
Start: 2025-06-18 | End: 2025-06-18

## 2025-06-17 RX ORDER — CLONIDINE HYDROCHLORIDE 0.1 MG/1
0.2 TABLET ORAL NIGHTLY
Status: DISCONTINUED | OUTPATIENT
Start: 2025-06-17 | End: 2025-06-24 | Stop reason: HOSPADM

## 2025-06-17 RX ORDER — PREDNISONE 5 MG/1
5 TABLET ORAL DAILY
Status: DISCONTINUED | OUTPATIENT
Start: 2025-06-18 | End: 2025-06-24 | Stop reason: HOSPADM

## 2025-06-17 RX ORDER — VILAZODONE HYDROCHLORIDE 10 MG/1
40 TABLET ORAL DAILY
Status: DISCONTINUED | OUTPATIENT
Start: 2025-06-18 | End: 2025-06-24 | Stop reason: HOSPADM

## 2025-06-17 RX ADMIN — BUSPIRONE HYDROCHLORIDE 5 MG: 5 TABLET ORAL at 10:06

## 2025-06-17 RX ADMIN — CHLORTHALIDONE 12.5 MG: 25 TABLET ORAL at 10:06

## 2025-06-17 RX ADMIN — CLONIDINE HYDROCHLORIDE 0.2 MG: 0.1 TABLET ORAL at 10:06

## 2025-06-17 RX ADMIN — SODIUM CHLORIDE 1000 ML: 9 INJECTION, SOLUTION INTRAVENOUS at 05:06

## 2025-06-17 RX ADMIN — CARVEDILOL 6.25 MG: 6.25 TABLET, FILM COATED ORAL at 10:06

## 2025-06-17 RX ADMIN — SODIUM CHLORIDE 2000 MG: 9 INJECTION, SOLUTION INTRAVENOUS at 10:06

## 2025-06-17 RX ADMIN — TRAZODONE HYDROCHLORIDE 50 MG: 50 TABLET ORAL at 10:06

## 2025-06-17 RX ADMIN — ONDANSETRON 4 MG: 2 INJECTION INTRAMUSCULAR; INTRAVENOUS at 05:06

## 2025-06-17 NOTE — FIRST PROVIDER EVALUATION
Emergency Department TeleTriage Encounter Note      CHIEF COMPLAINT    Chief Complaint   Patient presents with    Vomiting     Patient c/o vomiting x 1 week. Had a kidney transplant on April 20th.      VITAL SIGNS   Initial Vitals [06/17/25 1525]   BP Pulse Resp Temp SpO2   (!) 148/89 (!) 113 15 97.8 °F (36.6 °C) 100 %      MAP       --          ALLERGIES    Review of patient's allergies indicates:   Allergen Reactions    Amlodipine Itching and Swelling     PROVIDER TRIAGE NOTE  Verbal consent for the teletriage evaluation was given by the patient at the start of the evaluation.  All efforts will be made to maintain patient's privacy during the evaluation.      This is a teletriage evaluation of a 50 y.o. female presenting to the ED with c/o nausea, vomiting, abdominal pain that started 4 days ago.  S/P kidney transplant 4/2025.  Instructed to come to the ED by transplant coordinator at [facility]. Limited physical exam via telehealth: The patient is awake, alert, answering questions appropriately and is not in respiratory distress.  As the Teletriage provider, I performed an initial assessment and ordered appropriate labs and imaging studies, if any, to facilitate the patient's care once placed in the ED. Once a room is available, care and a full evaluation will be completed by an alternate ED provider.  Any additional orders and the final disposition will be determined by that provider.  All imaging and labs will not be followed-up by the Teletriage Team, including myself.      ORDERS  Labs Reviewed - No data to display    ED Orders (720h ago, onward)      Start Ordered     Status Ordering Provider    06/17/25 1833 06/17/25 1532  Lactic acid, plasma #2  Once Timed         Ordered AFTAB MEHTA    06/17/25 1533 06/17/25 1532  ED Preference List Used to Initiate Sepsis Orders  Until discontinued         Ordered AFTAB MEHTA    06/17/25 1533 06/17/25 1532  Blood culture x two cultures. Draw prior to antibiotics.  Every 15  min      Comments: Aerobic and anaerobic     Start Status Ordering Provider   06/17/25 1533 Scheduled AFTAB MEHTA   06/17/25 1548 Scheduled AFTAB MEHTA       Ordered TYSONAFTBA MYERS    06/17/25 1533 06/17/25 1532  CBC auto differential  STAT         Ordered AFTAB MEHTA    06/17/25 1533 06/17/25 1532  Comprehensive metabolic panel  STAT         Ordered AFTAB MEHTA    06/17/25 1533 06/17/25 1532  POCT Lactate #1  Once         Ordered AFTAB MEHTA    06/17/25 1533 06/17/25 1532  Vital signs  Every 15 min        Comments: Every 15 minutes until SBP greater than 90 or MAP greater than 65, then every 30 minutes times one hour, then every one hour.    Ordered TYSONAFTAB    06/17/25 1533 06/17/25 1532  Bed rest  Until discontinued         Ordered TYSON AFTAB ABRAMS    06/17/25 1533 06/17/25 1532  Cardiac Monitoring - Adult  Continuous        Comments: Notify Physician If:    Ordered TYSON AFTAB ABRAMS    06/17/25 1533 06/17/25 1532  Pulse Oximetry Continuous  Continuous         Ordered TYSON AFTAB ABRAMS    06/17/25 1533 06/17/25 1532  Strict intake and output  Until discontinued         Ordered TYSON AFTAB ABRAMS    06/17/25 1533 06/17/25 1532  Urinalysis, Reflex to Urine Culture Urine, Clean Catch  STAT         Ordered TYSON AFTAB ABRAMS    06/17/25 1533 06/17/25 1532  Saline lock IV  Once         Ordered TYSON AFTAB ABRAMS    06/17/25 1533 06/17/25 1532  EKG 12-lead  Once         Ordered TYSON AFTAB ABRAMS    06/17/25 1533 06/17/25 1532  X-Ray Chest AP Portable  1 time imaging         Ordered AFTAB MEHTA    06/17/25 1533 06/17/25 1532  COVID-19 Rapid Screening  STAT         Ordered AFTAB MEHTA    06/17/25 1533 06/17/25 1532  Influenza A & B by Molecular  Once         Ordered TYSON AFTAB JOSE ALBERTO          Virtual Visit Note: The provider triage portion of this emergency department evaluation and documentation was performed via [employer], a HIPAA-compliant telemedicine application, in concert with a tele-presenter in the room. A face to face  patient evaluation with one of my colleagues will occur once the patient is placed in an emergency department room.      DISCLAIMER: This note was prepared with [employer] voice recognition transcription software. Garbled syntax, mangled pronouns, and other bizarre constructions may be attributed to that software system.

## 2025-06-17 NOTE — ED PROVIDER NOTES
Encounter Date: 2025       History     Chief Complaint   Patient presents with    Vomiting     Patient c/o vomiting x 1 week. Had a kidney transplant on .      HPI    50-year-old female with past medical history of type 2 diabetes complicated by diabetic nephropathy s/p right-sided renal transplant currently on CellCept and Prograf, hypertension hyperlipidemia who presents to the emergency department for chronic abdominal pain primarily in the lower quadrants associated with 2 days of nausea and nonbloody nonbilious emesis with inability to tolerate p.o. intake including medications.  Denies any fevers, chills, chest pain, shortness of breath.  Reports nonbloody diarrhea and dysuria.  Reports her abdominal pain feels like knots in her lower abdomen similar to previous presentations in the outpatient clinic setting following her transplant.    Review of patient's allergies indicates:   Allergen Reactions    Amlodipine Itching and Swelling     Past Medical History:   Diagnosis Date    Allergy     Anemia     Anxiety     Depression     Diabetes mellitus, type 2     Dialysis patient     Disorder of kidney and ureter     Hyperlipidemia     Hypertension     Obesity     Pyelonephritis, acute 2025     Past Surgical History:   Procedure Laterality Date    AV FISTULA PLACEMENT  2022     SECTION      COLONOSCOPY N/A 2023    Procedure: COLONOSCOPY;  Surgeon: Hermilo Orozco III, MD;  Location: [facility];  Service: Endoscopy;  Laterality: N/A;    COLONOSCOPY N/A 2024    Procedure: COLONOSCOPY;  Surgeon: Hermilo Orozco III, MD;  Location: [facility];  Service: Endoscopy;  Laterality: N/A;    HYSTERECTOMY      INSERTION OF DIALYSIS CATHETER      KIDNEY TRANSPLANT N/A 2025    Procedure: TRANSPLANT, KIDNEY;  Surgeon: Josh Cameron Jr., MD;  Location: [address];  Service: Transplant;  Laterality: N/A;  Out of Ice 0030  Reperfusion 0102    TUBAL LIGATION       Family  History   Problem Relation Name Age of Onset    Heart disease Father      No Known Problems Daughter       Social History[1]  Review of Systems  Pertinent review of systems as stated above in the HPI.    Physical Exam     Initial Vitals [06/17/25 1525]   BP Pulse Resp Temp SpO2   (!) 148/89 (!) 113 15 97.8 °F (36.6 °C) 100 %      MAP       --         Physical Exam    Nursing note and vitals reviewed.  Constitutional: She appears well-developed and well-nourished. No distress.   HENT:   Head: Normocephalic and atraumatic.   Eyes: EOM are normal. Pupils are equal, round, and reactive to light.   Neck: Neck supple.   Normal range of motion.  Cardiovascular:  Normal rate and regular rhythm.           Pulmonary/Chest: Breath sounds normal.   Abdominal: Abdomen is soft.   Mild tenderness to palpation over the lower quadrants of the abdomen, incision site over lower abdomen with 3 cm area of superficial dehiscence with superficial packing, no obvious signs of infection, no purulent drainage.   Musculoskeletal:         General: Normal range of motion.      Cervical back: Normal range of motion and neck supple.     Neurological: She is alert and oriented to person, place, and time.   Skin: Skin is warm and dry.   Psychiatric: She has a normal mood and affect. Her behavior is normal.         ED Course   Procedures  Labs Reviewed   COMPREHENSIVE METABOLIC PANEL - Abnormal       Result Value    Sodium 135 (*)     Potassium 4.4      Chloride 111 (*)     CO2 12 (*)     Glucose 158 (*)     BUN 81 (*)     Creatinine 3.7 (*)     Calcium 9.6      Protein Total 6.6      Albumin 4.0      Bilirubin Total 0.5      ALP 69      AST 7 (*)     ALT 5 (*)     Anion Gap 12      eGFR 14 (*)    CBC WITH DIFFERENTIAL - Abnormal    WBC 6.31      RBC 3.42 (*)     Hgb 10.9 (*)     Hct 33.4 (*)     MCV 98      MCH 31.9 (*)     MCHC 32.6      RDW 14.7 (*)     Platelet Count 247      MPV 9.0 (*)     Nucleated RBC 0     MANUAL DIFFERENTIAL - Abnormal     Segmented Neutrophil % 52.0      Bands % 27.0      Lymphocyte % 3.0 (*)     Monocyte % 14.0      Eosinophil % 4.0     CULTURE, BLOOD - Normal    CULTURE, BLOOD (SM) No Growth After 48 Hours     CULTURE, BLOOD - Normal    CULTURE, BLOOD (SM) No Growth After 48 Hours     INFLUENZA A & B BY MOLECULAR - Normal    INFLUENZA A MOLECULAR Negative      INFLUENZA B MOLECULAR  Negative     SARS-COV-2 RNA AMPLIFICATION, QUAL - Normal    SARS COV-2 Molecular Negative     CBC W/ AUTO DIFFERENTIAL    Narrative:     The following orders were created for panel order CBC auto differential.  Procedure                               Abnormality         Status                     ---------                               -----------         ------                     CBC with Differential[[id number]]       Abnormal            Final result               Manual Differential[[id number]]         Abnormal            Final result                 Please view results for these tests on the individual orders.   EXTRA TUBES    Narrative:     The following orders were created for panel order EXTRA TUBES.  Procedure                               Abnormality         Status                     ---------                               -----------         ------                     Light Blue Top Hold[[id number]]                             In process                 Red Top Hold[[id number]]                                    In process                 Gold Top Hold[[id number]]                                   In process                   Please view results for these tests on the individual orders.   LIGHT BLUE TOP HOLD   RED TOP HOLD   GOLD TOP HOLD   EXTRA TUBES    Narrative:     The following orders were created for panel order EXTRA TUBES.  Procedure                               Abnormality         Status                     ---------                               -----------         ------                     Pink Top Hold[[id number]]                                    In process                   Please view results for these tests on the individual orders.   PINK TOP HOLD   ISTAT LACTATE    POC Lactate 0.56      Sample VENOUS          ECG Results              EKG 12-lead (Final result)        Collection Time Result Time QRS Duration [facility] QTC Calculation    06/17/25 18:31:25 06/18/25 21:35:12 80 454                     Final result by Interface, Lab In [facility] (06/18/25 21:35:19)                   Narrative:    Test Reason : R00.0,    Vent. Rate : 114 BPM     Atrial Rate : 114 BPM     P-R Int : 140 ms          QRS Dur :  80 ms      QT Int : 330 ms       P-R-T Axes :  80  23  78 degrees    QTcB Int : 454 ms    Sinus tachycardia  Otherwise normal ECG  When compared with ECG of 19-Apr-2025 20:00,  No significant change was found  Confirmed by Nik Frankel ([id number]) on 6/18/2025 9:35:07 PM    Referred By: AAAREFERRAL SELF           Confirmed By: Nik Frankel                                  Imaging Results              CT Abdomen Pelvis  Without Contrast (Final result)  Result time 06/17/25 19:41:50      Final result by Kiik Pena MD (06/17/25 19:41:50)                   Impression:      Edematous right lower quadrant renal transplant with adjacent infectious/inflammatory edema.    Bladder wall thickening.  Correlate for cystitis.    Mild gallbladder wall thickening with adjacent fat stranding. Correlate for acute cholecystitis.    Liquid stool throughout the colon and rectum suggestive of diarrheal illness.      Electronically signed by: Kiki Pena  Date:    06/17/2025  Time:    19:41               Narrative:    EXAMINATION:  CT ABDOMEN PELVIS WITHOUT CONTRAST    CLINICAL HISTORY:  LLQ abdominal pain;RLQ abdominal pain (Age >= 14y);History of renal transplant with concern for persistent abdominal pain near incision site;    TECHNIQUE:  Low dose axial images, sagittal and coronal reformations were obtained from the lung bases to the pubic  symphysis.  Oral contrast was not administered.    COMPARISON:  None    FINDINGS:  Soft tissues: Unremarkable.    Bones: No acute osseous abnormality.    Lower chest: Normal heart size. No pericardial effusion.    Lung Bases: Well aerated, without consolidation or pleural fluid.    Liver: Normal in size and attenuation, with no focal hepatic lesions.    Gallbladder: Mild gallbladder wall thickening with adjacent fat stranding.  Correlate for acute cholecystitis.    Bile Ducts: No evidence of dilated ducts.    Pancreas: No mass or peripancreatic fat stranding.    Spleen: Unremarkable.    Adrenals: Unremarkable.    Kidneys/ Ureters: Edematous right lower quadrant renal transplant with adjacent infectious/inflammatory edema.  Small and atrophic.  Left renal cyst and additional bilateral hypodense lesions which are too small to definitely characterize.    Bladder: Bladder wall thickening, although collapsed.    Reproductive organs: Unremarkable.    GI Tract/Mesentery: Liquid stool throughout the colon and rectum suggestive of diarrheal illness.  Colonic diverticulosis.  No small bowel obstruction.    Peritoneal Space: No ascites. No free air.    Lymphadenopathy: No significant adenopathy.    Vasculature: Multifocal atherosclerosis.                                       X-Ray Chest PA And Lateral (Final result)  Result time 06/17/25 16:04:28   Procedure changed from X-Ray Chest AP Portable     Final result by Fernie Dangelo DO (06/17/25 16:04:28)                   Impression:      No acute cardiopulmonary process.      Electronically signed by: Fernie Dangelo  Date:    06/17/2025  Time:    16:04               Narrative:    EXAMINATION:  XR CHEST PA AND LATERAL    CLINICAL HISTORY:  Sepsis;    FINDINGS:  PA and lateral chest with comparison chest x-ray 04/19/2025.  Cardiomediastinal silhouette is within normal limits. Lungs are clear. Pulmonary vasculature is normal. No acute osseous abnormality.                                        Medications   sodium chloride 0.9% bolus 1,000 mL 1,000 mL (0 mLs Intravenous Stopped 6/17/25 1847)   ondansetron injection 4 mg (4 mg Intravenous Given 6/17/25 1747)     Medical Decision Making  50-year-old female with past medical history of type 2 diabetes complicated by diabetic nephropathy s/p right-sided renal transplant currently on CellCept and Prograf, hypertension hyperlipidemia who presents to the emergency department for chronic abdominal pain primarily in the lower quadrants associated with 2 days of nausea and nonbloody nonbilious emesis with inability to tolerate p.o. intake including medications.  She presents A&O x4 GCS 15 with stable vital signs.  Physical exam notable for Mild tenderness to palpation over the lower quadrants of the abdomen, incision site over lower abdomen with 3 cm area of superficial dehiscence with superficial packing, no obvious signs of infection, no purulent drainage.  Otherwise cardiopulmonary exam within normal limits.  Given presentation can not rule out acute intra-abdominal pathology in the setting of intractable nausea/vomiting.  We will plan for broad laboratory workup, CT abdomen and pelvis without contrast in the setting of recent renal transplant.  Anticipate patient will likely require formal discussion with her transplant team at [facility] however will continue to fluid resuscitate provide antiemetics while patient remains on emergency department.  Dispo pending labs and imaging.  Updates in the ED course.  Case discussed with staff.    Joe Stubbs MD.   U EM PGY3    Amount and/or Complexity of Data Reviewed  Labs:  Decision-making details documented in ED Course.  Radiology: ordered. Decision-making details documented in ED Course.    Risk  Prescription drug management.              Attending Attestation:   Physician Attestation Statement for Resident:  As the supervising MD   Physician Attestation Statement: I have personally seen  and examined this patient.   I agree with the above history.  -:   As the supervising MD I agree with the above PE.     As the supervising MD I agree with the above treatment, course, plan, and disposition.   -: Brande Reyer is a 50 y.o. female presenting with nonspecific symptoms of weeks of prolonged abdominal pain worse of late, emphasize and lower abdomen in the setting of more recent nonbilious, nonbloody vomiting and nonbloody diarrhea.  Patient is having difficulty with medications including immunosuppressants in the setting of recent transplant with CKD and transplanted kidney with several recent biopsies.  Patient will be admitted for fluids and antiemetics to ensure compliance and we have arranged for transfer for involvement of transplant team after discussion.  CT shows nonspecific findings of possible gallbladder pathology with further ultrasound to clarify admitting team discretion.  I have low suspicion for cholecystitis.  I do not think empiric antibiotics are indicated and may be deferred to admitting team reassessment.  I do not think she would benefit from emergent cholecystectomy from the ED. I am awaiting urine results to exclude UTI prior to further decision on antibiotics.  I doubt severe sepsis.  Patient improved with antiemetics and IV fluids thus far here with mild persistent tachycardia pending ongoing resuscitation.                   ED Course as of 06/20/25 0802   Tue Jun 17, 2025   1741 CO2(!): 12 [AS]   1741 BUN(!): 81 [AS]   1741 Creatinine(!): 3.7 [AS]   1758 X-Ray Chest PA And Lateral  On my interpretation with no acute cardiopulmonary process. [AS]   1844 EKG:  Sinus tachycardia, rate of 114, normal intervals and axis.  There are no acute ST or T wave changes suggestive of acute ischemia or infarction.  (Independently interpreted by me) [MR]   1926 Discussed patient's case with transfer services and transplant surgeon at [facility] who recommend formal transfer to  inpatient transplant Services for further workup and management.  CT abdomen pelvis pending.  Dispo: Transfer.  Case discussed with staff. [AS]   1954 CT Abdomen Pelvis  Without Contrast  Edematous right lower quadrant renal transplant with adjacent infectious/inflammatory edema.     Bladder wall thickening.  Correlate for cystitis.     Mild gallbladder wall thickening with adjacent fat stranding. Correlate for acute cholecystitis.     Liquid stool throughout the colon and rectum suggestive of diarrheal illness.   [AS]      ED Course User Index  [AS] Joe Stubbs MD  [MR] Kailash Zepeda MD                           Clinical Impression:  Final diagnoses:  [R00.0] Tachycardia          ED Disposition Condition    Transfer to Another Facility Stable                      [1]   Social History  Tobacco Use    Smoking status: Former     Current packs/day: 0.00     Types: Cigarettes     Quit date: 2022     Years since quitting: 3.4    Smokeless tobacco: Never   Substance Use Topics    Alcohol use: Not Currently    Drug use: Never        Kailash Zepeda MD  06/20/25 0802

## 2025-06-17 NOTE — TELEPHONE ENCOUNTER
Pt sent to ER  N/V/D since Tuesday.   Hasn't taken meds since Sunday.     ----- Message from Andrea Adhikari MD sent at 6/17/2025 12:05 PM CDT -----  Elevated tac in conjunction with elevated creatinine. Biopsy earlier this month with no rejection. Not sure why her tac level is high. Please confirm she doesn't have any sick symptoms like bad   diarrhea which could cause this. If she has illness and these levels, then she needs to go to local ED. If true trough, please ask her to hold tonights dose and decrease dose from 8/8 to 5/5 starting   tomorrow morning. Repeat renal function panel and tac at the end of the week. Patient appears to have stopped taking baking soda. Please ask her to now take sodium bicarbonate pills, 1950 TID for now   until her bicarb comes up to reasonable level. Other labs acceptable.  ----- Message -----  From: Lab, Background User  Sent: 6/16/2025  10:17 AM CDT  To: Andrea Adhikari Jr., MD

## 2025-06-18 PROBLEM — R19.7 DIARRHEA: Status: ACTIVE | Noted: 2025-06-18

## 2025-06-18 PROBLEM — E87.20 METABOLIC ACIDOSIS: Status: ACTIVE | Noted: 2025-06-18

## 2025-06-18 PROBLEM — N10 PYELONEPHRITIS, ACUTE: Status: ACTIVE | Noted: 2025-06-18

## 2025-06-18 LAB
ABSOLUTE EOSINOPHIL (OHS): 0.12 K/UL
ABSOLUTE MONOCYTE (OHS): 0.61 K/UL (ref 0.3–1)
ABSOLUTE NEUTROPHIL COUNT (OHS): 4.14 K/UL (ref 1.8–7.7)
ADV 40+41 DNA STL QL NAA+NON-PROBE: NOT DETECTED
ALBUMIN SERPL BCP-MCNC: 3.1 G/DL (ref 3.5–5.2)
ANION GAP (OHS): 10 MMOL/L (ref 8–16)
ANISOCYTOSIS BLD QL SMEAR: SLIGHT
ASTRO TYP 1-8 RNA STL QL NAA+NON-PROBE: NOT DETECTED
BACTERIA #/AREA URNS AUTO: ABNORMAL /HPF
BASOPHILS # BLD AUTO: 0.03 K/UL
BASOPHILS NFR BLD AUTO: 0.6 %
BILIRUB UR QL STRIP.AUTO: NEGATIVE
BUN SERPL-MCNC: 76 MG/DL (ref 6–20)
BURR CELLS BLD QL SMEAR: NORMAL
C CAYETANENSIS DNA STL QL NAA+NON-PROBE: NOT DETECTED
C COLI+JEJ+UPSA DNA STL QL NAA+NON-PROBE: NOT DETECTED
C DIFF GDH STL QL: NEGATIVE
C DIFF TOX A+B STL QL IA: NEGATIVE
CALCIUM SERPL-MCNC: 8.6 MG/DL (ref 8.7–10.5)
CHLORIDE SERPL-SCNC: 112 MMOL/L (ref 95–110)
CLARITY UR: ABNORMAL
CO2 SERPL-SCNC: 12 MMOL/L (ref 23–29)
COLOR UR AUTO: YELLOW
CREAT SERPL-MCNC: 3.3 MG/DL (ref 0.5–1.4)
CRYPTOSP DNA STL QL NAA+NON-PROBE: NOT DETECTED
DACRYOCYTES BLD QL SMEAR: NORMAL
E HISTOLYT DNA STL QL NAA+NON-PROBE: NOT DETECTED
EAEC PAA PLAS AGGR+AATA ST NAA+NON-PRB: NOT DETECTED
EC STX1+STX2 GENES STL QL NAA+NON-PROBE: NOT DETECTED
EPEC EAE GENE STL QL NAA+NON-PROBE: NOT DETECTED
ERYTHROCYTE [DISTWIDTH] IN BLOOD BY AUTOMATED COUNT: 14.2 % (ref 11.5–14.5)
ETEC LTA+ST1A+ST1B TOX ST NAA+NON-PROBE: NOT DETECTED
G LAMBLIA DNA STL QL NAA+NON-PROBE: NOT DETECTED
GFR SERPLBLD CREATININE-BSD FMLA CKD-EPI: 16 ML/MIN/1.73/M2
GLUCOSE SERPL-MCNC: 178 MG/DL (ref 70–110)
GLUCOSE UR QL STRIP: NEGATIVE
GRAN CASTS UR QL COMP ASSIST: 4 /LPF (ref ?–0)
HCT VFR BLD AUTO: 29.2 % (ref 37–48.5)
HGB BLD-MCNC: 9.5 GM/DL (ref 12–16)
HGB UR QL STRIP: ABNORMAL
HYALINE CASTS UR QL AUTO: 0 /LPF (ref 0–1)
IMM GRANULOCYTES # BLD AUTO: 0.05 K/UL (ref 0–0.04)
IMM GRANULOCYTES NFR BLD AUTO: 1 % (ref 0–0.5)
KETONES UR QL STRIP: NEGATIVE
LEUKOCYTE ESTERASE UR QL STRIP: ABNORMAL
LYMPHOCYTES # BLD AUTO: 0.22 K/UL (ref 1–4.8)
MAGNESIUM SERPL-MCNC: 1.3 MG/DL (ref 1.6–2.6)
MCH RBC QN AUTO: 31.7 PG (ref 27–31)
MCHC RBC AUTO-ENTMCNC: 32.5 G/DL (ref 32–36)
MCV RBC AUTO: 97 FL (ref 82–98)
MICROSCOPIC COMMENT: ABNORMAL
NITRITE UR QL STRIP: NEGATIVE
NOROVIRUS GI+II RNA STL QL NAA+NON-PROBE: NOT DETECTED
NUCLEATED RBC (/100WBC) (OHS): 0 /100 WBC
OHS QRS DURATION: 80 MS
OHS QTC CALCULATION: 454 MS
OVALOCYTES BLD QL SMEAR: NORMAL
P SHIGELLOIDES DNA STL QL NAA+NON-PROBE: NOT DETECTED
PH UR STRIP: 5 [PH]
PHOSPHATE SERPL-MCNC: 2 MG/DL (ref 2.7–4.5)
PLATELET # BLD AUTO: 193 K/UL (ref 150–450)
PMV BLD AUTO: 9.6 FL (ref 9.2–12.9)
POIKILOCYTOSIS BLD QL SMEAR: SLIGHT
POLYCHROMASIA BLD QL SMEAR: NORMAL
POTASSIUM SERPL-SCNC: 4.5 MMOL/L (ref 3.5–5.1)
PROT UR QL STRIP: ABNORMAL
RBC # BLD AUTO: 3 M/UL (ref 4–5.4)
RBC #/AREA URNS AUTO: 5 /HPF (ref 0–4)
RELATIVE EOSINOPHIL (OHS): 2.3 %
RELATIVE LYMPHOCYTE (OHS): 4.3 % (ref 18–48)
RELATIVE MONOCYTE (OHS): 11.8 % (ref 4–15)
RELATIVE NEUTROPHIL (OHS): 80 % (ref 38–73)
RVA RNA STL QL NAA+NON-PROBE: NOT DETECTED
S ENT+BONG DNA STL QL NAA+NON-PROBE: NOT DETECTED
SAPO I+II+IV+V RNA STL QL NAA+NON-PROBE: NOT DETECTED
SCHISTOCYTES BLD QL SMEAR: NORMAL
SHIGELLA SP+EIEC IPAH ST NAA+NON-PROBE: NOT DETECTED
SODIUM SERPL-SCNC: 134 MMOL/L (ref 136–145)
SP GR UR STRIP: 1.02
SQUAMOUS #/AREA URNS AUTO: 1 /HPF
TACROLIMUS BLD-MCNC: 3.6 NG/ML (ref 5–15)
UROBILINOGEN UR STRIP-ACNC: NEGATIVE EU/DL
V CHOL+PARA+VUL DNA STL QL NAA+NON-PROBE: NOT DETECTED
V CHOLERAE DNA STL QL NAA+NON-PROBE: NOT DETECTED
WBC # BLD AUTO: 5.17 K/UL (ref 3.9–12.7)
WBC #/AREA STL HPF: NORMAL /[HPF]
WBC #/AREA URNS AUTO: 20 /HPF (ref 0–5)
Y ENTEROCOL DNA STL QL NAA+NON-PROBE: NOT DETECTED

## 2025-06-18 PROCEDURE — 63600175 PHARM REV CODE 636 W HCPCS: Performed by: CLINICAL NURSE SPECIALIST

## 2025-06-18 PROCEDURE — 94761 N-INVAS EAR/PLS OXIMETRY MLT: CPT

## 2025-06-18 PROCEDURE — 83735 ASSAY OF MAGNESIUM: CPT

## 2025-06-18 PROCEDURE — 25000003 PHARM REV CODE 250: Performed by: CLINICAL NURSE SPECIALIST

## 2025-06-18 PROCEDURE — 36415 COLL VENOUS BLD VENIPUNCTURE: CPT

## 2025-06-18 PROCEDURE — 20600001 HC STEP DOWN PRIVATE ROOM

## 2025-06-18 PROCEDURE — 85025 COMPLETE CBC W/AUTO DIFF WBC: CPT

## 2025-06-18 PROCEDURE — 87209 SMEAR COMPLEX STAIN: CPT

## 2025-06-18 PROCEDURE — 99499 UNLISTED E&M SERVICE: CPT | Mod: ,,, | Performed by: CLINICAL NURSE SPECIALIST

## 2025-06-18 PROCEDURE — 80197 ASSAY OF TACROLIMUS: CPT

## 2025-06-18 PROCEDURE — 87324 CLOSTRIDIUM AG IA: CPT

## 2025-06-18 PROCEDURE — 99223 1ST HOSP IP/OBS HIGH 75: CPT | Mod: ,,,

## 2025-06-18 PROCEDURE — 87329 GIARDIA AG IA: CPT

## 2025-06-18 PROCEDURE — 87046 STOOL CULTR AEROBIC BACT EA: CPT

## 2025-06-18 PROCEDURE — 27000207 HC ISOLATION

## 2025-06-18 PROCEDURE — 80069 RENAL FUNCTION PANEL: CPT

## 2025-06-18 PROCEDURE — 63600175 PHARM REV CODE 636 W HCPCS

## 2025-06-18 PROCEDURE — 87045 FECES CULTURE AEROBIC BACT: CPT

## 2025-06-18 PROCEDURE — 25000003 PHARM REV CODE 250

## 2025-06-18 PROCEDURE — 87507 IADNA-DNA/RNA PROBE TQ 12-25: CPT

## 2025-06-18 PROCEDURE — 89055 LEUKOCYTE ASSESSMENT FECAL: CPT

## 2025-06-18 PROCEDURE — 87427 SHIGA-LIKE TOXIN AG IA: CPT

## 2025-06-18 PROCEDURE — 81001 URINALYSIS AUTO W/SCOPE: CPT

## 2025-06-18 PROCEDURE — 87086 URINE CULTURE/COLONY COUNT: CPT

## 2025-06-18 RX ORDER — TACROLIMUS 1 MG/1
8 CAPSULE ORAL 2 TIMES DAILY
Status: DISCONTINUED | OUTPATIENT
Start: 2025-06-18 | End: 2025-06-19

## 2025-06-18 RX ORDER — TRAMADOL HYDROCHLORIDE 50 MG/1
50 TABLET, FILM COATED ORAL EVERY 4 HOURS PRN
Status: DISCONTINUED | OUTPATIENT
Start: 2025-06-18 | End: 2025-06-24 | Stop reason: HOSPADM

## 2025-06-18 RX ORDER — MAGNESIUM SULFATE HEPTAHYDRATE 40 MG/ML
2 INJECTION, SOLUTION INTRAVENOUS
Status: COMPLETED | OUTPATIENT
Start: 2025-06-18 | End: 2025-06-18

## 2025-06-18 RX ORDER — SODIUM CHLORIDE 9 MG/ML
INJECTION, SOLUTION INTRAVENOUS CONTINUOUS
Status: DISCONTINUED | OUTPATIENT
Start: 2025-06-18 | End: 2025-06-18

## 2025-06-18 RX ORDER — SODIUM BICARBONATE 650 MG/1
1300 TABLET ORAL 3 TIMES DAILY
Status: DISCONTINUED | OUTPATIENT
Start: 2025-06-18 | End: 2025-06-24 | Stop reason: HOSPADM

## 2025-06-18 RX ORDER — TACROLIMUS 1 MG/1
8 CAPSULE ORAL ONCE
Status: COMPLETED | OUTPATIENT
Start: 2025-06-18 | End: 2025-06-18

## 2025-06-18 RX ADMIN — CARVEDILOL 6.25 MG: 6.25 TABLET, FILM COATED ORAL at 09:06

## 2025-06-18 RX ADMIN — SODIUM BICARBONATE 650 MG TABLET 1300 MG: at 08:06

## 2025-06-18 RX ADMIN — MAGNESIUM SULFATE HEPTAHYDRATE 2 G: 40 INJECTION, SOLUTION INTRAVENOUS at 10:06

## 2025-06-18 RX ADMIN — GABAPENTIN 100 MG: 100 CAPSULE ORAL at 08:06

## 2025-06-18 RX ADMIN — TRAMADOL HYDROCHLORIDE 50 MG: 50 TABLET, COATED ORAL at 08:06

## 2025-06-18 RX ADMIN — SULFAMETHOXAZOLE AND TRIMETHOPRIM 1 TABLET: 400; 80 TABLET ORAL at 09:06

## 2025-06-18 RX ADMIN — HEPARIN SODIUM 5000 UNITS: 5000 INJECTION INTRAVENOUS; SUBCUTANEOUS at 03:06

## 2025-06-18 RX ADMIN — PRAVASTATIN SODIUM 40 MG: 40 TABLET ORAL at 08:06

## 2025-06-18 RX ADMIN — MAGNESIUM SULFATE HEPTAHYDRATE 2 G: 40 INJECTION, SOLUTION INTRAVENOUS at 03:06

## 2025-06-18 RX ADMIN — BUSPIRONE HYDROCHLORIDE 5 MG: 5 TABLET ORAL at 08:06

## 2025-06-18 RX ADMIN — PIPERACILLIN SODIUM AND TAZOBACTAM SODIUM 4.5 G: 4; .5 INJECTION, POWDER, FOR SOLUTION INTRAVENOUS at 08:06

## 2025-06-18 RX ADMIN — PIPERACILLIN SODIUM AND TAZOBACTAM SODIUM 4.5 G: 4; .5 INJECTION, POWDER, FOR SOLUTION INTRAVENOUS at 10:06

## 2025-06-18 RX ADMIN — PANTOPRAZOLE SODIUM 40 MG: 40 TABLET, DELAYED RELEASE ORAL at 08:06

## 2025-06-18 RX ADMIN — VALGANCICLOVIR HYDROCHLORIDE 450 MG: 450 TABLET ORAL at 09:06

## 2025-06-18 RX ADMIN — TACROLIMUS 8 MG: 1 CAPSULE ORAL at 05:06

## 2025-06-18 RX ADMIN — TRAZODONE HYDROCHLORIDE 50 MG: 50 TABLET ORAL at 08:06

## 2025-06-18 RX ADMIN — GABAPENTIN 100 MG: 100 CAPSULE ORAL at 03:06

## 2025-06-18 RX ADMIN — VILAZODONE HYDROCHLORIDE 40 MG: 10 TABLET ORAL at 11:06

## 2025-06-18 RX ADMIN — SODIUM BICARBONATE 650 MG TABLET 1300 MG: at 03:06

## 2025-06-18 RX ADMIN — PIPERACILLIN SODIUM AND TAZOBACTAM SODIUM 4.5 G: 4; .5 INJECTION, POWDER, FOR SOLUTION INTRAVENOUS at 03:06

## 2025-06-18 RX ADMIN — ONDANSETRON 8 MG: 8 TABLET, ORALLY DISINTEGRATING ORAL at 01:06

## 2025-06-18 RX ADMIN — SODIUM BICARBONATE: 84 INJECTION, SOLUTION INTRAVENOUS at 03:06

## 2025-06-18 RX ADMIN — TACROLIMUS 8 MG: 1 CAPSULE ORAL at 03:06

## 2025-06-18 RX ADMIN — CLONIDINE HYDROCHLORIDE 0.2 MG: 0.1 TABLET ORAL at 09:06

## 2025-06-18 RX ADMIN — CARVEDILOL 6.25 MG: 6.25 TABLET, FILM COATED ORAL at 08:06

## 2025-06-18 RX ADMIN — SODIUM PHOSPHATE, MONOBASIC, MONOHYDRATE AND SODIUM PHOSPHATE, DIBASIC, ANHYDROUS 30 MMOL: 142; 276 INJECTION, SOLUTION INTRAVENOUS at 11:06

## 2025-06-18 RX ADMIN — HEPARIN SODIUM 5000 UNITS: 5000 INJECTION INTRAVENOUS; SUBCUTANEOUS at 09:06

## 2025-06-18 RX ADMIN — CHLORTHALIDONE 12.5 MG: 25 TABLET ORAL at 09:06

## 2025-06-18 RX ADMIN — TRAMADOL HYDROCHLORIDE 50 MG: 50 TABLET, COATED ORAL at 05:06

## 2025-06-18 RX ADMIN — PREDNISONE 5 MG: 5 TABLET ORAL at 08:06

## 2025-06-18 NOTE — PROGRESS NOTES
Admit Note     Met with patient to assess needs. Patient is a 50 y.o.  female, admitted for Intractable nausea and vomiting [R11.2]  FOREST (acute kidney injury) [N17.9].      Patient admitted on 6/17/2025. At this time, patient presents as alert and oriented x 4, pleasant, concentration/judgement good, average intelligence, calm, communicative, cooperative, and asking and answering questions appropriately.  At this time, patients caregiver presents as out of room (pt reports  took a walk).    Household/Family Systems     Patient resides with patient's  and daughter 10yr old, at [address] [place] [address].  Support system includes  James Reyer, daughter Derek Street, son-in-law Kailash Street, and Mia Reyer sister-in-law.   .  Patient does have dependents that are in need of being cared for. Patient's daughter are being cared for by patient's ARACELI Mia.     Patients primary caregiver is Tylor Schulerjarek, patients , phone number [phone].  Confirmed patients contact information is [phone] (home);   Telephone Information:   Mobile [phone]   .    During admission, patient's caregiver plans to stay at home.  Confirmed patient and patients caregivers do have access to reliable transportation.    Cognitive Status/Learning     Patient reports reading ability as college and states patient does not have difficulty with reading, writing, seeing, hearing, comprehension, learning, and memory. Patient reports patient learns best by multisensory instructions.   Needed: No.   Highest education level: Associate/Bachelor Degree    Vocation/Disability   Working for Income: No      Adherence     Patient reports a high level of adherence to patients health care regimen.  Adherence counseling and education provided. Patient verbalizes understanding.    Substance Use    Patient reports the following substance usage.    Tobacco: none, patient denies any  "use.  Alcohol: none, patient denies any use.  Illicit Drugs/Non-prescribed Medications: none, patient denies any use.  Patient states clear understanding of the potential impact of substance use.  Substance abstinence/cessation counseling, education and resources provided and reviewed.     Services Utilizing/ADLS    Infusion Service: Prior to admission, patient utilizing? no  Home Health: Prior to admission, patient utilizing? no  DME: Prior to admission, no  Pulmonary/Cardiac Rehab: Prior to admission, no  Dialysis:  Prior to admission, yes "only for 6 cycles".   Transplant Specialty Pharmacy:  Prior to admission, yes; [facility], [facility] and [facility] .    Prior to admission, patient reports patient was not independent with ADLS and was not driving.  Patient reports patient is not able to care for self at this time due to compromised medical condition (as documented in medical record) and physical weakness..  Patient indicates a willingness to care for self once medically cleared to do so.    Insurance/Medications    Insured by   Payer/Plan Subscr  Sex Relation Sub. Ins. ID Effective Group Num   1. [employer] MANAGE* REYER,BRANDE 1975 Female Self [id number]                                   [address] [address]      Primary Insurance (for UNOS reporting): Public Insurance - Medicare & Choice  Secondary Insurance (for UNOS reporting): None    Patient reports patient is able to obtain and afford medications at this time and at time of discharge.    Living Will/Healthcare Power of     Patient states patient does not have a LW and/or HCPA.   provided education regarding LW and HCPA and the completion of forms.    Coping/Mental Health    Patient is coping adequately with the aid of  family members.   Patient denies mental health difficulties.     Discharge Planning    At time of discharge, patient plans to return to patient's home under the care of James Reyer.  Patients  " will transport patient.  Per rounds today, expected discharge date has not been medically determined at this time. Patient and patients caregiver  verbalize understanding and are involved in treatment planning and discharge process.    Additional Concerns     providing ongoing psychosocial support, education, resources and d/c planning as needed.  SW remains available.  provided resource list, patient choice, psychosocial and supportive counseling, resources, education, assistance and discharge planning with patient and caregiver involvement, ongoing SW availability and services as appropriate.  remains available.

## 2025-06-18 NOTE — HPI
Mrs. Reyer is a 49 y.o. woman who received a DBD KTX 4/20/24 for ESRD 2/2 DMII (CIT 10hrs 25 mins). Post op with DGF, HD. Cr very slowly trending down, last lowest 3.0. Rejection r/o with allosure and biopsy 6/4. Last US 5/26 stable. Last admit for anemia resolved with 2 units PRBCs.  She now presents as a transfer from OSH. Went to ED for lower abdominal pain and 2 day history of NV with associated decreased intake. Also endorsed dysuria and diarrhea. Denied fevers, chills, chest pain, shortness of breath. Recent labs notable for normal lactate, elevated Cr up to 4.2, bicarb 12. Hgb stable, no leukocytosis. BC from OSH pending. Flu and COVID negative. CT AP with edematous allograft + adjacent infectious/inflammatory edema, bladder wall thickening, evidence of diarrheal illness. CXR without acute process. Will obtain stool studies, start abx for poss pyelonephritis/UTI, f/u cultures, IVF, US for FOREST.

## 2025-06-18 NOTE — PROGRESS NOTES
[facility]  Kidney Transplant  Progress Note      Reason for Follow-up: Reassessment of Kidney Transplant - 4/20/2025  (#1) recipient and management of immunosuppression.    ORGAN:   RIGHT KIDNEY    Donor Type:   Donation after Brain Death    Donor CMV Status: Negative  Donor HBcAB:Negative  Donor HCV Status:Negative  Donor HBV MARKO:   Donor HCV MARKO: Negative      Subjective:   History of Present Illness:  Mrs. Reyer is a 49 y.o. woman who received a DBD KTX 4/20/24 for ESRD 2/2 DMII (CIT 10hrs 25 mins). Post op with DGF, HD. Cr very slowly trending down, last lowest 3.0. Rejection r/o with allosure and biopsy 6/4. Last US 5/26 stable. Last admit for anemia resolved with 2 units PRBCs.  She now presents as a transfer from OSH. Went to ED for lower abdominal pain and 2 day history of NV with associated decreased intake. Also endorsed dysuria and diarrhea. Denied fevers, chills, chest pain, shortness of breath. Recent labs notable for normal lactate, elevated Cr up to 4.2, bicarb 12. Hgb stable, no leukocytosis. BC from OSH pending. Flu and COVID negative. CT AP with edematous allograft + adjacent infectious/inflammatory edema, bladder wall thickening, evidence of diarrheal illness. CXR without acute process. Will obtain stool studies, start abx for poss pyelonephritis/UTI, f/u cultures, IVF, US for FOREST.     Hospital Course:  Patient transferred from OSH due to abdominal pain, FOREST, and diarrhea with CT concerning for pyelonephritis.     Interval note:  - NAEON. Infectious work-up in process, blood/urine/stool cx with NGTD. Afebrile on Zosyn. Reports mild improvement in abd pain and diarrhea. C diff pending  - FOREST improved with bicarb gtt, Cr down to 3.3. Continue IVF  - Small area of transplant incision open. Pt reports doing wound care at home, healing well. Consult wound care for recs      Past Medical, Surgical, Family, and Social History:   Unchanged from H&P.    Scheduled Meds:   busPIRone  5  mg Oral BID    carvediloL  6.25 mg Oral BID    chlorthalidone  12.5 mg Oral QHS    cloNIDine  0.2 mg Oral QHS    gabapentin  100 mg Oral TID    heparin (porcine)  5,000 Units Subcutaneous Q8H    magnesium sulfate 2 g IVPB  2 g Intravenous Q2H    pantoprazole  40 mg Oral Daily    piperacillin-tazobactam (Zosyn) IV (PEDS and ADULTS) (extended infusion is not appropriate)  4.5 g Intravenous Q12H    pravastatin  40 mg Oral Daily    predniSONE  5 mg Oral Daily    sodium bicarbonate  1,300 mg Oral TID    sulfamethoxazole-trimethoprim 400-80mg  1 tablet Oral Once per day on Monday Wednesday Friday    tacrolimus  8 mg Oral BID    traZODone  50 mg Oral QHS    valGANciclovir  450 mg Oral Once per day on Monday Wednesday Friday    vilazodone  40 mg Oral Daily     Continuous Infusions:   sodium bicarbonate 150 mEq in D5W 1,000 mL infusion   Intravenous Continuous 100 mL/hr at 06/18/25 0317 New Bag at 06/18/25 0317     PRN Meds:  Current Facility-Administered Medications:     acetaminophen, 650 mg, Oral, Q4H PRN    calcium carbonate, 500 mg, Oral, TID PRN    ondansetron, 8 mg, Oral, Q6H PRN    prochlorperazine, 5 mg, Intravenous, Q6H PRN    sodium chloride 0.9%, 10 mL, Intravenous, PRN    traMADoL, 50 mg, Oral, Q4H PRN    Intake/Output - Last 3 Shifts         06/16 0700  06/17 0659 06/17 0700  06/18 0659 06/18 0700  06/19 0659    P.O.  360 840    Total Intake(mL/kg)  360 (4.1) 840 (9.6)    Net  +360 +840           Unmeasured Urine Occurrence  3 x 2 x    Unmeasured Stool Occurrence  2 x 2 x             Review of Systems   Constitutional:  Positive for appetite change (decreased). Negative for fatigue and fever.   HENT:  Negative for trouble swallowing.    Respiratory:  Negative for cough and shortness of breath.    Cardiovascular:  Negative for chest pain and leg swelling.   Gastrointestinal:  Positive for abdominal pain and diarrhea. Negative for abdominal distention.   Genitourinary:  Positive for decreased urine volume and  dysuria. Negative for difficulty urinating, frequency and hematuria.   Skin:  Negative for wound.   Allergic/Immunologic: Positive for immunocompromised state.   Neurological:  Negative for dizziness and weakness.   Psychiatric/Behavioral:  Negative for confusion.       Objective:     Vital Signs (Most Recent):  Temp: 98 °F (36.7 °C) (06/18/25 1142)  Pulse: 96 (06/18/25 1142)  Resp: 18 (06/18/25 1142)  BP: 103/67 (06/18/25 1142)  SpO2: (!) 92 % (06/18/25 1142) Vital Signs (24h Range):  Temp:  [97.5 °F (36.4 °C)-98.8 °F (37.1 °C)] 98 °F (36.7 °C)  Pulse:  [] 96  Resp:  [14-21] 18  SpO2:  [92 %-100 %] 92 %  BP: (103-174)/(67-84) 103/67     Weight: 87.4 kg (192 lb 10.9 oz)     Body mass index is 33.07 kg/m².     Physical Exam  Vitals and nursing note reviewed.   Constitutional:       General: She is not in acute distress.     Appearance: She is not ill-appearing.   HENT:      Head: Normocephalic and atraumatic.      Mouth/Throat:      Mouth: Mucous membranes are moist.   Eyes:      Extraocular Movements: Extraocular movements intact.   Cardiovascular:      Rate and Rhythm: Normal rate and regular rhythm.      Pulses: Normal pulses.      Heart sounds: No murmur heard.  Pulmonary:      Effort: Pulmonary effort is normal. No respiratory distress.   Abdominal:      General: Bowel sounds are normal. There is no distension.      Palpations: Abdomen is soft.      Tenderness: There is no guarding or rebound.      Hernia: A hernia (small, umbilical, easily reduced) is present.   Musculoskeletal:         General: No swelling. Normal range of motion.      Cervical back: Normal range of motion.   Skin:     General: Skin is warm and dry.   Neurological:      Mental Status: She is alert and oriented to person, place, and time.      Motor: No weakness.   Psychiatric:         Mood and Affect: Mood normal.         Behavior: Behavior normal.         Thought Content: Thought content normal.         Judgment: Judgment normal.           Laboratory:  CBC:   Recent Labs   Lab 25  1605 25  2214 25  0545   WBC 6.31 5.61 5.17   RBC 3.42* 3.11* 3.00*   HGB 10.9* 9.7* 9.5*   HCT 33.4* 30.2* 29.2*    202 193   MCV 98 97 97   MCH 31.9* 31.2* 31.7*   MCHC 32.6 32.1 32.5     CMP:   Recent Labs   Lab 25  1605 25  2214 25  0545   * 131* 178*   CALCIUM 9.6 9.2 8.6*   ALBUMIN 4.0 3.4* 3.1*   PROT 6.6  --   --    * 136 134*   K 4.4 4.2 4.5   CO2 12* 11* 12*   * 115* 112*   BUN 81* 81* 76*   CREATININE 3.7* 3.4* 3.3*   ALKPHOS 69  --   --    ALT 5*  --   --    AST 7*  --   --        Diagnostic Results:  None  Assessment/Plan:     * FOREST (acute kidney injury)  -Cr increased at OSH   - likely 2/2 decr intake and diarrhea  - improving with IVF, will continue   - Kidney US reviewed  - strict Is and Os  - monitor with renal panel       Metabolic acidosis  - continue bicarb gtt      Pyelonephritis, acute  - findings on CT concerning for cystitis/pyelonephritis   - continue Zosyn  - IVF started   - urine cx pending      Diarrhea  - GIP negative  - C diff pending  - IVF for fluid replacement       Prophylactic immunotherapy  - cont tac, pred   - MMF held for diarrheal illness   - monitor tac levels daily for toxicity and therapeutic efforts       Long-term use of immunosuppressant medication  - see prophylactic immunotherapy      At risk for opportunistic infections  - Bactrim for PCP ppx.  - Valcyte for CMV ppx.      Status post -donor kidney transplantation  - see FOREST       Essential (primary) hypertension  - cont home meds     Anemia in chronic kidney disease  - h/h stable  - monitor with cbc           Discharge Planning:  Not suitable for discharge at this time    Medical decision making for this encounter includes review of pertinent labs and diagnostic studies, assessment and planning, discussions with consulting providers, discussion with patient/family, and participation in multidisciplinary  rounds. Time spent caring for patient: 60 minutes    Kailash Astudillo, NP  Kidney Transplant  [facility]

## 2025-06-18 NOTE — HOSPITAL COURSE
Patient transferred from OSH due to abdominal pain, FOREST, and diarrhea with CT concerning for pyelonephritis.     Interval note:  - no acute events overnight. Still having intermittent pain worse w eating.  Denies nausea or vomiting past 2 days. EGD today unremarkable esophagus, stomach and duodenum. Bx taken. CT reviewed w findings of GB wall thickening and fat stranding, RUQ US w sludge and mobile stones. LFTs and panc enzymes wnl. Consulting GI, apprec recs  - diarrhea- stool studies all neg.  Seems to be less frequent and stools forming  - Pyelonephritis suspected, UA w 2+ leuks, mod bacteria, Urine cx w mult organisms, none in predominance. Repeat UA unremarkable. Cont Zosyn, EOT 6/24.  - FOREST continued IV fluids   - Small area of transplant incision open. Pt reports doing wound care at home, healing well. Wound care consulted, apprec recs

## 2025-06-18 NOTE — ASSESSMENT & PLAN NOTE
- unknown cause   - stool studies pending  - infectious w/u pending   - IVF for fluid replacement

## 2025-06-18 NOTE — PROGRESS NOTES
Pharmacokinetic Initial Assessment: IV Vancomycin    Assessment/Plan:    Initiate intravenous vancomycin with loading dose of 2000 mg once with subsequent doses when random concentrations are less than 20 mcg/mL  Desired empiric serum trough concentration is 10 to 20 mcg/mL  Draw vancomycin random level on 6/18 at 2200.  Pharmacy will continue to follow and monitor vancomycin.      Please contact pharmacy at extension [phone] with any questions regarding this assessment.     Thank you for the consult,   Octavio Zuniga       Patient brief summary:  Brande Reyer is a 50 y.o. female initiated on antimicrobial therapy with IV Vancomycin for treatment of suspected urinary tract infection    Drug Allergies:   Review of patient's allergies indicates:   Allergen Reactions    Amlodipine Itching and Swelling       Actual Body Weight:   97.5 kg    Renal Function:   CrCl cannot be calculated (Unknown ideal weight.).,     Dialysis Method (if applicable):  N/A

## 2025-06-18 NOTE — SUBJECTIVE & OBJECTIVE
Subjective:     Chief Complaint/Reason for Admission: FOREST, diarrhea    History of Present Illness:  Mrs. Reyer is a 49 y.o. woman who received a DBD KTX 4/20/24 for ESRD 2/2 DMII (CIT 10hrs 25 mins). Post op with DGF, HD. Cr very slowly trending down, last lowest 3.0. Rejection r/o with allosure and biopsy 6/4. Last US 5/26 stable. Last admit for anemia resolved with 2 units PRBCs.  She now presents as a transfer from OSH. Went to ED for lower abdominal pain and 2 day history of NV with associated decreased intake. Also endorsed dysuria and diarrhea. Denied fevers, chills, chest pain, shortness of breath. Recent labs notable for normal lactate, elevated Cr up to 4.2, bicarb 12. Hgb stable, no leukocytosis. BC from OSH pending. Flu and COVID negative. CT AP with edematous allograft + adjacent infectious/inflammatory edema, bladder wall thickening, evidence of diarrheal illness. CXR without acute process. Will obtain stool studies, start abx for poss pyelonephritis/UTI, f/u cultures, IVF, US for FOREST.        Facility-Administered Medications Prior to Admission   Medication    epoetin kaity injection 10,000 Units     PTA Medications   Medication Sig    ALPRAZolam (XANAX) 0.5 MG tablet Take 0.5 mg by mouth 3 (three) times daily.    busPIRone (BUSPAR) 5 MG Tab Take 5 mg by mouth 2 (two) times daily.    carvediloL (COREG) 6.25 MG tablet Take 1 tablet (6.25 mg total) by mouth 2 (two) times daily.    chlorthalidone (HYGROTEN) 25 MG Tab Take 1/2 tablet (12.5 mg total) by mouth every evening.    cloNIDine (CATAPRES) 0.1 MG tablet Take 2 tablets (0.2 mg total) by mouth every evening.    gabapentin (NEURONTIN) 100 MG capsule Take 100 mg by mouth 3 (three) times daily.    loperamide (IMODIUM) 2 mg capsule Take 1 capsule (2 mg total) by mouth 4 (four) times daily as needed for Diarrhea.    montelukast (SINGULAIR) 10 mg tablet Take 10 mg by mouth nightly.    multivitamin Tab Take 1 tablet by mouth once daily.    mycophenolate  "sodium 180 MG TbEC Take 4 tablets (720 mg total) by mouth 2 (two) times daily.    oxyCODONE (ROXICODONE) 5 MG immediate release tablet Take 1 tablet (5 mg total) by mouth every 6 (six) hours as needed for Pain.    pantoprazole (PROTONIX) 40 MG tablet Take 1 tablet (40 mg total) by mouth once daily.    pen needle, diabetic 32 gauge x 5/32" Ndle Inject 1 Needle into the skin 3 (three) times daily with meals.    pravastatin (PRAVACHOL) 40 MG tablet Take 1 tablet (40 mg total) by mouth once daily.    predniSONE (DELTASONE) 5 MG tablet Take 1 tablet (5 mg total) by mouth once daily.    [Paused] sodium zirconium cyclosilicate (LOKELMA) 10 gram packet Take 10 g by mouth. Mix entire contents of packet(s) into drinking glass containing 3 tablespoons of water; stir well and drink immediately. Add water and repeat until no powder remains to receive entire dose. (Patient not taking: Reported on 5/14/2025)    sulfamethoxazole-trimethoprim 400-80mg (BACTRIM,SEPTRA) 400-80 mg per tablet Take 1 tablet by mouth 3 (three) times a week. Stop 10/20/25    tacrolimus (PROGRAF) 1 MG Cap Take 8 capsules (8 mg total) by mouth every 12 (twelve) hours.    [Paused] tirzepatide (MOUNJARO) 2.5 mg/0.5 mL PnIj Inject 2.5 mg into the skin every 7 days. (Patient not taking: Reported on 5/14/2025)    traZODone (DESYREL) 50 MG tablet Take 50 mg by mouth every evening.    valGANciclovir (VALCYTE) 450 mg Tab Take 1 tablet (450 mg total) by mouth 3 (three) times a week. Stop 7/20/25    vilazodone (VIIBRYD) 10 mg Tab tablet Take 4 tablets (40 mg total) by mouth once daily.       Review of patient's allergies indicates:   Allergen Reactions    Amlodipine Itching and Swelling       Past Medical History:   Diagnosis Date    Allergy     Anemia     Anxiety     Depression     Diabetes mellitus, type 2     Dialysis patient     Disorder of kidney and ureter     Hyperlipidemia     Hypertension     Obesity      Past Surgical History:   Procedure Laterality Date    " AV FISTULA PLACEMENT  2022     SECTION      COLONOSCOPY N/A 2023    Procedure: COLONOSCOPY;  Surgeon: Hermilo Orozco III, MD;  Location: [facility] ENDO;  Service: Endoscopy;  Laterality: N/A;    COLONOSCOPY N/A 2024    Procedure: COLONOSCOPY;  Surgeon: Hermilo Orozco III, MD;  Location: [facility] ENDO;  Service: Endoscopy;  Laterality: N/A;    HYSTERECTOMY      INSERTION OF DIALYSIS CATHETER      KIDNEY TRANSPLANT N/A 2025    Procedure: TRANSPLANT, KIDNEY;  Surgeon: Josh Cameron Jr., MD;  Location: [facility] OR [address] [place] [address];  Service: Transplant;  Laterality: N/A;  Out of Ice 0030  Reperfusion 0102    TUBAL LIGATION       Family History       Problem Relation (Age of Onset)    Heart disease Father    No Known Problems Daughter          Tobacco Use    Smoking status: Former     Current packs/day: 0.00     Types: Cigarettes     Quit date:      Years since quitting: 3.4    Smokeless tobacco: Never   Substance and Sexual Activity    Alcohol use: Not Currently    Drug use: Never    Sexual activity: Yes     Partners: Male     Birth control/protection: See Surgical Hx        Review of Systems   Constitutional:  Positive for appetite change (decr). Negative for fatigue and fever.   HENT:  Negative for trouble swallowing.    Respiratory:  Negative for cough and shortness of breath.    Cardiovascular:  Negative for chest pain and leg swelling.   Gastrointestinal:  Positive for abdominal pain, diarrhea, nausea and vomiting. Negative for abdominal distention.   Genitourinary:  Positive for decreased urine volume and dysuria (intermittent). Negative for difficulty urinating, frequency and hematuria.   Skin:  Negative for wound.   Allergic/Immunologic: Positive for immunocompromised state.   Neurological:  Negative for dizziness and weakness.   Psychiatric/Behavioral:  Negative for confusion.      Objective:     Vital Signs (Most Recent):  Temp: 98.8 °F (37.1 °C) (25)  Pulse: (!) 116  (06/17/25 2130)  Resp: 18 (06/17/25 2130)  BP: 120/81 (06/17/25 2130)  SpO2: 96 % (06/17/25 2130)        There is no height or weight on file to calculate BMI.      Physical Exam  Vitals and nursing note reviewed.   Constitutional:       General: She is not in acute distress.     Appearance: She is not ill-appearing.   HENT:      Head: Normocephalic and atraumatic.      Mouth/Throat:      Mouth: Mucous membranes are moist.   Eyes:      Extraocular Movements: Extraocular movements intact.   Cardiovascular:      Rate and Rhythm: Normal rate and regular rhythm.      Pulses: Normal pulses.      Heart sounds: No murmur heard.  Pulmonary:      Effort: Pulmonary effort is normal. No respiratory distress.   Abdominal:      General: Bowel sounds are normal. There is no distension.      Palpations: Abdomen is soft.      Tenderness: There is abdominal tenderness (diffuse). There is no guarding or rebound.      Hernia: A hernia (small, umbilical, easily reduced) is present.   Musculoskeletal:         General: No swelling. Normal range of motion.      Cervical back: Normal range of motion.   Skin:     General: Skin is warm and dry.   Neurological:      Mental Status: She is alert and oriented to person, place, and time.      Motor: No weakness.   Psychiatric:         Mood and Affect: Mood normal.         Behavior: Behavior normal.         Thought Content: Thought content normal.         Judgment: Judgment normal.          Laboratory  CBC:   Recent Labs   Lab 06/16/25  0903 06/17/25  1605 06/17/25 2214   WBC 5.27 6.31 5.61   RBC 3.23* 3.42* 3.11*   HGB 10.1* 10.9* 9.7*   HCT 32.5* 33.4* 30.2*    247 202   * 98 97   MCH 31.3* 31.9* 31.2*   MCHC 31.1* 32.6 32.1     CMP:   Recent Labs   Lab 06/16/25  0903 06/17/25  1605 06/17/25  2214   * 158* 131*   CALCIUM 9.5 9.6 9.2   ALBUMIN 3.8 4.0 3.4*   PROT  --  6.6  --     135* 136   K 5.0 4.4 4.2   CO2 13* 12* 11*   * 111* 115*   BUN 75* 81* 81*    CREATININE 4.2* 3.7* 3.4*   ALKPHOS  --  69  --    ALT  --  5*  --    AST  --  7*  --      Labs within the past 24 hours have been reviewed.    Diagnostic Results:  None

## 2025-06-18 NOTE — ASSESSMENT & PLAN NOTE
- findings on CT concerning for cystitis/pyelonephritis   - BS abx started on admit to [facility]   - IVF started   - f/u UA and urine culture

## 2025-06-18 NOTE — ASSESSMENT & PLAN NOTE
- findings on CT concerning for cystitis/pyelonephritis   - continue Zosyn  - IVF started   - urine cx pending

## 2025-06-18 NOTE — SUBJECTIVE & OBJECTIVE
Subjective:   History of Present Illness:  Mrs. Reyer is a 49 y.o. woman who received a DBD KTX 4/20/24 for ESRD 2/2 DMII (CIT 10hrs 25 mins). Post op with DGF, HD. Cr very slowly trending down, last lowest 3.0. Rejection r/o with allosure and biopsy 6/4. Last US 5/26 stable. Last admit for anemia resolved with 2 units PRBCs.  She now presents as a transfer from OSH. Went to ED for lower abdominal pain and 2 day history of NV with associated decreased intake. Also endorsed dysuria and diarrhea. Denied fevers, chills, chest pain, shortness of breath. Recent labs notable for normal lactate, elevated Cr up to 4.2, bicarb 12. Hgb stable, no leukocytosis. BC from OSH pending. Flu and COVID negative. CT AP with edematous allograft + adjacent infectious/inflammatory edema, bladder wall thickening, evidence of diarrheal illness. CXR without acute process. Will obtain stool studies, start abx for poss pyelonephritis/UTI, f/u cultures, IVF, US for FOREST.     Hospital Course:  Patient transferred from OSH due to abdominal pain, FOREST, and diarrhea with CT concerning for pyelonephritis.     Interval note:  - NAEON. Infectious work-up in process, blood/urine/stool cx with NGTD. Afebrile on Zosyn. Reports mild improvement in abd pain and diarrhea. C diff pending  - FOREST improved with bicarb gtt, Cr down to 3.3. Continue IVF  - Small area of transplant incision open. Pt reports doing wound care at home, healing well. Consult wound care for recs      Past Medical, Surgical, Family, and Social History:   Unchanged from H&P.    Scheduled Meds:   busPIRone  5 mg Oral BID    carvediloL  6.25 mg Oral BID    chlorthalidone  12.5 mg Oral QHS    cloNIDine  0.2 mg Oral QHS    gabapentin  100 mg Oral TID    heparin (porcine)  5,000 Units Subcutaneous Q8H    magnesium sulfate 2 g IVPB  2 g Intravenous Q2H    pantoprazole  40 mg Oral Daily    piperacillin-tazobactam (Zosyn) IV (PEDS and ADULTS) (extended infusion is not appropriate)  4.5 g  Intravenous Q12H    pravastatin  40 mg Oral Daily    predniSONE  5 mg Oral Daily    sodium bicarbonate  1,300 mg Oral TID    sulfamethoxazole-trimethoprim 400-80mg  1 tablet Oral Once per day on Monday Wednesday Friday    tacrolimus  8 mg Oral BID    traZODone  50 mg Oral QHS    valGANciclovir  450 mg Oral Once per day on Monday Wednesday Friday    vilazodone  40 mg Oral Daily     Continuous Infusions:   sodium bicarbonate 150 mEq in D5W 1,000 mL infusion   Intravenous Continuous 100 mL/hr at 06/18/25 0317 New Bag at 06/18/25 0317     PRN Meds:  Current Facility-Administered Medications:     acetaminophen, 650 mg, Oral, Q4H PRN    calcium carbonate, 500 mg, Oral, TID PRN    ondansetron, 8 mg, Oral, Q6H PRN    prochlorperazine, 5 mg, Intravenous, Q6H PRN    sodium chloride 0.9%, 10 mL, Intravenous, PRN    traMADoL, 50 mg, Oral, Q4H PRN    Intake/Output - Last 3 Shifts         06/16 0700 06/17 0659 06/17 0700 06/18 0659 06/18 0700  06/19 0659    P.O.  360 840    Total Intake(mL/kg)  360 (4.1) 840 (9.6)    Net  +360 +840           Unmeasured Urine Occurrence  3 x 2 x    Unmeasured Stool Occurrence  2 x 2 x             Review of Systems   Constitutional:  Positive for appetite change (decreased). Negative for fatigue and fever.   HENT:  Negative for trouble swallowing.    Respiratory:  Negative for cough and shortness of breath.    Cardiovascular:  Negative for chest pain and leg swelling.   Gastrointestinal:  Positive for abdominal pain and diarrhea. Negative for abdominal distention.   Genitourinary:  Positive for decreased urine volume and dysuria. Negative for difficulty urinating, frequency and hematuria.   Skin:  Negative for wound.   Allergic/Immunologic: Positive for immunocompromised state.   Neurological:  Negative for dizziness and weakness.   Psychiatric/Behavioral:  Negative for confusion.       Objective:     Vital Signs (Most Recent):  Temp: 98 °F (36.7 °C) (06/18/25 1142)  Pulse: 96 (06/18/25  1142)  Resp: 18 (06/18/25 1142)  BP: 103/67 (06/18/25 1142)  SpO2: (!) 92 % (06/18/25 1142) Vital Signs (24h Range):  Temp:  [97.5 °F (36.4 °C)-98.8 °F (37.1 °C)] 98 °F (36.7 °C)  Pulse:  [] 96  Resp:  [14-21] 18  SpO2:  [92 %-100 %] 92 %  BP: (103-174)/(67-84) 103/67     Weight: 87.4 kg (192 lb 10.9 oz)     Body mass index is 33.07 kg/m².     Physical Exam  Vitals and nursing note reviewed.   Constitutional:       General: She is not in acute distress.     Appearance: She is not ill-appearing.   HENT:      Head: Normocephalic and atraumatic.      Mouth/Throat:      Mouth: Mucous membranes are moist.   Eyes:      Extraocular Movements: Extraocular movements intact.   Cardiovascular:      Rate and Rhythm: Normal rate and regular rhythm.      Pulses: Normal pulses.      Heart sounds: No murmur heard.  Pulmonary:      Effort: Pulmonary effort is normal. No respiratory distress.   Abdominal:      General: Bowel sounds are normal. There is no distension.      Palpations: Abdomen is soft.      Tenderness: There is no guarding or rebound.      Hernia: A hernia (small, umbilical, easily reduced) is present.   Musculoskeletal:         General: No swelling. Normal range of motion.      Cervical back: Normal range of motion.   Skin:     General: Skin is warm and dry.   Neurological:      Mental Status: She is alert and oriented to person, place, and time.      Motor: No weakness.   Psychiatric:         Mood and Affect: Mood normal.         Behavior: Behavior normal.         Thought Content: Thought content normal.         Judgment: Judgment normal.          Laboratory:  CBC:   Recent Labs   Lab 06/17/25  1605 06/17/25  2214 06/18/25  0545   WBC 6.31 5.61 5.17   RBC 3.42* 3.11* 3.00*   HGB 10.9* 9.7* 9.5*   HCT 33.4* 30.2* 29.2*    202 193   MCV 98 97 97   MCH 31.9* 31.2* 31.7*   MCHC 32.6 32.1 32.5     CMP:   Recent Labs   Lab 06/17/25  1605 06/17/25  2214 06/18/25  0545   * 131* 178*   CALCIUM 9.6 9.2 8.6*    ALBUMIN 4.0 3.4* 3.1*   PROT 6.6  --   --    * 136 134*   K 4.4 4.2 4.5   CO2 12* 11* 12*   * 115* 112*   BUN 81* 81* 76*   CREATININE 3.7* 3.4* 3.3*   ALKPHOS 69  --   --    ALT 5*  --   --    AST 7*  --   --        Diagnostic Results:  None

## 2025-06-18 NOTE — ASSESSMENT & PLAN NOTE
-Cr increased at OSH   - likely 2/2 decr intake and diarrhea  - improving with IVF, will continue   - Kidney US reviewed  - strict Is and Os  - monitor with renal panel

## 2025-06-18 NOTE — ASSESSMENT & PLAN NOTE
- cont tac, pred   - MMF held for diarrheal illness   - monitor tac levels daily for toxicity and therapeutic efforts

## 2025-06-18 NOTE — H&P
[facility]  Kidney Transplant  H&P      Subjective:     Chief Complaint/Reason for Admission: FOREST, diarrhea    History of Present Illness:  Mrs. Reyer is a 49 y.o. woman who received a DBD KTX 4/20/24 for ESRD 2/2 DMII (CIT 10hrs 25 mins). Post op with DGF, HD. Cr very slowly trending down, last lowest 3.0. Rejection r/o with allosure and biopsy 6/4. Last US 5/26 stable. Last admit for anemia resolved with 2 units PRBCs.  She now presents as a transfer from OSH. Went to ED for lower abdominal pain and 2 day history of NV with associated decreased intake. Also endorsed dysuria and diarrhea. Denied fevers, chills, chest pain, shortness of breath. Recent labs notable for normal lactate, elevated Cr up to 4.2, bicarb 12. Hgb stable, no leukocytosis. BC from OSH pending. Flu and COVID negative. CT AP with edematous allograft + adjacent infectious/inflammatory edema, bladder wall thickening, evidence of diarrheal illness. CXR without acute process. Will obtain stool studies, start abx for poss pyelonephritis/UTI, f/u cultures, IVF, US for FOREST.        Facility-Administered Medications Prior to Admission   Medication    epoetin kaity injection 10,000 Units     PTA Medications   Medication Sig    ALPRAZolam (XANAX) 0.5 MG tablet Take 0.5 mg by mouth 3 (three) times daily.    busPIRone (BUSPAR) 5 MG Tab Take 5 mg by mouth 2 (two) times daily.    carvediloL (COREG) 6.25 MG tablet Take 1 tablet (6.25 mg total) by mouth 2 (two) times daily.    chlorthalidone (HYGROTEN) 25 MG Tab Take 1/2 tablet (12.5 mg total) by mouth every evening.    cloNIDine (CATAPRES) 0.1 MG tablet Take 2 tablets (0.2 mg total) by mouth every evening.    gabapentin (NEURONTIN) 100 MG capsule Take 100 mg by mouth 3 (three) times daily.    loperamide (IMODIUM) 2 mg capsule Take 1 capsule (2 mg total) by mouth 4 (four) times daily as needed for Diarrhea.    montelukast (SINGULAIR) 10 mg tablet Take 10 mg by mouth nightly.    multivitamin  "Tab Take 1 tablet by mouth once daily.    mycophenolate sodium 180 MG TbEC Take 4 tablets (720 mg total) by mouth 2 (two) times daily.    oxyCODONE (ROXICODONE) 5 MG immediate release tablet Take 1 tablet (5 mg total) by mouth every 6 (six) hours as needed for Pain.    pantoprazole (PROTONIX) 40 MG tablet Take 1 tablet (40 mg total) by mouth once daily.    pen needle, diabetic 32 gauge x 5/32" Ndle Inject 1 Needle into the skin 3 (three) times daily with meals.    pravastatin (PRAVACHOL) 40 MG tablet Take 1 tablet (40 mg total) by mouth once daily.    predniSONE (DELTASONE) 5 MG tablet Take 1 tablet (5 mg total) by mouth once daily.    [Paused] sodium zirconium cyclosilicate (LOKELMA) 10 gram packet Take 10 g by mouth. Mix entire contents of packet(s) into drinking glass containing 3 tablespoons of water; stir well and drink immediately. Add water and repeat until no powder remains to receive entire dose. (Patient not taking: Reported on 5/14/2025)    sulfamethoxazole-trimethoprim 400-80mg (BACTRIM,SEPTRA) 400-80 mg per tablet Take 1 tablet by mouth 3 (three) times a week. Stop 10/20/25    tacrolimus (PROGRAF) 1 MG Cap Take 8 capsules (8 mg total) by mouth every 12 (twelve) hours.    [Paused] tirzepatide (MOUNJARO) 2.5 mg/0.5 mL PnIj Inject 2.5 mg into the skin every 7 days. (Patient not taking: Reported on 5/14/2025)    traZODone (DESYREL) 50 MG tablet Take 50 mg by mouth every evening.    valGANciclovir (VALCYTE) 450 mg Tab Take 1 tablet (450 mg total) by mouth 3 (three) times a week. Stop 7/20/25    vilazodone (VIIBRYD) 10 mg Tab tablet Take 4 tablets (40 mg total) by mouth once daily.       Review of patient's allergies indicates:   Allergen Reactions    Amlodipine Itching and Swelling       Past Medical History:   Diagnosis Date    Allergy     Anemia     Anxiety     Depression     Diabetes mellitus, type 2     Dialysis patient     Disorder of kidney and ureter     Hyperlipidemia     Hypertension     Obesity  "     Past Surgical History:   Procedure Laterality Date    AV FISTULA PLACEMENT  2022     SECTION      COLONOSCOPY N/A 2023    Procedure: COLONOSCOPY;  Surgeon: Hermilo Orozco III, MD;  Location: [facility] ENDO;  Service: Endoscopy;  Laterality: N/A;    COLONOSCOPY N/A 2024    Procedure: COLONOSCOPY;  Surgeon: Hermilo Orozco III, MD;  Location: [facility] ENDO;  Service: Endoscopy;  Laterality: N/A;    HYSTERECTOMY      INSERTION OF DIALYSIS CATHETER      KIDNEY TRANSPLANT N/A 2025    Procedure: TRANSPLANT, KIDNEY;  Surgeon: Josh Cameron Jr., MD;  Location: [facility] OR [address] [place] [address];  Service: Transplant;  Laterality: N/A;  Out of Ice 0030  Reperfusion 0102    TUBAL LIGATION       Family History       Problem Relation (Age of Onset)    Heart disease Father    No Known Problems Daughter          Tobacco Use    Smoking status: Former     Current packs/day: 0.00     Types: Cigarettes     Quit date:      Years since quitting: 3.4    Smokeless tobacco: Never   Substance and Sexual Activity    Alcohol use: Not Currently    Drug use: Never    Sexual activity: Yes     Partners: Male     Birth control/protection: See Surgical Hx        Review of Systems   Constitutional:  Positive for appetite change (decr). Negative for fatigue and fever.   HENT:  Negative for trouble swallowing.    Respiratory:  Negative for cough and shortness of breath.    Cardiovascular:  Negative for chest pain and leg swelling.   Gastrointestinal:  Positive for abdominal pain, diarrhea, nausea and vomiting. Negative for abdominal distention.   Genitourinary:  Positive for decreased urine volume and dysuria (intermittent). Negative for difficulty urinating, frequency and hematuria.   Skin:  Negative for wound.   Allergic/Immunologic: Positive for immunocompromised state.   Neurological:  Negative for dizziness and weakness.   Psychiatric/Behavioral:  Negative for confusion.      Objective:     Vital Signs (Most  Recent):  Temp: 98.8 °F (37.1 °C) (06/17/25 2130)  Pulse: (!) 116 (06/17/25 2130)  Resp: 18 (06/17/25 2130)  BP: 120/81 (06/17/25 2130)  SpO2: 96 % (06/17/25 2130)        There is no height or weight on file to calculate BMI.      Physical Exam  Vitals and nursing note reviewed.   Constitutional:       General: She is not in acute distress.     Appearance: She is not ill-appearing.   HENT:      Head: Normocephalic and atraumatic.      Mouth/Throat:      Mouth: Mucous membranes are moist.   Eyes:      Extraocular Movements: Extraocular movements intact.   Cardiovascular:      Rate and Rhythm: Normal rate and regular rhythm.      Pulses: Normal pulses.      Heart sounds: No murmur heard.  Pulmonary:      Effort: Pulmonary effort is normal. No respiratory distress.   Abdominal:      General: Bowel sounds are normal. There is no distension.      Palpations: Abdomen is soft.      Tenderness: There is abdominal tenderness (diffuse). There is no guarding or rebound.      Hernia: A hernia (small, umbilical, easily reduced) is present.   Musculoskeletal:         General: No swelling. Normal range of motion.      Cervical back: Normal range of motion.   Skin:     General: Skin is warm and dry.   Neurological:      Mental Status: She is alert and oriented to person, place, and time.      Motor: No weakness.   Psychiatric:         Mood and Affect: Mood normal.         Behavior: Behavior normal.         Thought Content: Thought content normal.         Judgment: Judgment normal.          Laboratory  CBC:   Recent Labs   Lab 06/16/25  0903 06/17/25  1605 06/17/25 2214   WBC 5.27 6.31 5.61   RBC 3.23* 3.42* 3.11*   HGB 10.1* 10.9* 9.7*   HCT 32.5* 33.4* 30.2*    247 202   * 98 97   MCH 31.3* 31.9* 31.2*   MCHC 31.1* 32.6 32.1     CMP:   Recent Labs   Lab 06/16/25  0903 06/17/25  1605 06/17/25  2214   * 158* 131*   CALCIUM 9.5 9.6 9.2   ALBUMIN 3.8 4.0 3.4*   PROT  --  6.6  --     135* 136   K 5.0 4.4 4.2    CO2 13* 12* 11*   * 111* 115*   BUN 75* 81* 81*   CREATININE 4.2* 3.7* 3.4*   ALKPHOS  --  69  --    ALT  --  5*  --    AST  --  7*  --      Labs within the past 24 hours have been reviewed.    Diagnostic Results:  None      Assessment/Plan:     Cardiac/Vascular  Essential (primary) hypertension  - cont home meds     Renal/  * FOREST (acute kidney injury)  -Cr increased at OSH   - likely 2/2 decr intake and 4+ ep diarrhea a day for at least 1 week   - improving with IVF, will continue   - strict Is and Os  - monitor with renal panel       Status post -donor kidney transplantation  - see FOREST       ID  Pyelonephritis, acute  - findings on CT concerning for cystitis/pyelonephritis   - BS abx started on admit to [facility]   - IVF started   - f/u UA and urine culture       At risk for opportunistic infections  - Bactrim for PCP ppx.  - Valcyte for CMV ppx.      Immunology/Multi System  Prophylactic immunotherapy  - cont tac, pred   - MMF held for diarrheal illness   - monitor tac levels daily for toxicity and therapeutic efforts       Oncology  Anemia in chronic kidney disease  - h/h stable  - monitor with cbc       GI  Diarrhea  - unknown cause   - stool studies pending  - infectious w/u pending   - IVF for fluid replacement       Palliative Care  Long-term use of immunosuppressant medication  - see prophylactic immunotherapy          Medical decision making for this encounter includes review of pertinent labs and diagnostic studies, assessment and planning, discussions with consulting providers, discussion with patient/family, and participation in multidisciplinary rounds. Time spent caring for patient: > 90 minutes    Sherrill Santillan PA-C  Kidney Transplant  [facility] - Transplant Stepdown

## 2025-06-18 NOTE — ASSESSMENT & PLAN NOTE
-Cr increased at OSH   - likely 2/2 decr intake and 4+ ep diarrhea a day for at least 1 week   - improving with IVF, will continue   - strict Is and Os  - monitor with renal panel

## 2025-06-18 NOTE — PLAN OF CARE
Pt AAOx4. VSS, afebrile. Voids independently. Two BMs reported. Pt up independently. Bicarb infusing @ 100 ml/hr. Stool sample sent off. Blood cultures collected. Urine cultures to be collected. Vanc and zosyn given per MAR. Renal US completed O/N--no hydronephrosis, showing mild arterial RIs. Reviewed plan of care w/ pt. Remained free from falls/traumas/injuries. Questions answered and encouraged. Call light at bedside, non-skid socks on. Bed in low position, wheels locked. Standard precautions maintained.

## 2025-06-19 PROBLEM — K82.8 THICKENING OF WALL OF GALLBLADDER: Status: ACTIVE | Noted: 2025-06-19

## 2025-06-19 LAB
ABSOLUTE EOSINOPHIL (OHS): 0.04 K/UL
ABSOLUTE EOSINOPHIL (OHS): 0.11 K/UL
ABSOLUTE MONOCYTE (OHS): 0.5 K/UL (ref 0.3–1)
ABSOLUTE MONOCYTE (OHS): 0.65 K/UL (ref 0.3–1)
ABSOLUTE NEUTROPHIL COUNT (OHS): 4.12 K/UL (ref 1.8–7.7)
ABSOLUTE NEUTROPHIL COUNT (OHS): 5.43 K/UL (ref 1.8–7.7)
ALBUMIN SERPL BCP-MCNC: 2.7 G/DL (ref 3.5–5.2)
ALBUMIN SERPL BCP-MCNC: 2.8 G/DL (ref 3.5–5.2)
ALBUMIN SERPL BCP-MCNC: 3 G/DL (ref 3.5–5.2)
ALP SERPL-CCNC: 59 UNIT/L (ref 40–150)
ALT SERPL W/O P-5'-P-CCNC: 6 UNIT/L (ref 10–44)
AMYLASE SERPL-CCNC: 29 UNIT/L (ref 20–110)
ANION GAP (OHS): 11 MMOL/L (ref 8–16)
ANION GAP (OHS): 12 MMOL/L (ref 8–16)
AST SERPL-CCNC: 7 UNIT/L (ref 11–45)
BACTERIA UR CULT: NORMAL
BASOPHILS # BLD AUTO: 0.02 K/UL
BASOPHILS # BLD AUTO: 0.02 K/UL
BASOPHILS NFR BLD AUTO: 0.3 %
BASOPHILS NFR BLD AUTO: 0.4 %
BILIRUB DIRECT SERPL-MCNC: 0.1 MG/DL (ref 0.1–0.3)
BILIRUB SERPL-MCNC: 0.3 MG/DL (ref 0.1–1)
BUN SERPL-MCNC: 63 MG/DL (ref 6–20)
BUN SERPL-MCNC: 63 MG/DL (ref 6–20)
C COLI+JEJ+UPSA DNA STL QL NAA+NON-PROBE: NEGATIVE
CALCIUM SERPL-MCNC: 7.6 MG/DL (ref 8.7–10.5)
CALCIUM SERPL-MCNC: 7.7 MG/DL (ref 8.7–10.5)
CHLORIDE SERPL-SCNC: 100 MMOL/L (ref 95–110)
CHLORIDE SERPL-SCNC: 102 MMOL/L (ref 95–110)
CO2 SERPL-SCNC: 22 MMOL/L (ref 23–29)
CO2 SERPL-SCNC: 22 MMOL/L (ref 23–29)
CREAT SERPL-MCNC: 3.5 MG/DL (ref 0.5–1.4)
CREAT SERPL-MCNC: 3.5 MG/DL (ref 0.5–1.4)
CRYPTOSP AG SPEC QL: NEGATIVE
CYTOMEGALOVIRUS DNA, QUAL (OHS): NOT DETECTED
E COLI SXT1 STL QL IA: NEGATIVE
E COLI SXT2 STL QL IA: NEGATIVE
ERYTHROCYTE [DISTWIDTH] IN BLOOD BY AUTOMATED COUNT: 14 % (ref 11.5–14.5)
ERYTHROCYTE [DISTWIDTH] IN BLOOD BY AUTOMATED COUNT: 14.1 % (ref 11.5–14.5)
G LAMBLIA AG STL QL IA: NEGATIVE
GFR SERPLBLD CREATININE-BSD FMLA CKD-EPI: 15 ML/MIN/1.73/M2
GFR SERPLBLD CREATININE-BSD FMLA CKD-EPI: 15 ML/MIN/1.73/M2
GLUCOSE SERPL-MCNC: 146 MG/DL (ref 70–110)
GLUCOSE SERPL-MCNC: 232 MG/DL (ref 70–110)
HCT VFR BLD AUTO: 24.2 % (ref 37–48.5)
HCT VFR BLD AUTO: 25.3 % (ref 37–48.5)
HGB BLD-MCNC: 7.9 GM/DL (ref 12–16)
HGB BLD-MCNC: 8.3 GM/DL (ref 12–16)
IMM GRANULOCYTES # BLD AUTO: 0.08 K/UL (ref 0–0.04)
IMM GRANULOCYTES # BLD AUTO: 0.11 K/UL (ref 0–0.04)
IMM GRANULOCYTES NFR BLD AUTO: 1.5 % (ref 0–0.5)
IMM GRANULOCYTES NFR BLD AUTO: 1.8 % (ref 0–0.5)
LIPASE SERPL-CCNC: 28 U/L (ref 4–60)
LYMPHOCYTES # BLD AUTO: 0.16 K/UL (ref 1–4.8)
LYMPHOCYTES # BLD AUTO: 0.38 K/UL (ref 1–4.8)
MAGNESIUM SERPL-MCNC: 2.2 MG/DL (ref 1.6–2.6)
MCH RBC QN AUTO: 30.9 PG (ref 27–31)
MCH RBC QN AUTO: 30.9 PG (ref 27–31)
MCHC RBC AUTO-ENTMCNC: 32.6 G/DL (ref 32–36)
MCHC RBC AUTO-ENTMCNC: 32.8 G/DL (ref 32–36)
MCV RBC AUTO: 94 FL (ref 82–98)
MCV RBC AUTO: 95 FL (ref 82–98)
NUCLEATED RBC (/100WBC) (OHS): 0 /100 WBC
NUCLEATED RBC (/100WBC) (OHS): 0 /100 WBC
PHOSPHATE SERPL-MCNC: 2.7 MG/DL (ref 2.7–4.5)
PHOSPHATE SERPL-MCNC: 3.8 MG/DL (ref 2.7–4.5)
PLATELET # BLD AUTO: 180 K/UL (ref 150–450)
PLATELET # BLD AUTO: 182 K/UL (ref 150–450)
PMV BLD AUTO: 10 FL (ref 9.2–12.9)
PMV BLD AUTO: 9.5 FL (ref 9.2–12.9)
POTASSIUM SERPL-SCNC: 2.7 MMOL/L (ref 3.5–5.1)
POTASSIUM SERPL-SCNC: 2.8 MMOL/L (ref 3.5–5.1)
PROT SERPL-MCNC: 5.1 GM/DL (ref 6–8.4)
RBC # BLD AUTO: 2.56 M/UL (ref 4–5.4)
RBC # BLD AUTO: 2.69 M/UL (ref 4–5.4)
RELATIVE EOSINOPHIL (OHS): 0.6 %
RELATIVE EOSINOPHIL (OHS): 2.1 %
RELATIVE LYMPHOCYTE (OHS): 2.6 % (ref 18–48)
RELATIVE LYMPHOCYTE (OHS): 7.1 % (ref 18–48)
RELATIVE MONOCYTE (OHS): 12.1 % (ref 4–15)
RELATIVE MONOCYTE (OHS): 8 % (ref 4–15)
RELATIVE NEUTROPHIL (OHS): 76.8 % (ref 38–73)
RELATIVE NEUTROPHIL (OHS): 86.7 % (ref 38–73)
SODIUM SERPL-SCNC: 134 MMOL/L (ref 136–145)
SODIUM SERPL-SCNC: 135 MMOL/L (ref 136–145)
TACROLIMUS BLD-MCNC: 12.5 NG/ML (ref 5–15)
WBC # BLD AUTO: 5.36 K/UL (ref 3.9–12.7)
WBC # BLD AUTO: 6.26 K/UL (ref 3.9–12.7)

## 2025-06-19 PROCEDURE — 81001 URINALYSIS AUTO W/SCOPE: CPT | Performed by: NURSE PRACTITIONER

## 2025-06-19 PROCEDURE — 84155 ASSAY OF PROTEIN SERUM: CPT | Performed by: NURSE PRACTITIONER

## 2025-06-19 PROCEDURE — 25000003 PHARM REV CODE 250

## 2025-06-19 PROCEDURE — 36415 COLL VENOUS BLD VENIPUNCTURE: CPT

## 2025-06-19 PROCEDURE — 80069 RENAL FUNCTION PANEL: CPT

## 2025-06-19 PROCEDURE — 83735 ASSAY OF MAGNESIUM: CPT

## 2025-06-19 PROCEDURE — 83690 ASSAY OF LIPASE: CPT | Performed by: NURSE PRACTITIONER

## 2025-06-19 PROCEDURE — 87086 URINE CULTURE/COLONY COUNT: CPT | Performed by: NURSE PRACTITIONER

## 2025-06-19 PROCEDURE — 25000003 PHARM REV CODE 250: Performed by: NURSE PRACTITIONER

## 2025-06-19 PROCEDURE — 82150 ASSAY OF AMYLASE: CPT | Performed by: NURSE PRACTITIONER

## 2025-06-19 PROCEDURE — 20600001 HC STEP DOWN PRIVATE ROOM

## 2025-06-19 PROCEDURE — 25000003 PHARM REV CODE 250: Performed by: PHYSICIAN ASSISTANT

## 2025-06-19 PROCEDURE — 85025 COMPLETE CBC W/AUTO DIFF WBC: CPT | Performed by: NURSE PRACTITIONER

## 2025-06-19 PROCEDURE — 63600175 PHARM REV CODE 636 W HCPCS: Performed by: CLINICAL NURSE SPECIALIST

## 2025-06-19 PROCEDURE — 80197 ASSAY OF TACROLIMUS: CPT

## 2025-06-19 PROCEDURE — 99233 SBSQ HOSP IP/OBS HIGH 50: CPT | Mod: ,,, | Performed by: NURSE PRACTITIONER

## 2025-06-19 PROCEDURE — 82565 ASSAY OF CREATININE: CPT | Performed by: NURSE PRACTITIONER

## 2025-06-19 PROCEDURE — 27000207 HC ISOLATION

## 2025-06-19 PROCEDURE — 85025 COMPLETE CBC W/AUTO DIFF WBC: CPT

## 2025-06-19 PROCEDURE — 63600175 PHARM REV CODE 636 W HCPCS

## 2025-06-19 PROCEDURE — 63600175 PHARM REV CODE 636 W HCPCS: Performed by: NURSE PRACTITIONER

## 2025-06-19 RX ORDER — DICYCLOMINE HYDROCHLORIDE 10 MG/1
10 CAPSULE ORAL 3 TIMES DAILY
Status: DISCONTINUED | OUTPATIENT
Start: 2025-06-19 | End: 2025-06-24 | Stop reason: HOSPADM

## 2025-06-19 RX ORDER — TACROLIMUS 1 MG/1
4 CAPSULE ORAL 2 TIMES DAILY
Status: DISCONTINUED | OUTPATIENT
Start: 2025-06-19 | End: 2025-06-20

## 2025-06-19 RX ORDER — POTASSIUM CHLORIDE 7.45 MG/ML
10 INJECTION INTRAVENOUS
Status: DISPENSED | OUTPATIENT
Start: 2025-06-19 | End: 2025-06-19

## 2025-06-19 RX ORDER — POTASSIUM CHLORIDE 20 MEQ/1
40 TABLET, EXTENDED RELEASE ORAL EVERY 4 HOURS
Status: COMPLETED | OUTPATIENT
Start: 2025-06-19 | End: 2025-06-20

## 2025-06-19 RX ORDER — SODIUM CHLORIDE 9 MG/ML
INJECTION, SOLUTION INTRAVENOUS CONTINUOUS
Status: DISCONTINUED | OUTPATIENT
Start: 2025-06-19 | End: 2025-06-19

## 2025-06-19 RX ORDER — POTASSIUM CHLORIDE 20 MEQ/1
40 TABLET, EXTENDED RELEASE ORAL EVERY 6 HOURS
Status: DISCONTINUED | OUTPATIENT
Start: 2025-06-19 | End: 2025-06-19

## 2025-06-19 RX ORDER — SODIUM CHLORIDE 9 MG/ML
INJECTION, SOLUTION INTRAVENOUS CONTINUOUS
Status: ACTIVE | OUTPATIENT
Start: 2025-06-19 | End: 2025-06-19

## 2025-06-19 RX ADMIN — PRAVASTATIN SODIUM 40 MG: 40 TABLET ORAL at 08:06

## 2025-06-19 RX ADMIN — SODIUM BICARBONATE 650 MG TABLET 1300 MG: at 03:06

## 2025-06-19 RX ADMIN — POTASSIUM CHLORIDE 10 MEQ: 7.46 INJECTION, SOLUTION INTRAVENOUS at 10:06

## 2025-06-19 RX ADMIN — TRAMADOL HYDROCHLORIDE 50 MG: 50 TABLET, COATED ORAL at 03:06

## 2025-06-19 RX ADMIN — SODIUM BICARBONATE: 84 INJECTION, SOLUTION INTRAVENOUS at 01:06

## 2025-06-19 RX ADMIN — VILAZODONE HYDROCHLORIDE 40 MG: 10 TABLET ORAL at 08:06

## 2025-06-19 RX ADMIN — SODIUM BICARBONATE 650 MG TABLET 1300 MG: at 08:06

## 2025-06-19 RX ADMIN — GABAPENTIN 100 MG: 100 CAPSULE ORAL at 08:06

## 2025-06-19 RX ADMIN — POTASSIUM CHLORIDE 10 MEQ: 7.46 INJECTION, SOLUTION INTRAVENOUS at 03:06

## 2025-06-19 RX ADMIN — TACROLIMUS 8 MG: 1 CAPSULE ORAL at 08:06

## 2025-06-19 RX ADMIN — POTASSIUM CHLORIDE 40 MEQ: 1500 TABLET, EXTENDED RELEASE ORAL at 06:06

## 2025-06-19 RX ADMIN — POTASSIUM CHLORIDE 40 MEQ: 1500 TABLET, EXTENDED RELEASE ORAL at 09:06

## 2025-06-19 RX ADMIN — ONDANSETRON 8 MG: 8 TABLET, ORALLY DISINTEGRATING ORAL at 01:06

## 2025-06-19 RX ADMIN — PANTOPRAZOLE SODIUM 40 MG: 40 TABLET, DELAYED RELEASE ORAL at 08:06

## 2025-06-19 RX ADMIN — TACROLIMUS 4 MG: 1 CAPSULE ORAL at 06:06

## 2025-06-19 RX ADMIN — PIPERACILLIN SODIUM AND TAZOBACTAM SODIUM 4.5 G: 4; .5 INJECTION, POWDER, FOR SOLUTION INTRAVENOUS at 10:06

## 2025-06-19 RX ADMIN — GABAPENTIN 100 MG: 100 CAPSULE ORAL at 03:06

## 2025-06-19 RX ADMIN — DICYCLOMINE HYDROCHLORIDE 10 MG: 10 CAPSULE ORAL at 03:06

## 2025-06-19 RX ADMIN — DICYCLOMINE HYDROCHLORIDE 10 MG: 10 CAPSULE ORAL at 09:06

## 2025-06-19 RX ADMIN — PREDNISONE 5 MG: 5 TABLET ORAL at 08:06

## 2025-06-19 RX ADMIN — BUSPIRONE HYDROCHLORIDE 5 MG: 5 TABLET ORAL at 08:06

## 2025-06-19 RX ADMIN — POTASSIUM CHLORIDE 40 MEQ: 1500 TABLET, EXTENDED RELEASE ORAL at 11:06

## 2025-06-19 RX ADMIN — PIPERACILLIN SODIUM AND TAZOBACTAM SODIUM 4.5 G: 4; .5 INJECTION, POWDER, FOR SOLUTION INTRAVENOUS at 11:06

## 2025-06-19 RX ADMIN — POTASSIUM CHLORIDE 10 MEQ: 7.46 INJECTION, SOLUTION INTRAVENOUS at 06:06

## 2025-06-19 RX ADMIN — SODIUM BICARBONATE 650 MG TABLET 1300 MG: at 09:06

## 2025-06-19 RX ADMIN — TRAZODONE HYDROCHLORIDE 50 MG: 50 TABLET ORAL at 09:06

## 2025-06-19 RX ADMIN — BUSPIRONE HYDROCHLORIDE 5 MG: 5 TABLET ORAL at 09:06

## 2025-06-19 RX ADMIN — GABAPENTIN 100 MG: 100 CAPSULE ORAL at 09:06

## 2025-06-19 NOTE — PROGRESS NOTES
[facility]  Kidney Transplant  Progress Note      Reason for Follow-up: Reassessment of Kidney Transplant - 4/20/2025  (#1) recipient and management of immunosuppression.     ORGAN:   RIGHT KIDNEY    Donor Type:   Donation after Brain Death    PHS Increased Risk: no   Cold Ischemia:   Induction Medications: Thymoglobulin      Subjective:   History of Present Illness:  Mrs. Reyer is a 49 y.o. woman who received a DBD KTX 4/20/24 for ESRD 2/2 DMII (CIT 10hrs 25 mins). Post op with DGF, HD. Cr very slowly trending down, last lowest 3.0. Rejection r/o with allosure and biopsy 6/4. Last US 5/26 stable. Last admit for anemia resolved with 2 units PRBCs.  She now presents as a transfer from OSH. Went to ED for lower abdominal pain and 2 day history of NV with associated decreased intake. Also endorsed dysuria and diarrhea. Denied fevers, chills, chest pain, shortness of breath. Recent labs notable for normal lactate, elevated Cr up to 4.2, bicarb 12. Hgb stable, no leukocytosis. BC from OSH pending. Flu and COVID negative. CT AP with edematous allograft + adjacent infectious/inflammatory edema, bladder wall thickening, evidence of diarrheal illness. CXR without acute process. Will obtain stool studies, start abx for poss pyelonephritis/UTI, f/u cultures, IVF, US for FOREST.       Hospital Course:  Patient transferred from OSH due to abdominal pain, FOREST, and diarrhea with CT concerning for pyelonephritis.     Interval note:  - NAEON. On am exam, pt reportedly feeling better. By lunch, c/o abdominal pain to the right of umbilicus, worse w eating lunch. All stool studies neg. Pt cont w loose stools. CT A/P reviewed and did note GB wall thickness w fat stranding. RUQ US ordered. LFTs amd panc enzymes wnl. Trial of Bentyl ordered.  - Pyelonephritis suspected, UA w 2+ leuks, mod bacteria, Urine cx w mult organisms, none in predominance. Repeat pending. Cont Zosyn.  - FOREST  cont IV fluids while having diarrhea and  intermittent pain keeping pt from drinking/eating  - HYPOKalemia- minimal response w PO Micro K, giving K ryders. BIcarb gtt d/c'd  - Small area of transplant incision open. Pt reports doing wound care at home, healing well. Wound care consulted, apprec recs      Past Medical, Surgical, Family, and Social History:   Unchanged from H&P.    Scheduled Meds:   busPIRone  5 mg Oral BID    carvediloL  6.25 mg Oral BID    cloNIDine  0.2 mg Oral QHS    dicyclomine  10 mg Oral TID    gabapentin  100 mg Oral TID    heparin (porcine)  5,000 Units Subcutaneous Q8H    pantoprazole  40 mg Oral Daily    piperacillin-tazobactam (Zosyn) IV (PEDS and ADULTS) (extended infusion is not appropriate)  4.5 g Intravenous Q12H    potassium chloride  10 mEq Intravenous Q1H    potassium chloride  40 mEq Oral Q4H    pravastatin  40 mg Oral Daily    predniSONE  5 mg Oral Daily    sodium bicarbonate  1,300 mg Oral TID    sulfamethoxazole-trimethoprim 400-80mg  1 tablet Oral Once per day on Monday Wednesday Friday    tacrolimus  4 mg Oral BID    traZODone  50 mg Oral QHS    valGANciclovir  450 mg Oral Once per day on Monday Wednesday Friday    vilazodone  40 mg Oral Daily     Continuous Infusions:   0.9% NaCl   Intravenous Continuous         PRN Meds:  Current Facility-Administered Medications:     acetaminophen, 650 mg, Oral, Q4H PRN    calcium carbonate, 500 mg, Oral, TID PRN    ondansetron, 8 mg, Oral, Q6H PRN    prochlorperazine, 5 mg, Intravenous, Q6H PRN    sodium chloride 0.9%, 10 mL, Intravenous, PRN    traMADoL, 50 mg, Oral, Q4H PRN    Intake/Output - Last 3 Shifts         06/17 0700  06/18 0659 06/18 0700 06/19 0659 06/19 0700  06/20 0659    P.O. 360 1320 800    I.V. (mL/kg)   1394.8 (15.6)    IV Piggyback   356.4    Total Intake(mL/kg) 360 (4.1) 1320 (14.7) 2551.1 (28.5)    Urine (mL/kg/hr)   200 (0.2)    Stool   0    Total Output   200    Net +360 +1320 +2351.1           Unmeasured Urine Occurrence 3 x 5 x 3 x    Unmeasured Stool  "Occurrence 2 x 5 x 3 x             Review of Systems   Constitutional:  Positive for appetite change (decreased, symptoms worse w eating). Negative for fatigue and fever.   HENT:  Negative for trouble swallowing.    Respiratory:  Negative for cough and shortness of breath.    Cardiovascular:  Negative for chest pain and leg swelling.   Gastrointestinal:  Positive for abdominal pain and diarrhea. Negative for abdominal distention.   Genitourinary:  Positive for decreased urine volume. Negative for difficulty urinating, dysuria, frequency and hematuria.   Skin:  Negative for wound.   Allergic/Immunologic: Positive for immunocompromised state.   Neurological:  Negative for dizziness and weakness.   Psychiatric/Behavioral:  Negative for confusion.       Objective:     Vital Signs (Most Recent):  Temp: 98.3 °F (36.8 °C) (06/19/25 1616)  Pulse: 86 (06/19/25 1616)  Resp: 18 (06/19/25 1616)  BP: 119/66 (06/19/25 1616)  SpO2: 100 % (06/19/25 1616) Vital Signs (24h Range):  Temp:  [97.6 °F (36.4 °C)-98.3 °F (36.8 °C)] 98.3 °F (36.8 °C)  Pulse:  [74-94] 86  Resp:  [18-20] 18  SpO2:  [95 %-100 %] 100 %  BP: ()/(50-66) 119/66     Weight: 89.5 kg (197 lb 5 oz)  Height: 5' 4" (162.6 cm)  Body mass index is 33.87 kg/m².     Physical Exam  Vitals and nursing note reviewed.   Constitutional:       General: She is not in acute distress.     Appearance: She is not ill-appearing.   HENT:      Head: Normocephalic and atraumatic.      Mouth/Throat:      Mouth: Mucous membranes are moist.   Eyes:      Extraocular Movements: Extraocular movements intact.   Cardiovascular:      Rate and Rhythm: Normal rate and regular rhythm.      Pulses: Normal pulses.      Heart sounds: No murmur heard.  Pulmonary:      Effort: Pulmonary effort is normal. No respiratory distress.   Abdominal:      General: Bowel sounds are normal. There is no distension.      Palpations: Abdomen is soft.      Tenderness: There is no guarding or rebound.      Hernia: " "A hernia (small, umbilical, easily reduced) is present.   Musculoskeletal:         General: No swelling. Normal range of motion.      Cervical back: Normal range of motion.   Skin:     General: Skin is warm and dry.   Neurological:      Mental Status: She is alert and oriented to person, place, and time.      Motor: No weakness.   Psychiatric:         Mood and Affect: Mood normal.         Behavior: Behavior normal.         Thought Content: Thought content normal.         Judgment: Judgment normal.          Laboratory:  CBC:   Recent Labs   Lab 06/18/25  0545 06/19/25  0607 06/19/25  1356   WBC 5.17 5.36 6.26   RBC 3.00* 2.56* 2.69*   HGB 9.5* 7.9* 8.3*   HCT 29.2* 24.2* 25.3*    180 182   MCV 97 95 94   MCH 31.7* 30.9 30.9   MCHC 32.5 32.6 32.8     CMP:   Recent Labs   Lab 06/17/25  1605 06/17/25  2214 06/18/25  0545 06/19/25  0607 06/19/25  1356   *   < > 178* 146* 232*   CALCIUM 9.6   < > 8.6* 7.7* 7.6*   ALBUMIN 4.0   < > 3.1* 2.7* 2.8*  3.0*   PROT 6.6  --   --   --  5.1*   *   < > 134* 135* 134*   K 4.4   < > 4.5 2.7* 2.8*   CO2 12*   < > 12* 22* 22*   *   < > 112* 102 100   BUN 81*   < > 76* 63* 63*   CREATININE 3.7*   < > 3.3* 3.5* 3.5*   ALKPHOS 69  --   --   --  59   ALT 5*  --   --   --  6*   AST 7*  --   --   --  7*    < > = values in this interval not displayed.     Coagulation: No results for input(s): "PT", "APTT" in the last 168 hours.  Labs within the past 24 hours have been reviewed.    Diagnostic Results:  Abdominal X-Ray: No results found for this or any previous visit.  CT - Abd/Pelvic: No results found for this or any previous visit.  Assessment/Plan:     * FOREST (acute kidney injury)   -Cr increased at OSH   - likely 2/2 decr intake and diarrhea  - improving with IVF, will continue   - Kidney US reviewed  - strict Is and Os  - monitor with renal panel       Thickening of wall of gallbladder  - CT notable for gallbladder wall thickening and fat stranding  - LFTs and panc " enzymes wnl  - RUQ US ordered  - TRial of Bentyl ordered      Metabolic acidosis  - bicarb gtt, cont to monitor need for replacement      Pyelonephritis, acute  - findings on CT concerning for cystitis/pyelonephritis   - continue Zosyn  - IVF started   - urine cx w mult organisms, repeat pending      Diarrhea  - GIP negative  - C diff NEG  - IVF for fluid replacement       Prophylactic immunotherapy  - cont tac, pred   - MMF held for diarrheal illness   - monitor tac levels daily for toxicity and therapeutic efforts       Long-term use of immunosuppressant medication  - see prophylactic immunotherapy      At risk for opportunistic infections  - Bactrim for PCP ppx.  - Valcyte for CMV ppx.      Status post -donor kidney transplantation  - see FOREST       Essential (primary) hypertension  - cont home meds w HOLD parameters    Anemia in chronic kidney disease  - h/h stable  - monitor with cbc           Discharge Planning:  Discussed plan of care.  No plan for discharge today.    Medical decision making for this encounter includes review of pertinent labs and diagnostic studies, assessment and planning, discussions with consulting providers, discussion with patient/family, and participation in multidisciplinary rounds. Time spent caring for patient: 90 minutes    Catherine Leal NP  Kidney Transplant  [facility]

## 2025-06-19 NOTE — PLAN OF CARE
Pt AAOx4. VSS, afebrile. Voids independently. Three BMs reported. Pt up independently. Bicarb infusing @ 100 ml/hr. Zosyn given per MAR. Cdiff neg. Reviewed plan of care w/ pt. Remained free from falls/traumas/injuries. Questions answered and encouraged. Call light at bedside, non-skid socks on. Bed in low position, wheels locked. Standard precautions maintained.

## 2025-06-19 NOTE — PROGRESS NOTES
Called per lab and noted critical K+ level of 2.7.  Asked patient if fluids were paused when labs drawn and patient reported she did not notice.  Notifed Catherine Leal NP and inquired about a lab redraw.  New orders received.  Will continue to monitor patient.

## 2025-06-19 NOTE — PROGRESS NOTES
"Subjective      Molina Frias is a 41 y.o. female who presents for annual well woman exam.     GYN:  Denies vaginal discharge, labial erythema or lesions, dyspareunia, intermenstrual bleeding, spotting, or discharge.  Menarche: \"not normal - never has been\"   Occurring every other month to every 2 months for the past 2 years   Bleeding for 2 to 3 days then spotting   Contraception: hx of tubal ligation    Patient is sexually active with 1 partner.  Prior gynecologic surgeries: , tubal ligation    OB:   female  Pregnancies were delivered via .    :  Denies dysuria, urinary frequency or urgency, hematuria, flank pain, incontinence.    Breast:  Denies breast mass or tenderness  Normal mammogram in   Noting hx of inverted nipples that are chronic with dry skin  Patient denies have a family history of breast, endometrial, or ovarian ca.     Lifestyle:  Diet/Nutrition: well balanced diet.   Exercise: no formal exercise.   Vitamin D and calcium supplements  Advised calcium 600mg BID because only absorb 600mg at a time    Screening/Preventative:  Cervical cancer: last pap smear in . Results were abnormal with  HPV positive other  Negative 16, 18    Breast cancer: last mammogram in . Results were normal.  Colon cancer: N/A  Osteoporosis: N/A  STD screening: ordered    Review of Systems  Pertinent items are noted in HPI.      Objective      /68 (BP Location: Left arm, Patient Position: Sitting)   Pulse 72   Resp 17   Ht 5' 3\" (1.6 m)   Wt 83.9 kg (185 lb)   LMP 08/15/2024 (Approximate)   SpO2 99%   BMI 32.77 kg/m²     Physical Exam  Exam conducted with a chaperone present.   HENT:      Head: Normocephalic and atraumatic.      Right Ear: External ear normal.      Left Ear: External ear normal.      Mouth/Throat:      Pharynx: Oropharynx is clear.   Eyes:      Conjunctiva/sclera: Conjunctivae normal.   Cardiovascular:      Rate and Rhythm: Normal rate and regular rhythm. " [facility]  Wound Care    Patient Name:  Brande Reyer   MRN:  [id number]  Date: 6/19/2025  Diagnosis: FOREST (acute kidney injury)    History:     Past Medical History:   Diagnosis Date    Allergy     Anemia     Anxiety     Depression     Diabetes mellitus, type 2     Dialysis patient     Disorder of kidney and ureter     Hyperlipidemia     Hypertension     Obesity     Pyelonephritis, acute 6/18/2025       Social History[1]    Precautions:     Allergies as of 06/17/2025 - Reviewed 06/17/2025   Allergen Reaction Noted    Amlodipine Itching and Swelling 05/12/2022       WO Assessment Details/Treatment   Patient seen for wound care consultation.  Chart reviewed for this encounter.  See flow sheet for findings.    Pt laying on the couch and agreeable to care. RLQ incision site is dehisced measuring 1x4x0.3cm. The wound bed is pink and dry. Medihoney applied for moist wound healing, antimicrobial properties and to help facilitate autolytic debridement.     Recommendations:  - RLQ: cleanse with NS, pat dry, apply medihoney to the wound bed and cover with an island/bordered/mepore dressing daily      06/19/25 1056        Wound 06/04/25 0942 Puncture Right lower Abdomen   Date First Assessed/Time First Assessed: 06/04/25 0942   Present on Original Admission: No  Primary Wound Type: Puncture  Side: Right  Orientation: lower  Location: Abdomen   Wound Image    Dressing Appearance Intact;Moist drainage   Drainage Amount Scant   Drainage Characteristics/Odor Serous   Appearance [place];Dry   Periwound Area Intact;Dry   Wound Edges Open   Wound Length (cm) 1 cm   Wound Width (cm) 4 cm   Wound Depth (cm) 0.3 cm   Wound Volume (cm^3) 0.628 cm^3   Wound Surface Area (cm^2) 3.14 cm^2   Care Cleansed with:;Sterile normal saline   Dressing Applied;Honey;Changed;Island/border     Documented Som Score: 20  Recommendations made to primary team for above plan via secure chat. Wound care will follow-up as needed.        06/19/2025         [1]   Social History  Socioeconomic History    Marital status:    Occupational History    Occupation: unemployed   Tobacco Use    Smoking status: Former     Current packs/day: 0.00     Types: Cigarettes     Quit date: 2022     Years since quitting: 3.4    Smokeless tobacco: Never   Substance and Sexual Activity    Alcohol use: Not Currently    Drug use: Never    Sexual activity: Yes     Partners: Male     Birth control/protection: See Surgical Hx   Social History Narrative    Sister in law, Mia is caregiver. Lives in [place], [place]. Can perform all ADLs. 3x per week swims and used to go 3x per week to gym.     Social Drivers of Health     Financial Resource Strain: Low Risk  (6/19/2025)    Overall Financial Resource Strain (CARDIA)     Difficulty of Paying Living Expenses: Not hard at all   Food Insecurity: No Food Insecurity (6/19/2025)    Hunger Vital Sign     Worried About Running Out of Food in the Last Year: Never true     Ran Out of Food in the Last Year: Never true   Transportation Needs: No Transportation Needs (6/19/2025)    PRAPARE - Transportation     Lack of Transportation (Medical): No     Lack of Transportation (Non-Medical): No   Physical Activity: Insufficiently Active (1/8/2025)    Exercise Vital Sign     Days of Exercise per Week: 3 days     Minutes of Exercise per Session: 20 min   Stress: No Stress Concern Present (6/19/2025)    [employer] - Occupational Stress Questionnaire     Feeling of Stress : Not at all   Housing Stability: Low Risk  (6/19/2025)    Housing Stability Vital Sign     Unable to Pay for Housing in the Last Year: No     Number of Times Moved in the Last Year: 0     Homeless in the Last Year: No   Recent Concern: Housing Stability - High Risk (4/28/2025)    Housing Stability Vital Sign     Unable to Pay for Housing in the Last Year: Yes     Number of Times Moved in the Last Year: 0     Homeless in the Last Year: No  "  Pulmonary:      Effort: Pulmonary effort is normal.   Chest:   Breasts:     Right: Inverted nipple present. No nipple discharge.      Left: Inverted nipple present. No nipple discharge.   Abdominal:      General: Bowel sounds are normal.      Palpations: Abdomen is soft.   Genitourinary:     General: Normal vulva.      Exam position: Lithotomy position.      Labia:         Right: No rash.         Left: No rash.       Vagina: Signs of injury present.      Cervix: Cervical bleeding present.      Uterus: Normal.       Adnexa: Right adnexa normal and left adnexa normal.   Skin:     General: Skin is warm and dry.   Neurological:      General: No focal deficit present.      Mental Status: She is alert.                 Assessment & Plan:    1. Encounter for annual routine gynecological examination  2. Recurrent UTI (urinary tract infection)  Assessment & Plan:  Chronic, noting she has attempted to reduce constipation but is still noticing frequent UTI symptoms. Denies any current UTI symptoms.    Plan:  -Start macrobid 100 mg 2hrs post intercourse  -Recommend bladder emptying post intercourse  -Discussed increasing fluid intake  Orders:  -     nitrofurantoin (MACROBID) 100 mg capsule; Take 1 capsule (100 mg total) by mouth daily as needed (post intercourse) for up to 30 doses  3. Irregular menstruation  Assessment & Plan:  Noting that \"periods have been spacing out over years\" stating she has been menstruating every every other month to every 2 months.  Additionally noting that menstruation has become lighter on days of bleeding and she has noticed more spotting.  Mentioning some signs of early menopause including some hot flashes and menopausal symptoms    Plan:  -Order TSH to r/o thyroid dysfunction   -LH/FSH for r/o early menopause  -Discussed herbal supplement for symptom management as needed  Orders:  -     TSH, 3rd generation with Free T4 reflex; Future  -     Luteinizing hormone; Future  -     Follicle stimulating " hormone; Future  -     TSH, 3rd generation with Free T4 reflex  -     Luteinizing hormone  -     Follicle stimulating hormone  4. Screening for cervical cancer  -     Liquid-based pap, screening  5. Need for hepatitis C screening test  -     Hepatitis C Antibody; Future  -     Hepatitis C Antibody  6. Encounter for screening mammogram for breast cancer  -     Mammo screening bilateral w 3d and cad; Future  7. Screening for HIV (human immunodeficiency virus)  -     HIV 1/2 AG/AB w Reflex [facility] for 2 yr old and above; Future  8. Possible exposure to STI  -     RPR-Syphilis Screening (Total Syphilis IGG/IGM); Future  -     Molecular Vaginal Panel  -     Chlamydia/GC amplified DNA by PCR

## 2025-06-19 NOTE — SUBJECTIVE & OBJECTIVE
Subjective:   History of Present Illness:  Mrs. Reyer is a 49 y.o. woman who received a DBD KTX 4/20/24 for ESRD 2/2 DMII (CIT 10hrs 25 mins). Post op with DGF, HD. Cr very slowly trending down, last lowest 3.0. Rejection r/o with allosure and biopsy 6/4. Last US 5/26 stable. Last admit for anemia resolved with 2 units PRBCs.  She now presents as a transfer from OSH. Went to ED for lower abdominal pain and 2 day history of NV with associated decreased intake. Also endorsed dysuria and diarrhea. Denied fevers, chills, chest pain, shortness of breath. Recent labs notable for normal lactate, elevated Cr up to 4.2, bicarb 12. Hgb stable, no leukocytosis. BC from OSH pending. Flu and COVID negative. CT AP with edematous allograft + adjacent infectious/inflammatory edema, bladder wall thickening, evidence of diarrheal illness. CXR without acute process. Will obtain stool studies, start abx for poss pyelonephritis/UTI, f/u cultures, IVF, US for FOREST.       Hospital Course:  Patient transferred from OSH due to abdominal pain, FOREST, and diarrhea with CT concerning for pyelonephritis.     Interval note:  - NAEON. On am exam, pt reportedly feeling better. By lunch, c/o abdominal pain to the right of umbilicus, worse w eating lunch. All stool studies neg. Pt cont w loose stools. CT A/P reviewed and did note GB wall thickness w fat stranding. RUQ US ordered. LFTs amd panc enzymes wnl. Trial of Bentyl ordered.  - Pyelonephritis suspected, UA w 2+ leuks, mod bacteria, Urine cx w mult organisms, none in predominance. Repeat pending. Cont Zosyn.  - FOREST  cont IV fluids while having diarrhea and intermittent pain keeping pt from drinking/eating  - HYPOKalemia- minimal response w PO Micro K, giving K ryders. BIcarb gtt d/c'd  - Small area of transplant incision open. Pt reports doing wound care at home, healing well. Wound care consulted, apprec recs      Past Medical, Surgical, Family, and Social History:   Unchanged from  H&P.    Scheduled Meds:   busPIRone  5 mg Oral BID    carvediloL  6.25 mg Oral BID    cloNIDine  0.2 mg Oral QHS    dicyclomine  10 mg Oral TID    gabapentin  100 mg Oral TID    heparin (porcine)  5,000 Units Subcutaneous Q8H    pantoprazole  40 mg Oral Daily    piperacillin-tazobactam (Zosyn) IV (PEDS and ADULTS) (extended infusion is not appropriate)  4.5 g Intravenous Q12H    potassium chloride  10 mEq Intravenous Q1H    potassium chloride  40 mEq Oral Q4H    pravastatin  40 mg Oral Daily    predniSONE  5 mg Oral Daily    sodium bicarbonate  1,300 mg Oral TID    sulfamethoxazole-trimethoprim 400-80mg  1 tablet Oral Once per day on Monday Wednesday Friday    tacrolimus  4 mg Oral BID    traZODone  50 mg Oral QHS    valGANciclovir  450 mg Oral Once per day on Monday Wednesday Friday    vilazodone  40 mg Oral Daily     Continuous Infusions:   0.9% NaCl   Intravenous Continuous         PRN Meds:  Current Facility-Administered Medications:     acetaminophen, 650 mg, Oral, Q4H PRN    calcium carbonate, 500 mg, Oral, TID PRN    ondansetron, 8 mg, Oral, Q6H PRN    prochlorperazine, 5 mg, Intravenous, Q6H PRN    sodium chloride 0.9%, 10 mL, Intravenous, PRN    traMADoL, 50 mg, Oral, Q4H PRN    Intake/Output - Last 3 Shifts         06/17 0700  06/18 0659 06/18 0700  06/19 0659 06/19 0700  06/20 0659    P.O. 360 1320 800    I.V. (mL/kg)   1394.8 (15.6)    IV Piggyback   356.4    Total Intake(mL/kg) 360 (4.1) 1320 (14.7) 2551.1 (28.5)    Urine (mL/kg/hr)   200 (0.2)    Stool   0    Total Output   200    Net +360 +1320 +2351.1           Unmeasured Urine Occurrence 3 x 5 x 3 x    Unmeasured Stool Occurrence 2 x 5 x 3 x             Review of Systems   Constitutional:  Positive for appetite change (decreased, symptoms worse w eating). Negative for fatigue and fever.   HENT:  Negative for trouble swallowing.    Respiratory:  Negative for cough and shortness of breath.    Cardiovascular:  Negative for chest pain and leg swelling.  "  Gastrointestinal:  Positive for abdominal pain and diarrhea. Negative for abdominal distention.   Genitourinary:  Positive for decreased urine volume. Negative for difficulty urinating, dysuria, frequency and hematuria.   Skin:  Negative for wound.   Allergic/Immunologic: Positive for immunocompromised state.   Neurological:  Negative for dizziness and weakness.   Psychiatric/Behavioral:  Negative for confusion.       Objective:     Vital Signs (Most Recent):  Temp: 98.3 °F (36.8 °C) (06/19/25 1616)  Pulse: 86 (06/19/25 1616)  Resp: 18 (06/19/25 1616)  BP: 119/66 (06/19/25 1616)  SpO2: 100 % (06/19/25 1616) Vital Signs (24h Range):  Temp:  [97.6 °F (36.4 °C)-98.3 °F (36.8 °C)] 98.3 °F (36.8 °C)  Pulse:  [74-94] 86  Resp:  [18-20] 18  SpO2:  [95 %-100 %] 100 %  BP: ()/(50-66) 119/66     Weight: 89.5 kg (197 lb 5 oz)  Height: 5' 4" (162.6 cm)  Body mass index is 33.87 kg/m².     Physical Exam  Vitals and nursing note reviewed.   Constitutional:       General: She is not in acute distress.     Appearance: She is not ill-appearing.   HENT:      Head: Normocephalic and atraumatic.      Mouth/Throat:      Mouth: Mucous membranes are moist.   Eyes:      Extraocular Movements: Extraocular movements intact.   Cardiovascular:      Rate and Rhythm: Normal rate and regular rhythm.      Pulses: Normal pulses.      Heart sounds: No murmur heard.  Pulmonary:      Effort: Pulmonary effort is normal. No respiratory distress.   Abdominal:      General: Bowel sounds are normal. There is no distension.      Palpations: Abdomen is soft.      Tenderness: There is no guarding or rebound.      Hernia: A hernia (small, umbilical, easily reduced) is present.   Musculoskeletal:         General: No swelling. Normal range of motion.      Cervical back: Normal range of motion.   Skin:     General: Skin is warm and dry.   Neurological:      Mental Status: She is alert and oriented to person, place, and time.      Motor: No weakness. " "  Psychiatric:         Mood and Affect: Mood normal.         Behavior: Behavior normal.         Thought Content: Thought content normal.         Judgment: Judgment normal.          Laboratory:  CBC:   Recent Labs   Lab 06/18/25  0545 06/19/25  0607 06/19/25  1356   WBC 5.17 5.36 6.26   RBC 3.00* 2.56* 2.69*   HGB 9.5* 7.9* 8.3*   HCT 29.2* 24.2* 25.3*    180 182   MCV 97 95 94   MCH 31.7* 30.9 30.9   MCHC 32.5 32.6 32.8     CMP:   Recent Labs   Lab 06/17/25  1605 06/17/25  2214 06/18/25  0545 06/19/25  0607 06/19/25  1356   *   < > 178* 146* 232*   CALCIUM 9.6   < > 8.6* 7.7* 7.6*   ALBUMIN 4.0   < > 3.1* 2.7* 2.8*  3.0*   PROT 6.6  --   --   --  5.1*   *   < > 134* 135* 134*   K 4.4   < > 4.5 2.7* 2.8*   CO2 12*   < > 12* 22* 22*   *   < > 112* 102 100   BUN 81*   < > 76* 63* 63*   CREATININE 3.7*   < > 3.3* 3.5* 3.5*   ALKPHOS 69  --   --   --  59   ALT 5*  --   --   --  6*   AST 7*  --   --   --  7*    < > = values in this interval not displayed.     Coagulation: No results for input(s): "PT", "APTT" in the last 168 hours.  Labs within the past 24 hours have been reviewed.    Diagnostic Results:  Abdominal X-Ray: No results found for this or any previous visit.  CT - Abd/Pelvic: No results found for this or any previous visit.  "

## 2025-06-19 NOTE — ASSESSMENT & PLAN NOTE
- findings on CT concerning for cystitis/pyelonephritis   - continue Zosyn  - IVF started   - urine cx w mult organisms, repeat pending

## 2025-06-19 NOTE — ASSESSMENT & PLAN NOTE
- CT notable for gallbladder wall thickening and fat stranding  - LFTs and panc enzymes wnl  - RUQ US ordered  - TRial of Bentyl ordered

## 2025-06-19 NOTE — PLAN OF CARE
Patient remained free from injury.  Patient ambulating in room and up to restroom.  Patient still reporting loose stools and team aware.  Awaiting clean catch urine and culture.  Cup and wipes at bedside.  Wound care provided to RLQ incision per wound care.  Creatinine stable.  Potassium being replaced per oral and IV.  Patient reported hungry but having lover abd pain when eating.  Patient PO for US of abd.  Plan to perform US at 930pm after 8 hours NPO.  Team monitoring labs.

## 2025-06-20 LAB
ABSOLUTE EOSINOPHIL (OHS): 0.06 K/UL
ABSOLUTE MONOCYTE (OHS): 0.64 K/UL (ref 0.3–1)
ABSOLUTE NEUTROPHIL COUNT (OHS): 4.19 K/UL (ref 1.8–7.7)
ALBUMIN SERPL BCP-MCNC: 2.6 G/DL (ref 3.5–5.2)
ANION GAP (OHS): 9 MMOL/L (ref 8–16)
ANISOCYTOSIS BLD QL SMEAR: SLIGHT
BACTERIA #/AREA URNS AUTO: ABNORMAL /HPF
BACTERIA STL CULT: NORMAL
BASOPHILS # BLD AUTO: 0.01 K/UL
BASOPHILS NFR BLD AUTO: 0.2 %
BILIRUB UR QL STRIP.AUTO: NEGATIVE
BUN SERPL-MCNC: 59 MG/DL (ref 6–20)
BURR CELLS BLD QL SMEAR: NORMAL
CALCIUM SERPL-MCNC: 7.4 MG/DL (ref 8.7–10.5)
CHLORIDE SERPL-SCNC: 106 MMOL/L (ref 95–110)
CLARITY UR: ABNORMAL
CO2 SERPL-SCNC: 20 MMOL/L (ref 23–29)
COLOR UR AUTO: YELLOW
CREAT SERPL-MCNC: 4.2 MG/DL (ref 0.5–1.4)
DACRYOCYTES BLD QL SMEAR: NORMAL
ERYTHROCYTE [DISTWIDTH] IN BLOOD BY AUTOMATED COUNT: 14.4 % (ref 11.5–14.5)
GFR SERPLBLD CREATININE-BSD FMLA CKD-EPI: 12 ML/MIN/1.73/M2
GLUCOSE SERPL-MCNC: 148 MG/DL (ref 70–110)
GLUCOSE UR QL STRIP: NEGATIVE
HCT VFR BLD AUTO: 24.3 % (ref 37–48.5)
HGB BLD-MCNC: 7.6 GM/DL (ref 12–16)
HGB UR QL STRIP: NEGATIVE
IMM GRANULOCYTES # BLD AUTO: 0.05 K/UL (ref 0–0.04)
IMM GRANULOCYTES NFR BLD AUTO: 0.9 % (ref 0–0.5)
KETONES UR QL STRIP: NEGATIVE
LEUKOCYTE ESTERASE UR QL STRIP: ABNORMAL
LYMPHOCYTES # BLD AUTO: 0.42 K/UL (ref 1–4.8)
MAGNESIUM SERPL-MCNC: 1.9 MG/DL (ref 1.6–2.6)
MCH RBC QN AUTO: 30.6 PG (ref 27–31)
MCHC RBC AUTO-ENTMCNC: 31.3 G/DL (ref 32–36)
MCV RBC AUTO: 98 FL (ref 82–98)
MICROSCOPIC COMMENT: ABNORMAL
NITRITE UR QL STRIP: NEGATIVE
NUCLEATED RBC (/100WBC) (OHS): 0 /100 WBC
PH UR STRIP: 5 [PH]
PHOSPHATE SERPL-MCNC: 2.6 MG/DL (ref 2.7–4.5)
PLATELET # BLD AUTO: 170 K/UL (ref 150–450)
PMV BLD AUTO: 9.2 FL (ref 9.2–12.9)
POIKILOCYTOSIS BLD QL SMEAR: SLIGHT
POTASSIUM SERPL-SCNC: 3.8 MMOL/L (ref 3.5–5.1)
PROT UR QL STRIP: ABNORMAL
RBC # BLD AUTO: 2.48 M/UL (ref 4–5.4)
RBC #/AREA URNS AUTO: 4 /HPF (ref 0–4)
RELATIVE EOSINOPHIL (OHS): 1.1 %
RELATIVE LYMPHOCYTE (OHS): 7.8 % (ref 18–48)
RELATIVE MONOCYTE (OHS): 11.9 % (ref 4–15)
RELATIVE NEUTROPHIL (OHS): 78.1 % (ref 38–73)
SCHISTOCYTES BLD QL SMEAR: NORMAL
SCHISTOCYTES BLD QL SMEAR: PRESENT
SODIUM SERPL-SCNC: 135 MMOL/L (ref 136–145)
SP GR UR STRIP: 1.02
SQUAMOUS #/AREA URNS AUTO: 2 /HPF
TACROLIMUS BLD-MCNC: 12.8 NG/ML (ref 5–15)
UROBILINOGEN UR STRIP-ACNC: NEGATIVE EU/DL
WBC # BLD AUTO: 5.37 K/UL (ref 3.9–12.7)
WBC #/AREA URNS AUTO: 46 /HPF (ref 0–5)

## 2025-06-20 PROCEDURE — 85025 COMPLETE CBC W/AUTO DIFF WBC: CPT

## 2025-06-20 PROCEDURE — 25000003 PHARM REV CODE 250

## 2025-06-20 PROCEDURE — 20600001 HC STEP DOWN PRIVATE ROOM

## 2025-06-20 PROCEDURE — 83735 ASSAY OF MAGNESIUM: CPT

## 2025-06-20 PROCEDURE — 63600175 PHARM REV CODE 636 W HCPCS

## 2025-06-20 PROCEDURE — 25000003 PHARM REV CODE 250: Performed by: PHYSICIAN ASSISTANT

## 2025-06-20 PROCEDURE — 63600175 PHARM REV CODE 636 W HCPCS: Performed by: PHYSICIAN ASSISTANT

## 2025-06-20 PROCEDURE — 80197 ASSAY OF TACROLIMUS: CPT

## 2025-06-20 PROCEDURE — 25000003 PHARM REV CODE 250: Performed by: NURSE PRACTITIONER

## 2025-06-20 PROCEDURE — 36415 COLL VENOUS BLD VENIPUNCTURE: CPT

## 2025-06-20 PROCEDURE — 27000207 HC ISOLATION

## 2025-06-20 PROCEDURE — 99233 SBSQ HOSP IP/OBS HIGH 50: CPT | Mod: ,,, | Performed by: NURSE PRACTITIONER

## 2025-06-20 PROCEDURE — 80069 RENAL FUNCTION PANEL: CPT

## 2025-06-20 PROCEDURE — 63600175 PHARM REV CODE 636 W HCPCS: Performed by: NURSE PRACTITIONER

## 2025-06-20 RX ORDER — TACROLIMUS 1 MG/1
3 CAPSULE ORAL 2 TIMES DAILY
Status: DISCONTINUED | OUTPATIENT
Start: 2025-06-20 | End: 2025-06-22

## 2025-06-20 RX ORDER — SODIUM BICARBONATE 650 MG/1
1300 TABLET ORAL 3 TIMES DAILY
Qty: 180 TABLET | Refills: 11 | Status: SHIPPED | OUTPATIENT
Start: 2025-06-20 | End: 2025-07-01

## 2025-06-20 RX ORDER — POTASSIUM CHLORIDE 7.45 MG/ML
10 INJECTION INTRAVENOUS ONCE
Status: COMPLETED | OUTPATIENT
Start: 2025-06-20 | End: 2025-06-20

## 2025-06-20 RX ADMIN — SODIUM BICARBONATE 650 MG TABLET 1300 MG: at 09:06

## 2025-06-20 RX ADMIN — PIPERACILLIN SODIUM AND TAZOBACTAM SODIUM 4.5 G: 4; .5 INJECTION, POWDER, FOR SOLUTION INTRAVENOUS at 11:06

## 2025-06-20 RX ADMIN — VALGANCICLOVIR HYDROCHLORIDE 450 MG: 450 TABLET ORAL at 09:06

## 2025-06-20 RX ADMIN — SODIUM BICARBONATE 650 MG TABLET 1300 MG: at 04:06

## 2025-06-20 RX ADMIN — PRAVASTATIN SODIUM 40 MG: 40 TABLET ORAL at 09:06

## 2025-06-20 RX ADMIN — PIPERACILLIN SODIUM AND TAZOBACTAM SODIUM 4.5 G: 4; .5 INJECTION, POWDER, FOR SOLUTION INTRAVENOUS at 10:06

## 2025-06-20 RX ADMIN — VILAZODONE HYDROCHLORIDE 40 MG: 10 TABLET ORAL at 09:06

## 2025-06-20 RX ADMIN — TACROLIMUS 3 MG: 1 CAPSULE ORAL at 06:06

## 2025-06-20 RX ADMIN — TACROLIMUS 4 MG: 1 CAPSULE ORAL at 09:06

## 2025-06-20 RX ADMIN — BUSPIRONE HYDROCHLORIDE 5 MG: 5 TABLET ORAL at 09:06

## 2025-06-20 RX ADMIN — SULFAMETHOXAZOLE AND TRIMETHOPRIM 1 TABLET: 400; 80 TABLET ORAL at 09:06

## 2025-06-20 RX ADMIN — ONDANSETRON 8 MG: 8 TABLET, ORALLY DISINTEGRATING ORAL at 11:06

## 2025-06-20 RX ADMIN — GABAPENTIN 100 MG: 100 CAPSULE ORAL at 09:06

## 2025-06-20 RX ADMIN — GABAPENTIN 100 MG: 100 CAPSULE ORAL at 08:06

## 2025-06-20 RX ADMIN — POTASSIUM CHLORIDE 40 MEQ: 1500 TABLET, EXTENDED RELEASE ORAL at 02:06

## 2025-06-20 RX ADMIN — BUSPIRONE HYDROCHLORIDE 5 MG: 5 TABLET ORAL at 08:06

## 2025-06-20 RX ADMIN — POTASSIUM CHLORIDE 10 MEQ: 7.46 INJECTION, SOLUTION INTRAVENOUS at 01:06

## 2025-06-20 RX ADMIN — PANTOPRAZOLE SODIUM 40 MG: 40 TABLET, DELAYED RELEASE ORAL at 09:06

## 2025-06-20 RX ADMIN — SODIUM BICARBONATE 650 MG TABLET 1300 MG: at 08:06

## 2025-06-20 RX ADMIN — TRAMADOL HYDROCHLORIDE 50 MG: 50 TABLET, COATED ORAL at 11:06

## 2025-06-20 RX ADMIN — POTASSIUM CHLORIDE 10 MEQ: 7.46 INJECTION, SOLUTION INTRAVENOUS at 04:06

## 2025-06-20 RX ADMIN — DICYCLOMINE HYDROCHLORIDE 10 MG: 10 CAPSULE ORAL at 09:06

## 2025-06-20 RX ADMIN — DICYCLOMINE HYDROCHLORIDE 10 MG: 10 CAPSULE ORAL at 04:06

## 2025-06-20 RX ADMIN — DICYCLOMINE HYDROCHLORIDE 10 MG: 10 CAPSULE ORAL at 08:06

## 2025-06-20 RX ADMIN — GABAPENTIN 100 MG: 100 CAPSULE ORAL at 04:06

## 2025-06-20 RX ADMIN — PREDNISONE 5 MG: 5 TABLET ORAL at 09:06

## 2025-06-20 RX ADMIN — TRAZODONE HYDROCHLORIDE 50 MG: 50 TABLET ORAL at 08:06

## 2025-06-20 NOTE — ASSESSMENT & PLAN NOTE
- findings on CT concerning for cystitis/pyelonephritis   - continue Zosyn thru the wkd  - urine cx w mult organisms, repeat UA clean but was on abx, plan to cont treatment for pyelo

## 2025-06-20 NOTE — ASSESSMENT & PLAN NOTE
- CT notable for gallbladder wall thickening and fat stranding  - LFTs and panc enzymes wnl  - RUQ US ordered  - TRial of Bentyl ordered  - still having intermittent pain Right of umbilicus or below epigastric area, worse w eating  - imaging reviewed per Surgeon would like GI consult, possible if EGD, if neg then may need HIDA or further w/u. S/w GI fellow, plan to see pt in am, appreciate recs.

## 2025-06-20 NOTE — PROGRESS NOTES
[facility] - [facility] Step[facility]  Kidney Transplant  Progress Note      Reason for Follow-up: Reassessment of Kidney Transplant - 4/20/2025  (#1) recipient and management of immunosuppression.     ORGAN:   RIGHT KIDNEY    Donor Type:   Donation after Brain Death    PHS Increased Risk: no   Cold Ischemia:   Induction Medications: Thymoglobulin      Subjective:   History of Present Illness:  Mrs. Reyer is a 49 y.o. woman who received a DBD KTX 4/20/24 for ESRD 2/2 DMII (CIT 10hrs 25 mins). Post op with DGF, HD. Cr very slowly trending down, last lowest 3.0. Rejection r/o with allosure and biopsy 6/4. Last US 5/26 stable. Last admit for anemia resolved with 2 units PRBCs.  She now presents as a transfer from OSH. Went to ED for lower abdominal pain and 2 day history of NV with associated decreased intake. Also endorsed dysuria and diarrhea. Denied fevers, chills, chest pain, shortness of breath. Recent labs notable for normal lactate, elevated Cr up to 4.2, bicarb 12. Hgb stable, no leukocytosis. BC from OSH pending. Flu and COVID negative. CT AP with edematous allograft + adjacent infectious/inflammatory edema, bladder wall thickening, evidence of diarrheal illness. CXR without acute process. Will obtain stool studies, start abx for poss pyelonephritis/UTI, f/u cultures, IVF, US for FOREST.       Hospital Course:  Patient transferred from OSH due to abdominal pain, FOREST, and diarrhea with CT concerning for pyelonephritis.     Interval note:  - NAEON. Pt continues to report right flank pain, worse w eating and nausea. She doesn't get much improvement w meds but symptoms do get better if she avoids eating/drinking. CT reviewed w findings of GB wall thickening and fat stranding, RUQ US w sludge and mobile stones. LFTs and panc enzymes wnl. Thought she was getting relief w bentyl but then had another episode of abdominal pain keeping her from eating. Consulting GI to assess and possible EGD Monday. Apprec recs  - diarrhea-  stool studies all neg.   - Pyelonephritis suspected, UA w 2+ leuks, mod bacteria, Urine cx w mult organisms, none in predominance. Repeat UA unremarkable. Cont Zosyn.  - FOREST  cont IV fluids while having diarrhea and intermittent pain keeping pt from drinking/eating  - HYPOKalemia- responded w IV K+, K 3.8 today  - Small area of transplant incision open. Pt reports doing wound care at home, healing well. Wound care consulted, apprec recs      Past Medical, Surgical, Family, and Social History:   Unchanged from H&P.    Scheduled Meds:   busPIRone  5 mg Oral BID    carvediloL  6.25 mg Oral BID    cloNIDine  0.2 mg Oral QHS    dicyclomine  10 mg Oral TID    gabapentin  100 mg Oral TID    heparin (porcine)  5,000 Units Subcutaneous Q8H    pantoprazole  40 mg Oral Daily    piperacillin-tazobactam (Zosyn) IV (PEDS and ADULTS) (extended infusion is not appropriate)  4.5 g Intravenous Q12H    pravastatin  40 mg Oral Daily    predniSONE  5 mg Oral Daily    sodium bicarbonate  1,300 mg Oral TID    sulfamethoxazole-trimethoprim 400-80mg  1 tablet Oral Once per day on Monday Wednesday Friday    tacrolimus  3 mg Oral BID    traZODone  50 mg Oral QHS    valGANciclovir  450 mg Oral Once per day on Monday Wednesday Friday    vilazodone  40 mg Oral Daily     Continuous Infusions:      PRN Meds:  Current Facility-Administered Medications:     acetaminophen, 650 mg, Oral, Q4H PRN    calcium carbonate, 500 mg, Oral, TID PRN    ondansetron, 8 mg, Oral, Q6H PRN    prochlorperazine, 5 mg, Intravenous, Q6H PRN    sodium chloride 0.9%, 10 mL, Intravenous, PRN    traMADoL, 50 mg, Oral, Q4H PRN    Intake/Output - Last 3 Shifts         06/18 0700  06/19 0659 06/19 0700 06/20 0659 06/20 0700 06/21 0659    P.O. 1320 1620 340    I.V. (mL/kg)  1472.3 (16.1)     IV Piggyback  390.9     Total Intake(mL/kg) 1320 (14.7) 3483.2 (38.2) 340 (3.7)    Urine (mL/kg/hr)  200 (0.1) 150 (0.2)    Stool  0 0    Total Output  200 150    Net +1320 +3283.2 +190     "       Unmeasured Urine Occurrence 5 x 10 x 2 x    Unmeasured Stool Occurrence 5 x 8 x 3 x             Review of Systems   Constitutional:  Positive for appetite change (decreased, symptoms worse w eating). Negative for fatigue and fever.   HENT:  Negative for trouble swallowing.    Respiratory:  Negative for cough and shortness of breath.    Cardiovascular:  Negative for chest pain and leg swelling.   Gastrointestinal:  Positive for abdominal pain, diarrhea and nausea. Negative for abdominal distention.   Genitourinary:  Positive for decreased urine volume. Negative for difficulty urinating, dysuria, frequency and hematuria.   Skin:  Negative for wound.   Allergic/Immunologic: Positive for immunocompromised state.   Neurological:  Negative for dizziness and weakness.   Psychiatric/Behavioral:  Negative for confusion.       Objective:     Vital Signs (Most Recent):  Temp: 98.1 °F (36.7 °C) (06/20/25 1551)  Pulse: 95 (06/20/25 1551)  Resp: 18 (06/20/25 1551)  BP: 134/78 (06/20/25 1551)  SpO2: 96 % (06/20/25 1551) Vital Signs (24h Range):  Temp:  [98.1 °F (36.7 °C)-98.7 °F (37.1 °C)] 98.1 °F (36.7 °C)  Pulse:  [] 95  Resp:  [16-18] 18  SpO2:  [88 %-100 %] 96 %  BP: (111-134)/(57-78) 134/78     Weight: 91.2 kg (201 lb 1 oz)  Height: 5' 4" (162.6 cm)  Body mass index is 34.51 kg/m².     Physical Exam  Vitals and nursing note reviewed.   Constitutional:       General: She is not in acute distress.     Appearance: She is not ill-appearing.   HENT:      Head: Normocephalic and atraumatic.      Mouth/Throat:      Mouth: Mucous membranes are moist.   Eyes:      Extraocular Movements: Extraocular movements intact.   Cardiovascular:      Rate and Rhythm: Normal rate and regular rhythm.      Pulses: Normal pulses.      Heart sounds: No murmur heard.  Pulmonary:      Effort: Pulmonary effort is normal. No respiratory distress.   Abdominal:      General: Bowel sounds are normal. There is no distension.      Palpations: " "Abdomen is soft.      Tenderness: There is no guarding or rebound.      Hernia: A hernia (small, umbilical, easily reduced) is present.   Musculoskeletal:         General: No swelling. Normal range of motion.      Cervical back: Normal range of motion.   Skin:     General: Skin is warm and dry.   Neurological:      Mental Status: She is alert and oriented to person, place, and time.      Motor: No weakness.   Psychiatric:         Mood and Affect: Mood normal.         Behavior: Behavior normal.         Thought Content: Thought content normal.         Judgment: Judgment normal.          Laboratory:  CBC:   Recent Labs   Lab 06/19/25  0607 06/19/25  1356 06/20/25  0649   WBC 5.36 6.26 5.37   RBC 2.56* 2.69* 2.48*   HGB 7.9* 8.3* 7.6*   HCT 24.2* 25.3* 24.3*    182 170   MCV 95 94 98   MCH 30.9 30.9 30.6   MCHC 32.6 32.8 31.3*     CMP:   Recent Labs   Lab 06/17/25  1605 06/17/25  2214 06/19/25  0607 06/19/25  1356 06/20/25  0649   *   < > 146* 232* 148*   CALCIUM 9.6   < > 7.7* 7.6* 7.4*   ALBUMIN 4.0   < > 2.7* 2.8*  3.0* 2.6*   PROT 6.6  --   --  5.1*  --    *   < > 135* 134* 135*   K 4.4   < > 2.7* 2.8* 3.8   CO2 12*   < > 22* 22* 20*   *   < > 102 100 106   BUN 81*   < > 63* 63* 59*   CREATININE 3.7*   < > 3.5* 3.5* 4.2*   ALKPHOS 69  --   --  59  --    ALT 5*  --   --  6*  --    AST 7*  --   --  7*  --     < > = values in this interval not displayed.     Coagulation: No results for input(s): "PT", "APTT" in the last 168 hours.  Labs within the past 24 hours have been reviewed.    Diagnostic Results:  Abdominal X-Ray: No results found for this or any previous visit.  CT - Abd/Pelvic: No results found for this or any previous visit.  Assessment/Plan:     * FOREST (acute kidney injury)   -Cr increased at OSH   - likely 2/2 decr intake and diarrhea  - was improving with IV  - Kidney US reviewed and ok  - despite IV fluids, Prograf level 12.3, could be sensitive to CNI, pharmacist adjusted dose "   - strict Is and Os  - monitor with renal panel       Thickening of wall of gallbladder  - CT notable for gallbladder wall thickening and fat stranding  - LFTs and panc enzymes wnl  - RUQ US ordered  - TRial of Bentyl ordered  - still having intermittent pain Right of umbilicus or below epigastric area, worse w eating  - imaging reviewed per Surgeon would like GI consult, possible if EGD, if neg then may need HIDA or further w/u. S/w GI fellow, plan to see pt in am, appreciate recs.       Metabolic acidosis  - improved w bicarb gtt, cont to monitor need for replacement      Pyelonephritis, acute  - findings on CT concerning for cystitis/pyelonephritis   - continue Zosyn thru the wkd  - urine cx w mult organisms, repeat UA clean but was on abx, plan to cont treatment for pyelo    Diarrhea  - GIP negative  - C diff NEG  - IVF for fluid replacement   - has been ongoing per pt      Prophylactic immunotherapy  - cont tac, pred   - MMF held for diarrheal illness   - monitor tac levels daily for toxicity and therapeutic efforts       Long-term use of immunosuppressant medication  - see prophylactic immunotherapy      At risk for opportunistic infections  - Bactrim for PCP ppx.  - Valcyte for CMV ppx.      Status post -donor kidney transplantation  - see FOREST       Essential (primary) hypertension  - cont home meds w HOLD parameters    Anemia in chronic kidney disease  - h/h stable  - monitor with cbc           Discharge Planning:  Discussed plan of care.  No plan for discharge today.    Medical decision making for this encounter includes review of pertinent labs and diagnostic studies, assessment and planning, discussions with consulting providers, discussion with patient/family, and participation in multidisciplinary rounds. Time spent caring for patient: 90 minutes    Catherine Leal NP  Kidney Transplant  [facility] - Transplant Step[facility]

## 2025-06-20 NOTE — PLAN OF CARE
09/23/24 10:05 AM    Patient contacted post ED visit, [facility] department spoke with patient/caregiver and outreach was successful.    Thank you.  Urban Valdez MA  [facility]     Patient remaining free from injury and ambulating to and from the restroom independently.  Noted waylon sediment in urine.  Patient having soft formed BMs.  Patient eating 50% of meals.  Patient reported R upper abdomen pain about an hour and a half after eating breakfast.  Patient given zofran and tramadol and noted pain subsided and resolved within an hour.  Patient ate lunch about 1400 and no pain noted after lunch.  Gastro consult entered.  Patient up in chair most of the day.  Patient received IV ABX.  K WNL.  Patient to stay throughout the weekend to be monitored.

## 2025-06-20 NOTE — ASSESSMENT & PLAN NOTE
-Cr increased at OSH   - likely 2/2 decr intake and diarrhea  - was improving with IV  - Kidney US reviewed and ok  - despite IV fluids, Prograf level 12.3, could be sensitive to CNI, pharmacist adjusted dose   - strict Is and Os  - monitor with renal panel

## 2025-06-20 NOTE — SUBJECTIVE & OBJECTIVE
Subjective:   History of Present Illness:  Mrs. Reyer is a 49 y.o. woman who received a DBD KTX 4/20/24 for ESRD 2/2 DMII (CIT 10hrs 25 mins). Post op with DGF, HD. Cr very slowly trending down, last lowest 3.0. Rejection r/o with allosure and biopsy 6/4. Last US 5/26 stable. Last admit for anemia resolved with 2 units PRBCs.  She now presents as a transfer from OSH. Went to ED for lower abdominal pain and 2 day history of NV with associated decreased intake. Also endorsed dysuria and diarrhea. Denied fevers, chills, chest pain, shortness of breath. Recent labs notable for normal lactate, elevated Cr up to 4.2, bicarb 12. Hgb stable, no leukocytosis. BC from OSH pending. Flu and COVID negative. CT AP with edematous allograft + adjacent infectious/inflammatory edema, bladder wall thickening, evidence of diarrheal illness. CXR without acute process. Will obtain stool studies, start abx for poss pyelonephritis/UTI, f/u cultures, IVF, US for FOREST.       Hospital Course:  Patient transferred from OSH due to abdominal pain, FOREST, and diarrhea with CT concerning for pyelonephritis.     Interval note:  - NAEON. Pt continues to report right flank pain, worse w eating and nausea. She doesn't get much improvement w meds but symptoms do get better if she avoids eating/drinking. CT reviewed w findings of GB wall thickening and fat stranding, RUQ US w sludge and mobile stones. LFTs and panc enzymes wnl. Thought she was getting relief w bentyl but then had another episode of abdominal pain keeping her from eating. Consulting GI to assess and possible EGD Monday. Apprec recs  - diarrhea- stool studies all neg.   - Pyelonephritis suspected, UA w 2+ leuks, mod bacteria, Urine cx w mult organisms, none in predominance. Repeat UA unremarkable. Cont Zosyn.  - FOREST  cont IV fluids while having diarrhea and intermittent pain keeping pt from drinking/eating  - HYPOKalemia- responded w IV K+, K 3.8 today  - Small area of transplant incision  open. Pt reports doing wound care at home, healing well. Wound care consulted, apprec recs      Past Medical, Surgical, Family, and Social History:   Unchanged from H&P.    Scheduled Meds:   busPIRone  5 mg Oral BID    carvediloL  6.25 mg Oral BID    cloNIDine  0.2 mg Oral QHS    dicyclomine  10 mg Oral TID    gabapentin  100 mg Oral TID    heparin (porcine)  5,000 Units Subcutaneous Q8H    pantoprazole  40 mg Oral Daily    piperacillin-tazobactam (Zosyn) IV (PEDS and ADULTS) (extended infusion is not appropriate)  4.5 g Intravenous Q12H    pravastatin  40 mg Oral Daily    predniSONE  5 mg Oral Daily    sodium bicarbonate  1,300 mg Oral TID    sulfamethoxazole-trimethoprim 400-80mg  1 tablet Oral Once per day on Monday Wednesday Friday    tacrolimus  3 mg Oral BID    traZODone  50 mg Oral QHS    valGANciclovir  450 mg Oral Once per day on Monday Wednesday Friday    vilazodone  40 mg Oral Daily     Continuous Infusions:      PRN Meds:  Current Facility-Administered Medications:     acetaminophen, 650 mg, Oral, Q4H PRN    calcium carbonate, 500 mg, Oral, TID PRN    ondansetron, 8 mg, Oral, Q6H PRN    prochlorperazine, 5 mg, Intravenous, Q6H PRN    sodium chloride 0.9%, 10 mL, Intravenous, PRN    traMADoL, 50 mg, Oral, Q4H PRN    Intake/Output - Last 3 Shifts         06/18 0700  06/19 0659 06/19 0700 06/20 0659 06/20 0700 06/21 0659    P.O. 1320 1620 340    I.V. (mL/kg)  1472.3 (16.1)     IV Piggyback  390.9     Total Intake(mL/kg) 1320 (14.7) 3483.2 (38.2) 340 (3.7)    Urine (mL/kg/hr)  200 (0.1) 150 (0.2)    Stool  0 0    Total Output  200 150    Net +1320 +3283.2 +190           Unmeasured Urine Occurrence 5 x 10 x 2 x    Unmeasured Stool Occurrence 5 x 8 x 3 x             Review of Systems   Constitutional:  Positive for appetite change (decreased, symptoms worse w eating). Negative for fatigue and fever.   HENT:  Negative for trouble swallowing.    Respiratory:  Negative for cough and shortness of breath.   "  Cardiovascular:  Negative for chest pain and leg swelling.   Gastrointestinal:  Positive for abdominal pain, diarrhea and nausea. Negative for abdominal distention.   Genitourinary:  Positive for decreased urine volume. Negative for difficulty urinating, dysuria, frequency and hematuria.   Skin:  Negative for wound.   Allergic/Immunologic: Positive for immunocompromised state.   Neurological:  Negative for dizziness and weakness.   Psychiatric/Behavioral:  Negative for confusion.       Objective:     Vital Signs (Most Recent):  Temp: 98.1 °F (36.7 °C) (06/20/25 1551)  Pulse: 95 (06/20/25 1551)  Resp: 18 (06/20/25 1551)  BP: 134/78 (06/20/25 1551)  SpO2: 96 % (06/20/25 1551) Vital Signs (24h Range):  Temp:  [98.1 °F (36.7 °C)-98.7 °F (37.1 °C)] 98.1 °F (36.7 °C)  Pulse:  [] 95  Resp:  [16-18] 18  SpO2:  [88 %-100 %] 96 %  BP: (111-134)/(57-78) 134/78     Weight: 91.2 kg (201 lb 1 oz)  Height: 5' 4" (162.6 cm)  Body mass index is 34.51 kg/m².     Physical Exam  Vitals and nursing note reviewed.   Constitutional:       General: She is not in acute distress.     Appearance: She is not ill-appearing.   HENT:      Head: Normocephalic and atraumatic.      Mouth/Throat:      Mouth: Mucous membranes are moist.   Eyes:      Extraocular Movements: Extraocular movements intact.   Cardiovascular:      Rate and Rhythm: Normal rate and regular rhythm.      Pulses: Normal pulses.      Heart sounds: No murmur heard.  Pulmonary:      Effort: Pulmonary effort is normal. No respiratory distress.   Abdominal:      General: Bowel sounds are normal. There is no distension.      Palpations: Abdomen is soft.      Tenderness: There is no guarding or rebound.      Hernia: A hernia (small, umbilical, easily reduced) is present.   Musculoskeletal:         General: No swelling. Normal range of motion.      Cervical back: Normal range of motion.   Skin:     General: Skin is warm and dry.   Neurological:      Mental Status: She is alert " "and oriented to person, place, and time.      Motor: No weakness.   Psychiatric:         Mood and Affect: Mood normal.         Behavior: Behavior normal.         Thought Content: Thought content normal.         Judgment: Judgment normal.          Laboratory:  CBC:   Recent Labs   Lab 06/19/25  0607 06/19/25  1356 06/20/25  0649   WBC 5.36 6.26 5.37   RBC 2.56* 2.69* 2.48*   HGB 7.9* 8.3* 7.6*   HCT 24.2* 25.3* 24.3*    182 170   MCV 95 94 98   MCH 30.9 30.9 30.6   MCHC 32.6 32.8 31.3*     CMP:   Recent Labs   Lab 06/17/25  1605 06/17/25  2214 06/19/25  0607 06/19/25  1356 06/20/25  0649   *   < > 146* 232* 148*   CALCIUM 9.6   < > 7.7* 7.6* 7.4*   ALBUMIN 4.0   < > 2.7* 2.8*  3.0* 2.6*   PROT 6.6  --   --  5.1*  --    *   < > 135* 134* 135*   K 4.4   < > 2.7* 2.8* 3.8   CO2 12*   < > 22* 22* 20*   *   < > 102 100 106   BUN 81*   < > 63* 63* 59*   CREATININE 3.7*   < > 3.5* 3.5* 4.2*   ALKPHOS 69  --   --  59  --    ALT 5*  --   --  6*  --    AST 7*  --   --  7*  --     < > = values in this interval not displayed.     Coagulation: No results for input(s): "PT", "APTT" in the last 168 hours.  Labs within the past 24 hours have been reviewed.    Diagnostic Results:  Abdominal X-Ray: No results found for this or any previous visit.  CT - Abd/Pelvic: No results found for this or any previous visit.  "

## 2025-06-20 NOTE — PLAN OF CARE
Pt AAOx4. VSS, afebrile. Voids independently. Pt up independently. Zosyn given per MAR. US abdomen completed O/N-results pending. Three K riders given this shift and PO K replacement given per MAR. Urine culture and UA sent off. Reviewed plan of care w/ pt. Remained free from falls/traumas/injuries. Questions answered and encouraged. Call light at bedside, non-skid socks on. Bed in low position, wheels locked. Standard precautions maintained.

## 2025-06-21 PROBLEM — E86.9 VOLUME DEPLETION: Status: ACTIVE | Noted: 2025-06-21

## 2025-06-21 LAB
ABSOLUTE EOSINOPHIL (OHS): 0.12 K/UL
ABSOLUTE MONOCYTE (OHS): 0.61 K/UL (ref 0.3–1)
ABSOLUTE NEUTROPHIL COUNT (OHS): 5.65 K/UL (ref 1.8–7.7)
ALBUMIN SERPL BCP-MCNC: 2.9 G/DL (ref 3.5–5.2)
ANION GAP (OHS): 10 MMOL/L (ref 8–16)
BACTERIA UR CULT: NO GROWTH
BASOPHILS # BLD AUTO: 0.03 K/UL
BASOPHILS NFR BLD AUTO: 0.4 %
BUN SERPL-MCNC: 57 MG/DL (ref 6–20)
CALCIUM SERPL-MCNC: 8.2 MG/DL (ref 8.7–10.5)
CHLORIDE SERPL-SCNC: 111 MMOL/L (ref 95–110)
CO2 SERPL-SCNC: 21 MMOL/L (ref 23–29)
CREAT SERPL-MCNC: 5.2 MG/DL (ref 0.5–1.4)
ERYTHROCYTE [DISTWIDTH] IN BLOOD BY AUTOMATED COUNT: 14.6 % (ref 11.5–14.5)
GFR SERPLBLD CREATININE-BSD FMLA CKD-EPI: 9 ML/MIN/1.73/M2
GLUCOSE SERPL-MCNC: 110 MG/DL (ref 70–110)
HCT VFR BLD AUTO: 28.8 % (ref 37–48.5)
HGB BLD-MCNC: 9 GM/DL (ref 12–16)
IMM GRANULOCYTES # BLD AUTO: 0.05 K/UL (ref 0–0.04)
IMM GRANULOCYTES NFR BLD AUTO: 0.7 % (ref 0–0.5)
LYMPHOCYTES # BLD AUTO: 0.51 K/UL (ref 1–4.8)
MAGNESIUM SERPL-MCNC: 2 MG/DL (ref 1.6–2.6)
MCH RBC QN AUTO: 30.9 PG (ref 27–31)
MCHC RBC AUTO-ENTMCNC: 31.3 G/DL (ref 32–36)
MCV RBC AUTO: 99 FL (ref 82–98)
NUCLEATED RBC (/100WBC) (OHS): 0 /100 WBC
PHOSPHATE SERPL-MCNC: 3 MG/DL (ref 2.7–4.5)
PLATELET # BLD AUTO: 229 K/UL (ref 150–450)
PMV BLD AUTO: 9.3 FL (ref 9.2–12.9)
POTASSIUM SERPL-SCNC: 3.8 MMOL/L (ref 3.5–5.1)
RBC # BLD AUTO: 2.91 M/UL (ref 4–5.4)
RELATIVE EOSINOPHIL (OHS): 1.7 %
RELATIVE LYMPHOCYTE (OHS): 7.3 % (ref 18–48)
RELATIVE MONOCYTE (OHS): 8.8 % (ref 4–15)
RELATIVE NEUTROPHIL (OHS): 81.1 % (ref 38–73)
SODIUM SERPL-SCNC: 142 MMOL/L (ref 136–145)
TACROLIMUS BLD-MCNC: 10.8 NG/ML (ref 5–15)
WBC # BLD AUTO: 6.97 K/UL (ref 3.9–12.7)

## 2025-06-21 PROCEDURE — 25000003 PHARM REV CODE 250: Performed by: NURSE PRACTITIONER

## 2025-06-21 PROCEDURE — 80197 ASSAY OF TACROLIMUS: CPT

## 2025-06-21 PROCEDURE — 63600175 PHARM REV CODE 636 W HCPCS: Performed by: NURSE PRACTITIONER

## 2025-06-21 PROCEDURE — 80069 RENAL FUNCTION PANEL: CPT

## 2025-06-21 PROCEDURE — 25000003 PHARM REV CODE 250

## 2025-06-21 PROCEDURE — 83735 ASSAY OF MAGNESIUM: CPT

## 2025-06-21 PROCEDURE — 85025 COMPLETE CBC W/AUTO DIFF WBC: CPT

## 2025-06-21 PROCEDURE — 63600175 PHARM REV CODE 636 W HCPCS

## 2025-06-21 PROCEDURE — 99233 SBSQ HOSP IP/OBS HIGH 50: CPT | Mod: ,,, | Performed by: INTERNAL MEDICINE

## 2025-06-21 PROCEDURE — 20600001 HC STEP DOWN PRIVATE ROOM

## 2025-06-21 PROCEDURE — 27000207 HC ISOLATION

## 2025-06-21 PROCEDURE — 99222 1ST HOSP IP/OBS MODERATE 55: CPT | Mod: ,,, | Performed by: INTERNAL MEDICINE

## 2025-06-21 PROCEDURE — 36415 COLL VENOUS BLD VENIPUNCTURE: CPT

## 2025-06-21 RX ORDER — SODIUM CHLORIDE 9 MG/ML
INJECTION, SOLUTION INTRAVENOUS CONTINUOUS
Status: DISCONTINUED | OUTPATIENT
Start: 2025-06-21 | End: 2025-06-23

## 2025-06-21 RX ADMIN — TACROLIMUS 3 MG: 1 CAPSULE ORAL at 05:06

## 2025-06-21 RX ADMIN — DICYCLOMINE HYDROCHLORIDE 10 MG: 10 CAPSULE ORAL at 08:06

## 2025-06-21 RX ADMIN — DICYCLOMINE HYDROCHLORIDE 10 MG: 10 CAPSULE ORAL at 02:06

## 2025-06-21 RX ADMIN — PANTOPRAZOLE SODIUM 40 MG: 40 TABLET, DELAYED RELEASE ORAL at 08:06

## 2025-06-21 RX ADMIN — TACROLIMUS 3 MG: 1 CAPSULE ORAL at 08:06

## 2025-06-21 RX ADMIN — CARVEDILOL 6.25 MG: 6.25 TABLET, FILM COATED ORAL at 08:06

## 2025-06-21 RX ADMIN — PREDNISONE 5 MG: 5 TABLET ORAL at 08:06

## 2025-06-21 RX ADMIN — TRAZODONE HYDROCHLORIDE 50 MG: 50 TABLET ORAL at 08:06

## 2025-06-21 RX ADMIN — GABAPENTIN 100 MG: 100 CAPSULE ORAL at 08:06

## 2025-06-21 RX ADMIN — PRAVASTATIN SODIUM 40 MG: 40 TABLET ORAL at 08:06

## 2025-06-21 RX ADMIN — SODIUM BICARBONATE 650 MG TABLET 1300 MG: at 08:06

## 2025-06-21 RX ADMIN — SODIUM CHLORIDE: 9 INJECTION, SOLUTION INTRAVENOUS at 12:06

## 2025-06-21 RX ADMIN — PIPERACILLIN SODIUM AND TAZOBACTAM SODIUM 4.5 G: 4; .5 INJECTION, POWDER, FOR SOLUTION INTRAVENOUS at 10:06

## 2025-06-21 RX ADMIN — BUSPIRONE HYDROCHLORIDE 5 MG: 5 TABLET ORAL at 08:06

## 2025-06-21 RX ADMIN — VILAZODONE HYDROCHLORIDE 40 MG: 10 TABLET ORAL at 08:06

## 2025-06-21 RX ADMIN — SODIUM BICARBONATE 650 MG TABLET 1300 MG: at 02:06

## 2025-06-21 RX ADMIN — GABAPENTIN 100 MG: 100 CAPSULE ORAL at 02:06

## 2025-06-21 NOTE — H&P (VIEW-ONLY)
[facility]  Gastroenterology  Consult Note    Patient Name: Brande Reyer  MRN: [id number]  Admission Date: 6/17/2025  Hospital Length of Stay: 4 days  Code Status: Full Code   Attending Provider: Amina Foote*   Consulting Provider: Tai Boyer MD  Primary Care Physician: Janie Shankar FNP-C  Principal Problem:FOREST (acute kidney injury)    Inpatient consult to Gastroenterology  Consult performed by: Tai Boyer MD  Consult ordered by: Catherine Leal NP        Subjective:     HPI: Brande Reyer is a 50 y.o. female with history of ESRD from diabetes s/p kidney transplant on 04/20/2024 who presents as a transfer from an outside hospital with lower abdominal pain, nausea, vomiting, and decreased p.o. intake, diarrhea.  Workup thus far shows a CT abdomen pelvis with pyelonephritis, also noted gallbladder wall thickening and fat stranding.  This is followed by an ultrasound of RUQ that showed gallbladder sludge and stones.  Patient currently notes having postprandial RUQ pain that resolves whenever she has not taking anything p.o..  Denies any nausea, vomiting, hematemesis, melena, hematochezia.  GI consulted to assess for any other cause of this RUQ postprandial pain that may not be explained by the gallbladder findings on imaging as mentioned above.    Vitals are stable labs with hemoglobin 9.0, platelets 229, sodium 142, potassium 3.8, BUN 57, creatinine 5.2.    Patient has not had an EGD in the past.  Her last colonoscopy on 01/30/2024 showed a post polypectomy scar at the hepatic flexure, two 5 mm polyps in the sigmoid and transverse colon that were removed with a hot snare, diverticulosis in the sigmoid colon, nonbleeding internal hemorrhoids.  Repeat was recommended in 3 years for surveillance.  Pathology was consistent with tubular adenoma for the polyps.  Her colonoscopy prior to this 1 was on 08/29/2023 and showed an 18 mm polyp in the hepatic flexure that  was removed.    ROS - negative other than what is stated in the HPI    Objective:     Vitals:    06/21/25 0713   BP: 131/66   Pulse: 100   Resp: 16   Temp: 98.3 °F (36.8 °C)       Constitutional: obese and  not in acute distress  HENT: Head: Normal, normocephalic, atraumatic.  Eyes: conjunctiva clear and sclera nonicteric  GI: soft, non-tender, without masses or organomegaly  Skin: normal color  Neurological: alert, oriented x3  Psychiatric: mood and affect are within normal limits, pt is a good historian; no memory problems were noted    Significant Labs:  Reviewed the following pertinent laboratory tests    Significant Imaging:  See HPI.      Assessment/Plan:     Brande Reyer is a 50 y.o. female with history of ESRD from diabetes s/p kidney transplant on 04/20/2024 who presents as a transfer from an outside hospital with lower abdominal pain, nausea, vomiting, and decreased p.o. intake, diarrhea.  Workup thus far shows a CT abdomen pelvis with pyelonephritis, also noted gallbladder wall thickening and fat stranding.  This is followed by an ultrasound of RUQ that showed gallbladder sludge and stones.  Patient currently notes having postprandial RUQ pain that resolves whenever she has not taking anything p.o. she is also improving. GI consulted to assess for any other cause of this RUQ postprandial pain that may not be explained by the gallbladder findings on imaging as mentioned above.    Problem List:  RUQ, periumbilical abdominal pain  Gallbladder wall thickening and fat stranding seen on imaging  Pyelonephritis  Gallbladder sludge and stones  History of Kidney transplant    Recommendations:  - Plan for EGD on Monday  - Intravascular resuscitation/support with IVFs   - NPO on Monday night  - Hold all NSAIDs and anticoagulants, unless contraindicated  - Bolus IV pantoprazole 80mg followed by 40mg BID  - Please correct any coagulopathy with platelets and FFP for goal of platelets >50K and INR <2.0  - Please notify  GI team if there is significant change in patient's clinical status    Thank you for involving us in the care of Brande Reyer. Please call with any additional questions, concerns or changes in the patient's clinical status. We will continue to follow.     Discussed with Dr. Andrey Boyer MD  Gastroenterology Fellow PGY-IV  [facility]

## 2025-06-21 NOTE — PLAN OF CARE
Pt aao x3. Vss. No acute distress. Pt steady on her feet. Pt's Cr went from 5.2 from 4.2. PVR checked and 0. US of kidney negative. Pt started on IVF.  Pt having loose stools. GI to perform an EGD Monday. Pt has a left upper arm fistula. See assessment for full chart details. Will continue to monitor, assess adjust care as needed.

## 2025-06-21 NOTE — PLAN OF CARE
Orientedx4. VSS on room air. Scheduled Coreg and Clonidine held per order parameters. RLQ wound with daily dressing changes; dressing intact with scant drainage noted. Denied pain. Independent and ambulatory. Zosyn q12. Refused heparin shots--educated on medication and encouraged ambulation--pt verbalized understanding. Bed locked, lowered. Call light within reach.

## 2025-06-21 NOTE — CONSULTS
[facility]  Gastroenterology  Consult Note    Patient Name: Brande Reyer  MRN: [id number]  Admission Date: 6/17/2025  Hospital Length of Stay: 4 days  Code Status: Full Code   Attending Provider: Amina Foote*   Consulting Provider: Tai Boyer MD  Primary Care Physician: Janie Shankar FNP-C  Principal Problem:FOREST (acute kidney injury)    Inpatient consult to Gastroenterology  Consult performed by: Tai Boyer MD  Consult ordered by: Catherine Leal NP        Subjective:     HPI: Brande Reyer is a 50 y.o. female with history of ESRD from diabetes s/p kidney transplant on 04/20/2024 who presents as a transfer from an outside hospital with lower abdominal pain, nausea, vomiting, and decreased p.o. intake, diarrhea.  Workup thus far shows a CT abdomen pelvis with pyelonephritis, also noted gallbladder wall thickening and fat stranding.  This is followed by an ultrasound of RUQ that showed gallbladder sludge and stones.  Patient currently notes having postprandial RUQ pain that resolves whenever she has not taking anything p.o..  Denies any nausea, vomiting, hematemesis, melena, hematochezia.  GI consulted to assess for any other cause of this RUQ postprandial pain that may not be explained by the gallbladder findings on imaging as mentioned above.    Vitals are stable labs with hemoglobin 9.0, platelets 229, sodium 142, potassium 3.8, BUN 57, creatinine 5.2.    Patient has not had an EGD in the past.  Her last colonoscopy on 01/30/2024 showed a post polypectomy scar at the hepatic flexure, two 5 mm polyps in the sigmoid and transverse colon that were removed with a hot snare, diverticulosis in the sigmoid colon, nonbleeding internal hemorrhoids.  Repeat was recommended in 3 years for surveillance.  Pathology was consistent with tubular adenoma for the polyps.  Her colonoscopy prior to this 1 was on 08/29/2023 and showed an 18 mm polyp in the hepatic flexure that  was removed.    ROS - negative other than what is stated in the HPI    Objective:     Vitals:    06/21/25 0713   BP: 131/66   Pulse: 100   Resp: 16   Temp: 98.3 °F (36.8 °C)       Constitutional: obese and  not in acute distress  HENT: Head: Normal, normocephalic, atraumatic.  Eyes: conjunctiva clear and sclera nonicteric  GI: soft, non-tender, without masses or organomegaly  Skin: normal color  Neurological: alert, oriented x3  Psychiatric: mood and affect are within normal limits, pt is a good historian; no memory problems were noted    Significant Labs:  Reviewed the following pertinent laboratory tests    Significant Imaging:  See HPI.      Assessment/Plan:     Brande Reyer is a 50 y.o. female with history of ESRD from diabetes s/p kidney transplant on 04/20/2024 who presents as a transfer from an outside hospital with lower abdominal pain, nausea, vomiting, and decreased p.o. intake, diarrhea.  Workup thus far shows a CT abdomen pelvis with pyelonephritis, also noted gallbladder wall thickening and fat stranding.  This is followed by an ultrasound of RUQ that showed gallbladder sludge and stones.  Patient currently notes having postprandial RUQ pain that resolves whenever she has not taking anything p.o. she is also improving. GI consulted to assess for any other cause of this RUQ postprandial pain that may not be explained by the gallbladder findings on imaging as mentioned above.    Problem List:  RUQ, periumbilical abdominal pain  Gallbladder wall thickening and fat stranding seen on imaging  Pyelonephritis  Gallbladder sludge and stones  History of Kidney transplant    Recommendations:  - Plan for EGD on Monday  - Intravascular resuscitation/support with IVFs   - NPO on Monday night  - Hold all NSAIDs and anticoagulants, unless contraindicated  - Bolus IV pantoprazole 80mg followed by 40mg BID  - Please correct any coagulopathy with platelets and FFP for goal of platelets >50K and INR <2.0  - Please notify  GI team if there is significant change in patient's clinical status    Thank you for involving us in the care of Brande Reyer. Please call with any additional questions, concerns or changes in the patient's clinical status. We will continue to follow.     Discussed with Dr. Andrey Boyer MD  Gastroenterology Fellow PGY-IV  [facility]

## 2025-06-21 NOTE — PROGRESS NOTES
"KIDNEY TRANSPLANT MEDICINE PROGRESS NOTE    Please refer to detailed progress note by kidney transplant provider from 6/20/25 for complete details.    Interval history:  She continues to have diarrhea, but feels a bit better than before.  Labs showed significant worsening of creatinine, which was reviewed with patient.  She believes that she is eating and drinking okay, and making plenty of urine.  Intake/output is incomplete based on many unmeasured samples.     I/O last 3 completed shifts:  In: 1985.1 [P.O.:1780; IV Piggyback:205.1]  Out: 350 [Urine:350]    VITALS:  height is 5' 4" (1.626 m) and weight is 91.2 kg (200 lb 15.2 oz). Her oral temperature is 98.7 °F (37.1 °C). Her blood pressure is 144/76 (abnormal) and her pulse is 98. Her respiration is 16 and oxygen saturation is 99%.       A&O x3, NAD.  Lungs CTA, unlabored.  Heart regular, no rub.  Abdomen soft, tender, no masses.  Allograft nontender.  Edema: none.    Recent Labs   Lab 06/19/25  1356 06/20/25  0649 06/21/25  0540   WBC 6.26 5.37 6.97   HGB 8.3* 7.6* 9.0*   HCT 25.3* 24.3* 28.8*    170 229       Recent Labs   Lab 06/19/25  1356 06/20/25  0649 06/21/25  0540   * 135* 142   K 2.8* 3.8 3.8    106 111*   CO2 22* 20* 21*   BUN 63* 59* 57*   CREATININE 3.5* 4.2* 5.2*   CALCIUM 7.6* 7.4* 8.2*   PHOS 2.7 2.6* 3.0     Recent Labs   Lab 06/19/25  0607 06/20/25  0649 06/21/25  0540   Tacrolimus 12.5 12.8 10.8     Renal ultrasound 06/21/2025 showed no hydronephrosis, normal perfusion, normal size, and no significant abnormalities.    ASSESSMENT/PLAN:    Problems/plans as noted in the referenced progress note. Additional pertinent findings:     Kidney transplant complicated by progressive FOREST thought from volume depletion  -ultrasound today unrevealing   -initially thought due to decreased intake and diarrhea, initially improved with IV fluids.  Flakita IV fluids stopped  -continue IV hydration, given reported diarrhea.  The picture " incomplete as weight do not suggest significant weight loss  -Follow with strict I/Os, daily weights, BP (goal <140/90), daily labs.    -Avoid nephrotoxic agents when feasible (NSAIDs, IV contrast dye, Aminoglycoside-containing antibiotics)  -Renally dose all appropriate medications, including antibiotics      Acute pyelonephritis, asymptomatic, noted on imaging  - urine cx w mult organisms, repeat UA clean but was on abx, plan to cont treatment f   -Zosyn as noted.    Hypokalemia   -corrected    Acidosis  -improved, continue oral supplement    Immunosuppression management post Transplant, on prophylactic immunosuppression to prevent rejection.   -Remains in medication induced immunosuppressed state  -Continue to trend immunosuppression levels.   -Monitor for med-related side effects or drug toxicity given immunosuppressant med with narrow therapeutic window.      A total of 30  minutes was spent on this encounter. This includes face-to-face time with her and communicating with other providers and RN coordinator. This encounter also includes time preparing to see the patient (e.g., review of tests, records, diagnostics, etc), obtaining and/or reviewing separately obtained history, documenting clinical information in the electronic or other health record, independently interpreting results and communicating results to the patient/family/caregiver, or care coordination (not separately reported), clinical assessment, formulation of diagnostic/treatment plan, and education.

## 2025-06-22 LAB
ABSOLUTE EOSINOPHIL (OHS): 0.11 K/UL
ABSOLUTE MONOCYTE (OHS): 0.58 K/UL (ref 0.3–1)
ABSOLUTE NEUTROPHIL COUNT (OHS): 7.58 K/UL (ref 1.8–7.7)
ALBUMIN SERPL BCP-MCNC: 2.5 G/DL (ref 3.5–5.2)
ANION GAP (OHS): 8 MMOL/L (ref 8–16)
BACTERIA BLD CULT: NORMAL
BACTERIA BLD CULT: NORMAL
BASOPHILS # BLD AUTO: 0.03 K/UL
BASOPHILS NFR BLD AUTO: 0.3 %
BUN SERPL-MCNC: 56 MG/DL (ref 6–20)
CALCIUM SERPL-MCNC: 7.6 MG/DL (ref 8.7–10.5)
CHLORIDE SERPL-SCNC: 113 MMOL/L (ref 95–110)
CO2 SERPL-SCNC: 20 MMOL/L (ref 23–29)
CREAT SERPL-MCNC: 4.7 MG/DL (ref 0.5–1.4)
ERYTHROCYTE [DISTWIDTH] IN BLOOD BY AUTOMATED COUNT: 14.4 % (ref 11.5–14.5)
GFR SERPLBLD CREATININE-BSD FMLA CKD-EPI: 11 ML/MIN/1.73/M2
GLUCOSE SERPL-MCNC: 133 MG/DL (ref 70–110)
HCT VFR BLD AUTO: 24.7 % (ref 37–48.5)
HGB BLD-MCNC: 7.6 GM/DL (ref 12–16)
IMM GRANULOCYTES # BLD AUTO: 0.12 K/UL (ref 0–0.04)
IMM GRANULOCYTES NFR BLD AUTO: 1.4 % (ref 0–0.5)
LYMPHOCYTES # BLD AUTO: 0.37 K/UL (ref 1–4.8)
MAGNESIUM SERPL-MCNC: 1.6 MG/DL (ref 1.6–2.6)
MCH RBC QN AUTO: 30.8 PG (ref 27–31)
MCHC RBC AUTO-ENTMCNC: 30.8 G/DL (ref 32–36)
MCV RBC AUTO: 100 FL (ref 82–98)
NUCLEATED RBC (/100WBC) (OHS): 0 /100 WBC
PHOSPHATE SERPL-MCNC: 2.4 MG/DL (ref 2.7–4.5)
PLATELET # BLD AUTO: 205 K/UL (ref 150–450)
PMV BLD AUTO: 9 FL (ref 9.2–12.9)
POTASSIUM SERPL-SCNC: 3.9 MMOL/L (ref 3.5–5.1)
RBC # BLD AUTO: 2.47 M/UL (ref 4–5.4)
RELATIVE EOSINOPHIL (OHS): 1.3 %
RELATIVE LYMPHOCYTE (OHS): 4.2 % (ref 18–48)
RELATIVE MONOCYTE (OHS): 6.6 % (ref 4–15)
RELATIVE NEUTROPHIL (OHS): 86.2 % (ref 38–73)
SODIUM SERPL-SCNC: 141 MMOL/L (ref 136–145)
TACROLIMUS BLD-MCNC: 5.4 NG/ML (ref 5–15)
WBC # BLD AUTO: 8.79 K/UL (ref 3.9–12.7)

## 2025-06-22 PROCEDURE — 25000003 PHARM REV CODE 250

## 2025-06-22 PROCEDURE — 99233 SBSQ HOSP IP/OBS HIGH 50: CPT | Mod: ,,, | Performed by: INTERNAL MEDICINE

## 2025-06-22 PROCEDURE — 63600175 PHARM REV CODE 636 W HCPCS: Performed by: INTERNAL MEDICINE

## 2025-06-22 PROCEDURE — 25000003 PHARM REV CODE 250: Performed by: NURSE PRACTITIONER

## 2025-06-22 PROCEDURE — 27000207 HC ISOLATION

## 2025-06-22 PROCEDURE — 20600001 HC STEP DOWN PRIVATE ROOM

## 2025-06-22 PROCEDURE — 84132 ASSAY OF SERUM POTASSIUM: CPT

## 2025-06-22 PROCEDURE — 63600175 PHARM REV CODE 636 W HCPCS: Performed by: NURSE PRACTITIONER

## 2025-06-22 PROCEDURE — 63600175 PHARM REV CODE 636 W HCPCS

## 2025-06-22 PROCEDURE — 80197 ASSAY OF TACROLIMUS: CPT

## 2025-06-22 PROCEDURE — 85025 COMPLETE CBC W/AUTO DIFF WBC: CPT

## 2025-06-22 PROCEDURE — 83735 ASSAY OF MAGNESIUM: CPT

## 2025-06-22 RX ORDER — TACROLIMUS 1 MG/1
5 CAPSULE ORAL 2 TIMES DAILY
Status: DISCONTINUED | OUTPATIENT
Start: 2025-06-22 | End: 2025-06-23

## 2025-06-22 RX ORDER — TACROLIMUS 1 MG/1
2 CAPSULE ORAL ONCE
Status: COMPLETED | OUTPATIENT
Start: 2025-06-22 | End: 2025-06-22

## 2025-06-22 RX ADMIN — PREDNISONE 5 MG: 5 TABLET ORAL at 08:06

## 2025-06-22 RX ADMIN — CARVEDILOL 6.25 MG: 6.25 TABLET, FILM COATED ORAL at 08:06

## 2025-06-22 RX ADMIN — TRAZODONE HYDROCHLORIDE 50 MG: 50 TABLET ORAL at 08:06

## 2025-06-22 RX ADMIN — BUSPIRONE HYDROCHLORIDE 5 MG: 5 TABLET ORAL at 08:06

## 2025-06-22 RX ADMIN — TACROLIMUS 3 MG: 1 CAPSULE ORAL at 08:06

## 2025-06-22 RX ADMIN — SODIUM BICARBONATE 650 MG TABLET 1300 MG: at 08:06

## 2025-06-22 RX ADMIN — PRAVASTATIN SODIUM 40 MG: 40 TABLET ORAL at 08:06

## 2025-06-22 RX ADMIN — GABAPENTIN 100 MG: 100 CAPSULE ORAL at 08:06

## 2025-06-22 RX ADMIN — PANTOPRAZOLE SODIUM 40 MG: 40 TABLET, DELAYED RELEASE ORAL at 08:06

## 2025-06-22 RX ADMIN — SODIUM CHLORIDE: 9 INJECTION, SOLUTION INTRAVENOUS at 01:06

## 2025-06-22 RX ADMIN — GABAPENTIN 100 MG: 100 CAPSULE ORAL at 03:06

## 2025-06-22 RX ADMIN — TACROLIMUS 2 MG: 1 CAPSULE ORAL at 11:06

## 2025-06-22 RX ADMIN — TRAMADOL HYDROCHLORIDE 50 MG: 50 TABLET, COATED ORAL at 09:06

## 2025-06-22 RX ADMIN — PIPERACILLIN SODIUM AND TAZOBACTAM SODIUM 4.5 G: 4; .5 INJECTION, POWDER, FOR SOLUTION INTRAVENOUS at 09:06

## 2025-06-22 RX ADMIN — CLONIDINE HYDROCHLORIDE 0.2 MG: 0.1 TABLET ORAL at 08:06

## 2025-06-22 RX ADMIN — VILAZODONE HYDROCHLORIDE 40 MG: 10 TABLET ORAL at 08:06

## 2025-06-22 RX ADMIN — DICYCLOMINE HYDROCHLORIDE 10 MG: 10 CAPSULE ORAL at 08:06

## 2025-06-22 RX ADMIN — DICYCLOMINE HYDROCHLORIDE 10 MG: 10 CAPSULE ORAL at 03:06

## 2025-06-22 RX ADMIN — SODIUM BICARBONATE 650 MG TABLET 1300 MG: at 03:06

## 2025-06-22 RX ADMIN — TACROLIMUS 5 MG: 1 CAPSULE ORAL at 05:06

## 2025-06-22 RX ADMIN — TRAMADOL HYDROCHLORIDE 50 MG: 50 TABLET, COATED ORAL at 01:06

## 2025-06-22 NOTE — PLAN OF CARE
Pt aao x3. Vss. No acute distress. Pt steady on her feet. Pt on continuous IV fluids and IV antibiotics. Pt's post void residual checked. No residual found. Pt took a shower today and her linens were changed. Wound care to open incision performed as per order. Pt free from harm. Pt NPO after midnight for an EGD Monday. See assessment for full chart details. Will continue to monitor, assess and adjust care as needed.

## 2025-06-22 NOTE — PROGRESS NOTES
"KIDNEY TRANSPLANT MEDICINE PROGRESS NOTE    Please refer to detailed progress note by kidney transplant provider from 6/20/25 for complete details.    Interval history:  Noted diarrhea has lessened (small, soft stools x 6 Sat), but still having bouts of abd pain.  Has EGD planned for Monday. Rob nausea today. Reports no new complaints.     I/O last 3 completed shifts:  In: 3560.1 [P.O.:2640; I.V.:615; IV Piggyback:305.1]  Out: 500 [Urine:500]    VITALS:  height is 5' 4" (1.626 m) and weight is 93.6 kg (206 lb 5.6 oz). Her oral temperature is 98.4 °F (36.9 °C). Her blood pressure is 130/62 and her pulse is 91. Her respiration is 16 and oxygen saturation is 100%.       A&O x3, NAD. Ambulating normally.  Lungs clear post le  Heart regular, no rub.  Abdomen soft, tender, no masses.  Edema: none.    Recent Labs   Lab 06/20/25  0649 06/21/25  0540 06/22/25  0548   WBC 5.37 6.97 8.79   HGB 7.6* 9.0* 7.6*   HCT 24.3* 28.8* 24.7*    229 205     Recent Labs   Lab 06/20/25  0649 06/21/25  0540 06/22/25  0548   * 142 141   K 3.8 3.8 3.9    111* 113*   CO2 20* 21* 20*   BUN 59* 57* 56*   CREATININE 4.2* 5.2* 4.7*   CALCIUM 7.4* 8.2* 7.6*   PHOS 2.6* 3.0 2.4*     Recent Labs   Lab 06/20/25  0649 06/21/25  0540 06/22/25  0548   Tacrolimus 12.8 10.8 5.4     Renal ultrasound 06/21/2025 showed no hydronephrosis, normal perfusion, normal size, and no significant abnormalities.    ASSESSMENT/PLAN:    Problems/plans as noted in the referenced progress note. Additional pertinent findings:     Kidney transplant complicated by progressive FOREST thought from volume depletion. Biopsy 6/4/25 bx diffuse ATI, severe arteriosclerosis  -ultrasound 6/21 unrevealing   -creat sl better with IV hydration; high tacro levels with vol depletion may have contributed  -continue IV hydration, given reported diarrhea and improvement.  -Follow with strict I/Os, daily weights, BP (goal <140/90), daily labs.    -Avoid nephrotoxic agents " when feasible (NSAIDs, IV contrast dye, Aminoglycoside-containing antibiotics)  -Renally dose all appropriate medications, including antibiotics      Acute pyelonephritis, asymptomatic, noted on imaging  - urine cx w mult organisms, repeat UA clean but was on abx, plan to cont treatment given CT findings  -Zosyn as noted.    Hypokalemia   -corrected    Acidosis  -improved, continue oral supplement    Immunosuppression management post Transplant, on prophylactic immunosuppression to prevent rejection.   -Remains in medication induced immunosuppressed state  -Continue to trend immunosuppression levels.   -Monitor for med-related side effects or drug toxicity given immunosuppressant med with narrow therapeutic window.      A total of 40  minutes was spent on this encounter. This includes face-to-face time with her and communicating with other providers and RN coordinator. This encounter also includes time preparing to see the patient (e.g., review of tests, records, diagnostics, etc), obtaining and/or reviewing separately obtained history, documenting clinical information in the electronic or other health record, independently interpreting results and communicating results to the patient/family/caregiver, or care coordination (not separately reported), clinical assessment, formulation of diagnostic/treatment plan, and education.

## 2025-06-22 NOTE — PLAN OF CARE
Pt reported return of upper abdominal/perigastric pain. Given PRN tramadol for pain, as well as hot packs. Continuous NS at 75. VSS on room air.

## 2025-06-23 ENCOUNTER — ANESTHESIA EVENT (OUTPATIENT)
Dept: ENDOSCOPY | Facility: HOSPITAL | Age: 50
End: 2025-06-23

## 2025-06-23 ENCOUNTER — ANESTHESIA (OUTPATIENT)
Dept: ENDOSCOPY | Facility: HOSPITAL | Age: 50
End: 2025-06-23
Payer: MEDICARE

## 2025-06-23 PROBLEM — E86.9 VOLUME DEPLETION: Status: RESOLVED | Noted: 2025-06-21 | Resolved: 2025-06-23

## 2025-06-23 PROBLEM — E87.5 HYPERKALEMIA: Status: RESOLVED | Noted: 2025-04-21 | Resolved: 2025-06-23

## 2025-06-23 PROBLEM — M25.561 KNEE PAIN, RIGHT: Status: RESOLVED | Noted: 2024-11-22 | Resolved: 2025-06-23

## 2025-06-23 PROBLEM — Z74.09 IMPAIRED FUNCTIONAL MOBILITY AND ACTIVITY TOLERANCE: Status: RESOLVED | Noted: 2024-11-22 | Resolved: 2025-06-23

## 2025-06-23 LAB
ABSOLUTE EOSINOPHIL (OHS): 0.1 K/UL
ABSOLUTE MONOCYTE (OHS): 0.33 K/UL (ref 0.3–1)
ABSOLUTE NEUTROPHIL COUNT (OHS): 4.15 K/UL (ref 1.8–7.7)
ALBUMIN SERPL BCP-MCNC: 2.6 G/DL (ref 3.5–5.2)
ANION GAP (OHS): 7 MMOL/L (ref 8–16)
BASOPHILS # BLD AUTO: 0.03 K/UL
BASOPHILS NFR BLD AUTO: 0.6 %
BUN SERPL-MCNC: 48 MG/DL (ref 6–20)
CALCIUM SERPL-MCNC: 8 MG/DL (ref 8.7–10.5)
CHLORIDE SERPL-SCNC: 114 MMOL/L (ref 95–110)
CO2 SERPL-SCNC: 22 MMOL/L (ref 23–29)
CREAT SERPL-MCNC: 3.5 MG/DL (ref 0.5–1.4)
ERYTHROCYTE [DISTWIDTH] IN BLOOD BY AUTOMATED COUNT: 14.3 % (ref 11.5–14.5)
GFR SERPLBLD CREATININE-BSD FMLA CKD-EPI: 15 ML/MIN/1.73/M2
GLUCOSE SERPL-MCNC: 114 MG/DL (ref 70–110)
HCT VFR BLD AUTO: 24.2 % (ref 37–48.5)
HGB BLD-MCNC: 7.4 GM/DL (ref 12–16)
IMM GRANULOCYTES # BLD AUTO: 0.06 K/UL (ref 0–0.04)
IMM GRANULOCYTES NFR BLD AUTO: 1.2 % (ref 0–0.5)
LYMPHOCYTES # BLD AUTO: 0.49 K/UL (ref 1–4.8)
MAGNESIUM SERPL-MCNC: 1.7 MG/DL (ref 1.6–2.6)
MCH RBC QN AUTO: 31.1 PG (ref 27–31)
MCHC RBC AUTO-ENTMCNC: 30.6 G/DL (ref 32–36)
MCV RBC AUTO: 102 FL (ref 82–98)
NUCLEATED RBC (/100WBC) (OHS): 0 /100 WBC
O+P STL MICRO: NORMAL
PHOSPHATE SERPL-MCNC: 3.2 MG/DL (ref 2.7–4.5)
PLATELET # BLD AUTO: 198 K/UL (ref 150–450)
PMV BLD AUTO: 8.9 FL (ref 9.2–12.9)
POCT GLUCOSE: 114 MG/DL (ref 70–110)
POTASSIUM SERPL-SCNC: 4.4 MMOL/L (ref 3.5–5.1)
RBC # BLD AUTO: 2.38 M/UL (ref 4–5.4)
RELATIVE EOSINOPHIL (OHS): 1.9 %
RELATIVE LYMPHOCYTE (OHS): 9.5 % (ref 18–48)
RELATIVE MONOCYTE (OHS): 6.4 % (ref 4–15)
RELATIVE NEUTROPHIL (OHS): 80.4 % (ref 38–73)
SODIUM SERPL-SCNC: 143 MMOL/L (ref 136–145)
TACROLIMUS BLD-MCNC: 4.4 NG/ML (ref 5–15)
WBC # BLD AUTO: 5.16 K/UL (ref 3.9–12.7)

## 2025-06-23 PROCEDURE — 27000207 HC ISOLATION

## 2025-06-23 PROCEDURE — 82962 GLUCOSE BLOOD TEST: CPT | Performed by: INTERNAL MEDICINE

## 2025-06-23 PROCEDURE — 37000008 HC ANESTHESIA 1ST 15 MINUTES: Performed by: INTERNAL MEDICINE

## 2025-06-23 PROCEDURE — 80197 ASSAY OF TACROLIMUS: CPT

## 2025-06-23 PROCEDURE — 99900035 HC TECH TIME PER 15 MIN (STAT)

## 2025-06-23 PROCEDURE — 63600175 PHARM REV CODE 636 W HCPCS: Performed by: NURSE ANESTHETIST, CERTIFIED REGISTERED

## 2025-06-23 PROCEDURE — 0DB68ZX EXCISION OF STOMACH, VIA NATURAL OR ARTIFICIAL OPENING ENDOSCOPIC, DIAGNOSTIC: ICD-10-PCS | Performed by: INTERNAL MEDICINE

## 2025-06-23 PROCEDURE — 80069 RENAL FUNCTION PANEL: CPT

## 2025-06-23 PROCEDURE — 27000221 HC OXYGEN, UP TO 24 HOURS

## 2025-06-23 PROCEDURE — 85025 COMPLETE CBC W/AUTO DIFF WBC: CPT

## 2025-06-23 PROCEDURE — 83735 ASSAY OF MAGNESIUM: CPT

## 2025-06-23 PROCEDURE — 20600001 HC STEP DOWN PRIVATE ROOM

## 2025-06-23 PROCEDURE — 43239 EGD BIOPSY SINGLE/MULTIPLE: CPT | Mod: ,,, | Performed by: INTERNAL MEDICINE

## 2025-06-23 PROCEDURE — 36415 COLL VENOUS BLD VENIPUNCTURE: CPT

## 2025-06-23 PROCEDURE — 27201012 HC FORCEPS, HOT/COLD, DISP: Performed by: INTERNAL MEDICINE

## 2025-06-23 PROCEDURE — 99233 SBSQ HOSP IP/OBS HIGH 50: CPT | Mod: ,,, | Performed by: NURSE PRACTITIONER

## 2025-06-23 PROCEDURE — 63600175 PHARM REV CODE 636 W HCPCS: Performed by: NURSE PRACTITIONER

## 2025-06-23 PROCEDURE — 25000003 PHARM REV CODE 250: Performed by: NURSE PRACTITIONER

## 2025-06-23 PROCEDURE — 25000003 PHARM REV CODE 250

## 2025-06-23 PROCEDURE — 94761 N-INVAS EAR/PLS OXIMETRY MLT: CPT

## 2025-06-23 PROCEDURE — 37000009 HC ANESTHESIA EA ADD 15 MINS: Performed by: INTERNAL MEDICINE

## 2025-06-23 PROCEDURE — 43239 EGD BIOPSY SINGLE/MULTIPLE: CPT | Performed by: INTERNAL MEDICINE

## 2025-06-23 PROCEDURE — 63600175 PHARM REV CODE 636 W HCPCS: Performed by: INTERNAL MEDICINE

## 2025-06-23 PROCEDURE — 63600175 PHARM REV CODE 636 W HCPCS

## 2025-06-23 PROCEDURE — 88342 IMHCHEM/IMCYTCHM 1ST ANTB: CPT | Mod: TC | Performed by: INTERNAL MEDICINE

## 2025-06-23 RX ORDER — SODIUM CHLORIDE 9 MG/ML
INJECTION, SOLUTION INTRAVENOUS CONTINUOUS
Status: DISCONTINUED | OUTPATIENT
Start: 2025-06-23 | End: 2025-06-23

## 2025-06-23 RX ORDER — TACROLIMUS 1 MG/1
6 CAPSULE ORAL 2 TIMES DAILY
Status: DISCONTINUED | OUTPATIENT
Start: 2025-06-23 | End: 2025-06-24

## 2025-06-23 RX ORDER — SODIUM CHLORIDE 450 MG/100ML
INJECTION, SOLUTION INTRAVENOUS CONTINUOUS
Status: DISCONTINUED | OUTPATIENT
Start: 2025-06-23 | End: 2025-06-23

## 2025-06-23 RX ORDER — LORAZEPAM 2 MG/ML
0.25 INJECTION INTRAMUSCULAR ONCE AS NEEDED
Status: DISCONTINUED | OUTPATIENT
Start: 2025-06-23 | End: 2025-06-23 | Stop reason: HOSPADM

## 2025-06-23 RX ORDER — HALOPERIDOL LACTATE 5 MG/ML
0.5 INJECTION, SOLUTION INTRAMUSCULAR EVERY 10 MIN PRN
Status: DISCONTINUED | OUTPATIENT
Start: 2025-06-23 | End: 2025-06-23 | Stop reason: HOSPADM

## 2025-06-23 RX ORDER — TACROLIMUS 1 MG/1
1 CAPSULE ORAL ONCE
Status: COMPLETED | OUTPATIENT
Start: 2025-06-23 | End: 2025-06-23

## 2025-06-23 RX ORDER — SODIUM CHLORIDE 0.9 % (FLUSH) 0.9 %
3 SYRINGE (ML) INJECTION
Status: DISCONTINUED | OUTPATIENT
Start: 2025-06-23 | End: 2025-06-23 | Stop reason: HOSPADM

## 2025-06-23 RX ORDER — PROPOFOL 10 MG/ML
VIAL (ML) INTRAVENOUS CONTINUOUS PRN
Status: DISCONTINUED | OUTPATIENT
Start: 2025-06-23 | End: 2025-06-23

## 2025-06-23 RX ORDER — GLUCAGON 1 MG
1 KIT INJECTION
Status: DISCONTINUED | OUTPATIENT
Start: 2025-06-23 | End: 2025-06-23 | Stop reason: HOSPADM

## 2025-06-23 RX ORDER — HYDROMORPHONE HYDROCHLORIDE 1 MG/ML
0.2 INJECTION, SOLUTION INTRAMUSCULAR; INTRAVENOUS; SUBCUTANEOUS EVERY 5 MIN PRN
Status: DISCONTINUED | OUTPATIENT
Start: 2025-06-23 | End: 2025-06-23 | Stop reason: HOSPADM

## 2025-06-23 RX ORDER — SODIUM CHLORIDE 450 MG/100ML
INJECTION, SOLUTION INTRAVENOUS CONTINUOUS
Status: ACTIVE | OUTPATIENT
Start: 2025-06-23 | End: 2025-06-24

## 2025-06-23 RX ORDER — ONDANSETRON HYDROCHLORIDE 2 MG/ML
4 INJECTION, SOLUTION INTRAVENOUS ONCE AS NEEDED
Status: DISCONTINUED | OUTPATIENT
Start: 2025-06-23 | End: 2025-06-23 | Stop reason: HOSPADM

## 2025-06-23 RX ORDER — LIDOCAINE HYDROCHLORIDE 20 MG/ML
INJECTION INTRAVENOUS
Status: DISCONTINUED | OUTPATIENT
Start: 2025-06-23 | End: 2025-06-23

## 2025-06-23 RX ADMIN — PIPERACILLIN SODIUM AND TAZOBACTAM SODIUM 4.5 G: 4; .5 INJECTION, POWDER, FOR SOLUTION INTRAVENOUS at 11:06

## 2025-06-23 RX ADMIN — BUSPIRONE HYDROCHLORIDE 5 MG: 5 TABLET ORAL at 10:06

## 2025-06-23 RX ADMIN — SODIUM BICARBONATE 650 MG TABLET 1300 MG: at 09:06

## 2025-06-23 RX ADMIN — SULFAMETHOXAZOLE AND TRIMETHOPRIM 1 TABLET: 400; 80 TABLET ORAL at 10:06

## 2025-06-23 RX ADMIN — VALGANCICLOVIR HYDROCHLORIDE 450 MG: 450 TABLET ORAL at 10:06

## 2025-06-23 RX ADMIN — TACROLIMUS 6 MG: 1 CAPSULE ORAL at 05:06

## 2025-06-23 RX ADMIN — BUSPIRONE HYDROCHLORIDE 5 MG: 5 TABLET ORAL at 09:06

## 2025-06-23 RX ADMIN — LIDOCAINE HYDROCHLORIDE 100 MG: 20 INJECTION INTRAVENOUS at 09:06

## 2025-06-23 RX ADMIN — DICYCLOMINE HYDROCHLORIDE 10 MG: 10 CAPSULE ORAL at 10:06

## 2025-06-23 RX ADMIN — TACROLIMUS 5 MG: 1 CAPSULE ORAL at 07:06

## 2025-06-23 RX ADMIN — SODIUM BICARBONATE 650 MG TABLET 1300 MG: at 10:06

## 2025-06-23 RX ADMIN — VILAZODONE HYDROCHLORIDE 40 MG: 10 TABLET ORAL at 10:06

## 2025-06-23 RX ADMIN — PROPOFOL 175 MCG/KG/MIN: 10 INJECTION, EMULSION INTRAVENOUS at 09:06

## 2025-06-23 RX ADMIN — PANTOPRAZOLE SODIUM 40 MG: 40 TABLET, DELAYED RELEASE ORAL at 07:06

## 2025-06-23 RX ADMIN — TACROLIMUS 1 MG: 1 CAPSULE ORAL at 10:06

## 2025-06-23 RX ADMIN — SODIUM CHLORIDE: 9 INJECTION, SOLUTION INTRAVENOUS at 05:06

## 2025-06-23 RX ADMIN — SODIUM BICARBONATE 650 MG TABLET 1300 MG: at 02:06

## 2025-06-23 RX ADMIN — GABAPENTIN 100 MG: 100 CAPSULE ORAL at 02:06

## 2025-06-23 RX ADMIN — PIPERACILLIN SODIUM AND TAZOBACTAM SODIUM 4.5 G: 4; .5 INJECTION, POWDER, FOR SOLUTION INTRAVENOUS at 09:06

## 2025-06-23 RX ADMIN — HEPARIN SODIUM 5000 UNITS: 5000 INJECTION INTRAVENOUS; SUBCUTANEOUS at 02:06

## 2025-06-23 RX ADMIN — GABAPENTIN 100 MG: 100 CAPSULE ORAL at 09:06

## 2025-06-23 RX ADMIN — GABAPENTIN 100 MG: 100 CAPSULE ORAL at 10:06

## 2025-06-23 RX ADMIN — DICYCLOMINE HYDROCHLORIDE 10 MG: 10 CAPSULE ORAL at 09:06

## 2025-06-23 RX ADMIN — DICYCLOMINE HYDROCHLORIDE 10 MG: 10 CAPSULE ORAL at 02:06

## 2025-06-23 RX ADMIN — SODIUM CHLORIDE: 4.5 INJECTION, SOLUTION INTRAVENOUS at 10:06

## 2025-06-23 RX ADMIN — TRAZODONE HYDROCHLORIDE 50 MG: 50 TABLET ORAL at 09:06

## 2025-06-23 RX ADMIN — PREDNISONE 5 MG: 5 TABLET ORAL at 10:06

## 2025-06-23 RX ADMIN — PRAVASTATIN SODIUM 40 MG: 40 TABLET ORAL at 10:06

## 2025-06-23 RX ADMIN — ERYTHROPOIETIN 10000 UNITS: 10000 INJECTION, SOLUTION INTRAVENOUS; SUBCUTANEOUS at 02:06

## 2025-06-23 RX ADMIN — CARVEDILOL 6.25 MG: 6.25 TABLET, FILM COATED ORAL at 09:06

## 2025-06-23 NOTE — ASSESSMENT & PLAN NOTE
-Cr increased at OSH   - likely 2/2 decr intake and diarrhea  - was improving with IV  - Kidney US repeated when Cr 5.2, no new findings  - despite IV fluids, Prograf level 12.3, could be sensitive to CNI, pharmacist adjusted dose   - strict Is and Os  - dietician consulted for calorie count  - monitor with renal panel

## 2025-06-23 NOTE — PROVATION PATIENT INSTRUCTIONS
Discharge Summary/Instructions after an Endoscopic Procedure  Patient Name: Brande Reyer  Patient MRN: [id number]  Patient YOB: 1975 Monday, June 23, 2025  Pasha Cervantes MD  Dear patient,  As a result of recent federal legislation (The Federal Cures Act), you may   receive lab or pathology results from your procedure in your [facility]   account before your physician is able to contact you. Your physician or   their representative will relay the results to you with their   recommendations at their soonest availability.  Thank you,  RESTRICTIONS:  During your procedure today, you received medications for sedation.  These   medications may affect your judgment, balance and coordination.  Therefore,   for 24 hours, you have the following restrictions:   - DO NOT drive a car, operate machinery, make legal/financial decisions,   sign important papers or drink alcohol.    ACTIVITY:  Today: no heavy lifting, straining or running due to procedural   sedation/anesthesia.  The following day: return to full activity including work.  DIET:  Eat and drink normally unless instructed otherwise.     TREATMENT FOR COMMON SIDE EFFECTS:  - Mild abdominal pain, nausea, belching, bloating or excessive gas:  rest,   eat lightly and use a heating pad.  - Sore Throat: treat with throat lozenges and/or gargle with warm salt   water.  - Because air was used during the procedure, expelling large amounts of air   from your rectum or belching is normal.  - If a bowel prep was taken, you may not have a bowel movement for 1-3 days.    This is normal.  SYMPTOMS TO WATCH FOR AND REPORT TO YOUR PHYSICIAN:  1. Abdominal pain or bloating, other than gas cramps.  2. Chest pain.  3. Back pain.  4. Signs of infection such as: chills or fever occurring within 24 hours   after the procedure.  5. Rectal bleeding, which would show as bright red, maroon, or black stools.   (A tablespoon of blood from the rectum is not serious, especially if    hemorrhoids are present.)  6. Vomiting.  7. Weakness or dizziness.  GO DIRECTLY TO THE NEAREST EMERGENCY ROOM IF YOU HAVE ANY OF THE FOLLOWING:      Difficulty breathing              Chills and/or fever over 101 F   Persistent vomiting and/or vomiting blood   Severe abdominal pain   Severe chest pain   Black, tarry stools   Bleeding- more than one tablespoon   Any other symptom or condition that you feel may need urgent attention  Your doctor recommends these additional instructions:  If any biopsies were taken, your doctors clinic will contact you in 1 to 2   weeks with any results.  - Return patient to [facility] for [facility] care.   - Await pathology results.   - The findings and recommendations were discussed with the referring   physician.  For questions, problems or results please call your physician - Pasha Cervantes MD at Work:  [phone].  [facility], EMERGENCY ROOM PHONE NUMBER: [phone]  IF A COMPLICATION OR EMERGENCY SITUATION ARISES AND YOU ARE UNABLE TO REACH   YOUR PHYSICIAN - GO DIRECTLY TO THE EMERGENCY ROOM.  Pasha Cervantes MD  6/23/2025 9:12:48 AM  This report has been verified and signed electronically.  Dear patient,  As a result of recent federal legislation (The Federal Cures Act), you may   receive lab or pathology results from your procedure in your [facility]   account before your physician is able to contact you. Your physician or   their representative will relay the results to you with their   recommendations at their soonest availability.  Thank you,  PROVATION

## 2025-06-23 NOTE — TREATMENT PLAN
GI Treatment Plan    S/p EGD today    Impression:            - Normal esophagus.                          - Normal stomach. Biopsied.                          - Normal examined duodenum.   Recommendation:        - Return patient to [facility] for ongoing care.                          - Await pathology results.                          - The findings and recommendations were discussed                          with the referring physician.     Gi will sign off    Tai Boyer MD  Gastroenterology Fellow, PGY-IV

## 2025-06-23 NOTE — ASSESSMENT & PLAN NOTE
- CT notable for gallbladder wall thickening and fat stranding  - LFTs and panc enzymes wnl  - RUQ US ordered  - TRial of Bentyl ordered  - still having intermittent pain Right of umbilicus or below epigastric area, worse w eating  - imaging reviewed per Surgeon would like GI consult, possible if EGD, if neg then may need HIDA or further w/u. S/w GI fellow, plan to see pt in am, appreciate recs.   - EGD 6/23 unremarkable

## 2025-06-23 NOTE — ASSESSMENT & PLAN NOTE
- findings on CT concerning for cystitis/pyelonephritis   - continue Zosyn thru the wkd, EOT 6/24  - urine cx w mult organisms, repeat UA clean but was on abx, plan to cont treatment for pyelo

## 2025-06-23 NOTE — PROGRESS NOTES
Dr. MARIAH Pak at bedside. Discussed procedure and POC with pt. Per bruce MATHUR with primary team restarting po diet. Will monitor.

## 2025-06-23 NOTE — PLAN OF CARE
Plan for EGD today. NPO since midnight--pt verbalized understanding. Morning heparin shot held per verbal order by MD CARLOS Foote.     PRN Tramadol given for abdominal pain. NS at 75 continued.

## 2025-06-23 NOTE — SUBJECTIVE & OBJECTIVE
Subjective:   History of Present Illness:  Mrs. Reyer is a 49 y.o. woman who received a DBD KTX 4/20/24 for ESRD 2/2 DMII (CIT 10hrs 25 mins). Post op with DGF, HD. Cr very slowly trending down, last lowest 3.0. Rejection r/o with allosure and biopsy 6/4. Last US 5/26 stable. Last admit for anemia resolved with 2 units PRBCs.  She now presents as a transfer from OSH. Went to ED for lower abdominal pain and 2 day history of NV with associated decreased intake. Also endorsed dysuria and diarrhea. Denied fevers, chills, chest pain, shortness of breath. Recent labs notable for normal lactate, elevated Cr up to 4.2, bicarb 12. Hgb stable, no leukocytosis. BC from OSH pending. Flu and COVID negative. CT AP with edematous allograft + adjacent infectious/inflammatory edema, bladder wall thickening, evidence of diarrheal illness. CXR without acute process. Will obtain stool studies, start abx for poss pyelonephritis/UTI, f/u cultures, IVF, US for FOREST.         Hospital Course:  Patient transferred from OSH due to abdominal pain, FOREST, and diarrhea with CT concerning for pyelonephritis.     Interval note:  - no acute events overnight. Still having intermittent pain worse w eating and nausea. No vomiting past 2 days. EGD today unremarkable esophagus, stomach and duodenum. Bx taken. CT reviewed w findings of GB wall thickening and fat stranding, RUQ US w sludge and mobile stones. LFTs and panc enzymes wnl. Consulting GI, apprec recs  - diarrhea- stool studies all neg.  Seems to be less frequent and stools forming  - Pyelonephritis suspected, UA w 2+ leuks, mod bacteria, Urine cx w mult organisms, none in predominance. Repeat UA unremarkable. Cont Zosyn, EOT 6/24.  - FOREST continued IV fluids   - Small area of transplant incision open. Pt reports doing wound care at home, healing well. Wound care consulted, apprec recs      Past Medical, Surgical, Family, and Social History:   Unchanged from H&P.    Scheduled Meds:   busPIRone  5  mg Oral BID    carvediloL  6.25 mg Oral BID    cloNIDine  0.2 mg Oral QHS    dicyclomine  10 mg Oral TID    epoetin kaity  10,000 Units Subcutaneous Q7 Days    gabapentin  100 mg Oral TID    heparin (porcine)  5,000 Units Subcutaneous Q8H    pantoprazole  40 mg Oral Daily    piperacillin-tazobactam (Zosyn) IV (PEDS and ADULTS) (extended infusion is not appropriate)  4.5 g Intravenous Q12H    pravastatin  40 mg Oral Daily    predniSONE  5 mg Oral Daily    sodium bicarbonate  1,300 mg Oral TID    sulfamethoxazole-trimethoprim 400-80mg  1 tablet Oral Once per day on Monday Wednesday Friday    tacrolimus  6 mg Oral BID    traZODone  50 mg Oral QHS    valGANciclovir  450 mg Oral Once per day on Monday Wednesday Friday    vilazodone  40 mg Oral Daily     Continuous Infusions:   0.45% NaCl   Intravenous Continuous 75 mL/hr at 06/23/25 1020 New Bag at 06/23/25 1020     PRN Meds:  Current Facility-Administered Medications:     acetaminophen, 650 mg, Oral, Q4H PRN    calcium carbonate, 500 mg, Oral, TID PRN    ondansetron, 8 mg, Oral, Q6H PRN    prochlorperazine, 5 mg, Intravenous, Q6H PRN    sodium chloride 0.9%, 10 mL, Intravenous, PRN    traMADoL, 50 mg, Oral, Q4H PRN    Intake/Output - Last 3 Shifts         06/21 0700  06/22 0659 06/22 0700  06/23 0659 06/23 0700  06/24 0659    P.O. 1920 1080     I.V. (mL/kg) 615 (6.6)  100 (1)    IV Piggyback 100      Total Intake(mL/kg) 2635 (28.2) 1080 (11.3) 100 (1)    Urine (mL/kg/hr) 300 (0.1) 900 (0.4)     Stool 0 0     Total Output 300 900     Net +2335 +180 +100           Unmeasured Urine Occurrence 12 x 2 x     Unmeasured Stool Occurrence 6 x 2 x              Review of Systems   Constitutional:  Positive for appetite change (decreased, symptoms worse w eating). Negative for fatigue and fever.   HENT:  Negative for trouble swallowing.    Respiratory:  Negative for cough and shortness of breath.    Cardiovascular:  Negative for chest pain and leg swelling.   Gastrointestinal:   "Positive for abdominal pain, diarrhea and nausea. Negative for abdominal distention and vomiting.   Genitourinary:  Negative for decreased urine volume, difficulty urinating, dysuria, frequency and hematuria.   Skin:  Negative for wound.   Allergic/Immunologic: Positive for immunocompromised state.   Neurological:  Negative for dizziness and weakness.   Psychiatric/Behavioral:  Negative for confusion.       Objective:     Vital Signs (Most Recent):  Temp: 97.8 °F (36.6 °C) (06/23/25 1114)  Pulse: 90 (06/23/25 1114)  Resp: 18 (06/23/25 1114)  BP: 125/69 (06/23/25 1114)  SpO2: 97 % (06/23/25 1114) Vital Signs (24h Range):  Temp:  [97.8 °F (36.6 °C)-98.4 °F (36.9 °C)] 97.8 °F (36.6 °C)  Pulse:  [77-96] 90  Resp:  [14-22] 18  SpO2:  [97 %-100 %] 97 %  BP: (118-170)/(56-87) 125/69     Weight: 95.8 kg (211 lb 1.5 oz)  Height: 5' 4" (162.6 cm)  Body mass index is 36.23 kg/m².     Physical Exam  Vitals and nursing note reviewed.   Constitutional:       General: She is not in acute distress.     Appearance: She is not ill-appearing.   HENT:      Head: Normocephalic and atraumatic.      Mouth/Throat:      Mouth: Mucous membranes are moist.   Eyes:      Extraocular Movements: Extraocular movements intact.   Cardiovascular:      Rate and Rhythm: Normal rate and regular rhythm.      Pulses: Normal pulses.      Heart sounds: No murmur heard.  Pulmonary:      Effort: Pulmonary effort is normal. No respiratory distress.   Abdominal:      General: Bowel sounds are normal. There is no distension.      Palpations: Abdomen is soft.      Tenderness: There is no guarding or rebound.      Hernia: A hernia (small, umbilical, easily reduced) is present.   Musculoskeletal:         General: No swelling. Normal range of motion.      Cervical back: Normal range of motion.   Skin:     General: Skin is warm and dry.   Neurological:      Mental Status: She is alert and oriented to person, place, and time.      Motor: No weakness.   Psychiatric:    " "     Mood and Affect: Mood normal.         Behavior: Behavior normal.         Thought Content: Thought content normal.         Judgment: Judgment normal.          Laboratory:  CBC:   Recent Labs   Lab 06/21/25  0540 06/22/25  0548 06/23/25  0550   WBC 6.97 8.79 5.16   RBC 2.91* 2.47* 2.38*   HGB 9.0* 7.6* 7.4*   HCT 28.8* 24.7* 24.2*    205 198   MCV 99* 100* 102*   MCH 30.9 30.8 31.1*   MCHC 31.3* 30.8* 30.6*     CMP:   Recent Labs   Lab 06/17/25  1605 06/17/25  2214 06/19/25  1356 06/20/25  0649 06/21/25  0540 06/22/25  0548 06/23/25  0549   *   < > 232*   < > 110 133* 114*   CALCIUM 9.6   < > 7.6*   < > 8.2* 7.6* 8.0*   ALBUMIN 4.0   < > 2.8*  3.0*   < > 2.9* 2.5* 2.6*   PROT 6.6  --  5.1*  --   --   --   --    *   < > 134*   < > 142 141 143   K 4.4   < > 2.8*   < > 3.8 3.9 4.4   CO2 12*   < > 22*   < > 21* 20* 22*   *   < > 100   < > 111* 113* 114*   BUN 81*   < > 63*   < > 57* 56* 48*   CREATININE 3.7*   < > 3.5*   < > 5.2* 4.7* 3.5*   ALKPHOS 69  --  59  --   --   --   --    ALT 5*  --  6*  --   --   --   --    AST 7*  --  7*  --   --   --   --     < > = values in this interval not displayed.     Coagulation: No results for input(s): "PT", "APTT" in the last 168 hours.  Labs within the past 24 hours have been reviewed.    Diagnostic Results:  Imaging including EGD reveiwed  "

## 2025-06-23 NOTE — TRANSFER OF CARE
"Anesthesia Transfer of Care Note    Patient: Brande Reyer    Procedure(s) Performed: Procedure(s) (LRB):  EGD (ESOPHAGOGASTRODUODENOSCOPY) (N/A)    Patient location: PACU    Anesthesia Type: general    Transport from OR: Transported from OR on 6-10 L/min O2 by face mask with adequate spontaneous ventilation    Post pain: adequate analgesia    Post assessment: tolerated procedure well and no apparent anesthetic complications    Post vital signs: stable    Level of consciousness: awake, lethargic and oriented    Nausea/Vomiting: no nausea/vomiting    Complications: none    Transfer of care protocol was followed      Last vitals: Visit Vitals  /67 (BP Location: Right arm, Patient Position: Sitting)   Pulse 78   Temp 36.7 °C (98.1 °F) (Temporal)   Resp 17   Ht 5' 4" (1.626 m)   Wt 95.8 kg (211 lb 1.5 oz)   SpO2 100%   Breastfeeding No   BMI 36.23 kg/m²     "

## 2025-06-23 NOTE — ANESTHESIA PREPROCEDURE EVALUATION
2025  Brande Reyer is a 50 y.o., female.      Pre-op Assessment    I have reviewed the Patient Summary Reports.     I have reviewed the Nursing Notes. I have reviewed the NPO Status.   I have reviewed the Medications.     Review of Systems  Anesthesia Hx:  No problems with previous Anesthesia   History of prior surgery of interest to airway management or planning:  Previous anesthesia: General          Denies Personal Hx of Anesthesia complications.                    Cardiovascular:     Hypertension                                          Pulmonary:  Pulmonary Normal                       Renal/:  Chronic Renal Disease, Dialysis   S/P KIDNEY TX             Hepatic/GI:  Hepatic/GI Normal                    Neurological:  Neurology Normal                                      Endocrine:  Diabetes           Psych:  Psychiatric History                Patient Active Problem List   Diagnosis    Anaphylactic shock, unspecified, initial encounter    Anemia in chronic kidney disease    Anxiety disorder, unspecified    Hemodialysis access, AV graft    History of stillbirth    Hyperlipidemia    Essential (primary) hypertension    Major depressive disorder, single episode, moderate    Nicotine dependence, unspecified, uncomplicated    Obesity, morbid    Type 2 diabetes mellitus with diabetic nephropathy    Impaired functional mobility and activity tolerance    Knee pain, right    Status post -donor kidney transplantation    Acute blood loss anemia    At risk for opportunistic infections    Long-term use of immunosuppressant medication    Prophylactic immunotherapy    Hyperkalemia    Anemia of renal disease    Acute anemia    Anxiety and depression    Acute cystitis without hematuria    Immunocompromised state due to drug therapy    FOREST (acute kidney injury)    Diarrhea    Pyelonephritis, acute     Metabolic acidosis    Thickening of wall of gallbladder    Volume depletion     Past Medical History:   Diagnosis Date    Allergy     Anemia     Anxiety     Depression     Diabetes mellitus, type 2     Dialysis patient     Disorder of kidney and ureter     Hyperlipidemia     Hypertension     Obesity     Pyelonephritis, acute 2025     Past Surgical History:   Procedure Laterality Date    AV FISTULA PLACEMENT  2022     SECTION      COLONOSCOPY N/A 2023    Procedure: COLONOSCOPY;  Surgeon: Hermilo Orozco III, MD;  Location: [facility] ENDO;  Service: Endoscopy;  Laterality: N/A;    COLONOSCOPY N/A 2024    Procedure: COLONOSCOPY;  Surgeon: Hermilo Orozco III, MD;  Location: [facility] ENDO;  Service: Endoscopy;  Laterality: N/A;    HYSTERECTOMY      INSERTION OF DIALYSIS CATHETER      KIDNEY TRANSPLANT N/A 2025    Procedure: TRANSPLANT, KIDNEY;  Surgeon: Josh Cameron Jr., MD;  Location: [facility] OR [address] [place] [address];  Service: Transplant;  Laterality: N/A;  Out of Ice 0030  Reperfusion 0102    TUBAL LIGATION           Physical Exam  General: Well nourished, Cooperative and Alert    Airway:  Mallampati: II   Mouth Opening: Normal  TM Distance: Normal  Tongue: Normal  Neck ROM: Normal ROM    Chest/Lungs:  Normal Respiratory Rate    Heart:  Rate: Normal  Rhythm: Regular Rhythm      Lab Results   Component Value Date    WBC 5.16 2025    HGB 7.4 (L) 2025    HCT 24.2 (L) 2025     (H) 2025     2025       BMP  Lab Results   Component Value Date     2025    K 4.4 2025     (H) 2025    CO2 22 (L) 2025    BUN 48 (H) 2025    CREATININE 3.5 (H) 2025    CALCIUM 8.0 (L) 2025    ANIONGAP 7 (L) 2025    EGFRNORACEVR 15 (L) 2025         Anesthesia Plan  Type of Anesthesia, risks & benefits discussed:    Anesthesia Type: Gen Natural Airway, Gen ETT, MAC  Intra-op Monitoring Plan: Standard ASA Monitors  Post  Op Pain Control Plan: multimodal analgesia  Induction:  IV  Airway Plan: Direct, Post-Induction  Informed Consent: Informed consent signed with the Patient and all parties understand the risks and agree with anesthesia plan.  All questions answered.   ASA Score: 4  Day of Surgery Review of History & Physical: H&P Update referred to the surgeon/provider.    Ready For Surgery From Anesthesia Perspective.     .

## 2025-06-23 NOTE — INTERVAL H&P NOTE
The patient has been examined and the H&P has been reviewed:    I concur with the findings and no changes have occurred since H&P was written.    Anesthesia risks, benefits and alternative options discussed and understood by patient/family.    History and Exam unchanged from visit.    Procedure - EGD  Neck - supple  Plan of anesthesia - General  ASA - per anesthesia  Mallampati - per anesthesia  Anesthesia problems - no  Family history of anesthesia problems - no        Active Hospital Problems    Diagnosis  POA    *FOREST (acute kidney injury) [N17.9]  Yes    Volume depletion [E86.9]  Unknown    Thickening of wall of gallbladder [K82.8]  Yes    Diarrhea [R19.7]  Yes    Pyelonephritis, acute [N10]  Yes    Metabolic acidosis [E87.20]  Yes    Immunocompromised state due to drug therapy [D84.821, Z79.899]  Not Applicable     On tac, MMF, pred  Monitoring for toxicity      Status post -donor kidney transplantation [Z94.0]  Not Applicable     ESKD from DM; HH 2022 LUE AVF (steal syndrome)  DBDKT 25 KDPI 61 CIT 10.5 cPRA 66-95 DSA DQ7 induced with thymo. OR notable for lateral rectus entry, severe arterial calcifications. 1 stent. 2 drains. 2 day holly.    25 DSA B18,B8,DQ7    25 cfDNA 0.28    25 Bx 4/16 glomeruli sclerosed; mild IFTA      Prophylactic immunotherapy [Z29.89]  Not Applicable     - continue prograf  - continue to monitor for toxic side effects and check daily levels. Will adjust for therapeutic dose      At risk for opportunistic infections [Z91.89]  Yes     - Bactrim for PCP ppx.  - Valcyte for CMV ppx.      Long-term use of immunosuppressant medication [Z79.60]  Not Applicable     - see prophylactic immunotherapy      Essential (primary) hypertension [I10]  Yes    Anemia in chronic kidney disease [N18.9, D63.1]  Yes      Resolved Hospital Problems   No resolved problems to display.

## 2025-06-23 NOTE — NURSING TRANSFER
Nursing Transfer Note      6/23/2025   0952 AM    Nurse giving handoff:GREYSON Smith PACU  Nurse receiving handoff:GREYSON Flores TSU    Reason patient is being transferred: post anesthesia    Transfer To: [id number]    Transfer via stretcher    Transported by PCT    Transfer Vital Signs:  Blood Pressure:140/70  Heart Rate:79  O2:98%-- RA  Temperature:  Respirations:98.1    Any special needs or follow-up needed: n/a    Patient belongings transferred with patient: No    Chart send with patient: Yes    Notified: family    Patient reassessed at: 6/23/25@ 0917

## 2025-06-23 NOTE — PLAN OF CARE
Pt aao x4. Call bell within reach. She is up independent. EGD completed this am. She is eating 75% of meals. Plan to start calorie count. Cr 3.5, down form 4.7. she continues on IVFs. Encouraging oral intake. No c/o of nausea or abdominal pain. Dressing change to RLQ per orders. She verbalized understanding of plan of care.

## 2025-06-24 VITALS
OXYGEN SATURATION: 100 % | HEART RATE: 86 BPM | HEIGHT: 64 IN | BODY MASS INDEX: 37.03 KG/M2 | RESPIRATION RATE: 16 BRPM | WEIGHT: 216.88 LBS | SYSTOLIC BLOOD PRESSURE: 156 MMHG | DIASTOLIC BLOOD PRESSURE: 72 MMHG | TEMPERATURE: 99 F

## 2025-06-24 PROBLEM — R10.9 ABDOMINAL PAIN: Status: ACTIVE | Noted: 2025-06-24

## 2025-06-24 LAB
ABO + RH BLD: NORMAL
ABSOLUTE EOSINOPHIL (OHS): 0.09 K/UL
ABSOLUTE MONOCYTE (OHS): 0.29 K/UL (ref 0.3–1)
ABSOLUTE NEUTROPHIL COUNT (OHS): 5.04 K/UL (ref 1.8–7.7)
ALBUMIN SERPL BCP-MCNC: 2.4 G/DL (ref 3.5–5.2)
ANION GAP (OHS): 7 MMOL/L (ref 8–16)
BASOPHILS # BLD AUTO: 0.02 K/UL
BASOPHILS NFR BLD AUTO: 0.3 %
BLD PROD TYP BPU: NORMAL
BLOOD UNIT EXPIRATION DATE: NORMAL
BLOOD UNIT TYPE CODE: 6200
BUN SERPL-MCNC: 43 MG/DL (ref 6–20)
CALCIUM SERPL-MCNC: 7.7 MG/DL (ref 8.7–10.5)
CHLORIDE SERPL-SCNC: 114 MMOL/L (ref 95–110)
CO2 SERPL-SCNC: 20 MMOL/L (ref 23–29)
CREAT SERPL-MCNC: 2.8 MG/DL (ref 0.5–1.4)
CROSSMATCH INTERPRETATION: NORMAL
DISPENSE STATUS: NORMAL
ERYTHROCYTE [DISTWIDTH] IN BLOOD BY AUTOMATED COUNT: 14.4 % (ref 11.5–14.5)
GFR SERPLBLD CREATININE-BSD FMLA CKD-EPI: 20 ML/MIN/1.73/M2
GLUCOSE SERPL-MCNC: 134 MG/DL (ref 70–110)
HCT VFR BLD AUTO: 21.3 % (ref 37–48.5)
HCT VFR BLD AUTO: 23.9 % (ref 37–48.5)
HGB BLD-MCNC: 6.6 GM/DL (ref 12–16)
HGB BLD-MCNC: 7.1 GM/DL (ref 12–16)
IMM GRANULOCYTES # BLD AUTO: 0.1 K/UL (ref 0–0.04)
IMM GRANULOCYTES NFR BLD AUTO: 1.7 % (ref 0–0.5)
INDIRECT COOMBS: NORMAL
LYMPHOCYTES # BLD AUTO: 0.4 K/UL (ref 1–4.8)
MAGNESIUM SERPL-MCNC: 1.5 MG/DL (ref 1.6–2.6)
MCH RBC QN AUTO: 30.7 PG (ref 27–31)
MCHC RBC AUTO-ENTMCNC: 31 G/DL (ref 32–36)
MCV RBC AUTO: 99 FL (ref 82–98)
NUCLEATED RBC (/100WBC) (OHS): 0 /100 WBC
PHOSPHATE SERPL-MCNC: 2.2 MG/DL (ref 2.7–4.5)
PLATELET # BLD AUTO: 199 K/UL (ref 150–450)
PMV BLD AUTO: 9.1 FL (ref 9.2–12.9)
POTASSIUM SERPL-SCNC: 3.9 MMOL/L (ref 3.5–5.1)
RBC # BLD AUTO: 2.15 M/UL (ref 4–5.4)
RELATIVE EOSINOPHIL (OHS): 1.5 %
RELATIVE LYMPHOCYTE (OHS): 6.7 % (ref 18–48)
RELATIVE MONOCYTE (OHS): 4.9 % (ref 4–15)
RELATIVE NEUTROPHIL (OHS): 84.9 % (ref 38–73)
RETICS/RBC NFR AUTO: 2.5 % (ref 0.5–2.5)
RH BLD: NORMAL
SODIUM SERPL-SCNC: 141 MMOL/L (ref 136–145)
SPECIMEN OUTDATE: NORMAL
TACROLIMUS BLD-MCNC: 4.7 NG/ML (ref 5–15)
UNIT NUMBER: NORMAL
WBC # BLD AUTO: 5.94 K/UL (ref 3.9–12.7)

## 2025-06-24 PROCEDURE — 25000003 PHARM REV CODE 250

## 2025-06-24 PROCEDURE — P9016 RBC LEUKOCYTES REDUCED: HCPCS | Performed by: NURSE PRACTITIONER

## 2025-06-24 PROCEDURE — 80197 ASSAY OF TACROLIMUS: CPT

## 2025-06-24 PROCEDURE — 36430 TRANSFUSION BLD/BLD COMPNT: CPT

## 2025-06-24 PROCEDURE — 85014 HEMATOCRIT: CPT | Performed by: NURSE PRACTITIONER

## 2025-06-24 PROCEDURE — 36415 COLL VENOUS BLD VENIPUNCTURE: CPT | Performed by: NURSE PRACTITIONER

## 2025-06-24 PROCEDURE — 85018 HEMOGLOBIN: CPT | Performed by: NURSE PRACTITIONER

## 2025-06-24 PROCEDURE — 85025 COMPLETE CBC W/AUTO DIFF WBC: CPT

## 2025-06-24 PROCEDURE — 86850 RBC ANTIBODY SCREEN: CPT | Performed by: NURSE PRACTITIONER

## 2025-06-24 PROCEDURE — 82040 ASSAY OF SERUM ALBUMIN: CPT

## 2025-06-24 PROCEDURE — 85045 AUTOMATED RETICULOCYTE COUNT: CPT | Performed by: NURSE PRACTITIONER

## 2025-06-24 PROCEDURE — 83735 ASSAY OF MAGNESIUM: CPT

## 2025-06-24 PROCEDURE — 25000003 PHARM REV CODE 250: Performed by: NURSE PRACTITIONER

## 2025-06-24 PROCEDURE — 63600175 PHARM REV CODE 636 W HCPCS

## 2025-06-24 PROCEDURE — 63600175 PHARM REV CODE 636 W HCPCS: Performed by: NURSE PRACTITIONER

## 2025-06-24 PROCEDURE — 87798 DETECT AGENT NOS DNA AMP: CPT | Performed by: NURSE PRACTITIONER

## 2025-06-24 PROCEDURE — 86920 COMPATIBILITY TEST SPIN: CPT | Performed by: NURSE PRACTITIONER

## 2025-06-24 PROCEDURE — 99239 HOSP IP/OBS DSCHRG MGMT >30: CPT | Mod: ,,, | Performed by: NURSE PRACTITIONER

## 2025-06-24 RX ORDER — TACROLIMUS 1 MG/1
2 CAPSULE ORAL ONCE
Status: COMPLETED | OUTPATIENT
Start: 2025-06-24 | End: 2025-06-24

## 2025-06-24 RX ORDER — MYCOPHENOLIC ACID 180 MG/1
540 TABLET, DELAYED RELEASE ORAL 2 TIMES DAILY
Qty: 180 TABLET | Refills: 11 | Status: SHIPPED | OUTPATIENT
Start: 2025-06-24 | End: 2026-06-24

## 2025-06-24 RX ORDER — DICYCLOMINE HYDROCHLORIDE 10 MG/1
10 CAPSULE ORAL 3 TIMES DAILY PRN
Qty: 90 CAPSULE | Refills: 3 | Status: SHIPPED | OUTPATIENT
Start: 2025-06-24

## 2025-06-24 RX ORDER — TACROLIMUS 1 MG/1
8 CAPSULE ORAL 2 TIMES DAILY
Status: DISCONTINUED | OUTPATIENT
Start: 2025-06-24 | End: 2025-06-24 | Stop reason: HOSPADM

## 2025-06-24 RX ORDER — HYDROCODONE BITARTRATE AND ACETAMINOPHEN 500; 5 MG/1; MG/1
TABLET ORAL
Status: DISCONTINUED | OUTPATIENT
Start: 2025-06-24 | End: 2025-06-24 | Stop reason: HOSPADM

## 2025-06-24 RX ADMIN — TRAMADOL HYDROCHLORIDE 50 MG: 50 TABLET, COATED ORAL at 01:06

## 2025-06-24 RX ADMIN — TACROLIMUS 2 MG: 1 CAPSULE ORAL at 01:06

## 2025-06-24 RX ADMIN — PIPERACILLIN SODIUM AND TAZOBACTAM SODIUM 4.5 G: 4; .5 INJECTION, POWDER, FOR SOLUTION INTRAVENOUS at 09:06

## 2025-06-24 RX ADMIN — PANTOPRAZOLE SODIUM 40 MG: 40 TABLET, DELAYED RELEASE ORAL at 08:06

## 2025-06-24 RX ADMIN — TACROLIMUS 6 MG: 1 CAPSULE ORAL at 08:06

## 2025-06-24 RX ADMIN — GABAPENTIN 100 MG: 100 CAPSULE ORAL at 01:06

## 2025-06-24 RX ADMIN — DICYCLOMINE HYDROCHLORIDE 10 MG: 10 CAPSULE ORAL at 08:06

## 2025-06-24 RX ADMIN — CARVEDILOL 6.25 MG: 6.25 TABLET, FILM COATED ORAL at 08:06

## 2025-06-24 RX ADMIN — PRAVASTATIN SODIUM 40 MG: 40 TABLET ORAL at 08:06

## 2025-06-24 RX ADMIN — GABAPENTIN 100 MG: 100 CAPSULE ORAL at 08:06

## 2025-06-24 RX ADMIN — SODIUM BICARBONATE 650 MG TABLET 1300 MG: at 01:06

## 2025-06-24 RX ADMIN — SODIUM BICARBONATE 650 MG TABLET 1300 MG: at 08:06

## 2025-06-24 RX ADMIN — VILAZODONE HYDROCHLORIDE 40 MG: 10 TABLET ORAL at 08:06

## 2025-06-24 RX ADMIN — PREDNISONE 5 MG: 5 TABLET ORAL at 08:06

## 2025-06-24 RX ADMIN — BUSPIRONE HYDROCHLORIDE 5 MG: 5 TABLET ORAL at 08:06

## 2025-06-24 RX ADMIN — DICYCLOMINE HYDROCHLORIDE 10 MG: 10 CAPSULE ORAL at 01:06

## 2025-06-24 NOTE — CONSULTS
Calorie Count     Notification sign placed on door and papers left in room to document intakes.  Please document all food and beverages consumed as accurately as possible.    RD to follow back up in 48 hours (6/26) to evaluate PO intake.    Thanks!    Yadira Minor, MS, RD, LDN

## 2025-06-24 NOTE — PROGRESS NOTES
Transplant  Discharge Note:    Per KTS rounds, patient stable for discharge today.  SW met with patient at bedside to review discharge plans.  Pt will discharge to patient's home under the care of self and/or James Reyer, patient's , phone number [phone].  Pt aware of, involved in, and coping well with this discharge plan.  Pt did not have any concerns with the discharge plan at this time.  No psychosocial needs indicated for discharge at this time.  JASS remains available at [phone].

## 2025-06-24 NOTE — PLAN OF CARE
Pt aaox4 vswnl and no c/o pain at beginning shift but later in night Tramadol given with moderate relief. Pt ambulates independently. Pt refusing heparin SQ. BRENDA + +.  IVF's d/c after MN. Pt had EGD on dayshift. Rt lower quad incision with open area [facility] Zosyn continued. Holding Clonidine for SBP<160 per MD orders however pt states she takes it for night sweats not BP. Possible d/c on 6/24

## 2025-06-24 NOTE — NURSING
Pt met with transplant pharmacist who reviewed her meds with her and gave her an updated med card. AVS reviewed with patient. She verbalized understanding of follow up appts, meds, what to call for, who to call, and [facility] on call number. IV d/ashkan with cath tip intact. She received her meds from bedside pharmacy.  on way and close. Pt to leave when he arrives.

## 2025-06-24 NOTE — DISCHARGE SUMMARY
[facility] - Transplant Stepdown  Kidney Transplant  Discharge Summary    Patient Name: Brande Reyer  MRN: [id number]  Admission Date: 6/17/2025  Hospital Length of Stay: 7 days  Discharge Date and Time: 06/24/2025 1:02 PM  Attending Physician: Amina Foote*   Discharging Provider: Catherine Leal NP  Primary Care Provider: Janie Shankar, NERY-CARLOS    HPI:   Mrs. Reyer is a 49 y.o. woman who received a DBD KTX 4/20/24 for ESRD 2/2 DMII (CIT 10hrs 25 mins). Post op with DGF, HD. Cr very slowly trending down, last lowest 3.0. Rejection r/o with allosure and biopsy 6/4. Last US 5/26 stable. Last admit for anemia resolved with 2 units PRBCs.  She now presents as a transfer from OSH. Went to ED for lower abdominal pain and 2 day history of NV with associated decreased intake. Also endorsed dysuria and diarrhea. Denied fevers, chills, chest pain, shortness of breath. Recent labs notable for normal lactate, elevated Cr up to 4.2, bicarb 12. Hgb stable, no leukocytosis. BC from OSH pending. Flu and COVID negative. CT AP with edematous allograft + adjacent infectious/inflammatory edema, bladder wall thickening, evidence of diarrheal illness. CXR without acute process. Will obtain stool studies, start abx for poss pyelonephritis/UTI, f/u cultures, IVF, US for FOREST.   Procedure(s) (LRB):  EGD (ESOPHAGOGASTRODUODENOSCOPY) (N/A)     Hospital Course:    Patient transferred from OSH due to abdominal pain, FOREST, and diarrhea with CT concerning for pyelonephritis. Blood cx and Urine cx here unremarkable. Pt treated for Pyelonephritis w Zosyn x7 days. Pt denies dysuria, making more Urine and w pushing fluids, cr down as low as 2.8. Cr peaked as high as 5.2 this admission. CT A/P also showed gallbladder wall thickening and fast stranding. Dedicated RUQ US also showed mobile stones and sludge. Neg Mathews's sign. Pt w ongoing n/NBNB emesis, post prandial pain and diarrhea. Stool studies including c diff NEG. Trial of bentyl started  ". Pt was evaluated by GI. EGD  w unremarkable esophagus, stomach and duodenum. Pt states nausea has subsided past 3 days and she notes stools are more formed and less frequent. She continues to note intermittent abdominal pain- improved w taking Oxy and drinking mild to "coat her stomach" . Patient is tolerating diet, able to drink at least 2L of water a day and other liquids. She is otherwise independent in room.  Of note, hgb ~7.4-9.7 post txp. Hgb 7.1 during hospital stay. Anemia likely multifactorial, kidney recovering, last epo , multiple lab draws. Iron studies last checked 25 and not low, folate 11.7 and B12 570. Pt w no overt signs of bleeding. Transfused 1u PRBC  prior to discharge. Wound care consulted on admit for small area of transplant incision open. Wound care recommending medi honey. Pt reports her and her  can do wound care.     While in patient, Hygroton d/c'd. Pt required Potassium replacement. BP well managed w Coreg and Clonidine. Hygroton not resumed on discharge.      Discharge instructions reviewed by Pharmacist, Nursing and Transplant coordinator.  Patient and CG verbalize understanding and are in agreement with discharge from hospital today.  Patient is medically stable for discharge.  Patient will f/u w labs on Thursday and per transplant coordinator.  Next visit in clinic in 1-2 weeks.       Goals of Care Treatment Preferences:  Code Status: Full Code      Final Active Diagnoses:    Diagnosis Date Noted POA    PRINCIPAL PROBLEM:  FOREST (acute kidney injury) [N17.9] 2025 Yes    Thickening of wall of gallbladder [K82.8] 2025 Yes    Diarrhea [R19.7] 2025 Yes    Pyelonephritis, acute [N10] 2025 Yes    Metabolic acidosis [E87.20] 2025 Yes    Immunocompromised state due to drug therapy [D84.821, Z79.899] 2025 Not Applicable    Status post -donor kidney transplantation [Z94.0] 2025 Not Applicable    Prophylactic " "immunotherapy [Z29.89] 04/20/2025 Not Applicable    At risk for opportunistic infections [Z91.89] 04/20/2025 Yes    Long-term use of immunosuppressant medication [Z79.60] 04/20/2025 Not Applicable    Essential (primary) hypertension [I10] 03/14/2023 Yes    Anemia in chronic kidney disease [N18.9, D63.1] 02/17/2023 Yes      Problems Resolved During this Admission:    Diagnosis Date Noted Date Resolved POA    Volume depletion [E86.9] 06/21/2025 06/23/2025 Unknown     Treatments: see hospital course    Consults (From admission, onward)          Status Ordering Provider     Inpatient consult to Registered Dietitian/Nutritionist  Once        Provider:  (Not yet assigned)    Completed KAREEN PURVIS     Inpatient consult to Gastroenterology  Once        Provider:  (Not yet assigned)    KAREEN Estrada            Pending Diagnostic Studies:       Procedure Component Value Units Date/Time    Parvovirus B19, Qual by PCR, Blood [[id number]] Collected: 06/24/25 0551    Order Status: Sent Lab Status: In process Updated: 06/24/25 0601    Specimen: Blood           Significant Diagnostic Studies: Labs: CMP   Recent Labs   Lab 06/23/25  0549 06/24/25  0551    141   K 4.4 3.9   * 114*   CO2 22* 20*   * 134*   BUN 48* 43*   CREATININE 3.5* 2.8*   CALCIUM 8.0* 7.7*   ALBUMIN 2.6* 2.4*   ANIONGAP 7* 7*    CBC   Recent Labs   Lab 06/23/25  0550 06/24/25  0551 06/24/25  0850   WBC 5.16 5.94  --    HGB 7.4* 6.6* 7.1*   HCT 24.2* 21.3* 23.9*    199  --      INR   Lab Results   Component Value Date    INR 1.0 05/19/2025    INR 1.0 04/19/2025    INR 1.0 06/20/2023    PROTIME 11.6 05/19/2025    PROTIME 10.6 04/19/2025     and All labs within the past 24 hours have been reviewed  Microbiology: Blood Culture No results found for: "LABBLOO" and Urine Culture    Lab Results   Component Value Date    LABURIN No Growth 06/19/2025     Radiology: X-Ray: CXR: X-Ray Chest 1 View (CXR): No results found for this " "visit on 06/17/25.    CT scan: CT ABDOMEN PELVIS WITHOUT CONTRAST:OSH    Kidney Ultrasound 6/21/25"     CLINICAL HISTORY:  Elelvated CR 62 days  post  Kidney Transplant;     TECHNIQUE:  Real time gray scale and doppler ultrasound was performed over the patient's renal allograft.     COMPARISON:  Renal transplant ultrasound 06/17/2025     FINDINGS:  Status post kidney transplant on 04/19/2025.     Renal allograft in the right lower quadrant.  The allograft measures 10.0 cm. Normal perfusion. Mild pelviectasis but no hydronephrosis.     No fluid collections.     Vasculature:     Resistive indices ranged from 0.73 to 0.80.     Main renal artery peak systolic velocity: 203cm/sec with normal waveform.     Renal artery/iliac ratio: 1.7.     The main renal vein is patent.     Impression:     Satisfactory gray scale and doppler evaluation of the renal allograft.    Discharged Condition: fair    Disposition:     Follow Up:    Patient Instructions:   No discharge procedures on file.    Medications:  Reconciled Home Medications:      Medication List        PAUSE taking these medications      LOKELMA 10 gram packet  Wait to take this until your doctor or other care provider tells you to start again.  Generic drug: sodium zirconium cyclosilicate  Take 10 g by mouth. Mix entire contents of packet(s) into drinking glass containing 3 tablespoons of water; stir well and drink immediately. Add water and repeat until no powder remains to receive entire dose.     MOUNJARO 2.5 mg/0.5 mL Pnij  Wait to take this until your doctor or other care provider tells you to start again.  Generic drug: tirzepatide  Inject 2.5 mg into the skin every 7 days.            START taking these medications      dicyclomine 10 MG capsule  Commonly known as: BENTYL  Take 1 capsule (10 mg total) by mouth 3 (three) times daily as needed (abdominal pain).     sodium bicarbonate 650 MG tablet  Take 2 tablets (1,300 mg total) by mouth 3 (three) times daily.        " "    CHANGE how you take these medications      mycophenolate sodium 180 MG Tbec  Take 3 tablets (540 mg total) by mouth 2 (two) times daily.  What changed: how much to take            CONTINUE taking these medications      busPIRone 5 MG Tab  Commonly known as: BUSPAR  Take 5 mg by mouth 2 (two) times daily.     carvediloL 6.25 MG tablet  Commonly known as: COREG  Take 1 tablet (6.25 mg total) by mouth 2 (two) times daily.     cloNIDine 0.1 MG tablet  Commonly known as: CATAPRES  Take 2 tablets (0.2 mg total) by mouth every evening.     gabapentin 100 MG capsule  Commonly known as: NEURONTIN  Take 100 mg by mouth 3 (three) times daily.     loperamide 2 mg capsule  Commonly known as: IMODIUM  Take 1 capsule (2 mg total) by mouth 4 (four) times daily as needed for Diarrhea.     montelukast 10 mg tablet  Commonly known as: SINGULAIR  Take 10 mg by mouth nightly.     multivitamin Tab  Take 1 tablet by mouth once daily.     oxyCODONE 5 MG immediate release tablet  Commonly known as: ROXICODONE  Take 1 tablet (5 mg total) by mouth every 6 (six) hours as needed for Pain.     pantoprazole 40 MG tablet  Commonly known as: PROTONIX  Take 1 tablet (40 mg total) by mouth once daily.     pen needle, diabetic 32 gauge x 5/32" Ndle  Inject 1 Needle into the skin 3 (three) times daily with meals.     pravastatin 40 MG tablet  Commonly known as: PRAVACHOL  Take 1 tablet (40 mg total) by mouth once daily.     predniSONE 5 MG tablet  Commonly known as: DELTASONE  Take 1 tablet (5 mg total) by mouth once daily.     sulfamethoxazole-trimethoprim 400-80mg 400-80 mg per tablet  Commonly known as: BACTRIM,SEPTRA  Take 1 tablet by mouth 3 (three) times a week. Stop 10/20/25     tacrolimus 1 MG Cap  Commonly known as: PROGRAF  Take 8 capsules (8 mg total) by mouth every 12 (twelve) hours.     traZODone 50 MG tablet  Commonly known as: DESYREL  Take 50 mg by mouth every evening.     valGANciclovir 450 mg Tab  Commonly known as: VALCYTE  Take " 1 tablet (450 mg total) by mouth 3 (three) times a week. Stop 7/20/25     vilazodone 10 mg Tab tablet  Commonly known as: VIIBRYD  Take 4 tablets (40 mg total) by mouth once daily.     XANAX 0.5 MG tablet  Generic drug: ALPRAZolam  Take 0.5 mg by mouth 3 (three) times daily.            STOP taking these medications      chlorthalidone 25 MG Tab  Commonly known as: HYGROTEN              Time spent caring for patient (Greater than 1/2 spent in direct face-to-face contact): > 30 minutes    Catherine Leal NP  Kidney Transplant  [facility] - Transplant Stepdown

## 2025-06-25 ENCOUNTER — PATIENT MESSAGE (OUTPATIENT)
Facility: CLINIC | Age: 50
End: 2025-06-25
Payer: MEDICARE

## 2025-06-25 LAB
ESTROGEN SERPL-MCNC: NORMAL PG/ML
INSULIN SERPL-ACNC: NORMAL U[IU]/ML
LAB AP CLINICAL INFORMATION: NORMAL
LAB AP GROSS DESCRIPTION: NORMAL
LAB AP PERFORMING LOCATION(S): NORMAL
LAB AP REPORT FOOTNOTES: NORMAL
T3RU NFR SERPL: NORMAL %

## 2025-06-25 NOTE — ANESTHESIA POSTPROCEDURE EVALUATION
Anesthesia Post Evaluation    Patient: Brande Reyer    Procedure(s) Performed: Procedure(s) (LRB):  EGD (ESOPHAGOGASTRODUODENOSCOPY) (N/A)    Final Anesthesia Type: general      Patient location during evaluation: PACU  Patient participation: Yes- Able to Participate  Level of consciousness: awake and alert and oriented  Post-procedure vital signs: reviewed and stable  Pain management: adequate  Airway patency: patent    PONV status at discharge: No PONV  Anesthetic complications: no      Cardiovascular status: hemodynamically stable  Respiratory status: unassisted, spontaneous ventilation and room air  Hydration status: euvolemic  Follow-up not needed.          Vital signs stable, no anesthesia issues at this time.    Event Time   Out of Recovery 06/23/2025 09:55:00

## 2025-06-26 ENCOUNTER — TELEPHONE (OUTPATIENT)
Dept: TRANSPLANT | Facility: CLINIC | Age: 50
End: 2025-06-26
Payer: MEDICARE

## 2025-06-26 ENCOUNTER — LAB VISIT (OUTPATIENT)
Dept: LAB | Facility: HOSPITAL | Age: 50
End: 2025-06-26
Attending: STUDENT IN AN ORGANIZED HEALTH CARE EDUCATION/TRAINING PROGRAM
Payer: MEDICARE

## 2025-06-26 ENCOUNTER — RESULTS FOLLOW-UP (OUTPATIENT)
Dept: TRANSPLANT | Facility: CLINIC | Age: 50
End: 2025-06-26
Payer: MEDICARE

## 2025-06-26 DIAGNOSIS — Z94.0 KIDNEY REPLACED BY TRANSPLANT: ICD-10-CM

## 2025-06-26 DIAGNOSIS — N17.9 AKI (ACUTE KIDNEY INJURY): Primary | ICD-10-CM

## 2025-06-26 LAB
ABSOLUTE EOSINOPHIL (SMH): 0.09 K/UL
ABSOLUTE MONOCYTE (SMH): 0.11 K/UL (ref 0.3–1)
ABSOLUTE NEUTROPHIL COUNT (SMH): 3.5 K/UL (ref 1.8–7.7)
ALBUMIN SERPL-MCNC: 2.6 G/DL (ref 3.5–5.2)
ANION GAP (SMH): 5 MMOL/L (ref 8–16)
BASOPHILS # BLD AUTO: 0.04 K/UL
BASOPHILS NFR BLD AUTO: 1 %
BUN SERPL-MCNC: 31 MG/DL (ref 6–20)
CALCIUM SERPL-MCNC: 8.3 MG/DL (ref 8.7–10.5)
CHLORIDE SERPL-SCNC: 112 MMOL/L (ref 95–110)
CO2 SERPL-SCNC: 25 MMOL/L (ref 23–29)
CREAT SERPL-MCNC: 2.4 MG/DL (ref 0.5–1.4)
ERYTHROCYTE [DISTWIDTH] IN BLOOD BY AUTOMATED COUNT: 14.7 % (ref 11.5–14.5)
GFR SERPLBLD CREATININE-BSD FMLA CKD-EPI: 24 ML/MIN/1.73/M2
GLUCOSE SERPL-MCNC: 126 MG/DL (ref 70–110)
HCT VFR BLD AUTO: 25.3 % (ref 37–48.5)
HGB BLD-MCNC: 7.8 GM/DL (ref 12–16)
IMM GRANULOCYTES # BLD AUTO: 0.06 K/UL (ref 0–0.04)
IMM GRANULOCYTES NFR BLD AUTO: 1.5 % (ref 0–0.5)
LYMPHOCYTES # BLD AUTO: 0.29 K/UL (ref 1–4.8)
MAGNESIUM SERPL-MCNC: 1.5 MG/DL (ref 1.6–2.6)
MCH RBC QN AUTO: 31.3 PG (ref 27–31)
MCHC RBC AUTO-ENTMCNC: 30.8 G/DL (ref 32–36)
MCV RBC AUTO: 102 FL (ref 82–98)
NUCLEATED RBC (/100WBC) (SMH): 0 /100 WBC
PHOSPHATE SERPL-MCNC: 2.9 MG/DL (ref 2.7–4.5)
PLATELET # BLD AUTO: 172 K/UL (ref 150–450)
PMV BLD AUTO: 8.2 FL (ref 9.2–12.9)
POTASSIUM SERPL-SCNC: 4.3 MMOL/L (ref 3.5–5.1)
RBC # BLD AUTO: 2.49 M/UL (ref 4–5.4)
RELATIVE EOSINOPHIL (SMH): 2.2 % (ref 0–8)
RELATIVE LYMPHOCYTE (SMH): 7.1 % (ref 18–48)
RELATIVE MONOCYTE (SMH): 2.7 % (ref 4–15)
RELATIVE NEUTROPHIL (SMH): 85.5 % (ref 38–73)
SODIUM SERPL-SCNC: 142 MMOL/L (ref 136–145)
WBC # BLD AUTO: 4.08 K/UL (ref 3.9–12.7)

## 2025-06-26 PROCEDURE — 80197 ASSAY OF TACROLIMUS: CPT

## 2025-06-26 PROCEDURE — 85025 COMPLETE CBC W/AUTO DIFF WBC: CPT

## 2025-06-26 PROCEDURE — 83735 ASSAY OF MAGNESIUM: CPT

## 2025-06-26 PROCEDURE — 82040 ASSAY OF SERUM ALBUMIN: CPT

## 2025-06-26 PROCEDURE — 36415 COLL VENOUS BLD VENIPUNCTURE: CPT

## 2025-06-26 RX ORDER — LANOLIN ALCOHOL/MO/W.PET/CERES
400 CREAM (GRAM) TOPICAL 2 TIMES DAILY
Qty: 60 TABLET | Refills: 11 | Status: SHIPPED | OUTPATIENT
Start: 2025-06-26 | End: 2026-06-26

## 2025-06-26 NOTE — TELEPHONE ENCOUNTER
----- Message from Pepper Jenkins DO sent at 6/26/2025 11:20 AM CDT -----  Continues to have decreasing creatinine.   Start on MgOx 400 BID  FK level pending   ----- Message -----  From: Lab, Background User  Sent: 6/26/2025   8:30 AM CDT  To: Andrea Adhikari Jr., MD

## 2025-06-27 LAB
B19V DNA SPEC QL NAA+PROBE: NEGATIVE
SPECIMEN SOURCE: NORMAL
TACROLIMUS BLD-MCNC: 6.6 NG/ML (ref 5–15)

## 2025-06-30 ENCOUNTER — LAB VISIT (OUTPATIENT)
Dept: LAB | Facility: HOSPITAL | Age: 50
End: 2025-06-30
Attending: STUDENT IN AN ORGANIZED HEALTH CARE EDUCATION/TRAINING PROGRAM
Payer: MEDICARE

## 2025-06-30 ENCOUNTER — TELEPHONE (OUTPATIENT)
Dept: TRANSPLANT | Facility: CLINIC | Age: 50
End: 2025-06-30
Payer: MEDICARE

## 2025-06-30 ENCOUNTER — RESULTS FOLLOW-UP (OUTPATIENT)
Dept: TRANSPLANT | Facility: HOSPITAL | Age: 50
End: 2025-06-30
Payer: MEDICARE

## 2025-06-30 DIAGNOSIS — Z94.0 KIDNEY REPLACED BY TRANSPLANT: Primary | ICD-10-CM

## 2025-06-30 DIAGNOSIS — Z94.0 KIDNEY REPLACED BY TRANSPLANT: ICD-10-CM

## 2025-06-30 LAB
ALBUMIN SERPL-MCNC: 2.8 G/DL (ref 3.5–5.2)
ANION GAP (SMH): 7 MMOL/L (ref 8–16)
BACTERIA #/AREA URNS AUTO: ABNORMAL /HPF
BASOPHILS NFR BLD MANUAL: 0 %
BILIRUB UR QL STRIP.AUTO: NEGATIVE
BUN SERPL-MCNC: 34 MG/DL (ref 6–20)
CALCIUM SERPL-MCNC: 8.2 MG/DL (ref 8.7–10.5)
CHLORIDE SERPL-SCNC: 109 MMOL/L (ref 95–110)
CLARITY UR: ABNORMAL
CO2 SERPL-SCNC: 25 MMOL/L (ref 23–29)
COLOR UR AUTO: YELLOW
CREAT SERPL-MCNC: 2.6 MG/DL (ref 0.5–1.4)
EOSINOPHIL NFR BLD MANUAL: 5 % (ref 0–8)
ERYTHROCYTE [DISTWIDTH] IN BLOOD BY AUTOMATED COUNT: 14.9 % (ref 11.5–14.5)
GFR SERPLBLD CREATININE-BSD FMLA CKD-EPI: 22 ML/MIN/1.73/M2
GLUCOSE SERPL-MCNC: 125 MG/DL (ref 70–110)
GLUCOSE UR QL STRIP: NEGATIVE
HCT VFR BLD AUTO: 25.9 % (ref 37–48.5)
HGB BLD-MCNC: 8 GM/DL (ref 12–16)
HGB UR QL STRIP: ABNORMAL
KETONES UR QL STRIP: NEGATIVE
LEUKOCYTE ESTERASE UR QL STRIP: ABNORMAL
LYMPHOCYTES NFR BLD MANUAL: 25 % (ref 18–48)
MAGNESIUM SERPL-MCNC: 1.6 MG/DL (ref 1.6–2.6)
MCH RBC QN AUTO: 32.3 PG (ref 27–31)
MCHC RBC AUTO-ENTMCNC: 30.9 G/DL (ref 32–36)
MCV RBC AUTO: 104 FL (ref 82–98)
MICROSCOPIC COMMENT: ABNORMAL
MONOCYTES NFR BLD MANUAL: 18 % (ref 4–15)
NEUTROPHILS NFR BLD MANUAL: 52 % (ref 38–73)
NITRITE UR QL STRIP: NEGATIVE
NUCLEATED RBC (/100WBC) (SMH): 0 /100 WBC
PH UR STRIP: 5 [PH]
PHOSPHATE SERPL-MCNC: 3.2 MG/DL (ref 2.7–4.5)
PLATELET # BLD AUTO: 221 K/UL (ref 150–450)
PLATELET BLD QL SMEAR: ABNORMAL
PMV BLD AUTO: 8.8 FL (ref 9.2–12.9)
POTASSIUM SERPL-SCNC: 4.4 MMOL/L (ref 3.5–5.1)
PROT UR QL STRIP: ABNORMAL
RBC # BLD AUTO: 2.48 M/UL (ref 4–5.4)
RBC #/AREA URNS AUTO: 48 /HPF
SODIUM SERPL-SCNC: 141 MMOL/L (ref 136–145)
SP GR UR STRIP: 1.01
SQUAMOUS #/AREA URNS AUTO: 25 /HPF
TACROLIMUS BLD-MCNC: 8.1 NG/ML (ref 5–15)
UROBILINOGEN UR STRIP-ACNC: NEGATIVE EU/DL
WBC # BLD AUTO: 1.26 K/UL (ref 3.9–12.7)
WBC #/AREA URNS AUTO: 53 /HPF

## 2025-06-30 PROCEDURE — 80197 ASSAY OF TACROLIMUS: CPT

## 2025-06-30 PROCEDURE — 80069 RENAL FUNCTION PANEL: CPT

## 2025-06-30 PROCEDURE — 85025 COMPLETE CBC W/AUTO DIFF WBC: CPT

## 2025-06-30 PROCEDURE — 36415 COLL VENOUS BLD VENIPUNCTURE: CPT

## 2025-06-30 PROCEDURE — 83735 ASSAY OF MAGNESIUM: CPT

## 2025-06-30 PROCEDURE — 81003 URINALYSIS AUTO W/O SCOPE: CPT

## 2025-06-30 NOTE — TELEPHONE ENCOUNTER
Discussed all of the below with pt. Feels fine. .4.     ----- Message from Andrea Adhikari MD sent at 6/30/2025  9:23 AM CDT -----  Leukopenia severe. Urine unlikely clean catch and has not grown bacteria from culture since transplant except for multiple organisms in groups likely contaminates. Current urine needs to reflex to   culture and if she has any sick symptoms then she will need to be seen. If no sick symptoms can be managed outpatient and if ANC<500 will need 3 days gCSF. Hold myfortic for 5 days, Bactrim for 5   days, Valgan for 7 days.  Repeat CBC with diff, CMP, mag, phos, UA, tac trough in 1 week. Other labs acceptable. Tac pending.  ----- Message -----  From: Lab, Background User  Sent: 6/30/2025   9:04 AM CDT  To: Andrea Adhikari Jr., MD

## 2025-07-01 ENCOUNTER — LAB VISIT (OUTPATIENT)
Dept: LAB | Facility: HOSPITAL | Age: 50
End: 2025-07-01
Attending: STUDENT IN AN ORGANIZED HEALTH CARE EDUCATION/TRAINING PROGRAM
Payer: MEDICARE

## 2025-07-01 ENCOUNTER — RESULTS FOLLOW-UP (OUTPATIENT)
Dept: TRANSPLANT | Facility: HOSPITAL | Age: 50
End: 2025-07-01

## 2025-07-01 DIAGNOSIS — Z94.0 KIDNEY REPLACED BY TRANSPLANT: ICD-10-CM

## 2025-07-01 LAB
BACTERIA #/AREA URNS AUTO: NORMAL /HPF
BILIRUB UR QL STRIP.AUTO: NEGATIVE
CLARITY UR: CLEAR
COLOR UR AUTO: YELLOW
GLUCOSE UR QL STRIP: NEGATIVE
HGB UR QL STRIP: ABNORMAL
HYALINE CASTS UR QL AUTO: 1 /LPF (ref 0–1)
KETONES UR QL STRIP: NEGATIVE
LEUKOCYTE ESTERASE UR QL STRIP: NEGATIVE
MICROSCOPIC COMMENT: NORMAL
NITRITE UR QL STRIP: NEGATIVE
PH UR STRIP: 5 [PH]
PROT UR QL STRIP: ABNORMAL
RBC #/AREA URNS AUTO: 2 /HPF
SP GR UR STRIP: 1.02
SQUAMOUS #/AREA URNS AUTO: 3 /HPF
UROBILINOGEN UR STRIP-ACNC: NEGATIVE EU/DL
WBC #/AREA URNS AUTO: 3 /HPF

## 2025-07-01 PROCEDURE — 81003 URINALYSIS AUTO W/O SCOPE: CPT

## 2025-07-01 NOTE — PROGRESS NOTES
Kidney Post-Transplant Assessment    Referring Physician: Samuel Knutson  Current Nephrologist: Tam Mathis    ORGAN: RIGHT KIDNEY  Donor Type: donation after brain death  PHS Increased Risk: no  Cold Ischemia: 625 mins  Induction Medications: thymo    Subjective:   The patient location is: [place]  The chief complaint leading to consultation is: Reassessment of renal allograft function and management of chronic immunosuppression.    Visit type: audiovisual    Face to Face time with patient:   30 minutes of total time spent on the encounter, which includes face to face time and non-face to face time preparing to see the patient (eg, review of tests), Obtaining and/or reviewing separately obtained history, Documenting clinical information in the electronic or other health record, Independently interpreting results (not separately reported) and communicating results to the patient/family/caregiver, or Care coordination (not separately reported).     Each patient to whom he or she provides medical services by telemedicine is:  (1) informed of the relationship between the physician and patient and the respective role of any other health care provider with respect to management of the patient; and (2) notified that he or she may decline to receive medical services by telemedicine and may withdraw from such care at any time.    CC:  Reassessment of renal allograft function and management of chronic immunosuppression.    HPI:  Ms. Reyer is a 50 y.o. 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 Her most recent creatinine is 2.6. She takes prednisone and tacrolimus for maintenance immunosuppression. Myfortic on hold for neutropenia. Her post transplant course has been complicated by delayed  "graft function requiring dialysis and anemia requiring blood transfusion.    Hospital Course 6/17-6/24:    Patient transferred from OSH due to abdominal pain, FOREST, and diarrhea with CT concerning for pyelonephritis. Blood cx and Urine cx here unremarkable. Pt treated for Pyelonephritis w Zosyn x7 days. Pt denies dysuria, making more Urine and w pushing fluids, cr down as low as 2.8. Cr peaked as high as 5.2 this admission. CT A/P also showed gallbladder wall thickening and fast stranding. Dedicated RUQ US also showed mobile stones and sludge. Neg Mathews's sign. Pt w ongoing n/NBNB emesis, post prandial pain and diarrhea. Stool studies including c diff NEG. Trial of bentyl started 6/20. Pt was evaluated by GI. EGD 6/23 w unremarkable esophagus, stomach and duodenum. Pt states nausea has subsided past 3 days and she notes stools are more formed and less frequent. She continues to note intermittent abdominal pain- improved w taking Oxy and drinking milk to "coat her stomach" . Patient is tolerating diet, able to drink at least 2L of water a day and other liquids. She is otherwise independent in room.  Of note, hgb ~7.4-9.7 post txp. Hgb 7.1 during hospital stay. Anemia likely multifactorial, kidney recovering, last epo 6/17, multiple lab draws. Iron studies last checked 5/5/25 and not low, folate 11.7 and B12 570. Pt w no overt signs of bleeding. Transfused 1u PRBC 6/24 prior to discharge. Wound care consulted on admit for small area of transplant incision open. Wound care recommending medi honey. Pt reports her and her  can do wound care. While in patient, Hygroton d/c'd. Pt required Potassium replacement. BP well managed w Coreg and Clonidine. Hygroton not resumed on discharge.       Interval History 7/2/2025:  Doing well since hospital discharge. She initially had resolution of GI upset but then ate spicy gumbo which bothered her stomach. Diarrhea resolved. She tries to drink water and fluids throughout the " day. No problems with urination. Home BP at goal. Feels more swollen and bloated, planning to restart chlorthalidone 12.5 mg daily with a whole dose tonight. No CP or SOB. No fevers or sick symptoms. She is currently holding myfortic, bactrim, and valcyte for neutropenia and aware of plans to restart. Eating well and energy levels continue to improve.     Current Medications[1]    Past Medical History:   Diagnosis Date    Allergy     Anemia     Anxiety     Depression     Diabetes mellitus, type 2     Dialysis patient     Disorder of kidney and ureter     Hyperlipidemia     Hypertension     Obesity     Pyelonephritis, acute 6/18/2025       Review of Systems   Constitutional:  Positive for fatigue. Negative for activity change and fever.   Eyes:  Negative for visual disturbance.   Respiratory:  Negative for shortness of breath.    Cardiovascular:  Negative for chest pain and leg swelling.   Gastrointestinal:  Positive for abdominal distention (bloating). Negative for constipation, diarrhea and nausea.   Genitourinary:  Negative for difficulty urinating, frequency and hematuria.   Musculoskeletal:  Negative for arthralgias and myalgias.   Allergic/Immunologic: Positive for immunocompromised state.   Neurological:  Negative for weakness and numbness.   Psychiatric/Behavioral:  Negative for sleep disturbance. The patient is not nervous/anxious.      Objective:   There were no vitals taken for this visit.body mass index is unknown because there is no height or weight on file.    Physical Exam - VIRTUAL VISIT    Labs:  Lab Results   Component Value Date    WBC 1.26 (LL) 06/30/2025    HGB 8.0 (L) 06/30/2025    HCT 25.9 (L) 06/30/2025     06/30/2025    K 4.4 06/30/2025     06/30/2025    CO2 25 06/30/2025    BUN 34 (H) 06/30/2025    CREATININE 2.6 (H) 06/30/2025    EGFRNORACEVR 22 (L) 06/30/2025    CALCIUM 8.2 (L) 06/30/2025    PHOS 3.2 06/30/2025    MG 1.6 06/30/2025    ALBUMIN 2.8 (L) 06/30/2025    AST 7 (L)  06/19/2025    ALT 6 (L) 06/19/2025    .6 (H) 04/24/2025    TACROLIMUS 8.1 06/30/2025     Labs were reviewed with the patient    Assessment:     1. Kidney replaced by transplant    2. Long-term use of immunosuppressant medication    3. Essential (primary) hypertension    4. Metabolic acidosis    5. Neutropenia, unspecified type    6. At risk for opportunistic infections      Plan:   Restarted chlorthalidone 12.5 mg HS with plans for full dose tonight  Aware to restart myfortic, valcyte, and bactrim after hold dates for neutropenia  Aware to notify of fevers or sick symptoms      1. Immunosuppression: Prograf trough 8.1, which is therapeutic. Continue Prograf 8/8 and Prednisone 5 mg QD. Myfortic on hold for 5 days for neutropenia (starting 6/30). Will continue to monitor for drug toxicities    2. Allograft Function: DGF, last HD 4/28. Cr labile but trending down, continue good oral hydration.   - ESRD secondary to diabetic nephropathy s/p donation after brain death kidney transplant on 4/20/25 (CIT 10hrs 25 mins, cPRA 94%)   - post transplant course  complicated by delayed graft function requiring dialysis and anemia requiring blood transfusions   - allograft biopsy 6/4/2025: diffuse ATI, severe arteriosclerosis, donor derived, no rejection   Lab Results   Component Value Date    CREATININE 2.6 (H) 06/30/2025       3. Hypertension management:   - advise low salt diet and home BP monitoring    - coreg 6.25 mg BID, chlorthalidone 12.5 mg HS  - on clonidine 0.2 mg HS for night sweats    4. Acidosis   - improving, decrease sodium bicarbonate 650 mg BID       5. Neutropenia   - hold myfortic for 5 days, Bactrim for 5 days, Valgan for 7 days starting 6/30   - planning for 3 days gCSF if ANC < 500      6. Anemia: s/p 1 unit RBC during recent hospitalization  Lab Results   Component Value Date    WBC 1.26 (LL) 06/30/2025    HGB 8.0 (L) 06/30/2025    HCT 25.9 (L) 06/30/2025     (H) 06/30/2025      06/30/2025         7. Proteinuria: continue p/c ratio as per guidelines     8. BK virus infection screening:  BK PCR per protocol    - last PCR not detected    9. Weight education: provided during the clinic visit   There is no height or weight on file to calculate BMI.     10. Patient safety education regarding immunosuppression including prophylaxis posttransplant for CMV, PCP   - PCP PROPHYLAXIS: Bactrim until 10/20/25 (HOLDING FOR NEUTROPENIA)   - CMV PROPHYLAXIS: Valcyte until 7/20/25 (HOLDING FOR NEUTROPENIA)       Follow-up:   Clinic: return to transplant clinic weekly for the first month after transplant; every 2 weeks during months 2-3; then at 6-, 9-, 12-, 18-, 24-, and 36- months post-transplant to reassess for complications from immunosuppression toxicity and monitor for rejection.  Annually thereafter.    Labs: since patient remains at high risk for rejection and drug-related complications that warrant close monitoring, labs will be ordered as follows: continue twice weekly CBC, renal panel, and drug level for first month; then same labs once weekly through 3rd month post-transplant.  Urine for UA and protein/creatinine ratio monthly.  Serum BK - PCR at 1-, 3-, 6-, 9-, 12-, 18-, 24-, 36-, 48-, and 60 months post-transplant.  Hepatic panel at 1-, 2-, 3-, 6-, 9-, 12-, 18-, 24-, and 36- months post-transplant.    Birgit Romano NP            [1]   Current Outpatient Medications   Medication Sig Dispense Refill    ALPRAZolam (XANAX) 0.5 MG tablet Take 0.5 mg by mouth 3 (three) times daily.      busPIRone (BUSPAR) 5 MG Tab Take 5 mg by mouth 2 (two) times daily.      carvediloL (COREG) 6.25 MG tablet Take 1 tablet (6.25 mg total) by mouth 2 (two) times daily. 60 tablet 11    cloNIDine (CATAPRES) 0.1 MG tablet Take 2 tablets (0.2 mg total) by mouth every evening. 60 tablet 11    dicyclomine (BENTYL) 10 MG capsule Take 1 capsule (10 mg total) by mouth 3 (three) times daily as needed (abdominal pain). 90  "capsule 3    gabapentin (NEURONTIN) 100 MG capsule Take 100 mg by mouth 3 (three) times daily.      loperamide (IMODIUM) 2 mg capsule Take 1 capsule (2 mg total) by mouth 4 (four) times daily as needed for Diarrhea. 180 capsule 0    magnesium oxide (MAG-OX) 400 mg (241.3 mg magnesium) tablet Take 1 tablet (400 mg total) by mouth 2 (two) times daily. 60 tablet 11    magnesium oxide (MAG-OX) 400 mg (241.3 mg magnesium) tablet Take 1 tablet (400 mg total) by mouth 2 (two) times daily. 60 tablet 11    montelukast (SINGULAIR) 10 mg tablet Take 10 mg by mouth nightly.      multivitamin Tab Take 1 tablet by mouth once daily.      mycophenolate sodium 180 MG TbEC Take 3 tablets (540 mg total) by mouth 2 (two) times daily. 180 tablet 11    pantoprazole (PROTONIX) 40 MG tablet Take 1 tablet (40 mg total) by mouth once daily. 90 tablet 3    pen needle, diabetic 32 gauge x 5/32" Ndle Inject 1 Needle into the skin 3 (three) times daily with meals. 100 each 11    pravastatin (PRAVACHOL) 40 MG tablet Take 1 tablet (40 mg total) by mouth once daily. 90 tablet 3    predniSONE (DELTASONE) 5 MG tablet Take 1 tablet (5 mg total) by mouth once daily. 30 tablet 11    sodium bicarbonate 650 MG tablet Take 2 tablets (1,300 mg total) by mouth 3 (three) times daily. 180 tablet 11    [Paused] sodium zirconium cyclosilicate (LOKELMA) 10 gram packet Take 10 g by mouth. Mix entire contents of packet(s) into drinking glass containing 3 tablespoons of water; stir well and drink immediately. Add water and repeat until no powder remains to receive entire dose. (Patient not taking: Reported on 5/14/2025)      sulfamethoxazole-trimethoprim 400-80mg (BACTRIM,SEPTRA) 400-80 mg per tablet Take 1 tablet by mouth 3 (three) times a week. Stop 10/20/25 12 tablet 5    tacrolimus (PROGRAF) 1 MG Cap Take 8 capsules (8 mg total) by mouth every 12 (twelve) hours. 540 capsule 11    [Paused] tirzepatide (MOUNJARO) 2.5 mg/0.5 mL PnIj Inject 2.5 mg into the skin " every 7 days. (Patient not taking: Reported on 5/14/2025)      traZODone (DESYREL) 50 MG tablet Take 50 mg by mouth every evening.      valGANciclovir (VALCYTE) 450 mg Tab Take 1 tablet (450 mg total) by mouth 3 (three) times a week. Stop 7/20/25 12 tablet 2    vilazodone (VIIBRYD) 10 mg Tab tablet Take 4 tablets (40 mg total) by mouth once daily.       Current Facility-Administered Medications   Medication Dose Route Frequency Provider Last Rate Last Admin    epoetin kaity injection 10,000 Units  10,000 Units Subcutaneous 1 time in Clinic/HOD Andrea Adhikari Jr., MD

## 2025-07-01 NOTE — PHYSICIAN QUERY
Please further specify the stage of chronic kidney disease (CKD):  Chronic kidney disease (CKD) stage 4 - (eGFR 15-29)

## 2025-07-02 ENCOUNTER — OFFICE VISIT (OUTPATIENT)
Dept: TRANSPLANT | Facility: CLINIC | Age: 50
End: 2025-07-02
Payer: MEDICARE

## 2025-07-02 ENCOUNTER — PATIENT MESSAGE (OUTPATIENT)
Dept: TRANSPLANT | Facility: CLINIC | Age: 50
End: 2025-07-02

## 2025-07-02 DIAGNOSIS — Z94.0 KIDNEY REPLACED BY TRANSPLANT: Primary | ICD-10-CM

## 2025-07-02 DIAGNOSIS — Z79.60 LONG-TERM USE OF IMMUNOSUPPRESSANT MEDICATION: ICD-10-CM

## 2025-07-02 DIAGNOSIS — E87.20 METABOLIC ACIDOSIS: ICD-10-CM

## 2025-07-02 DIAGNOSIS — I10 ESSENTIAL (PRIMARY) HYPERTENSION: ICD-10-CM

## 2025-07-02 DIAGNOSIS — Z91.89 AT RISK FOR OPPORTUNISTIC INFECTIONS: ICD-10-CM

## 2025-07-02 DIAGNOSIS — D70.9 NEUTROPENIA, UNSPECIFIED TYPE: ICD-10-CM

## 2025-07-02 RX ORDER — CHLORTHALIDONE 25 MG/1
12.5 TABLET ORAL NIGHTLY
Qty: 45 TABLET | Refills: 3 | Status: SHIPPED | OUTPATIENT
Start: 2025-07-02 | End: 2026-07-02

## 2025-07-02 RX ORDER — SODIUM BICARBONATE 650 MG/1
650 TABLET ORAL 2 TIMES DAILY
Qty: 60 TABLET | Refills: 11 | Status: SHIPPED | OUTPATIENT
Start: 2025-07-02 | End: 2026-07-02

## 2025-07-02 NOTE — LETTER
July 2, 2025        Tam Mathis  [address]  [facility] [address]  Phone: [phone]  Fax: [phone]             [facility] 1st Fl  [address]  [facility] [address]  Phone: [phone]   Patient: Brande Reyer   MR Number: [id number]   YOB: 1975   Date of Visit: 7/2/2025       Dear Dr. Tam Mathis    Thank you for referring Brande Reyer to me for evaluation. Attached you will find relevant portions of my assessment and plan of care.    If you have questions, please do not hesitate to call me. I look forward to following Brande Reyer along with you.    Sincerely,    Birgit Romano, LAZARO    Enclosure    If you would like to receive this communication electronically, please contact [email or web address] or [phone] to request [employer] access.    [employer] is a tool which provides read-only access to select patient information with whom you have a relationship. Its easy to use and provides real time access to review your patients record including encounter summaries, notes, results, and demographic information.    If you feel you have received this communication in error or would no longer like to receive these types of communications, please e-mail [email or web address]

## 2025-07-07 ENCOUNTER — LAB VISIT (OUTPATIENT)
Dept: LAB | Facility: HOSPITAL | Age: 50
End: 2025-07-07
Attending: STUDENT IN AN ORGANIZED HEALTH CARE EDUCATION/TRAINING PROGRAM
Payer: MEDICARE

## 2025-07-07 ENCOUNTER — TELEPHONE (OUTPATIENT)
Dept: TRANSPLANT | Facility: CLINIC | Age: 50
End: 2025-07-07
Payer: MEDICARE

## 2025-07-07 DIAGNOSIS — Z94.0 KIDNEY REPLACED BY TRANSPLANT: ICD-10-CM

## 2025-07-07 DIAGNOSIS — R19.7 DIARRHEA, UNSPECIFIED TYPE: Primary | ICD-10-CM

## 2025-07-07 LAB
ALBUMIN SERPL-MCNC: 2.8 G/DL (ref 3.5–5.2)
ANION GAP (SMH): 9 MMOL/L (ref 8–16)
BUN SERPL-MCNC: 35 MG/DL (ref 6–20)
CALCIUM SERPL-MCNC: 8.5 MG/DL (ref 8.7–10.5)
CHLORIDE SERPL-SCNC: 105 MMOL/L (ref 95–110)
CO2 SERPL-SCNC: 26 MMOL/L (ref 23–29)
CREAT SERPL-MCNC: 3.5 MG/DL (ref 0.5–1.4)
EOSINOPHIL NFR BLD MANUAL: 1 % (ref 0–8)
ERYTHROCYTE [DISTWIDTH] IN BLOOD BY AUTOMATED COUNT: 14.5 % (ref 11.5–14.5)
GFR SERPLBLD CREATININE-BSD FMLA CKD-EPI: 15 ML/MIN/1.73/M2
GLUCOSE SERPL-MCNC: 118 MG/DL (ref 70–110)
HCT VFR BLD AUTO: 29.8 % (ref 37–48.5)
HGB BLD-MCNC: 9.1 GM/DL (ref 12–16)
LYMPHOCYTES NFR BLD MANUAL: 5 % (ref 18–48)
MAGNESIUM SERPL-MCNC: 1.6 MG/DL (ref 1.6–2.6)
MCH RBC QN AUTO: 31.8 PG (ref 27–31)
MCHC RBC AUTO-ENTMCNC: 30.5 G/DL (ref 32–36)
MCV RBC AUTO: 104 FL (ref 82–98)
METAMYELOCYTES NFR BLD MANUAL: 2 %
MONOCYTES NFR BLD MANUAL: 4 % (ref 4–15)
NEUTROPHILS NFR BLD MANUAL: 82 % (ref 38–73)
NEUTS BAND NFR BLD MANUAL: 6 %
NUCLEATED RBC (/100WBC) (SMH): 0 /100 WBC
PHOSPHATE SERPL-MCNC: 2.6 MG/DL (ref 2.7–4.5)
PLATELET # BLD AUTO: 236 K/UL (ref 150–450)
PMV BLD AUTO: 9 FL (ref 9.2–12.9)
POTASSIUM SERPL-SCNC: 4 MMOL/L (ref 3.5–5.1)
RBC # BLD AUTO: 2.86 M/UL (ref 4–5.4)
SODIUM SERPL-SCNC: 140 MMOL/L (ref 136–145)
WBC # BLD AUTO: 8.49 K/UL (ref 3.9–12.7)

## 2025-07-07 PROCEDURE — 80197 ASSAY OF TACROLIMUS: CPT

## 2025-07-07 PROCEDURE — 36415 COLL VENOUS BLD VENIPUNCTURE: CPT

## 2025-07-07 PROCEDURE — 85025 COMPLETE CBC W/AUTO DIFF WBC: CPT

## 2025-07-07 PROCEDURE — 80069 RENAL FUNCTION PANEL: CPT

## 2025-07-07 PROCEDURE — 83735 ASSAY OF MAGNESIUM: CPT

## 2025-07-07 NOTE — TELEPHONE ENCOUNTER
GI referral for EGD. Send GI pcr pathogen panel to rule out infection. Check CMV with next labs. Increase pantoprazole to 40 mg bid.

## 2025-07-08 ENCOUNTER — RESULTS FOLLOW-UP (OUTPATIENT)
Dept: TRANSPLANT | Facility: HOSPITAL | Age: 50
End: 2025-07-08
Payer: MEDICARE

## 2025-07-08 DIAGNOSIS — Z76.89 ENCOUNTER FOR NEW MEDICATION PRESCRIPTION: ICD-10-CM

## 2025-07-08 DIAGNOSIS — Z94.0 STATUS POST DECEASED-DONOR KIDNEY TRANSPLANTATION: ICD-10-CM

## 2025-07-08 DIAGNOSIS — Z79.60 LONG-TERM USE OF IMMUNOSUPPRESSANT MEDICATION: ICD-10-CM

## 2025-07-08 DIAGNOSIS — Z94.0 KIDNEY REPLACED BY TRANSPLANT: Primary | ICD-10-CM

## 2025-07-08 LAB — TACROLIMUS BLD-MCNC: 11 NG/ML (ref 5–15)

## 2025-07-08 RX ORDER — PANTOPRAZOLE SODIUM 40 MG/1
40 TABLET, DELAYED RELEASE ORAL 2 TIMES DAILY
Qty: 180 TABLET | Refills: 3 | Status: SHIPPED | OUTPATIENT
Start: 2025-07-08 | End: 2026-07-08

## 2025-07-08 NOTE — TELEPHONE ENCOUNTER
Pt stated she is still having diarrhea that improved but now is back to how it was. Pt know she needs to supply a sample but the rain inhibited her from going to the lab. Pt aware to make FK dose change. ----- Message from Andrea Adhikari MD sent at 7/8/2025 12:49 PM CDT -----  Tac high. If true tac trough, decrease dose from 8/8 to 7/6 and repeat in 1-2 weeks. Creatinine elevated. If having diarrhea and volume depletion, we can give IV fluids, but I believe she was   complaining about edema so I dont think that is the issue. Diarrhea dress in previous message with GI referral, stool PCR, and increasing PPI. Please make sure urinating without issue and obtain   another kidney ultrasound this week and repeat labs at the end of the week. Start belatacept workup for CNI toxicity sparing.   ----- Message -----  From: Lab, Background User  Sent: 7/7/2025   9:13 AM CDT  To: Andrea Adhikari Jr., MD

## 2025-07-09 ENCOUNTER — HOSPITAL ENCOUNTER (OUTPATIENT)
Dept: RADIOLOGY | Facility: HOSPITAL | Age: 50
Discharge: HOME | End: 2025-07-09
Attending: STUDENT IN AN ORGANIZED HEALTH CARE EDUCATION/TRAINING PROGRAM
Payer: MEDICARE

## 2025-07-09 DIAGNOSIS — Z94.0 KIDNEY REPLACED BY TRANSPLANT: Primary | ICD-10-CM

## 2025-07-09 DIAGNOSIS — Z94.0 KIDNEY REPLACED BY TRANSPLANT: ICD-10-CM

## 2025-07-09 PROCEDURE — 76776 US EXAM K TRANSPL W/DOPPLER: CPT | Mod: TC

## 2025-07-09 PROCEDURE — 76776 US EXAM K TRANSPL W/DOPPLER: CPT | Mod: 26,,, | Performed by: RADIOLOGY

## 2025-07-09 RX ORDER — TACROLIMUS 1 MG/1
CAPSULE ORAL
Qty: 540 CAPSULE | Refills: 11 | Status: ACTIVE | OUTPATIENT
Start: 2025-07-09

## 2025-07-09 NOTE — PROGRESS NOTES
Subjective:       Patient ID: Brande Reyer is a 50 y.o. female Body mass index is 37.88 kg/m².    Chief Complaint: Diarrhea (Stomach burns above belly button every time the pt eats anything. Then she has to run to the restroom. )    This patient is new to me.  Referring Provider: Dr. Andrea Adhikari* for chronic diarrhea.     Diarrhea started about 2 weeks ago.     Is on myfortic (give or take a few weeks since swapping from [employer])    No recent GI stool studies.       Review of Systems   Constitutional:  Negative for activity change, appetite change, fatigue, fever and unexpected weight change.   HENT:  Negative for sore throat and trouble swallowing.    Respiratory:  Negative for cough and shortness of breath.    Cardiovascular:  Negative for chest pain.   Gastrointestinal:  Positive for abdominal pain and diarrhea. Negative for abdominal distention, anal bleeding, blood in stool, constipation, nausea, rectal pain and vomiting.       No LMP recorded. Patient has had a hysterectomy.  Past Medical History:   Diagnosis Date    Allergy     Anemia     Anxiety     Depression     Diabetes mellitus, type 2     Dialysis patient     Disorder of kidney and ureter     Hyperlipidemia     Hypertension     Obesity     Pyelonephritis, acute 2025     Past Surgical History:   Procedure Laterality Date    AV FISTULA PLACEMENT  2022     SECTION      COLONOSCOPY N/A 2023    Procedure: COLONOSCOPY;  Surgeon: Hermilo Orozco III, MD;  Location: [facility];  Service: Endoscopy;  Laterality: N/A;    COLONOSCOPY N/A 2024    Procedure: COLONOSCOPY;  Surgeon: Hermilo Orozco III, MD;  Location: [facility];  Service: Endoscopy;  Laterality: N/A;    ESOPHAGOGASTRODUODENOSCOPY N/A 2025    Procedure: EGD (ESOPHAGOGASTRODUODENOSCOPY);  Surgeon: Pasha Cervantes MD;  Location: [facility] ([address] [place] [address]);  Service: Endoscopy;  Laterality: N/A;    HYSTERECTOMY      INSERTION OF DIALYSIS CATHETER      KIDNEY  TRANSPLANT N/A 4/19/2025    Procedure: TRANSPLANT, KIDNEY;  Surgeon: Josh Cameron Jr., MD;  Location: [facility] OR [address] [place] [address];  Service: Transplant;  Laterality: N/A;  Out of Ice 0030  Reperfusion 0102    TUBAL LIGATION       Family History   Problem Relation Name Age of Onset    Heart disease Father      No Known Problems Daughter       Social History[1]  Wt Readings from Last 10 Encounters:   07/14/25 100.1 kg (220 lb 10.9 oz)   06/24/25 98.4 kg (216 lb 14.4 oz)   06/17/25 97.5 kg (215 lb)   06/04/25 97.8 kg (215 lb 9.8 oz)   05/21/25 102.9 kg (226 lb 13.7 oz)   05/16/25 108 kg (238 lb)   05/14/25 108.3 kg (238 lb 12.1 oz)   05/14/25 108.3 kg (238 lb 12.1 oz)   05/14/25 108.3 kg (238 lb 12.1 oz)   05/14/25 108.5 kg (239 lb 3.2 oz)     Lab Results   Component Value Date    WBC 14.23 (H) 07/14/2025    HGB 9.2 (L) 07/14/2025    HCT 30.7 (L) 07/14/2025     (H) 07/14/2025     07/14/2025     CMP  Sodium   Date Value Ref Range Status   07/14/2025 140 136 - 145 mmol/L Final     Potassium   Date Value Ref Range Status   07/14/2025 3.5 3.5 - 5.1 mmol/L Final     Chloride   Date Value Ref Range Status   07/14/2025 108 95 - 110 mmol/L Final     CO2   Date Value Ref Range Status   07/14/2025 25 23 - 29 mmol/L Final     Glucose   Date Value Ref Range Status   07/14/2025 125 (H) 70 - 110 mg/dL Final     BUN   Date Value Ref Range Status   07/14/2025 37 (H) 6 - 20 mg/dL Final   06/24/2025 43 (H) 6 - 20 mg/dL Final     Creatinine   Date Value Ref Range Status   07/14/2025 3.0 (H) 0.5 - 1.4 mg/dL Final   06/24/2025 2.8 (H) 0.5 - 1.4 mg/dL Final     Calcium   Date Value Ref Range Status   07/14/2025 8.2 (L) 8.7 - 10.5 mg/dL Final     Protein Total   Date Value Ref Range Status   06/19/2025 5.1 (L) 6.0 - 8.4 gm/dL Final   06/17/2025 6.6 6.0 - 8.4 gm/dL Final     Albumin   Date Value Ref Range Status   07/14/2025 2.5 (L) 3.5 - 5.2 g/dL Final     Bilirubin Total   Date Value Ref Range Status   06/19/2025 0.3 0.1 - 1.0 mg/dL  "Final     Comment:     For infants and newborns, interpretation of results should be based   on gestational age, weight and in agreement with clinical   observations.    Premature Infant recommended reference ranges:   0-24 hours:  <8.0 mg/dL   24-48 hours: <12.0 mg/dL   3-5 days:    <15.0 mg/dL   6-29 days:   <15.0 mg/dL   06/17/2025 0.5 0.1 - 1.0 mg/dL Final     Comment:     For infants and newborns, interpretation of results should be based   on gestational age, weight and in agreement with clinical   observations.    Premature Infant recommended reference ranges:   0-24 hours:  <8.0 mg/dL   24-48 hours: <12.0 mg/dL   3-5 days:    <15.0 mg/dL   6-29 days:   <15.0 mg/dL     ALP   Date Value Ref Range Status   06/19/2025 59 40 - 150 unit/L Final   06/17/2025 69 55 - 135 unit/L Final     AST   Date Value Ref Range Status   06/19/2025 7 (L) 11 - 45 unit/L Final   06/17/2025 7 (L) 10 - 40 unit/L Final     ALT   Date Value Ref Range Status   06/19/2025 6 (L) 10 - 44 unit/L Final   06/17/2025 5 (L) 10 - 44 unit/L Final     Anion Gap   Date Value Ref Range Status   07/14/2025 7 (L) 8 - 16 mmol/L Final   06/24/2025 7 (L) 8 - 16 mmol/L Final     Lab Results   Component Value Date    AMYLASE 29 06/19/2025     Lab Results   Component Value Date    LIPASE 28 06/19/2025     No results found for: "LIPASERES"  No results found for: "TSH"    Reviewed prior medical records including radiology report of GI PCR Path Panel 7/9/25 & endoscopy history (see surgical history).    Objective:      Physical Exam  Vitals and nursing note reviewed.   Constitutional:       General: She is not in acute distress.     Appearance: She is obese. She is not ill-appearing.   HENT:      Head: Normocephalic and atraumatic.      Mouth/Throat:      Mouth: Mucous membranes are moist.      Pharynx: Oropharynx is clear.   Eyes:      Conjunctiva/sclera: Conjunctivae normal.   Cardiovascular:      Rate and Rhythm: Normal rate and regular rhythm.      Pulses: " Normal pulses.   Pulmonary:      Effort: Pulmonary effort is normal. No respiratory distress.   Abdominal:      General: Abdomen is flat. There is no distension.      Palpations: Abdomen is soft.      Tenderness: There is no abdominal tenderness.   Skin:     General: Skin is warm and dry.      Capillary Refill: Capillary refill takes 2 to 3 seconds.   Neurological:      Mental Status: She is alert and oriented to person, place, and time.         Assessment:       1. Diarrhea, unspecified type    2. Abdominal pain, unspecified abdominal location        Plan:       Diarrhea, unspecified type  X-ray to r/o constipation with overflow  Stool studies to rule out infection/insufficient pancreatic enzymes   -     Pancreatic elastase, fecal; Future; Expected date: 07/14/2025  -     Giardia / Cryptosporidum, EIA; Future; Expected date: 07/14/2025  -     Stool Exam-Ova,Cysts,Parasites; Future; Expected date: 07/14/2025  -     Clostridium difficile EIA; Future; Expected date: 07/14/2025  -     Stool culture; Future; Expected date: 07/14/2025  -     Gastrointestinal Pathogens Panel, PCR; Future; Expected date: 07/14/2025  -     X-Ray Abdomen AP 1 View; Future; Expected date: 07/14/2025    Abdominal pain, unspecified abdominal location  Increase bentyl to 20 mg before each meal and at bedtime to assist with cramping and slowing diarrhea    I sent in a prescription for bentyl (antispasmodic of the gi tract) take as directed prn abdominal pain/cramping and can help slow down how fast food moves through gi tract (may help with diarrhea)     -     dicyclomine (BENTYL) 20 mg tablet; Take 1 tablet (20 mg total) by mouth 4 (four) times daily before meals and nightly.  Dispense: 120 tablet; Refill: 3      Follow up if symptoms worsen or fail to improve.      If no improvement in symptoms or symptoms worsen, call/follow-up at clinic or go to ER.       VIRY Mullen, SANTINOC    Encounter includes face to face time and non-face to face  time preparing to see the patient (eg, review of tests), obtaining and/or reviewing separately obtained history, documenting clinical information in the electronic or other health record, independently interpreting results (not separately reported) and communicating results to the patient/family/caregiver, or care coordination (not separately reported).     A dictation software program was used for this note. Please expect some simple typographical errors in this note.         [1]   Social History  Tobacco Use    Smoking status: Former     Current packs/day: 0.00     Types: Cigarettes     Quit date: 2022     Years since quitting: 3.5    Smokeless tobacco: Never   Substance Use Topics    Alcohol use: Not Currently    Drug use: Never       Infant (Birth)

## 2025-07-09 NOTE — TELEPHONE ENCOUNTER
----- Message from Andrea Adhikari MD sent at 7/8/2025 12:49 PM CDT -----  Tac high. If true tac trough, decrease dose from 8/8 to 7/6 and repeat in 1-2 weeks. Creatinine elevated. If having diarrhea and volume depletion, we can give IV fluids, but I believe she was   complaining about edema so I dont think that is the issue. Diarrhea dress in previous message with GI referral, stool PCR, and increasing PPI. Please make sure urinating without issue and obtain   another kidney ultrasound this week and repeat labs at the end of the week. Start belatacept workup for CNI toxicity sparing.   ----- Message -----  From: Lab, Background User  Sent: 7/7/2025   9:13 AM CDT  To: Andrea Adhikari Jr., MD

## 2025-07-10 ENCOUNTER — TELEPHONE (OUTPATIENT)
Dept: TRANSPLANT | Facility: CLINIC | Age: 50
End: 2025-07-10
Payer: MEDICARE

## 2025-07-10 DIAGNOSIS — Z94.0 KIDNEY REPLACED BY TRANSPLANT: Primary | ICD-10-CM

## 2025-07-10 RX ORDER — CEFPODOXIME PROXETIL 100 MG/1
200 TABLET, FILM COATED ORAL DAILY
Qty: 14 TABLET | Refills: 0 | Status: SHIPPED | OUTPATIENT
Start: 2025-07-10 | End: 2025-07-17

## 2025-07-14 ENCOUNTER — HOSPITAL ENCOUNTER (OUTPATIENT)
Dept: RADIOLOGY | Facility: HOSPITAL | Age: 50
Discharge: HOME | End: 2025-07-14
Payer: MEDICARE

## 2025-07-14 ENCOUNTER — OFFICE VISIT (OUTPATIENT)
Dept: GASTROENTEROLOGY | Facility: CLINIC | Age: 50
End: 2025-07-14
Payer: MEDICARE

## 2025-07-14 VITALS
HEIGHT: 64 IN | SYSTOLIC BLOOD PRESSURE: 131 MMHG | BODY MASS INDEX: 37.68 KG/M2 | HEART RATE: 88 BPM | WEIGHT: 220.69 LBS | DIASTOLIC BLOOD PRESSURE: 63 MMHG

## 2025-07-14 DIAGNOSIS — R19.7 DIARRHEA, UNSPECIFIED TYPE: ICD-10-CM

## 2025-07-14 DIAGNOSIS — R19.7 DIARRHEA, UNSPECIFIED TYPE: Primary | ICD-10-CM

## 2025-07-14 DIAGNOSIS — R10.9 ABDOMINAL PAIN, UNSPECIFIED ABDOMINAL LOCATION: ICD-10-CM

## 2025-07-14 PROCEDURE — 3008F BODY MASS INDEX DOCD: CPT | Mod: CPTII,S$GLB,,

## 2025-07-14 PROCEDURE — 3044F HG A1C LEVEL LT 7.0%: CPT | Mod: CPTII,S$GLB,,

## 2025-07-14 PROCEDURE — 1159F MED LIST DOCD IN RCRD: CPT | Mod: CPTII,S$GLB,,

## 2025-07-14 PROCEDURE — 3066F NEPHROPATHY DOC TX: CPT | Mod: CPTII,S$GLB,,

## 2025-07-14 PROCEDURE — 99999 PR PBB SHADOW E&M-EST. PATIENT-LVL V: CPT | Mod: PBBFAC,,,

## 2025-07-14 PROCEDURE — 74018 RADEX ABDOMEN 1 VIEW: CPT | Mod: 26,,, | Performed by: RADIOLOGY

## 2025-07-14 PROCEDURE — 1111F DSCHRG MED/CURRENT MED MERGE: CPT | Mod: CPTII,S$GLB,,

## 2025-07-14 PROCEDURE — 3078F DIAST BP <80 MM HG: CPT | Mod: CPTII,S$GLB,,

## 2025-07-14 PROCEDURE — 74018 RADEX ABDOMEN 1 VIEW: CPT | Mod: TC

## 2025-07-14 PROCEDURE — 99214 OFFICE O/P EST MOD 30 MIN: CPT | Mod: S$GLB,,,

## 2025-07-14 PROCEDURE — 3075F SYST BP GE 130 - 139MM HG: CPT | Mod: CPTII,S$GLB,,

## 2025-07-14 RX ORDER — DICYCLOMINE HYDROCHLORIDE 20 MG/1
20 TABLET ORAL
Qty: 120 TABLET | Refills: 3 | Status: SHIPPED | OUTPATIENT
Start: 2025-07-14 | End: 2025-11-11

## 2025-07-21 ENCOUNTER — LAB VISIT (OUTPATIENT)
Dept: LAB | Facility: HOSPITAL | Age: 50
End: 2025-07-21
Attending: STUDENT IN AN ORGANIZED HEALTH CARE EDUCATION/TRAINING PROGRAM
Payer: MEDICARE

## 2025-07-21 DIAGNOSIS — A49.8 CLOSTRIDIOIDES DIFFICILE INFECTION: Primary | ICD-10-CM

## 2025-07-21 DIAGNOSIS — R19.7 DIARRHEA, UNSPECIFIED TYPE: ICD-10-CM

## 2025-07-21 DIAGNOSIS — Z94.0 KIDNEY REPLACED BY TRANSPLANT: ICD-10-CM

## 2025-07-21 LAB
ABSOLUTE EOSINOPHIL (SMH): 0.11 K/UL
ABSOLUTE MONOCYTE (SMH): 0.41 K/UL (ref 0.3–1)
ABSOLUTE NEUTROPHIL COUNT (SMH): 8.8 K/UL (ref 1.8–7.7)
ADV 40+41 DNA STL QL NAA+NON-PROBE: NOT DETECTED
ALBUMIN SERPL-MCNC: 2.5 G/DL (ref 3.5–5.2)
ANION GAP (SMH): 9 MMOL/L (ref 8–16)
ASTRO TYP 1-8 RNA STL QL NAA+NON-PROBE: NOT DETECTED
BASOPHILS # BLD AUTO: 0.06 K/UL
BASOPHILS NFR BLD AUTO: 0.6 %
BUN SERPL-MCNC: 35 MG/DL (ref 6–20)
C CAYETANENSIS DNA STL QL NAA+NON-PROBE: NOT DETECTED
C COLI+JEJ+UPSA DNA STL QL NAA+NON-PROBE: NOT DETECTED
C DIFF GDH STL QL: POSITIVE
C DIFF TOX A+B STL QL IA: POSITIVE
CALCIUM SERPL-MCNC: 8.3 MG/DL (ref 8.7–10.5)
CHLORIDE SERPL-SCNC: 107 MMOL/L (ref 95–110)
CO2 SERPL-SCNC: 26 MMOL/L (ref 23–29)
CREAT SERPL-MCNC: 2.6 MG/DL (ref 0.5–1.4)
CRYPTOSP DNA STL QL NAA+NON-PROBE: NOT DETECTED
E HISTOLYT DNA STL QL NAA+NON-PROBE: NOT DETECTED
EAEC PAA PLAS AGGR+AATA ST NAA+NON-PRB: NOT DETECTED
EC STX1+STX2 GENES STL QL NAA+NON-PROBE: NOT DETECTED
EPEC EAE GENE STL QL NAA+NON-PROBE: NOT DETECTED
ERYTHROCYTE [DISTWIDTH] IN BLOOD BY AUTOMATED COUNT: 14.6 % (ref 11.5–14.5)
ETEC LTA+ST1A+ST1B TOX ST NAA+NON-PROBE: NOT DETECTED
G LAMBLIA DNA STL QL NAA+NON-PROBE: NOT DETECTED
GFR SERPLBLD CREATININE-BSD FMLA CKD-EPI: 22 ML/MIN/1.73/M2
GLUCOSE SERPL-MCNC: 123 MG/DL (ref 70–110)
HCT VFR BLD AUTO: 29.5 % (ref 37–48.5)
HGB BLD-MCNC: 8.9 GM/DL (ref 12–16)
IMM GRANULOCYTES # BLD AUTO: 0.1 K/UL (ref 0–0.04)
IMM GRANULOCYTES NFR BLD AUTO: 1 % (ref 0–0.5)
LYMPHOCYTES # BLD AUTO: 0.58 K/UL (ref 1–4.8)
MAGNESIUM SERPL-MCNC: 2.1 MG/DL (ref 1.6–2.6)
MCH RBC QN AUTO: 32.1 PG (ref 27–31)
MCHC RBC AUTO-ENTMCNC: 30.2 G/DL (ref 32–36)
MCV RBC AUTO: 107 FL (ref 82–98)
NOROVIRUS GI+II RNA STL QL NAA+NON-PROBE: NOT DETECTED
NUCLEATED RBC (/100WBC) (SMH): 0 /100 WBC
P SHIGELLOIDES DNA STL QL NAA+NON-PROBE: NOT DETECTED
PHOSPHATE SERPL-MCNC: 2.4 MG/DL (ref 2.7–4.5)
PLATELET # BLD AUTO: 249 K/UL (ref 150–450)
PMV BLD AUTO: 8.7 FL (ref 9.2–12.9)
POTASSIUM SERPL-SCNC: 3.8 MMOL/L (ref 3.5–5.1)
RBC # BLD AUTO: 2.77 M/UL (ref 4–5.4)
RELATIVE EOSINOPHIL (SMH): 1.1 % (ref 0–8)
RELATIVE LYMPHOCYTE (SMH): 5.7 % (ref 18–48)
RELATIVE MONOCYTE (SMH): 4.1 % (ref 4–15)
RELATIVE NEUTROPHIL (SMH): 87.5 % (ref 38–73)
RVA RNA STL QL NAA+NON-PROBE: NOT DETECTED
S ENT+BONG DNA STL QL NAA+NON-PROBE: NOT DETECTED
SAPO I+II+IV+V RNA STL QL NAA+NON-PROBE: NOT DETECTED
SHIGELLA SP+EIEC IPAH ST NAA+NON-PROBE: NOT DETECTED
SODIUM SERPL-SCNC: 142 MMOL/L (ref 136–145)
TACROLIMUS BLD-MCNC: 7.1 NG/ML (ref 5–15)
V CHOL+PARA+VUL DNA STL QL NAA+NON-PROBE: NOT DETECTED
V CHOLERAE DNA STL QL NAA+NON-PROBE: NOT DETECTED
WBC # BLD AUTO: 10.1 K/UL (ref 3.9–12.7)
Y ENTEROCOL DNA STL QL NAA+NON-PROBE: NOT DETECTED

## 2025-07-21 PROCEDURE — 87427 SHIGA-LIKE TOXIN AG IA: CPT | Mod: 59

## 2025-07-21 PROCEDURE — 87046 STOOL CULTR AEROBIC BACT EA: CPT | Mod: 91

## 2025-07-21 PROCEDURE — 87329 GIARDIA AG IA: CPT | Mod: 59

## 2025-07-21 PROCEDURE — 87507 IADNA-DNA/RNA PROBE TQ 12-25: CPT

## 2025-07-21 PROCEDURE — 87328 CRYPTOSPORIDIUM AG IA: CPT | Mod: 59

## 2025-07-21 PROCEDURE — 80197 ASSAY OF TACROLIMUS: CPT

## 2025-07-21 PROCEDURE — 83735 ASSAY OF MAGNESIUM: CPT

## 2025-07-21 PROCEDURE — 80197 ASSAY OF TACROLIMUS: CPT | Mod: 59

## 2025-07-21 PROCEDURE — 87449 NOS EACH ORGANISM AG IA: CPT

## 2025-07-21 PROCEDURE — 82653 EL-1 FECAL QUANTITATIVE: CPT

## 2025-07-21 PROCEDURE — 87046 STOOL CULTR AEROBIC BACT EA: CPT | Mod: 59

## 2025-07-21 PROCEDURE — 36415 COLL VENOUS BLD VENIPUNCTURE: CPT

## 2025-07-21 PROCEDURE — 87209 SMEAR COMPLEX STAIN: CPT

## 2025-07-21 PROCEDURE — 82040 ASSAY OF SERUM ALBUMIN: CPT

## 2025-07-21 PROCEDURE — 85025 COMPLETE CBC W/AUTO DIFF WBC: CPT

## 2025-07-21 RX ORDER — VANCOMYCIN HYDROCHLORIDE 125 MG/1
125 CAPSULE ORAL EVERY 6 HOURS
Qty: 56 CAPSULE | Refills: 0 | Status: SHIPPED | OUTPATIENT
Start: 2025-07-21 | End: 2025-08-04

## 2025-07-22 ENCOUNTER — PATIENT MESSAGE (OUTPATIENT)
Dept: TRANSPLANT | Facility: CLINIC | Age: 50
End: 2025-07-22
Payer: MEDICARE

## 2025-07-22 LAB — BACTERIA STL CULT: NORMAL

## 2025-07-22 NOTE — PROGRESS NOTES
Kidney Post-Transplant Assessment    Referring Physician: Saumel Knutson  Current Nephrologist: Tam Mathis    ORGAN: RIGHT KIDNEY  Donor Type: donation after brain death  PHS Increased Risk: no  Cold Ischemia: 625 mins  Induction Medications: thymo    Subjective:   The patient location is: [place]  The chief complaint leading to consultation is: Kidney Post-Transplant Assessment    Visit type: audiovisual    Face to Face time with patient:   30 minutes of total time spent on the encounter, which includes face to face time and non-face to face time preparing to see the patient (eg, review of tests), Obtaining and/or reviewing separately obtained history, Documenting clinical information in the electronic or other health record, Independently interpreting results (not separately reported) and communicating results to the patient/family/caregiver, or Care coordination (not separately reported).     Each patient to whom he or she provides medical services by telemedicine is:  (1) informed of the relationship between the physician and patient and the respective role of any other health care provider with respect to management of the patient; and (2) notified that he or she may decline to receive medical services by telemedicine and may withdraw from such care at any time.      CC:  Reassessment of renal allograft function and management of chronic immunosuppression.    HPI:  Ms. Reyer is a 50 y.o. 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 Her most recent creatinine is 2.6. She takes mycophenolic acid, prednisone, and tacrolimus for maintenance immunosuppression. Her post transplant course has been complicated by delayed graft function requiring dialysis and anemia requiring blood  "transfusion.    Hospital Course 6/17-6/24:    Patient transferred from OSH due to abdominal pain, FOREST, and diarrhea with CT concerning for pyelonephritis. Blood cx and Urine cx here unremarkable. Pt treated for Pyelonephritis w Zosyn x7 days. Pt denies dysuria, making more Urine and w pushing fluids, cr down as low as 2.8. Cr peaked as high as 5.2 this admission. CT A/P also showed gallbladder wall thickening and fast stranding. Dedicated RUQ US also showed mobile stones and sludge. Neg Mathews's sign. Pt w ongoing n/NBNB emesis, post prandial pain and diarrhea. Stool studies including c diff NEG. Trial of bentyl started 6/20. Pt was evaluated by GI. EGD 6/23 w unremarkable esophagus, stomach and duodenum. Pt states nausea has subsided past 3 days and she notes stools are more formed and less frequent. She continues to note intermittent abdominal pain- improved w taking Oxy and drinking milk to "coat her stomach" . Patient is tolerating diet, able to drink at least 2L of water a day and other liquids. She is otherwise independent in room.  Of note, hgb ~7.4-9.7 post txp. Hgb 7.1 during hospital stay. Anemia likely multifactorial, kidney recovering, last epo 6/17, multiple lab draws. Iron studies last checked 5/5/25 and not low, folate 11.7 and B12 570. Pt w no overt signs of bleeding. Transfused 1u PRBC 6/24 prior to discharge. Wound care consulted on admit for small area of transplant incision open. Wound care recommending medi honey. Pt reports her and her  can do wound care. While in patient, Hygroton d/c'd. Pt required Potassium replacement. BP well managed w Coreg and Clonidine. Hygroton not resumed on discharge.       Interval History 7/2/2025:  Doing well since hospital discharge. She initially had resolution of GI upset but then ate spicy gumbo which bothered her stomach. Diarrhea resolved. She tries to drink water and fluids throughout the day. No problems with urination. Home BP at goal. Feels more " swollen and bloated, planning to restart chlorthalidone 12.5 mg daily with a whole dose tonight. No CP or SOB. No fevers or sick symptoms. She is currently holding myfortic, bactrim, and valcyte for neutropenia and aware of plans to restart. Eating well and energy levels continue to improve.     Interval History 7/23/2025:  Now 3 months post transplant and doing well. Saw GI 7/14 for diarrhea and did stool studies which resulted with c diff, currently on 2 weeks vancomycin. She reports diarrhea has improved significantly. She and her family have been mindful of washing hands with soap and water and she disinfects the toilet after using it. She has upcoming trip for a [employer] tour before her youngest returns to school. No problems with urination. Home BP at goal. Weight down to pre transplant weight but still feels bloated in her belly. Tolerating IS without issue, no nausea or vomiting.     Current Medications[1]    Past Medical History:   Diagnosis Date    Allergy     Anemia     Anxiety     Depression     Diabetes mellitus, type 2     Dialysis patient     Disorder of kidney and ureter     Hyperlipidemia     Hypertension     Obesity     Pyelonephritis, acute 6/18/2025       Review of Systems   Constitutional:  Positive for fatigue. Negative for activity change and fever.   Eyes:  Negative for visual disturbance.   Respiratory:  Negative for shortness of breath.    Cardiovascular:  Negative for chest pain and leg swelling.   Gastrointestinal:  Positive for abdominal distention (bloating) and diarrhea (improving). Negative for constipation and nausea.   Genitourinary:  Negative for difficulty urinating, frequency and hematuria.   Musculoskeletal:  Negative for arthralgias and myalgias.   Allergic/Immunologic: Positive for immunocompromised state.   Neurological:  Negative for weakness and numbness.   Psychiatric/Behavioral:  Negative for sleep disturbance. The patient is not nervous/anxious.      Objective:   There were  no vitals taken for this visit.body mass index is unknown because there is no height or weight on file.    Physical Exam - VIRTUAL VISIT    Labs:  Lab Results   Component Value Date    WBC 10.10 2025    HGB 8.9 (L) 2025    HCT 29.5 (L) 2025     2025    K 3.8 2025     2025    CO2 26 2025    BUN 35 (H) 2025    CREATININE 2.6 (H) 2025    EGFRNORACEVR 22 (L) 2025    CALCIUM 8.3 (L) 2025    PHOS 2.4 (L) 2025    MG 2.1 2025    ALBUMIN 2.5 (L) 2025    AST 7 (L) 2025    ALT 6 (L) 2025    .6 (H) 2025    TACROLIMUS 7.1 2025     Labs were reviewed with the patient    Assessment:     1. Status post -donor kidney transplantation    2. Long-term use of immunosuppressant medication    3. Clostridium difficile infection    4. Diabetic nephropathy associated with type 2 diabetes mellitus    5. Essential (primary) hypertension    6. At risk for opportunistic infections      Plan:   OK to space out labs to every 2 weeks      1. Immunosuppression: Prograf trough 7.1, which is therapeutic. Continue Prograf 7/6, MyF 540 mg BID, and Prednisone 5 mg QD.  Will continue to monitor for drug toxicities    2. Allograft Function: DGF, last HD . Cr labile but trending down, continue good oral hydration.   - ESRD secondary to diabetic nephropathy s/p donation after brain death kidney transplant on 25 (CIT 10hrs 25 mins, cPRA 94%)   - post transplant course  complicated by delayed graft function requiring dialysis and anemia requiring blood transfusions   - allograft biopsy 2025: diffuse ATI, severe arteriosclerosis, donor derived, no rejection   Lab Results   Component Value Date    CREATININE 2.6 (H) 2025       3. Hypertension management:   - advise low salt diet and home BP monitoring    - coreg 6.25 mg BID, chlorthalidone 12.5 mg HS  - on clonidine 0.2 mg HS for night sweats    4. Acidosis   -  resolved, DC sodium bicarbonate       5. Neutropenia   - resolved      6. Anemia: stable, no need for intervention   Lab Results   Component Value Date    WBC 10.10 07/21/2025    HGB 8.9 (L) 07/21/2025    HCT 29.5 (L) 07/21/2025     (H) 07/21/2025     07/21/2025         7. Proteinuria: continue p/c ratio as per guidelines     8. BK virus infection screening:  BK PCR per protocol    - last PCR not detected    9. Weight education: provided during the clinic visit   There is no height or weight on file to calculate BMI.     10. Patient safety education regarding immunosuppression including prophylaxis posttransplant for CMV, PCP   - PCP PROPHYLAXIS: Bactrim until 10/20/25    - CMV PROPHYLAXIS: Valcyte until 7/20/25 - completed       Follow-up:   Clinic: return to transplant clinic weekly for the first month after transplant; every 2 weeks during months 2-3; then at 6-, 9-, 12-, 18-, 24-, and 36- months post-transplant to reassess for complications from immunosuppression toxicity and monitor for rejection.  Annually thereafter.    Labs: since patient remains at high risk for rejection and drug-related complications that warrant close monitoring, labs will be ordered as follows: continue twice weekly CBC, renal panel, and drug level for first month; then same labs once weekly through 3rd month post-transplant.  Urine for UA and protein/creatinine ratio monthly.  Serum BK - PCR at 1-, 3-, 6-, 9-, 12-, 18-, 24-, 36-, 48-, and 60 months post-transplant.  Hepatic panel at 1-, 2-, 3-, 6-, 9-, 12-, 18-, 24-, and 36- months post-transplant.    Birgit Romano NP              [1]   Current Outpatient Medications   Medication Sig Dispense Refill    ALPRAZolam (XANAX) 0.5 MG tablet Take 0.5 mg by mouth 3 (three) times daily.      busPIRone (BUSPAR) 5 MG Tab Take 5 mg by mouth 2 (two) times daily.      carvediloL (COREG) 6.25 MG tablet Take 1 tablet (6.25 mg total) by mouth 2 (two) times daily. 60 tablet 11     "chlorthalidone (HYGROTEN) 25 MG Tab Take 1/2 tablet (12.5 mg total) by mouth every evening. 45 tablet 3    cloNIDine (CATAPRES) 0.1 MG tablet Take 2 tablets (0.2 mg total) by mouth every evening. 60 tablet 11    dicyclomine (BENTYL) 20 mg tablet Take 1 tablet (20 mg total) by mouth 4 (four) times daily before meals and nightly. 120 tablet 3    gabapentin (NEURONTIN) 100 MG capsule Take 100 mg by mouth 3 (three) times daily.      loperamide (IMODIUM) 2 mg capsule Take 1 capsule (2 mg total) by mouth 4 (four) times daily as needed for Diarrhea. 180 capsule 0    magnesium oxide (MAG-OX) 400 mg (241.3 mg magnesium) tablet Take 1 tablet (400 mg total) by mouth 2 (two) times daily. 60 tablet 11    magnesium oxide (MAG-OX) 400 mg (241.3 mg magnesium) tablet Take 1 tablet (400 mg total) by mouth 2 (two) times daily. 60 tablet 11    montelukast (SINGULAIR) 10 mg tablet Take 10 mg by mouth nightly.      multivitamin Tab Take 1 tablet by mouth once daily.      mycophenolate sodium 180 MG TbEC Take 3 tablets (540 mg total) by mouth 2 (two) times daily. 180 tablet 11    pantoprazole (PROTONIX) 40 MG tablet Take 1 tablet (40 mg total) by mouth 2 (two) times daily. 180 tablet 3    pen needle, diabetic 32 gauge x 5/32" Ndle Inject 1 Needle into the skin 3 (three) times daily with meals. 100 each 11    pravastatin (PRAVACHOL) 40 MG tablet Take 1 tablet (40 mg total) by mouth once daily. 90 tablet 3    predniSONE (DELTASONE) 5 MG tablet Take 1 tablet (5 mg total) by mouth once daily. 30 tablet 11    [Paused] sodium zirconium cyclosilicate (LOKELMA) 10 gram packet Take 10 g by mouth. Mix entire contents of packet(s) into drinking glass containing 3 tablespoons of water; stir well and drink immediately. Add water and repeat until no powder remains to receive entire dose. (Patient not taking: Reported on 7/14/2025)      sulfamethoxazole-trimethoprim 400-80mg (BACTRIM,SEPTRA) 400-80 mg per tablet Take 1 tablet by mouth 3 (three) times a " week. Stop 10/20/25 12 tablet 5    tacrolimus (PROGRAF) 1 MG Cap Take 7 capsules (7 mg total) by mouth every morning AND 6 capsules (6 mg total) every evening. 540 capsule 11    [Paused] tirzepatide (MOUNJARO) 2.5 mg/0.5 mL PnIj Inject 2.5 mg into the skin every 7 days. (Patient not taking: Reported on 7/14/2025)      traZODone (DESYREL) 50 MG tablet Take 50 mg by mouth every evening.      vancomycin (VANCOCIN) 125 MG capsule Take 1 capsule (125 mg total) by mouth every 6 (six) hours. for 14 days 56 capsule 0    vilazodone (VIIBRYD) 10 mg Tab tablet Take 4 tablets (40 mg total) by mouth once daily.       Current Facility-Administered Medications   Medication Dose Route Frequency Provider Last Rate Last Admin    epoetin kaity injection 10,000 Units  10,000 Units Subcutaneous 1 time in Clinic/HOD Andrea Adhikari Jr., MD

## 2025-07-23 ENCOUNTER — OFFICE VISIT (OUTPATIENT)
Dept: TRANSPLANT | Facility: CLINIC | Age: 50
End: 2025-07-23
Payer: MEDICARE

## 2025-07-23 DIAGNOSIS — Z94.0 STATUS POST DECEASED-DONOR KIDNEY TRANSPLANTATION: Primary | ICD-10-CM

## 2025-07-23 DIAGNOSIS — Z91.89 AT RISK FOR OPPORTUNISTIC INFECTIONS: ICD-10-CM

## 2025-07-23 DIAGNOSIS — I10 ESSENTIAL (PRIMARY) HYPERTENSION: ICD-10-CM

## 2025-07-23 DIAGNOSIS — A49.8 CLOSTRIDIUM DIFFICILE INFECTION: ICD-10-CM

## 2025-07-23 DIAGNOSIS — E11.21 DIABETIC NEPHROPATHY ASSOCIATED WITH TYPE 2 DIABETES MELLITUS: ICD-10-CM

## 2025-07-23 DIAGNOSIS — Z79.60 LONG-TERM USE OF IMMUNOSUPPRESSANT MEDICATION: ICD-10-CM

## 2025-07-23 LAB
C COLI+JEJ+UPSA DNA STL QL NAA+NON-PROBE: NEGATIVE
CRYPTOSP AG SPEC QL: NEGATIVE
G LAMBLIA AG STL QL IA: NEGATIVE

## 2025-07-23 PROCEDURE — 98006 SYNCH AUDIO-VIDEO EST MOD 30: CPT | Mod: 95,,, | Performed by: NURSE PRACTITIONER

## 2025-07-23 PROCEDURE — 1160F RVW MEDS BY RX/DR IN RCRD: CPT | Mod: CPTII,95,, | Performed by: NURSE PRACTITIONER

## 2025-07-23 PROCEDURE — 3044F HG A1C LEVEL LT 7.0%: CPT | Mod: CPTII,95,, | Performed by: NURSE PRACTITIONER

## 2025-07-23 PROCEDURE — 1159F MED LIST DOCD IN RCRD: CPT | Mod: CPTII,95,, | Performed by: NURSE PRACTITIONER

## 2025-07-23 PROCEDURE — 3066F NEPHROPATHY DOC TX: CPT | Mod: CPTII,95,, | Performed by: NURSE PRACTITIONER

## 2025-07-23 NOTE — LETTER
July 23, 2025        Tam Mathis  [address]  [facility] [address]  Phone: [phone]  Fax: [phone]             [facility]st [facility]  [address]  [facility] [address]  Phone: [phone]   Patient: Brande Reyer   MR Number: [id number]   YOB: 1975   Date of Visit: 7/23/2025       Dear Dr. Tam Mathis    Thank you for referring Brande Reyer to me for evaluation. Attached you will find relevant portions of my assessment and plan of care.    If you have questions, please do not hesitate to call me. I look forward to following Brande Reyer along with you.    Sincerely,    Birgit Romano, LAZARO    Enclosure    If you would like to receive this communication electronically, please contact [email or web address] or [phone] to request [employer] Link access.    [employer] Link is a tool which provides read-only access to select patient information with whom you have a relationship. Its easy to use and provides real time access to review your patients record including encounter summaries, notes, results, and demographic information.    If you feel you have received this communication in error or would no longer like to receive these types of communications, please e-mail [email or web address]

## 2025-07-24 LAB — ELASTASE PANC STL-MCNT: 259 MCG/G

## 2025-07-29 ENCOUNTER — PATIENT MESSAGE (OUTPATIENT)
Dept: TRANSPLANT | Facility: CLINIC | Age: 50
End: 2025-07-29
Payer: MEDICARE

## 2025-07-29 ENCOUNTER — TELEPHONE (OUTPATIENT)
Dept: TRANSPLANT | Facility: CLINIC | Age: 50
End: 2025-07-29
Payer: MEDICARE

## 2025-07-29 DIAGNOSIS — Z94.0 KIDNEY REPLACED BY TRANSPLANT: Primary | ICD-10-CM

## 2025-07-29 DIAGNOSIS — I10 BENIGN ESSENTIAL HTN: ICD-10-CM

## 2025-07-29 NOTE — TELEPHONE ENCOUNTER
Pt reports swelling to hands and feet after outdoor activity over the weekend. Swelling decreases after she elevates appendage. Pt reports taking diuretic nightly. Will send BP log over [employer] message.    Pt Bps 140s/70s, per Dr. Adhikari, instructed pt to increase chlorthalidrone to 25mg nightly.   Copied from CRM #[id number]. Topic: General Inquiry - Patient Advice  >> Jul 29, 2025  1:40 PM Hanh wrote:     Consult/Advisory     Name Of Caller:Brande Reyer         Contact Preference:[phone] (home)      Nature of call:Patient is calling to speak to Eva about her hands and feet swelling for the past two days. Requesting a call back

## 2025-07-30 RX ORDER — CHLORTHALIDONE 25 MG/1
25 TABLET ORAL NIGHTLY
Qty: 90 TABLET | Refills: 3 | Status: SHIPPED | OUTPATIENT
Start: 2025-07-30 | End: 2026-07-30

## 2025-08-01 ENCOUNTER — TELEPHONE (OUTPATIENT)
Dept: TRANSPLANT | Facility: CLINIC | Age: 50
End: 2025-08-01
Payer: MEDICARE

## 2025-08-01 DIAGNOSIS — Z94.0 KIDNEY REPLACED BY TRANSPLANT: Primary | ICD-10-CM

## 2025-08-01 DIAGNOSIS — I10 BENIGN ESSENTIAL HTN: ICD-10-CM

## 2025-08-01 DIAGNOSIS — R60.9 SWELLING: ICD-10-CM

## 2025-08-01 RX ORDER — FUROSEMIDE 40 MG/1
80 TABLET ORAL DAILY PRN
Qty: 60 TABLET | Refills: 0 | Status: SHIPPED | OUTPATIENT
Start: 2025-08-01 | End: 2025-08-31

## 2025-08-01 NOTE — TELEPHONE ENCOUNTER
Pt reports swelling to feet and hands, does improve with elevation. Discussed with Dr. Jenkins - instructed pt to take lasix 80 mg daily until the swelling decreases and hold the chlorthalidone in the interim. Will re-assess swelling Monday for continued need for lasix. Labs/urine scheduled Monday    Copied from CRM #[id number]. Topic: General Inquiry - Patient Advice  >> Aug 1, 2025  1:42 PM Melania wrote:  Type:  Same Day Appointment Request    Caller is requesting a same day appointment.   Name of Caller:Brande Reyer    When is the first available appointment?open    Symptoms:hands and feet are swelling, states it look like her toes are about to pop off  Best Call Back Number:[phone], requesting a call back.     Additional Information:  Pt is very concerned, would like to be seen today if possible.

## 2025-08-04 ENCOUNTER — LAB VISIT (OUTPATIENT)
Dept: LAB | Facility: HOSPITAL | Age: 50
End: 2025-08-04
Attending: STUDENT IN AN ORGANIZED HEALTH CARE EDUCATION/TRAINING PROGRAM
Payer: MEDICARE

## 2025-08-04 DIAGNOSIS — Z94.0 KIDNEY REPLACED BY TRANSPLANT: ICD-10-CM

## 2025-08-04 LAB
ABSOLUTE EOSINOPHIL (SMH): 0.09 K/UL
ABSOLUTE MONOCYTE (SMH): 0.32 K/UL (ref 0.3–1)
ABSOLUTE NEUTROPHIL COUNT (SMH): 4.5 K/UL (ref 1.8–7.7)
ALBUMIN SERPL-MCNC: 3.3 G/DL (ref 3.5–5.2)
ANION GAP (SMH): 8 MMOL/L (ref 8–16)
BASOPHILS # BLD AUTO: 0.05 K/UL
BASOPHILS NFR BLD AUTO: 0.9 %
BUN SERPL-MCNC: 34 MG/DL (ref 6–20)
CALCIUM SERPL-MCNC: 8.7 MG/DL (ref 8.7–10.5)
CHLORIDE SERPL-SCNC: 110 MMOL/L (ref 95–110)
CO2 SERPL-SCNC: 25 MMOL/L (ref 23–29)
CREAT SERPL-MCNC: 2.9 MG/DL (ref 0.5–1.4)
CREAT UR-MCNC: 87.8 MG/DL (ref 15–325)
ERYTHROCYTE [DISTWIDTH] IN BLOOD BY AUTOMATED COUNT: 14.6 % (ref 11.5–14.5)
GFR SERPLBLD CREATININE-BSD FMLA CKD-EPI: 19 ML/MIN/1.73/M2
GLUCOSE SERPL-MCNC: 120 MG/DL (ref 70–110)
HCT VFR BLD AUTO: 29.2 % (ref 37–48.5)
HGB BLD-MCNC: 8.7 GM/DL (ref 12–16)
IMM GRANULOCYTES # BLD AUTO: 0.04 K/UL (ref 0–0.04)
IMM GRANULOCYTES NFR BLD AUTO: 0.7 % (ref 0–0.5)
LYMPHOCYTES # BLD AUTO: 0.45 K/UL (ref 1–4.8)
MAGNESIUM SERPL-MCNC: 2 MG/DL (ref 1.6–2.6)
MCH RBC QN AUTO: 32.1 PG (ref 27–31)
MCHC RBC AUTO-ENTMCNC: 29.8 G/DL (ref 32–36)
MCV RBC AUTO: 108 FL (ref 82–98)
NUCLEATED RBC (/100WBC) (SMH): 0 /100 WBC
PHOSPHATE SERPL-MCNC: 4 MG/DL (ref 2.7–4.5)
PLATELET # BLD AUTO: 213 K/UL (ref 150–450)
PMV BLD AUTO: 9.3 FL (ref 9.2–12.9)
POTASSIUM SERPL-SCNC: 4.7 MMOL/L (ref 3.5–5.1)
PROT UR-MCNC: 18 MG/DL
PROT/CREAT UR: 0.21 MG/G{CREAT}
RBC # BLD AUTO: 2.71 M/UL (ref 4–5.4)
RELATIVE EOSINOPHIL (SMH): 1.6 % (ref 0–8)
RELATIVE LYMPHOCYTE (SMH): 8.2 % (ref 18–48)
RELATIVE MONOCYTE (SMH): 5.8 % (ref 4–15)
RELATIVE NEUTROPHIL (SMH): 82.8 % (ref 38–73)
SODIUM SERPL-SCNC: 143 MMOL/L (ref 136–145)
WBC # BLD AUTO: 5.49 K/UL (ref 3.9–12.7)

## 2025-08-04 PROCEDURE — 83735 ASSAY OF MAGNESIUM: CPT

## 2025-08-04 PROCEDURE — 36415 COLL VENOUS BLD VENIPUNCTURE: CPT

## 2025-08-04 PROCEDURE — 82570 ASSAY OF URINE CREATININE: CPT

## 2025-08-04 PROCEDURE — 80197 ASSAY OF TACROLIMUS: CPT

## 2025-08-04 PROCEDURE — 85025 COMPLETE CBC W/AUTO DIFF WBC: CPT

## 2025-08-04 PROCEDURE — 82947 ASSAY GLUCOSE BLOOD QUANT: CPT

## 2025-08-05 ENCOUNTER — RESULTS FOLLOW-UP (OUTPATIENT)
Dept: TRANSPLANT | Facility: CLINIC | Age: 50
End: 2025-08-05
Payer: MEDICARE

## 2025-08-05 DIAGNOSIS — Z79.60 LONG-TERM USE OF IMMUNOSUPPRESSANT MEDICATION: ICD-10-CM

## 2025-08-05 DIAGNOSIS — Z94.0 STATUS POST DECEASED-DONOR KIDNEY TRANSPLANTATION: ICD-10-CM

## 2025-08-05 LAB — TACROLIMUS BLD-MCNC: 5.9 NG/ML (ref 5–15)

## 2025-08-05 RX ORDER — TACROLIMUS 1 MG/1
CAPSULE ORAL
Qty: 540 CAPSULE | Refills: 11 | Status: ACTIVE | OUTPATIENT
Start: 2025-08-05

## 2025-08-05 NOTE — TELEPHONE ENCOUNTER
Message sent.   Labs 8/18    ----- Message from Andrea Adhikari MD sent at 8/5/2025 11:29 AM CDT -----  If true tac trough, increase dose from 7/6 to 7/7 and repeat trough in 1-2 weeks  ----- Message -----  From: Lab, Background User  Sent: 8/4/2025   8:28 AM CDT  To: Andrea Adhikari Jr., MD

## 2025-08-11 PROBLEM — R10.9 ABDOMINAL PAIN: Status: RESOLVED | Noted: 2025-06-24 | Resolved: 2025-08-11

## 2025-08-18 ENCOUNTER — LAB VISIT (OUTPATIENT)
Dept: LAB | Facility: HOSPITAL | Age: 50
End: 2025-08-18
Attending: STUDENT IN AN ORGANIZED HEALTH CARE EDUCATION/TRAINING PROGRAM
Payer: MEDICARE

## 2025-08-18 DIAGNOSIS — Z94.0 KIDNEY REPLACED BY TRANSPLANT: ICD-10-CM

## 2025-08-18 LAB
ABSOLUTE EOSINOPHIL (SMH): 0.15 K/UL
ABSOLUTE MONOCYTE (SMH): 0.43 K/UL (ref 0.3–1)
ABSOLUTE NEUTROPHIL COUNT (SMH): 4.3 K/UL (ref 1.8–7.7)
ALBUMIN SERPL-MCNC: 3.6 G/DL (ref 3.5–5.2)
ANION GAP (SMH): 8 MMOL/L (ref 8–16)
BASOPHILS # BLD AUTO: 0.03 K/UL
BASOPHILS NFR BLD AUTO: 0.5 %
BUN SERPL-MCNC: 35 MG/DL (ref 6–20)
CALCIUM SERPL-MCNC: 9.3 MG/DL (ref 8.7–10.5)
CHLORIDE SERPL-SCNC: 111 MMOL/L (ref 95–110)
CO2 SERPL-SCNC: 23 MMOL/L (ref 23–29)
CREAT SERPL-MCNC: 2.5 MG/DL (ref 0.5–1.4)
ERYTHROCYTE [DISTWIDTH] IN BLOOD BY AUTOMATED COUNT: 13.4 % (ref 11.5–14.5)
GFR SERPLBLD CREATININE-BSD FMLA CKD-EPI: 23 ML/MIN/1.73/M2
GLUCOSE SERPL-MCNC: 122 MG/DL (ref 70–110)
HCT VFR BLD AUTO: 30.6 % (ref 37–48.5)
HGB BLD-MCNC: 9.3 GM/DL (ref 12–16)
IMM GRANULOCYTES # BLD AUTO: 0.04 K/UL (ref 0–0.04)
IMM GRANULOCYTES NFR BLD AUTO: 0.7 % (ref 0–0.5)
LYMPHOCYTES # BLD AUTO: 0.52 K/UL (ref 1–4.8)
MAGNESIUM SERPL-MCNC: 1.6 MG/DL (ref 1.6–2.6)
MCH RBC QN AUTO: 32.2 PG (ref 27–31)
MCHC RBC AUTO-ENTMCNC: 30.4 G/DL (ref 32–36)
MCV RBC AUTO: 106 FL (ref 82–98)
NUCLEATED RBC (/100WBC) (SMH): 0 /100 WBC
PHOSPHATE SERPL-MCNC: 3 MG/DL (ref 2.7–4.5)
PLATELET # BLD AUTO: 181 K/UL (ref 150–450)
PMV BLD AUTO: 9.2 FL (ref 9.2–12.9)
POTASSIUM SERPL-SCNC: 4.8 MMOL/L (ref 3.5–5.1)
RBC # BLD AUTO: 2.89 M/UL (ref 4–5.4)
RELATIVE EOSINOPHIL (SMH): 2.7 % (ref 0–8)
RELATIVE LYMPHOCYTE (SMH): 9.5 % (ref 18–48)
RELATIVE MONOCYTE (SMH): 7.8 % (ref 4–15)
RELATIVE NEUTROPHIL (SMH): 78.8 % (ref 38–73)
SODIUM SERPL-SCNC: 142 MMOL/L (ref 136–145)
WBC # BLD AUTO: 5.49 K/UL (ref 3.9–12.7)

## 2025-08-18 PROCEDURE — 80197 ASSAY OF TACROLIMUS: CPT

## 2025-08-18 PROCEDURE — 36415 COLL VENOUS BLD VENIPUNCTURE: CPT

## 2025-08-18 PROCEDURE — 83735 ASSAY OF MAGNESIUM: CPT

## 2025-08-18 PROCEDURE — 85025 COMPLETE CBC W/AUTO DIFF WBC: CPT

## 2025-08-18 PROCEDURE — 82310 ASSAY OF CALCIUM: CPT

## 2025-08-19 LAB — TACROLIMUS BLD-MCNC: 6.7 NG/ML (ref 5–15)

## 2025-08-20 ENCOUNTER — TELEPHONE (OUTPATIENT)
Dept: TRANSPLANT | Facility: CLINIC | Age: 50
End: 2025-08-20
Payer: MEDICARE

## 2025-08-25 ENCOUNTER — PATIENT MESSAGE (OUTPATIENT)
Dept: TRANSPLANT | Facility: CLINIC | Age: 50
End: 2025-08-25
Payer: MEDICARE

## 2025-08-25 ENCOUNTER — TELEPHONE (OUTPATIENT)
Dept: TRANSPLANT | Facility: CLINIC | Age: 50
End: 2025-08-25
Payer: MEDICARE

## 2025-08-25 DIAGNOSIS — I10 BENIGN ESSENTIAL HTN: ICD-10-CM

## 2025-08-25 DIAGNOSIS — Z94.0 KIDNEY REPLACED BY TRANSPLANT: ICD-10-CM

## 2025-08-25 RX ORDER — CHLORTHALIDONE 25 MG/1
25 TABLET ORAL NIGHTLY
Qty: 90 TABLET | Refills: 3 | Status: SHIPPED | OUTPATIENT
Start: 2025-08-25 | End: 2026-08-25

## (undated) DEVICE — DRAPE SLUSH WARMER WITH DISC

## (undated) DEVICE — SPONGE LAP 18X18 PREWASHED

## (undated) DEVICE — TOWEL OR XRAY WHITE 17X26IN

## (undated) DEVICE — BLANKET WARMING UPPER BODY

## (undated) DEVICE — HEMOSTAT SURGICEL NU-KNIT 6X9

## (undated) DEVICE — SUT 3-0 12-18IN SILK

## (undated) DEVICE — STAPLER INT LINEAR ARTC 3.5-45

## (undated) DEVICE — SUT 2-0 12-18IN SILK

## (undated) DEVICE — GAUZE DRAIN N WVN 6PLY 4X4IN

## (undated) DEVICE — ELECTRODE MEGADYNE RETURN DUAL

## (undated) DEVICE — SUT 1 36IN PDS II VIO MONO

## (undated) DEVICE — TRAY CATH 1-LYR URIMTR 16FR

## (undated) DEVICE — SET IRR URLGY 2LINE UNIV SPIKE

## (undated) DEVICE — DRESSING ADH ISLAND 3.6 X 14

## (undated) DEVICE — ADHESIVE DERMABOND ADVANCED

## (undated) DEVICE — SYR 10CC LUER LOCK

## (undated) DEVICE — SUT 4-0 12-18IN SILK BLACK

## (undated) DEVICE — PAD SUREFIT GRND ELECTRD 10FT

## (undated) DEVICE — SUT PROLENE 5-0 36IN C-1

## (undated) DEVICE — PLUG CATHETER STERILE FOLEY

## (undated) DEVICE — SUT SILK 3-0 SH 18IN BLACK

## (undated) DEVICE — SUT SILK 2-0 STRANDS 30IN

## (undated) DEVICE — INSERTS STEALTH FIBRA SIZE 2

## (undated) DEVICE — STOCKINETTE 2INX36

## (undated) DEVICE — HANDSET ARGON PLUS

## (undated) DEVICE — BLANKET WARMING LOWER BODY

## (undated) DEVICE — FOLEY BLLN 20FR 3WAY 5CC

## (undated) DEVICE — SUT PROLENE 6-0 BV-1 30IN

## (undated) DEVICE — SUT PDS BV 6-0

## (undated) DEVICE — STAPLER SKIN PROXIMATE WIDE

## (undated) DEVICE — PACK KIDNEY TRANSPLANT CUSTOM

## (undated) DEVICE — CLIPPER BLADE MOD 4406 (CAREF)

## (undated) DEVICE — Device

## (undated) DEVICE — TIP YANKAUERS BULB NO VENT

## (undated) DEVICE — DRESSING ABSRBNT ISLAND 3.6X8

## (undated) DEVICE — SUT ETHILON 3-0 PS2 18 BLK

## (undated) DEVICE — SYR ONLY LUER LOCK 20CC

## (undated) DEVICE — SUT 5-0 PROLENE

## (undated) DEVICE — PUNCH AORTIC 4.8MM

## (undated) DEVICE — DECANTER FLUID TRNSF WHITE 9IN

## (undated) DEVICE — SUT SILK 3-0 STRANDS 30IN

## (undated) DEVICE — CART STAPLE RELD 45MM WHT

## (undated) DEVICE — PACK SET UP CONVERTORS